# Patient Record
Sex: FEMALE | Race: WHITE | NOT HISPANIC OR LATINO | Employment: OTHER | URBAN - NONMETROPOLITAN AREA
[De-identification: names, ages, dates, MRNs, and addresses within clinical notes are randomized per-mention and may not be internally consistent; named-entity substitution may affect disease eponyms.]

---

## 2017-01-25 ENCOUNTER — TELEPHONE (OUTPATIENT)
Dept: GASTROENTEROLOGY | Facility: CLINIC | Age: 57
End: 2017-01-25

## 2017-01-25 NOTE — TELEPHONE ENCOUNTER
I set patient up with screening colonoscopy. Can you please place the order for the colonoscopy and the [facility]? Thank you.

## 2017-01-26 ENCOUNTER — TELEPHONE (OUTPATIENT)
Dept: GASTROENTEROLOGY | Facility: CLINIC | Age: 57
End: 2017-01-26

## 2017-01-26 DIAGNOSIS — Z12.11 COLON CANCER SCREENING: Primary | ICD-10-CM

## 2017-02-21 ENCOUNTER — ANESTHESIA EVENT (OUTPATIENT)
Dept: PERIOP | Facility: HOSPITAL | Age: 57
End: 2017-02-21

## 2017-02-21 ENCOUNTER — ANESTHESIA (OUTPATIENT)
Dept: PERIOP | Facility: HOSPITAL | Age: 57
End: 2017-02-21

## 2017-02-21 ENCOUNTER — HOSPITAL ENCOUNTER (OUTPATIENT)
Facility: HOSPITAL | Age: 57
Setting detail: HOSPITAL OUTPATIENT SURGERY
Discharge: HOME | End: 2017-02-21
Attending: INTERNAL MEDICINE | Admitting: INTERNAL MEDICINE

## 2017-02-21 VITALS
RESPIRATION RATE: 20 BRPM | BODY MASS INDEX: 47.48 KG/M2 | HEART RATE: 69 BPM | HEIGHT: 62 IN | WEIGHT: 258 LBS | OXYGEN SATURATION: 96 % | SYSTOLIC BLOOD PRESSURE: 100 MMHG | DIASTOLIC BLOOD PRESSURE: 59 MMHG | TEMPERATURE: 97.8 F

## 2017-02-21 LAB — GLUCOSE BLDC GLUCOMTR-MCNC: 195 MG/DL (ref 70–130)

## 2017-02-21 PROCEDURE — 82962 GLUCOSE BLOOD TEST: CPT

## 2017-02-21 PROCEDURE — 25010000002 PROPOFOL 1000 MG/ML EMULSION: Performed by: NURSE ANESTHETIST, CERTIFIED REGISTERED

## 2017-02-21 PROCEDURE — 25010000002 PROPOFOL 10 MG/ML EMULSION: Performed by: NURSE ANESTHETIST, CERTIFIED REGISTERED

## 2017-02-21 PROCEDURE — G0121 COLON CA SCRN NOT HI RSK IND: HCPCS | Performed by: INTERNAL MEDICINE

## 2017-02-21 RX ORDER — IPRATROPIUM BROMIDE AND ALBUTEROL SULFATE 2.5; .5 MG/3ML; MG/3ML
3 SOLUTION RESPIRATORY (INHALATION) ONCE AS NEEDED
Status: DISCONTINUED | OUTPATIENT
Start: 2017-02-21 | End: 2017-02-21 | Stop reason: HOSPADM

## 2017-02-21 RX ORDER — OXYCODONE HYDROCHLORIDE AND ACETAMINOPHEN 5; 325 MG/1; MG/1
1 TABLET ORAL ONCE AS NEEDED
Status: DISCONTINUED | OUTPATIENT
Start: 2017-02-21 | End: 2017-02-21 | Stop reason: HOSPADM

## 2017-02-21 RX ORDER — SODIUM CHLORIDE 0.9 % (FLUSH) 0.9 %
1-10 SYRINGE (ML) INJECTION AS NEEDED
Status: DISCONTINUED | OUTPATIENT
Start: 2017-02-21 | End: 2017-02-21 | Stop reason: HOSPADM

## 2017-02-21 RX ORDER — LIDOCAINE HYDROCHLORIDE 20 MG/ML
INJECTION, SOLUTION INFILTRATION; PERINEURAL AS NEEDED
Status: DISCONTINUED | OUTPATIENT
Start: 2017-02-21 | End: 2017-02-21 | Stop reason: SURG

## 2017-02-21 RX ORDER — MEPERIDINE HYDROCHLORIDE 25 MG/ML
12.5 INJECTION INTRAMUSCULAR; INTRAVENOUS; SUBCUTANEOUS
Status: DISCONTINUED | OUTPATIENT
Start: 2017-02-21 | End: 2017-02-21 | Stop reason: HOSPADM

## 2017-02-21 RX ORDER — FENTANYL CITRATE 50 UG/ML
50 INJECTION, SOLUTION INTRAMUSCULAR; INTRAVENOUS
Status: DISCONTINUED | OUTPATIENT
Start: 2017-02-21 | End: 2017-02-21 | Stop reason: HOSPADM

## 2017-02-21 RX ORDER — SODIUM CHLORIDE, SODIUM LACTATE, POTASSIUM CHLORIDE, CALCIUM CHLORIDE 600; 310; 30; 20 MG/100ML; MG/100ML; MG/100ML; MG/100ML
125 INJECTION, SOLUTION INTRAVENOUS CONTINUOUS
Status: DISCONTINUED | OUTPATIENT
Start: 2017-02-21 | End: 2017-02-21 | Stop reason: HOSPADM

## 2017-02-21 RX ORDER — PROPOFOL 10 MG/ML
VIAL (ML) INTRAVENOUS AS NEEDED
Status: DISCONTINUED | OUTPATIENT
Start: 2017-02-21 | End: 2017-02-21 | Stop reason: SURG

## 2017-02-21 RX ORDER — ONDANSETRON 2 MG/ML
4 INJECTION INTRAMUSCULAR; INTRAVENOUS ONCE AS NEEDED
Status: DISCONTINUED | OUTPATIENT
Start: 2017-02-21 | End: 2017-02-21 | Stop reason: HOSPADM

## 2017-02-21 RX ADMIN — PROPOFOL 175 MCG/KG/MIN: 10 INJECTION, EMULSION INTRAVENOUS at 10:10

## 2017-02-21 RX ADMIN — PROPOFOL 100 MG: 10 INJECTION, EMULSION INTRAVENOUS at 10:10

## 2017-02-21 RX ADMIN — SODIUM CHLORIDE, POTASSIUM CHLORIDE, SODIUM LACTATE AND CALCIUM CHLORIDE 125 ML/HR: 600; 310; 30; 20 INJECTION, SOLUTION INTRAVENOUS at 09:34

## 2017-02-21 RX ADMIN — SODIUM CHLORIDE, POTASSIUM CHLORIDE, SODIUM LACTATE AND CALCIUM CHLORIDE: 600; 310; 30; 20 INJECTION, SOLUTION INTRAVENOUS at 10:06

## 2017-02-21 RX ADMIN — LIDOCAINE HYDROCHLORIDE 60 MG: 20 INJECTION, SOLUTION INFILTRATION; PERINEURAL at 10:10

## 2017-02-21 NOTE — H&P
"History and physical examination  February 21, 2017    HPI  56-year-old white female presents for screening colonoscopy.  Her last colonoscopy was performed greater than 10 years ago.  Family history is positive for colon cancer (sister).      Review of Systems   Negative and noncontributory.    ACTIVE PROBLEMS:   Specialty Problems     None          PAST MEDICAL HISTORY:  Past Medical History   Diagnosis Date   • Arthritis    • Diabetes mellitus    • Dyslipidemia    • History of transfusion    • Hyperlipidemia    • Hypertension    • Palpitations    • PONV (postoperative nausea and vomiting)        SURGICAL HISTORY:  Past Surgical History   Procedure Laterality Date   • Cholecystectomy     • Hysterectomy     • Skin graft     • Tubal abdominal ligation         FAMILY HISTORY:  Family History   Problem Relation Age of Onset   • Cancer Mother    • Heart attack Father    • Cancer Father        SOCIAL HISTORY:  Social History   Substance Use Topics   • Smoking status: Never Smoker   • Smokeless tobacco: Not on file   • Alcohol use No       CURRENT MEDICATION:    Current Facility-Administered Medications:   •  lactated ringers infusion, 125 mL/hr, Intravenous, Continuous, Malachi Wen MD, Last Rate: 125 mL/hr at 02/21/17 0934, 125 mL/hr at 02/21/17 0934  •  sodium chloride 0.9 % flush 1-10 mL, 1-10 mL, Intravenous, PRN, Malachi Wen MD     I have reviewed her list of current medications.    ALLERGIES:  Augmentin [amoxicillin-pot clavulanate] and Metformin and related    VISIT VITALS:  Visit Vitals   • /76 (BP Location: Right arm, Patient Position: Lying)   • Pulse 72   • Temp 97.8 °F (36.6 °C) (Oral)   • Resp 20   • Ht 62\" (157.5 cm)   • Wt 258 lb (117 kg)   • SpO2 97%   • BMI 47.19 kg/m2       PHYSICAL EXAMINATION:  Physical Exam  Alert, oriented ×3  HEENT: Normal  Neck: No mass  Chest: Clear  Heart: Regular rhythm  Abdomen: Soft, nontender, active BS  Extremities: No edema  Neuro: No focal " deficit    Assessment/Plan   Colon cancer screening    REC  Screening colonoscopy is planned.  The procedure, benefits, risks and alternatives were explained to the patient.         Socrates Price III, MD

## 2017-02-21 NOTE — ANESTHESIA POSTPROCEDURE EVALUATION
Patient: Zeina Reed    Procedure Summary     Date Anesthesia Start Anesthesia Stop Room / Location    02/21/17 1006 1026 [facility] 10 / [facility]       Procedure Diagnosis Surgeon Provider    COLONOSCOPY FOR SCREENING  CPTCODE:32593 (N/A ) Colon cancer screening  (Colon cancer screening [Z12.11]) MD Malachi Garvey III, MD          Anesthesia Type: general  Last vitals  /46 (02/21/17 1040)    Temp 97.8 °F (36.6 °C) (02/21/17 1030)    Pulse 77 (02/21/17 1040)   Resp 20 (02/21/17 1040)    SpO2 97 % (02/21/17 1040)      Post Anesthesia Care and Evaluation    Patient location during evaluation: PHASE II  Patient participation: complete - patient participated  Level of consciousness: awake and alert  Pain score: 1  Pain management: adequate  Airway patency: patent  Anesthetic complications: No anesthetic complications  PONV Status: controlled  Cardiovascular status: acceptable  Respiratory status: acceptable  Hydration status: acceptable

## 2017-02-21 NOTE — PLAN OF CARE
Problem: Patient Care Overview (Adult)  Goal: Plan of Care Review  Outcome: Ongoing (interventions implemented as appropriate)  Goal: Adult Individualization and Mutuality  Outcome: Ongoing (interventions implemented as appropriate)  Goal: Discharge Needs Assessment  Outcome: Ongoing (interventions implemented as appropriate)    02/21/17 0842   Discharge Needs Assessment   Concerns To Be Addressed denies needs/concerns at this time   Readmission Within The Last 30 Days no previous admission in last 30 days   Equipment Needed After Discharge none   Discharge Disposition home or self-care   Current Health   Anticipated Changes Related to Illness none   Self-Care   Equipment Currently Used at Home none   Living Environment   Transportation Available car;family or friend will provide         Problem: GI Endoscopy (Adult)  Goal: Signs and Symptoms of Listed Potential Problems Will be Absent or Manageable (GI Endoscopy)  Outcome: Ongoing (interventions implemented as appropriate)    02/21/17 0842   GI Endoscopy   Problems Assessed (GI Endoscopy) all   Problems Present (GI Endoscopy) none

## 2017-02-21 NOTE — ANESTHESIA PREPROCEDURE EVALUATION
Anesthesia Evaluation     Patient summary reviewed and Nursing notes reviewed   no history of anesthetic complications:  NPO Status: > 8 hours   Airway   Mallampati: II  TM distance: <3 FB  Neck ROM: full  no difficulty expected  Dental    (+) upper dentures and poor dentation    Pulmonary - negative pulmonary ROS and normal exam   (-) asthma, not a smoker  Cardiovascular - normal exam  Exercise tolerance: good (4-7 METS)    NYHA Classification: II  Patient on routine beta blocker and Beta blocker given within 24 hours of surgery    (+) hypertension, CAD, dysrhythmias,   (-) past MI, angina, CHF      Neuro/Psych  (+) dizziness/light headedness,    (-) seizures, CVA  GI/Hepatic/Renal/Endo    (+) morbid obesity, GERD, diabetes mellitus,   (-) hypothyroidism    Musculoskeletal     (-) back pain, neck pain, radiculopathy  Abdominal  - normal exam    Bowel sounds: normal.   Substance History - negative use     OB/GYN negative ob/gyn ROS         Other   (+) arthritis                                 Anesthesia Plan    ASA 3     general     intravenous induction   Anesthetic plan and risks discussed with patient and spouse/significant other.  Use of blood products discussed with patient and spouse/significant other  Consented to blood products.

## 2017-02-21 NOTE — OP NOTE
02/21/17    COLONOSCOPY FOR SCREENING  Procedure Note    Zeina Reed  2/21/2017    Pre-op Diagnosis: Family history of colon cancer, colon cancer screening, last colonoscopy was performed greater than 10 years ago      Post-op Diagnosis: Normal colonoscopy        Anesthesia: Per Anesthesia service, general anesthesia      Estimated Blood Loss: None      Findings: Normal rectal examination.  Colonoscopy was performed to the cecum, confirmed by identification of typical cecal anatomy.  Bowel preparation was fair but generally adequate .  The scope was retroflexed within the rectum.  All areas were examined carefully and no abnormality was seen.  She tolerated the examination very well and no complications were encountered.  She left the OR in stable and satisfactory condition.      Complications: None      Recommendations:   Findings discussed with the patient  Repeat screening/surveillance colonoscopy in about 5 years      Socrates Price III, MD     Date: 2/21/2017  Time: 10:27 AM

## 2017-04-18 ENCOUNTER — TRANSCRIBE ORDERS (OUTPATIENT)
Dept: ADMINISTRATIVE | Facility: HOSPITAL | Age: 57
End: 2017-04-18

## 2017-04-18 ENCOUNTER — LAB (OUTPATIENT)
Dept: LAB | Facility: HOSPITAL | Age: 57
End: 2017-04-18

## 2017-04-18 DIAGNOSIS — E11.9 DIABETES MELLITUS WITHOUT COMPLICATION (HCC): ICD-10-CM

## 2017-04-18 DIAGNOSIS — E78.5 HYPERLIPIDEMIA, UNSPECIFIED HYPERLIPIDEMIA TYPE: ICD-10-CM

## 2017-04-18 DIAGNOSIS — E11.9 DIABETES MELLITUS WITHOUT COMPLICATION (HCC): Primary | ICD-10-CM

## 2017-04-18 LAB
ALBUMIN SERPL-MCNC: 3.6 G/DL (ref 3.5–5)
ALBUMIN/GLOB SERPL: 1.4 G/DL (ref 1.5–2.5)
ALP SERPL-CCNC: 85 U/L (ref 35–104)
ALT SERPL W P-5'-P-CCNC: 35 U/L (ref 10–36)
ANION GAP SERPL CALCULATED.3IONS-SCNC: 4.6 MMOL/L (ref 3.6–11.2)
AST SERPL-CCNC: 20 U/L (ref 10–30)
BILIRUB SERPL-MCNC: 1.2 MG/DL (ref 0.2–1.8)
BUN BLD-MCNC: 9 MG/DL (ref 7–21)
BUN/CREAT SERPL: 18.8 (ref 7–25)
CALCIUM SPEC-SCNC: 9 MG/DL (ref 7.7–10)
CHLORIDE SERPL-SCNC: 106 MMOL/L (ref 99–112)
CHOLEST SERPL-MCNC: 147 MG/DL (ref 0–200)
CO2 SERPL-SCNC: 32.4 MMOL/L (ref 24.3–31.9)
CREAT BLD-MCNC: 0.48 MG/DL (ref 0.43–1.29)
GFR SERPL CREATININE-BSD FRML MDRD: 134 ML/MIN/1.73
GLOBULIN UR ELPH-MCNC: 2.6 GM/DL
GLUCOSE BLD-MCNC: 211 MG/DL (ref 70–110)
HBA1C MFR BLD: 8.9 % (ref 4.5–5.7)
HDLC SERPL-MCNC: 41 MG/DL (ref 60–100)
LDLC SERPL CALC-MCNC: 53 MG/DL (ref 0–100)
LDLC/HDLC SERPL: 1.29 {RATIO}
OSMOLALITY SERPL CALC.SUM OF ELEC: 289.9 MOSM/KG (ref 273–305)
POTASSIUM BLD-SCNC: 3.8 MMOL/L (ref 3.5–5.3)
PROT SERPL-MCNC: 6.2 G/DL (ref 6–8)
SODIUM BLD-SCNC: 143 MMOL/L (ref 135–153)
T3RU NFR SERPL: 35.2 % (ref 22.5–37)
T4 SERPL-MCNC: 9.4 MCG/DL (ref 4.5–10.9)
TRIGL SERPL-MCNC: 266 MG/DL (ref 0–150)
TSH SERPL DL<=0.05 MIU/L-ACNC: 2 MIU/ML (ref 0.55–4.78)
VLDLC SERPL-MCNC: 53.2 MG/DL

## 2017-04-18 PROCEDURE — 80053 COMPREHEN METABOLIC PANEL: CPT

## 2017-04-18 PROCEDURE — 84479 ASSAY OF THYROID (T3 OR T4): CPT

## 2017-04-18 PROCEDURE — 84443 ASSAY THYROID STIM HORMONE: CPT

## 2017-04-18 PROCEDURE — 80061 LIPID PANEL: CPT

## 2017-04-18 PROCEDURE — 84436 ASSAY OF TOTAL THYROXINE: CPT

## 2017-04-18 PROCEDURE — 83036 HEMOGLOBIN GLYCOSYLATED A1C: CPT

## 2017-04-18 PROCEDURE — 36415 COLL VENOUS BLD VENIPUNCTURE: CPT

## 2017-04-18 RX ORDER — PRAVASTATIN SODIUM 20 MG
20 TABLET ORAL DAILY
Qty: 30 TABLET | Refills: 2 | Status: CANCELLED | OUTPATIENT
Start: 2017-04-18

## 2017-09-06 ENCOUNTER — OFFICE VISIT (OUTPATIENT)
Dept: CARDIOLOGY | Facility: CLINIC | Age: 57
End: 2017-09-06

## 2017-09-06 VITALS
DIASTOLIC BLOOD PRESSURE: 81 MMHG | BODY MASS INDEX: 45.27 KG/M2 | OXYGEN SATURATION: 95 % | RESPIRATION RATE: 16 BRPM | WEIGHT: 246 LBS | HEART RATE: 75 BPM | SYSTOLIC BLOOD PRESSURE: 133 MMHG | HEIGHT: 62 IN

## 2017-09-06 DIAGNOSIS — E78.5 DYSLIPIDEMIA: ICD-10-CM

## 2017-09-06 DIAGNOSIS — R00.2 PALPITATIONS: Primary | ICD-10-CM

## 2017-09-06 DIAGNOSIS — I10 ESSENTIAL HYPERTENSION: ICD-10-CM

## 2017-09-06 PROCEDURE — 99213 OFFICE O/P EST LOW 20 MIN: CPT | Performed by: INTERNAL MEDICINE

## 2017-09-06 PROCEDURE — 93000 ELECTROCARDIOGRAM COMPLETE: CPT | Performed by: INTERNAL MEDICINE

## 2017-09-06 RX ORDER — OMEGA-3-ACID ETHYL ESTERS 1 G/1
2 CAPSULE, LIQUID FILLED ORAL 2 TIMES DAILY
Qty: 120 CAPSULE | Refills: 5 | Status: ON HOLD | OUTPATIENT
Start: 2017-09-06 | End: 2021-07-10

## 2017-09-06 NOTE — PROGRESS NOTES
MAURO Pulliam  Zeina Reed  1960      Patient Active Problem List   Diagnosis   • Palpitations   • Essential hypertension   • Dyslipidemia   • Dizziness       Dear MAURO Pulliam:    Subjective     Zeina Reed is a 56 y.o. female with the above medical problems who is here today for follow-up. According to the patient he she has been having episodes of palpitations since her last visit.  She states his palpitations occur frequently and she has been checking her heart rate with a faint bid which showed her pulse rises to the 120s occasionally at rest.  She says this is associated with shortness of breath.  She states the episodes occur approximately once or twice per week and can last for 10-15 minutes.  She also continues to complain of occasional dizziness.      Current Outpatient Prescriptions:   •  aspirin 81 MG EC tablet, Take 81 mg by mouth daily., Disp: , Rfl:   •  Calcium Carb-Cholecalciferol (CALCIUM PLUS VITAMIN D3 PO), Take 600 mg by mouth daily., Disp: , Rfl:   •  Cholecalciferol (VITAMIN D3) 2000 UNITS tablet, Take  by mouth daily., Disp: , Rfl:   •  Exenatide ER (BYDUREON) 2 MG pen-injector, Inject 2 mg (1 pen) under the skin once Every 7 (Seven) Days., Disp: 4 each, Rfl: 6  •  FREESTYLE TEST STRIPS test strip, Use as instructed, Disp: 50 each, Rfl: 9  •  ibuprofen (ADVIL,MOTRIN) 800 MG tablet, Take 800 mg by mouth every 6 (six) hours as needed for mild pain (1-3)., Disp: , Rfl:   •  JANUMET -1000 MG tablet sustained-release 24 hour, Take 1 tablet by mouth Daily., Disp: 30 tablet, Rfl: 12  •  JARDIANCE 25 MG tablet, Take 1 tablet by mouth daily., Disp: 30 tablet, Rfl: 12  •  lisinopril-hydrochlorothiazide (PRINZIDE,ZESTORETIC) 20-25 MG per tablet, Take 1 tablet by mouth Daily., Disp: 90 tablet, Rfl: 3  •  metoprolol tartrate (LOPRESSOR) 50 MG tablet, Take 1 tablet by mouth 2 (Two) Times a Day., Disp: 60 tablet, Rfl: 11  •  pravastatin (PRAVACHOL) 20 MG tablet, Take 1  tablet by mouth Daily., Disp: 90 tablet, Rfl: 2  •  SITagliptin-MetFORMIN HCl ER (JANUMET XR)  MG tablet sustained-release 24 hour, Take 2 tablets by mouth Daily., Disp: 60 tablet, Rfl: 7  •  cephalexin (KEFLEX) 500 MG capsule, Take 1 capsule by mouth 2 (Two) Times a Day., Disp: 20 capsule, Rfl: 0  •  Empagliflozin 25 MG tablet, Take 1 tablet by mouth Daily., Disp: 30 tablet, Rfl: 12  •  JARDIANCE 25 MG tablet, Take 1 tablet by mouth Daily., Disp: 30 tablet, Rfl: 12  •  guaiFENesin (MUCINEX) 600 MG 12 hr tablet, Take 1,200 mg by mouth 2 (two) times a day., Disp: , Rfl:   •  lisinopril-hydrochlorothiazide (ZESTORETIC) 10-12.5 MG per tablet, Take 1 tablet by mouth Daily., Disp: 30 tablet, Rfl: 5  •  loratadine (ALLERGY) 10 MG tablet, Take 10 mg by mouth daily., Disp: , Rfl:   •  Mirabegron ER (MYRBETRIQ) 50 MG tablet sustained-release 24 hour 24 hr tablet, Take 1 Tablet by mouth Daily for hypertonicity of bladder., Disp: 30 tablet, Rfl: 10  •  Multiple Vitamin (MULTI VITAMIN DAILY PO), Take  by mouth daily., Disp: , Rfl:   •  omega-3 acid ethyl esters (LOVAZA) 1 G capsule, Take 2 capsules by mouth 2 (Two) Times a Day., Disp: 120 capsule, Rfl: 5  •  Omega-3 Fatty Acids (FISH OIL) 1000 MG capsule capsule, Take 1 capsule by mouth 2 (two) times a day., Disp: 60 capsule, Rfl: 2  •  pantoprazole (PROTONIX) 40 MG EC tablet, Take 1 tablet by mouth Daily., Disp: 90 tablet, Rfl: 3  •  permethrin (ELIMITE) 5 % cream, Apply as Directed and leave on for 8 to 14 Hours then Wash Off and Repeat in 7 Days., Disp: 60 g, Rfl: 1  •  polyethylene glycol (GoLYTELY) 236 G solution, Starting at 6 pm on Day Prior to Procedure. Drink 8 Ounces Every 15 Minutes until all gone or stools are clear., Disp: 4000 mL, Rfl: 0  •  vitamin C (ASCORBIC ACID) 500 MG tablet, Take 1,000 mg by mouth daily., Disp: , Rfl:     The following portions of the patient's history were reviewed and updated as appropriate: allergies, current medications, past  "family history, past medical history, past social history, past surgical history and problem list.    Review of Systems   Constitution: Negative for chills, diaphoresis and fever.   HENT: Negative for congestion, ear pain, hearing loss and sore throat.    Eyes: Negative for blurred vision, pain and redness.   Cardiovascular: Positive for dyspnea on exertion and palpitations. Negative for chest pain, irregular heartbeat, leg swelling, near-syncope, orthopnea and paroxysmal nocturnal dyspnea.   Respiratory: Negative for cough, hemoptysis and shortness of breath.    Endocrine: Negative for cold intolerance and heat intolerance.   Hematologic/Lymphatic: Does not bruise/bleed easily.   Skin: Negative for rash.   Musculoskeletal: Positive for arthritis, back pain, joint swelling, muscle cramps and stiffness. Negative for myalgias.   Gastrointestinal: Negative for abdominal pain, constipation, diarrhea, hematemesis, hematochezia, melena, nausea and vomiting.   Genitourinary: Negative for dysuria and hematuria.   Neurological: Positive for dizziness and light-headedness. Negative for focal weakness and numbness.   Psychiatric/Behavioral: Negative for depression. The patient is nervous/anxious.        Objective   Blood pressure 133/81, pulse 75, resp. rate 16, height 62\" (157.5 cm), weight 246 lb (112 kg), SpO2 95 %.    Physical Exam   Constitutional: She is oriented to person, place, and time. She appears well-developed and well-nourished.   Morbidly obese WF sitting comfortably on chair.   HENT:   Mouth/Throat: Oropharynx is clear and moist.   Eyes: EOM are normal. Pupils are equal, round, and reactive to light.   Neck: Neck supple. No JVD present. No tracheal deviation present. No thyromegaly present.   Cardiovascular: Normal rate, regular rhythm, S1 normal and S2 normal.  Exam reveals no gallop and no friction rub.    No murmur heard.  Pulmonary/Chest: Effort normal and breath sounds normal. No respiratory distress. She " has no wheezes. She has no rales.   Abdominal: Soft. Bowel sounds are normal. She exhibits no mass. There is no tenderness.   Protuberant.   Musculoskeletal: Normal range of motion. She exhibits no edema.   Lymphadenopathy:     She has no cervical adenopathy.   Neurological: She is alert and oriented to person, place, and time.   Skin: Skin is warm and dry. No rash noted.   Psychiatric: She has a normal mood and affect.         ECG 12 Lead  Date/Time: 9/6/2017 11:35 AM  Performed by: LICHA SHERMAN  Authorized by: LICHA SHERMAN   Comparison: compared with previous ECG from 9/16/2016  Similar to previous ECG  Rhythm: sinus rhythm  Rate: normal  BPM: 68  Conduction: conduction normal  ST Segments: ST segments normal  T Waves: T waves normal  QRS axis: normal  Other: no other findings  Clinical impression: non-specific ECG and low voltage            Assessment/Plan      1.  Palpitations: According to the patient she has continued to present with palpitations and is now complaining of tachycardia noted on her fifth visit.  This point we'll evaluate further with a 24-hour Holter.  We will consider increasing her beta blocker further if an arrhythmia is noted.    2.  Hypertension: Patient with a history of hypertension but appears to be well-controlled at this point.  We will continue current regimen.    3.  Dyslipidemia: Patient with history of dyslipidemia with recent lipid panel showing adequate LDL controlled but elevated triglycerides. She has been intermittently compliant with fish oil. I advised her of the importance of compliance. Will check lipid panel on next visit.         Diagnosis Plan   1. Palpitations  Holter Monitor - 24 Hour   2. Essential hypertension     3. Dyslipidemia              Return in about 3 months (around 12/6/2017).    I appreciate the opportunity to participate in this patient's cardiovascular care.    Best Regards    Licha Dinh

## 2017-09-08 ENCOUNTER — HOSPITAL ENCOUNTER (OUTPATIENT)
Dept: RESPIRATORY THERAPY | Facility: HOSPITAL | Age: 57
Discharge: HOME | End: 2017-09-08
Attending: INTERNAL MEDICINE | Admitting: INTERNAL MEDICINE

## 2017-09-08 DIAGNOSIS — R00.2 PALPITATIONS: ICD-10-CM

## 2017-09-08 PROCEDURE — 93226 XTRNL ECG REC<48 HR SCAN A/R: CPT

## 2017-09-08 PROCEDURE — 93225 XTRNL ECG REC<48 HRS REC: CPT

## 2017-09-08 PROCEDURE — 93227 XTRNL ECG REC<48 HR R&I: CPT | Performed by: INTERNAL MEDICINE

## 2017-09-29 RX ORDER — METOPROLOL TARTRATE 50 MG/1
50 TABLET, FILM COATED ORAL 2 TIMES DAILY
Qty: 60 TABLET | Refills: 3 | Status: SHIPPED | OUTPATIENT
Start: 2017-09-29 | End: 2018-01-08

## 2018-01-08 RX ORDER — METOPROLOL TARTRATE 50 MG/1
50 TABLET, FILM COATED ORAL
Qty: 60 TABLET | Refills: 6 | Status: SHIPPED | OUTPATIENT
Start: 2018-01-08 | End: 2018-10-18 | Stop reason: SDUPTHER

## 2018-03-15 RX ORDER — OMEGA-3-ACID ETHYL ESTERS 1 G/1
2 CAPSULE, LIQUID FILLED ORAL
Qty: 120 CAPSULE | Refills: 5 | Status: CANCELLED | OUTPATIENT
Start: 2018-03-15

## 2018-03-16 RX ORDER — OMEGA-3-ACID ETHYL ESTERS 1 G/1
2 CAPSULE, LIQUID FILLED ORAL
Qty: 120 CAPSULE | Refills: 5 | OUTPATIENT
Start: 2018-03-16

## 2018-03-16 RX ORDER — CHLORAL HYDRATE 500 MG
1000 CAPSULE ORAL 2 TIMES DAILY WITH MEALS
Qty: 60 CAPSULE | Refills: 1 | Status: SHIPPED | OUTPATIENT
Start: 2018-03-16 | End: 2018-08-30 | Stop reason: SDUPTHER

## 2018-03-16 RX ORDER — CHLORAL HYDRATE 500 MG
1000 CAPSULE ORAL 2 TIMES DAILY WITH MEALS
Qty: 60 CAPSULE | Refills: 1 | Status: CANCELLED | OUTPATIENT
Start: 2018-03-16

## 2018-03-27 ENCOUNTER — TRANSCRIBE ORDERS (OUTPATIENT)
Dept: ADMINISTRATIVE | Facility: HOSPITAL | Age: 58
End: 2018-03-27

## 2018-03-27 ENCOUNTER — HOSPITAL ENCOUNTER (OUTPATIENT)
Dept: ULTRASOUND IMAGING | Facility: HOSPITAL | Age: 58
Discharge: HOME | End: 2018-03-27
Admitting: NURSE PRACTITIONER

## 2018-03-27 ENCOUNTER — LAB (OUTPATIENT)
Dept: LAB | Facility: HOSPITAL | Age: 58
End: 2018-03-27

## 2018-03-27 DIAGNOSIS — R10.9 ACUTE ABDOMINAL PAIN: ICD-10-CM

## 2018-03-27 DIAGNOSIS — E11.9 DIABETES MELLITUS, STABLE (HCC): Primary | ICD-10-CM

## 2018-03-27 DIAGNOSIS — I10 ESSENTIAL HYPERTENSION, BENIGN: ICD-10-CM

## 2018-03-27 DIAGNOSIS — E11.9 DIABETES MELLITUS, STABLE (HCC): ICD-10-CM

## 2018-03-27 DIAGNOSIS — R10.9 ACUTE ABDOMINAL PAIN: Primary | ICD-10-CM

## 2018-03-27 LAB
ALBUMIN SERPL-MCNC: 3.9 G/DL (ref 3.5–5)
ALBUMIN/GLOB SERPL: 1.2 G/DL (ref 1.5–2.5)
ALP SERPL-CCNC: 98 U/L (ref 35–104)
ALT SERPL W P-5'-P-CCNC: 29 U/L (ref 10–36)
AMYLASE SERPL-CCNC: 39 U/L (ref 28–100)
ANION GAP SERPL CALCULATED.3IONS-SCNC: 9.9 MMOL/L (ref 3.6–11.2)
AST SERPL-CCNC: 20 U/L (ref 10–30)
BASOPHILS # BLD AUTO: 0.04 10*3/MM3 (ref 0–0.3)
BASOPHILS NFR BLD AUTO: 0.4 % (ref 0–2)
BILIRUB SERPL-MCNC: 1.5 MG/DL (ref 0.2–1.8)
BUN BLD-MCNC: 15 MG/DL (ref 7–21)
BUN/CREAT SERPL: 21.7 (ref 7–25)
CALCIUM SPEC-SCNC: 9.9 MG/DL (ref 7.7–10)
CHLORIDE SERPL-SCNC: 105 MMOL/L (ref 99–112)
CO2 SERPL-SCNC: 25.1 MMOL/L (ref 24.3–31.9)
CREAT BLD-MCNC: 0.69 MG/DL (ref 0.43–1.29)
D-LACTATE SERPL-SCNC: 1.2 MMOL/L (ref 0.5–2)
DEPRECATED RDW RBC AUTO: 44.7 FL (ref 37–54)
EOSINOPHIL # BLD AUTO: 0.2 10*3/MM3 (ref 0–0.7)
EOSINOPHIL NFR BLD AUTO: 1.9 % (ref 0–5)
ERYTHROCYTE [DISTWIDTH] IN BLOOD BY AUTOMATED COUNT: 13.6 % (ref 11.5–14.5)
GFR SERPL CREATININE-BSD FRML MDRD: 88 ML/MIN/1.73
GLOBULIN UR ELPH-MCNC: 3.3 GM/DL
GLUCOSE BLD-MCNC: 187 MG/DL (ref 70–110)
HCT VFR BLD AUTO: 48.7 % (ref 37–47)
HGB BLD-MCNC: 16.3 G/DL (ref 12–16)
IMM GRANULOCYTES # BLD: 0.02 10*3/MM3 (ref 0–0.03)
IMM GRANULOCYTES NFR BLD: 0.2 % (ref 0–0.5)
LIPASE SERPL-CCNC: 40 U/L (ref 13–60)
LYMPHOCYTES # BLD AUTO: 2.1 10*3/MM3 (ref 1–3)
LYMPHOCYTES NFR BLD AUTO: 20.1 % (ref 21–51)
MCH RBC QN AUTO: 30.2 PG (ref 27–33)
MCHC RBC AUTO-ENTMCNC: 33.5 G/DL (ref 33–37)
MCV RBC AUTO: 90.4 FL (ref 80–94)
MONOCYTES # BLD AUTO: 0.97 10*3/MM3 (ref 0.1–0.9)
MONOCYTES NFR BLD AUTO: 9.3 % (ref 0–10)
NEUTROPHILS # BLD AUTO: 7.1 10*3/MM3 (ref 1.4–6.5)
NEUTROPHILS NFR BLD AUTO: 68.1 % (ref 30–70)
OSMOLALITY SERPL CALC.SUM OF ELEC: 285.1 MOSM/KG (ref 273–305)
PLATELET # BLD AUTO: 311 10*3/MM3 (ref 130–400)
PMV BLD AUTO: 10.9 FL (ref 6–10)
POTASSIUM BLD-SCNC: 3.8 MMOL/L (ref 3.5–5.3)
PROT SERPL-MCNC: 7.2 G/DL (ref 6–8)
RBC # BLD AUTO: 5.39 10*6/MM3 (ref 4.2–5.4)
SODIUM BLD-SCNC: 140 MMOL/L (ref 135–153)
WBC NRBC COR # BLD: 10.43 10*3/MM3 (ref 4.5–12.5)

## 2018-03-27 PROCEDURE — 76700 US EXAM ABDOM COMPLETE: CPT

## 2018-03-27 PROCEDURE — 36415 COLL VENOUS BLD VENIPUNCTURE: CPT

## 2018-03-27 PROCEDURE — 85025 COMPLETE CBC W/AUTO DIFF WBC: CPT

## 2018-03-27 PROCEDURE — 76700 US EXAM ABDOM COMPLETE: CPT | Performed by: RADIOLOGY

## 2018-03-27 PROCEDURE — 83690 ASSAY OF LIPASE: CPT

## 2018-03-27 PROCEDURE — 83605 ASSAY OF LACTIC ACID: CPT

## 2018-03-27 PROCEDURE — 80053 COMPREHEN METABOLIC PANEL: CPT

## 2018-03-27 PROCEDURE — 82150 ASSAY OF AMYLASE: CPT

## 2018-04-02 ENCOUNTER — TRANSCRIBE ORDERS (OUTPATIENT)
Dept: ADMINISTRATIVE | Facility: HOSPITAL | Age: 58
End: 2018-04-02

## 2018-04-02 DIAGNOSIS — R10.9 ACUTE ABDOMINAL PAIN: Primary | ICD-10-CM

## 2018-04-04 ENCOUNTER — APPOINTMENT (OUTPATIENT)
Dept: ULTRASOUND IMAGING | Facility: HOSPITAL | Age: 58
End: 2018-04-04

## 2018-08-30 RX ORDER — CHLORAL HYDRATE 500 MG
1000 CAPSULE ORAL 2 TIMES DAILY WITH MEALS
Qty: 60 CAPSULE | Refills: 0 | Status: SHIPPED | OUTPATIENT
Start: 2018-08-30 | End: 2018-11-26 | Stop reason: SDUPTHER

## 2018-10-08 RX ORDER — CHLORAL HYDRATE 500 MG
1000 CAPSULE ORAL 2 TIMES DAILY WITH MEALS
Qty: 60 CAPSULE | Refills: 0 | Status: CANCELLED | OUTPATIENT
Start: 2018-10-08

## 2018-10-08 RX ORDER — METOPROLOL TARTRATE 50 MG/1
50 TABLET, FILM COATED ORAL
Qty: 60 TABLET | Refills: 6 | Status: CANCELLED | OUTPATIENT
Start: 2018-10-08

## 2018-10-18 RX ORDER — METOPROLOL TARTRATE 50 MG/1
50 TABLET, FILM COATED ORAL
Qty: 60 TABLET | Refills: 0 | Status: SHIPPED | OUTPATIENT
Start: 2018-10-18 | End: 2018-11-21 | Stop reason: SDUPTHER

## 2018-10-18 NOTE — TELEPHONE ENCOUNTER
Pt called requesting refill for Metoprolol 25 mg bid.  She has appt to see us 11/26/18, I will send her in a 30 day supply of this med.  I made her aware we cannot fill anything after this due to her not being seen since 9/17. She states she will make sure to keep this appt.

## 2018-11-21 RX ORDER — METOPROLOL TARTRATE 50 MG/1
50 TABLET, FILM COATED ORAL
Qty: 60 TABLET | Refills: 0 | Status: SHIPPED | OUTPATIENT
Start: 2018-11-21 | End: 2018-11-26

## 2018-11-26 ENCOUNTER — OFFICE VISIT (OUTPATIENT)
Dept: CARDIOLOGY | Facility: CLINIC | Age: 58
End: 2018-11-26

## 2018-11-26 VITALS
HEIGHT: 62 IN | WEIGHT: 244 LBS | SYSTOLIC BLOOD PRESSURE: 127 MMHG | HEART RATE: 69 BPM | DIASTOLIC BLOOD PRESSURE: 74 MMHG | OXYGEN SATURATION: 98 % | BODY MASS INDEX: 44.9 KG/M2

## 2018-11-26 DIAGNOSIS — I10 ESSENTIAL HYPERTENSION: ICD-10-CM

## 2018-11-26 DIAGNOSIS — R00.2 PALPITATIONS: Primary | ICD-10-CM

## 2018-11-26 DIAGNOSIS — E78.5 DYSLIPIDEMIA: ICD-10-CM

## 2018-11-26 PROCEDURE — 93000 ELECTROCARDIOGRAM COMPLETE: CPT | Performed by: PHYSICIAN ASSISTANT

## 2018-11-26 PROCEDURE — 99213 OFFICE O/P EST LOW 20 MIN: CPT | Performed by: PHYSICIAN ASSISTANT

## 2018-11-26 RX ORDER — CHLORAL HYDRATE 500 MG
1000 CAPSULE ORAL 2 TIMES DAILY WITH MEALS
Qty: 60 CAPSULE | Refills: 6 | Status: ON HOLD | OUTPATIENT
Start: 2018-11-26 | End: 2021-07-10

## 2018-11-26 RX ORDER — METOPROLOL TARTRATE 50 MG/1
50 TABLET, FILM COATED ORAL
Qty: 120 TABLET | Refills: 6 | Status: SHIPPED | OUTPATIENT
Start: 2018-11-26 | End: 2020-01-31 | Stop reason: SDUPTHER

## 2018-11-26 NOTE — PROGRESS NOTES
Rachel Savage APRN  Zeina Reed  1960  11/26/2018    Patient Active Problem List   Diagnosis   • Palpitations   • Essential hypertension   • Dyslipidemia   • Dizziness       Dear Rachel Savage APRN:    Subjective       History of Present Illness:    Chief Complaint   Patient presents with   • Palpitations       Zeina Reed is a pleasant 58 y.o. female with a past medical history significant for essential hypertension, dyslipidemia, and history of palpitations.  Patient comes in for routine cardiology follow-up.    Patient states that she's been doing well.  She denies any chest pain, shortness of breath, weakness, fatigue, dizziness, or syncope.  Patient states her blood pressure is been running well at home and states she is simply here for medication refills.      Allergies   Allergen Reactions   • Augmentin [Amoxicillin-Pot Clavulanate] Diarrhea   • Metformin And Related Diarrhea   :      Current Outpatient Medications:   •  aspirin 81 MG EC tablet, Take 81 mg by mouth daily., Disp: , Rfl:   •  Cholecalciferol (VITAMIN D3) 2000 UNITS tablet, Take  by mouth daily., Disp: , Rfl:   •  FREESTYLE TEST STRIPS test strip, Use as instructed, Disp: 50 each, Rfl: 9  •  ibuprofen (ADVIL,MOTRIN) 800 MG tablet, Take 800 mg by mouth every 6 (six) hours as needed for mild pain (1-3)., Disp: , Rfl:   •  JANUMET -1000 MG tablet sustained-release 24 hour, Take 1 tablet by mouth Daily., Disp: 30 tablet, Rfl: 12  •  lisinopril-hydrochlorothiazide (PRINZIDE,ZESTORETIC) 20-25 MG per tablet, Take 1 tablet by mouth Daily., Disp: 90 tablet, Rfl: 3  •  metoprolol tartrate (LOPRESSOR) 50 MG tablet, Take 1 tablet by mouth 2 (Two) Times a Day., Disp: 120 tablet, Rfl: 6  •  omega-3 acid ethyl esters (LOVAZA) 1 g capsule, Take 2 capsules by mouth 2 (Two) Times a Day., Disp: 120 capsule, Rfl: 5  •  Omega-3 Fatty Acids (FISH OIL) 1000 MG capsule capsule, Take 1 capsule by mouth 2 (Two) Times a Day With Meals., Disp:  60 capsule, Rfl: 6  •  pravastatin (PRAVACHOL) 20 MG tablet, Take 1 tablet by mouth Daily., Disp: 90 tablet, Rfl: 2  •  SITagliptin-MetFORMIN HCl ER (JANUMET XR)  MG tablet, Take 2 tablets by mouth Daily., Disp: 60 tablet, Rfl: 7  •  vitamin C (ASCORBIC ACID) 500 MG tablet, Take 1,000 mg by mouth daily., Disp: , Rfl:   •  Calcium Carb-Cholecalciferol (CALCIUM PLUS VITAMIN D3 PO), Take 600 mg by mouth daily., Disp: , Rfl:   •  cephalexin (KEFLEX) 500 MG capsule, Take 1 capsule by mouth 2 (Two) Times a Day., Disp: 20 capsule, Rfl: 0  •  Exenatide ER (BYDUREON) 2 MG pen-injector, Inject 2 mg (1 pen) under the skin once Every 7 (Seven) Days., Disp: 4 each, Rfl: 6  •  guaiFENesin (MUCINEX) 600 MG 12 hr tablet, Take 1,200 mg by mouth 2 (two) times a day., Disp: , Rfl:   •  lisinopril-hydrochlorothiazide (ZESTORETIC) 10-12.5 MG per tablet, Take 1 tablet by mouth Daily., Disp: 30 tablet, Rfl: 5  •  loratadine (ALLERGY) 10 MG tablet, Take 10 mg by mouth daily., Disp: , Rfl:   •  Mirabegron ER (MYRBETRIQ) 50 MG tablet sustained-release 24 hour 24 hr tablet, Take 1 Tablet by mouth Daily for hypertonicity of bladder., Disp: 30 tablet, Rfl: 10  •  Multiple Vitamin (MULTI VITAMIN DAILY PO), Take  by mouth daily., Disp: , Rfl:   •  pantoprazole (PROTONIX) 40 MG EC tablet, Take 1 tablet by mouth Daily., Disp: 90 tablet, Rfl: 3  •  permethrin (ELIMITE) 5 % cream, Apply as Directed and leave on for 8 to 14 Hours then Wash Off and Repeat in 7 Days., Disp: 60 g, Rfl: 1  •  polyethylene glycol (GoLYTELY) 236 G solution, Starting at 6 pm on Day Prior to Procedure. Drink 8 Ounces Every 15 Minutes until all gone or stools are clear., Disp: 4000 mL, Rfl: 0      The following portions of the patient's history were reviewed and updated as appropriate: allergies, current medications, past family history, past medical history, past social history, past surgical history and problem list.    Social History     Tobacco Use   • Smoking  "status: Never Smoker   Substance Use Topics   • Alcohol use: No   • Drug use: No       Review of Systems   Constitution: Negative for weakness and malaise/fatigue.   Cardiovascular: Positive for dyspnea on exertion, leg swelling and palpitations. Negative for chest pain and irregular heartbeat.   Respiratory: Negative for cough and shortness of breath.    Hematologic/Lymphatic: Negative for bleeding problem. Does not bruise/bleed easily.   Gastrointestinal: Negative for nausea and vomiting.   Neurological: Negative for dizziness.       Objective   Vitals:    11/26/18 1421   BP: 127/74   BP Location: Right arm   Patient Position: Sitting   Cuff Size: Adult   Pulse: 69   SpO2: 98%   Weight: 111 kg (244 lb)   Height: 157.5 cm (62\")     Body mass index is 44.63 kg/m².        Physical Exam   Constitutional: She is oriented to person, place, and time. She appears well-developed and well-nourished. No distress.   HENT:   Head: Normocephalic and atraumatic.   Cardiovascular: Normal rate, regular rhythm, normal heart sounds and intact distal pulses.   Pulmonary/Chest: Effort normal and breath sounds normal. No respiratory distress.   Musculoskeletal: She exhibits no edema.   Neurological: She is alert and oriented to person, place, and time.   Skin: She is not diaphoretic.       Lab Results   Component Value Date     03/27/2018    K 3.8 03/27/2018     03/27/2018    CO2 25.1 03/27/2018    BUN 15 03/27/2018    CREATININE 0.69 03/27/2018    GLUCOSE 187 (H) 03/27/2018    CALCIUM 9.9 03/27/2018    AST 20 03/27/2018    ALT 29 03/27/2018    ALKPHOS 98 03/27/2018    LABIL2 1.4 (L) 02/12/2015     No results found for: CKTOTAL  Lab Results   Component Value Date    WBC 10.43 03/27/2018    HGB 16.3 (H) 03/27/2018    HCT 48.7 (H) 03/27/2018     03/27/2018     No results found for: INR  Lab Results   Component Value Date    MG 2.1 03/17/2015     Lab Results   Component Value Date    TSH 2.005 04/18/2017    CHLPL 205 " (H) 02/17/2016    TRIG 266 (H) 04/18/2017    HDL 41 (L) 04/18/2017    LDL 53 04/18/2017      No results found for: BNP    During this visit the following were done:  Labs Reviewed [x]    Labs Ordered []    Radiology Reports Reviewed [x]    Radiology Ordered []    PCP Records Reviewed []    Referring Provider Records Reviewed []    ER Records Reviewed []    Hospital Records Reviewed []    History Obtained From Family []    Radiology Images Reviewed []    Other Reviewed []    Records Requested []         ECG 12 Lead  Date/Time: 11/26/2018 2:14 PM  Performed by: Chandler Langford PA-C  Authorized by: Chandler Langford PA-C   Rhythm: sinus rhythm  Conduction: conduction normal  ST Segments: ST segments normal  T depression: V2 and III  T flattening: V3  Clinical impression: normal ECG and non-specific ECG              Assessment/Plan    Diagnosis Plan   1. Palpitations     2. Essential hypertension     3. Dyslipidemia                  Recommendations:  1. Continue current regimen follow-up in 6 months.           Return in about 6 months (around 5/26/2019).    As always, I appreciate very much the opportunity to participate in the cardiovascular care of your patients.      With Best Regards,    ANNE Crum disclaimer:  Much of this encounter note is an electronic transcription/translation of spoken language to printed text. The electronic translation of spoken language may permit erroneous, or at times, nonsensical words or phrases to be inadvertently transcribed; Although I have reviewed the note for such errors, some may still exist.

## 2019-06-19 ENCOUNTER — OFFICE VISIT (OUTPATIENT)
Dept: ORTHOPEDIC SURGERY | Facility: CLINIC | Age: 59
End: 2019-06-19

## 2019-06-19 VITALS — WEIGHT: 250 LBS | BODY MASS INDEX: 44.3 KG/M2 | HEIGHT: 63 IN

## 2019-06-19 DIAGNOSIS — S83.411A SPRAIN OF MEDIAL COLLATERAL LIGAMENT OF RIGHT KNEE, INITIAL ENCOUNTER: Primary | ICD-10-CM

## 2019-06-19 PROCEDURE — 99203 OFFICE O/P NEW LOW 30 MIN: CPT | Performed by: PHYSICIAN ASSISTANT

## 2019-06-20 NOTE — PROGRESS NOTES
New Patient Visit        Patient: Zeina Reed  YOB: 1960  Date of encounter: 6/20/2019    Chief Complaint   Patient presents with   • Right Knee - Pain, Edema       History of Present Illness:   Zeina Reed is a 58 y.o. female who presents here today with referral from first care for evaluation of right knee pain following an injury on 6/6/2019.  She states she slipped and fell at [employer] and landed directly on both knees.  She states she has some mild pain initially but over the next several days symptoms got progressively worse.  She states the right knee has persisted and she had some mild swelling and bruising along the medial aspect of her knee.  She states pain is worse with walking as well as going up and down stairs or twisting.  She has a  at the hospital and states that with work and has caused increased pain and by the end of the day she has difficulty walking because of the pain.  She was given a brace and has been using a cane with ambulation.    PMH:   Patient Active Problem List   Diagnosis   • Palpitations   • Essential hypertension   • Dyslipidemia   • Dizziness     Past Medical History:   Diagnosis Date   • Arthritis    • Diabetes mellitus (CMS/HCC)    • Dyslipidemia    • History of transfusion    • Hyperlipidemia    • Hypertension    • Palpitations    • PONV (postoperative nausea and vomiting)        PSH:  Past Surgical History:   Procedure Laterality Date   • CHOLECYSTECTOMY     • COLONOSCOPY N/A 2/21/2017    Procedure: COLONOSCOPY FOR SCREENING  CPTCODE:86148;  Surgeon: Socrates Price III, MD;  Location: [facility];  Service:    • HYSTERECTOMY     • SKIN GRAFT     • TUBAL ABDOMINAL LIGATION         Allergies:     Allergies   Allergen Reactions   • Augmentin [Amoxicillin-Pot Clavulanate] Diarrhea   • Metformin And Related Diarrhea       Medications:     Current Outpatient Medications:   •  aspirin 81 MG EC tablet, Take 81 mg by mouth daily., Disp: , Rfl:   •   Calcium Carb-Cholecalciferol (CALCIUM PLUS VITAMIN D3 PO), Take 600 mg by mouth daily., Disp: , Rfl:   •  cephalexin (KEFLEX) 500 MG capsule, Take 1 capsule by mouth 2 (Two) Times a Day., Disp: 20 capsule, Rfl: 0  •  Cholecalciferol (VITAMIN D3) 2000 UNITS tablet, Take  by mouth daily., Disp: , Rfl:   •  Exenatide ER (BYDUREON) 2 MG pen-injector, Inject 2 mg (1 pen) under the skin once Every 7 (Seven) Days., Disp: 4 each, Rfl: 6  •  FREESTYLE TEST STRIPS test strip, Use as instructed, Disp: 50 each, Rfl: 9  •  guaiFENesin (MUCINEX) 600 MG 12 hr tablet, Take 1,200 mg by mouth 2 (two) times a day., Disp: , Rfl:   •  ibuprofen (ADVIL,MOTRIN) 800 MG tablet, Take 800 mg by mouth every 6 (six) hours as needed for mild pain (1-3)., Disp: , Rfl:   •  JANUMET -1000 MG tablet sustained-release 24 hour, Take 1 tablet by mouth Daily., Disp: 30 tablet, Rfl: 12  •  lisinopril-hydrochlorothiazide (PRINZIDE,ZESTORETIC) 20-25 MG per tablet, Take 1 tablet by mouth Daily., Disp: 90 tablet, Rfl: 3  •  lisinopril-hydrochlorothiazide (ZESTORETIC) 10-12.5 MG per tablet, Take 1 tablet by mouth Daily., Disp: 30 tablet, Rfl: 5  •  loratadine (ALLERGY) 10 MG tablet, Take 10 mg by mouth daily., Disp: , Rfl:   •  metoprolol tartrate (LOPRESSOR) 50 MG tablet, Take 1 tablet by mouth 2 (Two) Times a Day., Disp: 120 tablet, Rfl: 6  •  Mirabegron ER (MYRBETRIQ) 50 MG tablet sustained-release 24 hour 24 hr tablet, Take 1 Tablet by mouth Daily for hypertonicity of bladder., Disp: 30 tablet, Rfl: 10  •  Multiple Vitamin (MULTI VITAMIN DAILY PO), Take  by mouth daily., Disp: , Rfl:   •  omega-3 acid ethyl esters (LOVAZA) 1 g capsule, Take 2 capsules by mouth 2 (Two) Times a Day., Disp: 120 capsule, Rfl: 5  •  Omega-3 Fatty Acids (FISH OIL) 1000 MG capsule capsule, Take 1 capsule by mouth 2 (Two) Times a Day With Meals., Disp: 60 capsule, Rfl: 6  •  pantoprazole (PROTONIX) 40 MG EC tablet, Take 1 tablet by mouth Daily., Disp: 90 tablet, Rfl: 3  •   permethrin (ELIMITE) 5 % cream, Apply as Directed and leave on for 8 to 14 Hours then Wash Off and Repeat in 7 Days., Disp: 60 g, Rfl: 1  •  polyethylene glycol (GoLYTELY) 236 G solution, Starting at 6 pm on Day Prior to Procedure. Drink 8 Ounces Every 15 Minutes until all gone or stools are clear., Disp: 4000 mL, Rfl: 0  •  pravastatin (PRAVACHOL) 20 MG tablet, Take 1 tablet by mouth daily, Disp: 90 tablet, Rfl: 2  •  pravastatin (PRAVACHOL) 20 MG tablet, Take 1 tablet by mouth Daily., Disp: 90 tablet, Rfl: 2  •  pravastatin (PRAVACHOL) 20 MG tablet, Take 1 tablet by mouth daily, Disp: 90 tablet, Rfl: 2  •  pravastatin (PRAVACHOL) 20 MG tablet, Take 1 tablet by mouth daily, Disp: 90 tablet, Rfl: 2  •  SITagliptin-metFORMIN HCl ER (JANUMET XR)  MG tablet, Take 2 tablets by mouth Daily., Disp: 60 tablet, Rfl: 7  •  vitamin C (ASCORBIC ACID) 500 MG tablet, Take 1,000 mg by mouth daily., Disp: , Rfl:     Social History:  Social History     Socioeconomic History   • Marital status:      Spouse name: Not on file   • Number of children: Not on file   • Years of education: Not on file   • Highest education level: Not on file   Tobacco Use   • Smoking status: Never Smoker   • Smokeless tobacco: Never Used   Substance and Sexual Activity   • Alcohol use: No   • Drug use: No   • Sexual activity: Defer       Family History:     Family History   Problem Relation Age of Onset   • Cancer Mother    • Heart attack Father    • Cancer Father        Review of Systems:   Review of Systems   Constitutional: Negative.    HENT: Negative.    Eyes: Negative.    Respiratory: Negative.    Cardiovascular: Positive for palpitations and leg swelling.   Gastrointestinal: Positive for diarrhea.   Endocrine: Negative.    Genitourinary: Positive for frequency and urgency.   Musculoskeletal: Positive for arthralgias.   Skin: Negative.    Neurological: Negative.    Hematological: Negative.    Psychiatric/Behavioral: Negative.   "      Physical Exam: 58 y.o. female  General Appearance:    Alert and oriented x 3, cooperative, in no acute distress                   Vitals:    06/19/19 1632   Weight: 113 kg (250 lb)   Height: 160 cm (63\")              Body mass index is 44.29 kg/m².        Musculoskeletal: Examination of the right knee reveals no significant effusion.  She does have tenderness medially just below the joint line along the insertion of the medial collateral ligament.  She has full range of motion with mild crepitus of the patella.  No instability with varus or valgus stressing.  Neurovascular status is intact.    Radiology:     3 views of the right knee from outside facility reveal some mild narrowing of the medial compartment and squaring of the medial femoral condyle.  No acute fractures or dislocations are noted.    Assessment    ICD-10-CM ICD-9-CM   1. Sprain of medial collateral ligament of right knee, initial encounter S83.411A 844.1       Plan:  A 58-year-old female with a injury to the right knee.  Reviewed radiographs and there is some early to mild degenerative changes but no acute fractures or dislocations noted.  She does have clinical evidence for a strain of the medial collateral ligament.  Of advised to continue wearing the brace.  We will also get her started in formal physical therapy.  In addition of this I will keep her off work for 2 weeks.  She will return back in 4 weeks for follow-up.    Roas Bradford PA-C        "

## 2019-06-25 ENCOUNTER — HOSPITAL ENCOUNTER (OUTPATIENT)
Dept: PHYSICAL THERAPY | Facility: HOSPITAL | Age: 59
Setting detail: THERAPIES SERIES
Discharge: HOME | End: 2019-06-25

## 2019-06-25 DIAGNOSIS — S83.411D SPRAIN OF MEDIAL COLLATERAL LIGAMENT OF RIGHT KNEE, SUBSEQUENT ENCOUNTER: Primary | ICD-10-CM

## 2019-06-25 PROCEDURE — 97163 PT EVAL HIGH COMPLEX 45 MIN: CPT

## 2019-06-25 NOTE — THERAPY EVALUATION
Outpatient Physical Therapy Ortho Initial Evaluation   Dilshad     Patient Name: Zeina Reed  : 1960  MRN: [id number]  Today's Date: 2019      Visit Date: 2019    Patient Active Problem List   Diagnosis   • Palpitations   • Essential hypertension   • Dyslipidemia   • Dizziness        Past Medical History:   Diagnosis Date   • Arthritis    • Diabetes mellitus (CMS/HCC)    • Dyslipidemia    • History of transfusion    • Hyperlipidemia    • Hypertension    • Palpitations    • PONV (postoperative nausea and vomiting)         Past Surgical History:   Procedure Laterality Date   • CHOLECYSTECTOMY     • COLONOSCOPY N/A 2017    Procedure: COLONOSCOPY FOR SCREENING  CPTCODE:73973;  Surgeon: Socrates rPice III, MD;  Location: [facility];  Service:    • HYSTERECTOMY     • SKIN GRAFT     • TUBAL ABDOMINAL LIGATION         Visit Dx:     ICD-10-CM ICD-9-CM   1. Sprain of medial collateral ligament of right knee, subsequent encounter S83.411D V58.89     844.1         Patient History     Row Name 19 1500             History    Chief Complaint  Balance Problems;Difficulty Walking;Difficulty with daily activities;Falls/history of falls;Joint stiffness;Joint swelling;Muscle tenderness;Muscle weakness;Pain  -RT      Type of Pain  Knee pain right  -RT      Date Current Problem(s) Began  19  -RT      Brief Description of Current Complaint  Pt reports while walking in a [employer], she fell and strained her right knee on a slippery surface. The patient received an x-ray of the right knee and later was referred to Rosa Bradford.  The patient was diagnosed with a right MCL strain and was advised to receive therapy for treatment of pain.  -RT      Patient/Caregiver Goals  Relieve pain;Return to prior level of function;Improve strength  -RT      Current Tobacco Use  no  -RT      Smoking Status  no  -RT      Patient's Rating of General Health  Fair  -RT      Hand Dominance   right-handed  -RT      How has patient tried to help current problem?  elevate  -RT      What clinical tests have you had for this problem?  X-ray  -RT      Results of Clinical Tests  OA  -RT      Are you or can you be pregnant  No  -RT         Pain     Pain Location  Knee  -RT      Pain at Present  5  -RT      Pain at Best  1  -RT      Pain at Worst  9  -RT      Pain Frequency  Constant/continuous  -RT      Pain Description  Aching;Throbbing  -RT      What Performance Factors Make the Current Problem(s) WORSE?  squat, walking, standing  -RT         Fall Risk Assessment    Any falls in the past year:  Yes  -RT      Number of falls reported in the last 12 months  1  -RT      Factors that contributed to the fall:  Slippery surface  -RT         Daily Activities    Primary Language  English  -RT      Are you able to read  Yes  -RT      Are you able to write  Yes  -RT      How does patient learn best?  Listening;Reading;Demonstration;Pictures/Video  -RT         Safety    Are you being hurt, hit, or frightened by anyone at home or in your life?  No  -RT      Are you being neglected by a caregiver  No  -RT        User Key  (r) = Recorded By, (t) = Taken By, (c) = Cosigned By    Initials Name Provider Type    RT Tigre Fraga, PT Physical Therapist          PT Ortho     Row Name 06/25/19 1500       Posture/Observations    Posture/Observations Comments  right medial knee edema  -RT       Sensory Screen for Light Touch- Lower Quarter Clearing    L1 (inguinal area)  Bilateral:;Intact  -RT    L2 (anterior mid thigh)  Bilateral:;Intact  -RT    L3 (distal anterior thigh)  Bilateral:;Intact  -RT    L4 (medial lower leg/foot)  Bilateral:;Intact  -RT    L5 (lateral lower leg/great toe)  Bilateral:;Intact  -RT    S1 (bottom of foot)  Bilateral:;Intact  -RT       Myotomal Screen- Lower Quarter Clearing    Hip flexion (L2)  Bilateral:;4 (Good)  -RT    Knee extension (L3)  Right:;4- (Good -);Left:;4 (Good)  -RT    Knee flexion (S2)   Right:;4- (Good -);Left:;4 (Good)  -RT       Special Tests/Palpation    Special Tests/Palpation  -- right med jt line pain  -RT       Knee Special Tests    Anterior drawer (ACL lesion)  Right:;Negative  -RT    Posterior drawer (PCL lesion)  Right:;Negative  -RT    Valgus stress (MCL lesion)  Right:;Positive  -RT    Varus stress (LCL lesion)  Right:;Negative  -RT    Thessaly test (meniscal lesion)  Right:;Positive  -RT       General ROM    GENERAL ROM COMMENTS  left knee 0-115; right 5-105  -RT       Gait/Stairs Assessment/Training    Comment (Gait/Stairs)  amb with decreased stance on right LE  -RT      User Key  (r) = Recorded By, (t) = Taken By, (c) = Cosigned By    Initials Name Provider Type    RT Tigre Fraga, PT Physical Therapist                      Therapy Education  Given: HEP  Program: New, Reinforced  How Provided: Verbal, Demonstration, Written  Provided to: Patient  Level of Understanding: Teach back education performed, Verbalized     PT OP Goals     Row Name 06/25/19 1800          PT Short Term Goals    STG Date to Achieve  07/09/19  -RT     STG 1  Pt will be instructed in a hep.  -RT     STG 1 Progress  New  -RT     STG 2  Pt will present with 5/5 bilateral knee strength.  -RT     STG 2 Progress  New  -RT        Long Term Goals    LTG Date to Achieve  07/23/19  -RT     LTG 1  Pt will improve her LEFs by 20%.  -RT     LTG 1 Progress  New  -RT     LTG 2  Pt will report pain no greater than 4/10 with ADLs and gait.  -RT     LTG 2 Progress  New  -RT        Time Calculation    PT Goal Re-Cert Due Date  07/23/19  -RT       User Key  (r) = Recorded By, (t) = Taken By, (c) = Cosigned By    Initials Name Provider Type    RT Tigre Fraga, PT Physical Therapist          PT Assessment/Plan     Row Name 06/25/19 1826          PT Assessment    Functional Limitations  Impaired gait;Performance in self-care ADL;Performance in leisure activities;Performance in work activities;Limitations in functional  capacity and performance  -RT     Impairments  Balance;Gait;Pain;Muscle strength;Motor function;Poor body mechanics;Posture;Range of motion;Joint mobility  -RT     Assessment Comments  Pt is a 57 y/o female referred to therapy for treatment of a right knee MCL strain.  Pt presents with decreased ROM, impaired gait, decreased strength and increased pain.  Pt will benefit from therapy services for improved mobility and function.  -RT     Please refer to paper survey for additional self-reported information  Yes  -RT     Rehab Potential  Good  -RT     Patient/caregiver participated in establishment of treatment plan and goals  Yes  -RT     Patient would benefit from skilled therapy intervention  Yes  -RT        PT Plan    PT Frequency  Other (comment) bi weekly pt request  -RT     Predicted Duration of Therapy Intervention (Therapy Eval)  4 weeks  -RT     Planned CPT's?  PT EVAL HIGH COMPLEXITY: 22427;PT RE-EVAL: 14248;PT THER PROC EA 15 MIN: 74689;PT MANUAL THERAPY EA 15 MIN: 95023;PT NEUROMUSC RE-EDUCATION EA 15 MIN: 18256;PT GAIT TRAINING EA 15 MIN: 69104;PT ELECTRICAL STIM UNATTEND: ;PT ULTRASOUND EA 15 MIN: 21930;PT HOT/COLD PACK WC NONMCARE: 95745  -RT     Physical Therapy Interventions (Optional Details)  balance training;bed mobility training;joint mobilization;home exercise program;gait training;manual therapy techniques;modalities;postural re-education;patient/family education;neuromuscular re-education;ROM (Range of Motion);strengthening;stretching;taping  -RT     PT Plan Comments  Will follow for optimal gains.  -RT       User Key  (r) = Recorded By, (t) = Taken By, (c) = Cosigned By    Initials Name Provider Type    RT Tigre Fraga, PT Physical Therapist          Modalities     Row Name 06/25/19 1700             Ultrasound 99967    Location  right med knee  -RT      Duty Cycle  50  -RT      Frequency  -- 3.3  -RT      Intensity - Wts/cm  1.2  -RT      89968 - PT Ultrasound Minutes  8  -RT         User Key  (r) = Recorded By, (t) = Taken By, (c) = Cosigned By    Initials Name Provider Type    RT Tigre Fraga, PT Physical Therapist                          Outcome Measure Options: Lower Extremity Functional Scale (LEFS)  Lower Extremity Functional Index  Any of your usual work, housework or school activities: Quite a bit of difficulty  Your usual hobbies, recreational or sporting activities: Quite a bit of difficulty  Getting into or out of the bath: Quite a bit of difficulty  Walking between rooms: Quite a bit of difficulty  Putting on your shoes or socks: Quite a bit of difficulty  Squatting: Quite a bit of difficulty  Lifting an object, like a bag of groceries from the floor: Quite a bit of difficulty  Performing light activities around your home: Quite a bit of difficulty  Performing heavy activities around your home: Quite a bit of difficulty  Getting into or out of a car: Moderate difficulty  Walking 2 blocks: Quite a bit of difficulty  Walking a mile: Quite a bit of difficulty  Going up or down 10 stairs (about 1 flight of stairs): Quite a bit of difficulty  Standing for 1 hour: Quite a bit of difficulty  Sitting for 1 hour: No difficulty  Running on even ground: Extreme difficulty or unable to perform activity  Running on uneven ground: Extreme difficulty or unable to perform activity  Making sharp turns while running fast: Extreme difficulty or unable to perform activity  Hopping: Extreme difficulty or unable to perform activity  Rolling over in bed: Quite a bit of difficulty  Total: 20      Time Calculation:     Start Time: 1500  Stop Time: 1600  Time Calculation (min): 60 min     Therapy Charges for Today     Code Description Service Date Service Provider Modifiers Qty    85589351335 HC PT EVAL HIGH COMPLEXITY 4 6/25/2019 Tigre Fraga, PT GP 1          PT G-Codes  Outcome Measure Options: Lower Extremity Functional Scale (LEFS)  Total: 20         Tigre Fraga PT  6/25/2019

## 2019-07-17 ENCOUNTER — OFFICE VISIT (OUTPATIENT)
Dept: ORTHOPEDIC SURGERY | Facility: CLINIC | Age: 59
End: 2019-07-17

## 2019-07-17 DIAGNOSIS — S83.411D SPRAIN OF MEDIAL COLLATERAL LIGAMENT OF RIGHT KNEE, SUBSEQUENT ENCOUNTER: Primary | ICD-10-CM

## 2019-07-17 PROCEDURE — 99212 OFFICE O/P EST SF 10 MIN: CPT | Performed by: PHYSICIAN ASSISTANT

## 2019-07-17 NOTE — PROGRESS NOTES
Zeina Reed   :1960    Date of encounter:2019    Chief Complaint   Patient presents with   • Right Knee - Follow-up       HPI:  Zeina Reed is a 58 y.o. female who returns here today for follow-up of a strain of the MCL of the right knee.  She also has moderate osteoarthritis as well.  She has been going to physical therapy and states she is doing much better.  She states pain is not nearly as severe and she is getting around better.  She states she is no longer having to wear the brace.    PMH:   Patient Active Problem List   Diagnosis   • Palpitations   • Essential hypertension   • Dyslipidemia   • Dizziness       Exam:  General Appearance:    58 y.o. female  cooperative, in no acute distress.  Alert and oriented x 3,                 There were no vitals filed for this visit.       There is no height or weight on file to calculate BMI.    Examination of the right knee reveals no swelling.  She has minimal tenderness along along the insertion of the medial collateral ligament.  She has full range of motion.  No gross instability.  Her neurovascular status is intact.      Assessment    ICD-10-CM ICD-9-CM   1. Sprain of medial collateral ligament of right knee, subsequent encounter S83.411D V58.89     844.1       Plan:  58-year-old female with a MCL strain of the right knee with moderate arthritis.  She has been doing well with recent therapy.  I have advised that she continue the remainder of her sessions and continue working on strengthening.  She will return back here on an as-needed basis with any worsening or recurrence of pain.    Rosa Bradford PA-C

## 2019-07-18 ENCOUNTER — HOSPITAL ENCOUNTER (OUTPATIENT)
Dept: PHYSICAL THERAPY | Facility: HOSPITAL | Age: 59
Setting detail: THERAPIES SERIES
Discharge: HOME | End: 2019-07-18

## 2019-07-18 DIAGNOSIS — S83.411D SPRAIN OF MEDIAL COLLATERAL LIGAMENT OF RIGHT KNEE, SUBSEQUENT ENCOUNTER: Primary | ICD-10-CM

## 2019-07-18 PROCEDURE — G0283 ELEC STIM OTHER THAN WOUND: HCPCS | Performed by: PHYSICAL THERAPIST

## 2019-07-18 PROCEDURE — 97010 HOT OR COLD PACKS THERAPY: CPT | Performed by: PHYSICAL THERAPIST

## 2019-07-18 PROCEDURE — 97110 THERAPEUTIC EXERCISES: CPT | Performed by: PHYSICAL THERAPIST

## 2019-07-18 NOTE — THERAPY TREATMENT NOTE
Outpatient Physical Therapy Ortho Treatment Note   Dilshad     Patient Name: Zeina Reed  : 1960  MRN: [id number]  Today's Date: 2019      Visit Date: 2019    Visit Dx:    ICD-10-CM ICD-9-CM   1. Sprain of medial collateral ligament of right knee, subsequent encounter S83.411D V58.89     844.1       Patient Active Problem List   Diagnosis   • Palpitations   • Essential hypertension   • Dyslipidemia   • Dizziness        Past Medical History:   Diagnosis Date   • Arthritis    • Diabetes mellitus (CMS/HCC)    • Dyslipidemia    • History of transfusion    • Hyperlipidemia    • Hypertension    • Palpitations    • PONV (postoperative nausea and vomiting)         Past Surgical History:   Procedure Laterality Date   • CHOLECYSTECTOMY     • COLONOSCOPY N/A 2017    Procedure: COLONOSCOPY FOR SCREENING  CPTCODE:85634;  Surgeon: Socrates Price III, MD;  Location: [facility];  Service:    • HYSTERECTOMY     • SKIN GRAFT     • TUBAL ABDOMINAL LIGATION         PT Ortho     Row Name 19 1300       Subjective Comments    Subjective Comments  Pt reported 0/10 right knee pain prior to today's treatment session. She states she is scheduled to return to work as a  on 19.  -AD      User Key  (r) = Recorded By, (t) = Taken By, (c) = Cosigned By    Initials Name Provider Type    AD Dalton, Ashley Claudene, PT Physical Therapist                      PT Assessment/Plan     Row Name 19 1521          PT Assessment    Assessment Comments  Pt tolerated today's session well, with rest breaks provided as needed. Therapeutic exercises were performed to address right knee range of motion and strength. Tactile and verbal cues were required for proper form. The session was initiated with moist heat combined with premod to the right knee and concluded with therapeutic ultrasound. No skin irritation was observed following modalities. She will be progressed as tolerated.  -AD        PT Plan     PT Plan Comments  Progress as tolerated per POC.  -AD       User Key  (r) = Recorded By, (t) = Taken By, (c) = Cosigned By    Initials Name Provider Type    AD Dalton, Ashley Claudene, PT Physical Therapist          Modalities     Row Name 07/18/19 1300             Moist Heat    MH Applied  Yes With premod, no skin irritation observed.  -AD      Location  Right knee  -AD      Rx Minutes  10 mins  -AD      MH Prior to Rx  Yes  -AD         Ultrasound 98610    Location  Medial aspect of right knee No skin irritation observed.  -AD      Duty Cycle  50  -AD      Frequency  -- 3.3 MHz  -AD      Intensity - Wts/cm  1.2  -AD      15485 - PT Ultrasound Minutes  8  -AD         ELECTRICAL STIMULATION    Attended/Unattended  Unattended With MH, no skin irritation observed.  -AD      Stimulation Type  Pre-Mod  -AD      Max mAmp  -- Per pt tolerance  -AD      Location/Electrode Placement/Other  Right knee  -AD       PT E-Stim Unattended (Manual) Minutes  10  -AD        User Key  (r) = Recorded By, (t) = Taken By, (c) = Cosigned By    Initials Name Provider Type    AD Dalton, Ashley Claudene, PT Physical Therapist        Exercises     Row Name 07/18/19 1500 07/18/19 1300          Subjective Comments    Subjective Comments  --  Pt reported 0/10 right knee pain prior to today's treatment session. She states she is scheduled to return to work as a  on 7/22/19.  -AD        Total Minutes    92362 - PT Therapeutic Exercise Minutes  25  -AD  --        Exercise 1    Exercise Name 1  LAQ, GTB knee flexion, GTB hip abduction, SLR, QS, SAQ, heel slides.  -AD  --     Cueing 1  Verbal;Tactile;Demo  -AD  --     Sets 1  2  -AD  --     Reps 1  15  -AD  --     Time 1  25 minutes  -AD  --       User Key  (r) = Recorded By, (t) = Taken By, (c) = Cosigned By    Initials Name Provider Type    AD Dalton, Ashley Claudene, PT Physical Therapist                           Therapy Education  Given: HEP  Program: New, Reinforced  How  Provided: Verbal, Demonstration  Provided to: Patient  Level of Understanding: Teach back education performed, Verbalized              Time Calculation:   Start Time: 1310  Stop Time: 1410  Time Calculation (min): 60 min  Therapy Charges for Today     Code Description Service Date Service Provider Modifiers Qty    66625022041 HC PT THER PROC EA 15 MIN 7/18/2019 Dalton, Ashley Claudene, PT GP 2    15076917674 HC PT ELECTRICAL STIM UNATTENDED 7/18/2019 Dalton, Ashley Claudene, PT  1    [id number]  PT HOT/COLD PACK WC NONMCARE 7/18/2019 Dalton, Ashley Claudene, PT GP 1                    Ashley Claudene Dalton, PT  7/18/2019

## 2019-08-27 ENCOUNTER — DOCUMENTATION (OUTPATIENT)
Dept: PHYSICAL THERAPY | Facility: HOSPITAL | Age: 59
End: 2019-08-27

## 2019-08-27 DIAGNOSIS — S83.411D SPRAIN OF MEDIAL COLLATERAL LIGAMENT OF RIGHT KNEE, SUBSEQUENT ENCOUNTER: Primary | ICD-10-CM

## 2019-08-27 NOTE — THERAPY DISCHARGE NOTE
Outpatient Physical Therapy Discharge Summary         Patient Name: Zeina Reed  : 1960  MRN: [id number]    Today's Date: 2019    Visit Dx:    ICD-10-CM ICD-9-CM   1. Sprain of medial collateral ligament of right knee, subsequent encounter S83.411D V58.89     844.1           OP PT Discharge Summary  Date of Discharge: 19  Reason for Discharge: Unable to participate  Outcomes Achieved: Unable to make functional progress toward goals at this time  Discharge Destination: Home with home program  Discharge Instructions/Additional Comments: Pt treated for one visit only and will be discharged for reasons unknown.      Time Calculation:                    Tigre FRY Flakito, PT  2019

## 2019-09-30 ENCOUNTER — TELEPHONE (OUTPATIENT)
Dept: ORTHOPEDIC SURGERY | Facility: CLINIC | Age: 59
End: 2019-09-30

## 2019-09-30 NOTE — TELEPHONE ENCOUNTER
Patient called requesting a work release.     Talked to Dr. Virk and she is good to be released back to work. Called patient to let her know.

## 2020-01-31 ENCOUNTER — OFFICE VISIT (OUTPATIENT)
Dept: CARDIOLOGY | Facility: CLINIC | Age: 60
End: 2020-01-31

## 2020-01-31 VITALS — BODY MASS INDEX: 45.79 KG/M2 | HEIGHT: 63 IN | WEIGHT: 258.4 LBS

## 2020-01-31 DIAGNOSIS — E78.5 HYPERLIPIDEMIA: ICD-10-CM

## 2020-01-31 DIAGNOSIS — E78.5 DYSLIPIDEMIA: ICD-10-CM

## 2020-01-31 DIAGNOSIS — I10 ESSENTIAL HYPERTENSION: Primary | ICD-10-CM

## 2020-01-31 DIAGNOSIS — R00.2 PALPITATIONS: ICD-10-CM

## 2020-01-31 PROCEDURE — 99213 OFFICE O/P EST LOW 20 MIN: CPT | Performed by: PHYSICIAN ASSISTANT

## 2020-01-31 PROCEDURE — 93000 ELECTROCARDIOGRAM COMPLETE: CPT | Performed by: PHYSICIAN ASSISTANT

## 2020-01-31 RX ORDER — PRAVASTATIN SODIUM 20 MG
20 TABLET ORAL DAILY
Qty: 90 TABLET | Refills: 3 | Status: SHIPPED | OUTPATIENT
Start: 2020-01-31 | End: 2021-04-28 | Stop reason: SDUPTHER

## 2020-01-31 RX ORDER — HYOSCYAMINE SULFATE 0.12 MG/1
TABLET SUBLINGUAL EVERY 8 HOURS
COMMUNITY
Start: 2020-01-15 | End: 2021-02-25 | Stop reason: ALTCHOICE

## 2020-01-31 RX ORDER — METOPROLOL TARTRATE 50 MG/1
50 TABLET, FILM COATED ORAL
Qty: 180 TABLET | Refills: 3 | Status: SHIPPED | OUTPATIENT
Start: 2020-01-31 | End: 2021-02-25 | Stop reason: SDUPTHER

## 2020-01-31 NOTE — PROGRESS NOTES
Rachel Savage APRN  Zeina Reed  1960  01/31/2020    Patient Active Problem List   Diagnosis   • Palpitations   • Essential hypertension   • Dyslipidemia   • Dizziness       Dear Rachel Savage APRN:    Subjective     History of Present Illness:    Chief Complaint   Patient presents with   • Palpitations       Zeina Reed is a pleasant 59 y.o. female with a past medical history significant for essential hypertension, dyslipidemia, and history of palpitations.  Patient comes in for routine cardiology follow-up.     Patient reports that she has been doing well and has no complaints to bring forth upon open questions.  Upon further questioning she does deny chest pains, shortness of breath, dizziness, or syncope.  She does report some mild palpitations that occur from time to time but do not affect her actives of daily living and are infrequent and mild.    Allergies   Allergen Reactions   • Augmentin [Amoxicillin-Pot Clavulanate] Diarrhea   • Metformin And Related Diarrhea   :      Current Outpatient Medications:   •  aspirin 81 MG EC tablet, Take 81 mg by mouth daily., Disp: , Rfl:   •  Calcium Carb-Cholecalciferol (CALCIUM PLUS VITAMIN D3 PO), Take 600 mg by mouth daily., Disp: , Rfl:   •  cephalexin (KEFLEX) 500 MG capsule, Take 1 capsule by mouth 2 (Two) Times a Day., Disp: 20 capsule, Rfl: 0  •  Cholecalciferol (VITAMIN D3) 2000 UNITS tablet, Take  by mouth daily., Disp: , Rfl:   •  Exenatide ER (BYDUREON) 2 MG pen-injector, Inject 2 mg (1 pen) under the skin once Every 7 (Seven) Days., Disp: 4 each, Rfl: 6  •  FREESTYLE TEST STRIPS test strip, Use as instructed, Disp: 50 each, Rfl: 9  •  guaiFENesin (MUCINEX) 600 MG 12 hr tablet, Take 1,200 mg by mouth 2 (two) times a day., Disp: , Rfl:   •  Hyoscyamine Sulfate SL (LEVSIN/SL) 0.125 MG sublingual tablet, Place  under the tongue Every 8 (Eight) Hours., Disp: , Rfl:   •  Hyoscyamine Sulfate SL 0.125 MG sublingual tablet, Dissolve 1 tablet under  the tongue  3 times every day before meals and at bedtime, Disp: 90 each, Rfl: 4  •  ibuprofen (ADVIL,MOTRIN) 800 MG tablet, Take 800 mg by mouth every 6 (six) hours as needed for mild pain (1-3)., Disp: , Rfl:   •  JANUMET -1000 MG tablet sustained-release 24 hour, Take 1 tablet by mouth Daily., Disp: 30 tablet, Rfl: 12  •  lansoprazole (PREVACID) 30 MG capsule, Take 1 capsule by mouth every day before a meal, Disp: 90 capsule, Rfl: 2  •  lisinopril-hydrochlorothiazide (PRINZIDE,ZESTORETIC) 20-25 MG per tablet, Take 1 tablet by mouth Daily., Disp: 90 tablet, Rfl: 3  •  loratadine (ALLERGY) 10 MG tablet, Take 10 mg by mouth daily., Disp: , Rfl:   •  metoprolol tartrate (LOPRESSOR) 50 MG tablet, Take 1 tablet by mouth 2 (Two) Times a Day., Disp: 180 tablet, Rfl: 3  •  Mirabegron ER (MYRBETRIQ) 50 MG tablet sustained-release 24 hour 24 hr tablet, Take 1 Tablet by mouth Daily for hypertonicity of bladder., Disp: 30 tablet, Rfl: 10  •  Mirabegron ER (MYRBETRIQ) 50 MG tablet sustained-release 24 hour 24 hr tablet, take 1 tablet by mouth every day swallowing whole with water. Do not crush, chew and/or divide., Disp: 30 tablet, Rfl: 6  •  Multiple Vitamin (MULTI VITAMIN DAILY PO), Take  by mouth daily., Disp: , Rfl:   •  omega-3 acid ethyl esters (LOVAZA) 1 g capsule, Take 2 capsules by mouth 2 (Two) Times a Day., Disp: 120 capsule, Rfl: 5  •  Omega-3 Fatty Acids (FISH OIL) 1000 MG capsule capsule, Take 1 capsule by mouth 2 (Two) Times a Day With Meals., Disp: 60 capsule, Rfl: 6  •  pantoprazole (PROTONIX) 40 MG EC tablet, Take 1 tablet by mouth Daily., Disp: 90 tablet, Rfl: 3  •  permethrin (ELIMITE) 5 % cream, Apply as Directed and leave on for 8 to 14 Hours then Wash Off and Repeat in 7 Days., Disp: 60 g, Rfl: 1  •  polyethylene glycol (GoLYTELY) 236 G solution, Starting at 6 pm on Day Prior to Procedure. Drink 8 Ounces Every 15 Minutes until all gone or stools are clear., Disp: 4000 mL, Rfl: 0  •  pravastatin  "(PRAVACHOL) 20 MG tablet, Take 1 tablet by mouth Daily., Disp: 90 tablet, Rfl: 3  •  SITagliptin-metFORMIN HCl ER (JANUMET XR)  MG tablet, Take 2 tablets by mouth Daily., Disp: 60 tablet, Rfl: 7  •  SITagliptin-metFORMIN HCl ER (JANUMET XR)  MG tablet, Take 2 tablets by mouth Daily., Disp: 180 tablet, Rfl: 2  •  vitamin C (ASCORBIC ACID) 500 MG tablet, Take 1,000 mg by mouth daily., Disp: , Rfl:     The following portions of the patient's history were reviewed and updated as appropriate: allergies, current medications, past family history, past medical history, past social history, past surgical history and problem list.    Social History     Tobacco Use   • Smoking status: Never Smoker   • Smokeless tobacco: Never Used   Substance Use Topics   • Alcohol use: No   • Drug use: No       Review of Systems   Constitution: Negative for malaise/fatigue.   Cardiovascular: Negative for chest pain, dyspnea on exertion and irregular heartbeat.   Respiratory: Negative for cough and shortness of breath.    Hematologic/Lymphatic: Negative for bleeding problem. Does not bruise/bleed easily.   Gastrointestinal: Negative for nausea and vomiting.   Neurological: Negative for weakness.       Objective   Vitals:    01/31/20 0922   Weight: 117 kg (258 lb 6.4 oz)   Height: 160 cm (63\")     Body mass index is 45.77 kg/m².    Physical Exam   Constitutional: She is oriented to person, place, and time. She appears well-developed and well-nourished. No distress.   HENT:   Head: Normocephalic and atraumatic.   Cardiovascular: Normal rate, regular rhythm and normal heart sounds.   Pulmonary/Chest: Effort normal and breath sounds normal. No respiratory distress.   Musculoskeletal: She exhibits no edema.   Neurological: She is alert and oriented to person, place, and time.   Skin: She is not diaphoretic.       Lab Results   Component Value Date     03/27/2018    K 3.8 03/27/2018     03/27/2018    CO2 25.1 03/27/2018    " BUN 15 03/27/2018    CREATININE 0.69 03/27/2018    GLUCOSE 187 (H) 03/27/2018    CALCIUM 9.9 03/27/2018    AST 20 03/27/2018    ALT 29 03/27/2018    ALKPHOS 98 03/27/2018    LABIL2 1.4 (L) 02/12/2015     No results found for: CKTOTAL  Lab Results   Component Value Date    WBC 10.43 03/27/2018    HGB 16.3 (H) 03/27/2018    HCT 48.7 (H) 03/27/2018     03/27/2018     No results found for: INR  Lab Results   Component Value Date    MG 2.1 03/17/2015     Lab Results   Component Value Date    TSH 2.005 04/18/2017    CHLPL 205 (H) 02/17/2016    TRIG 266 (H) 04/18/2017    HDL 41 (L) 04/18/2017    LDL 53 04/18/2017      No results found for: BNP    During this visit the following were done:  Labs Reviewed [x]    Labs Ordered []    Radiology Reports Reviewed [x]    Radiology Ordered []    PCP Records Reviewed []    Referring Provider Records Reviewed []    ER Records Reviewed []    Hospital Records Reviewed []    History Obtained From Family []    Radiology Images Reviewed []    Other Reviewed []    Records Requested []         ECG 12 Lead  Date/Time: 1/31/2020 9:22 AM  Performed by: Chandler Langford PA-C  Authorized by: Chandler Langford PA-C   Comparison: compared with previous ECG   Similar to previous ECG  Rhythm: sinus rhythm  ST Segments: ST segments normal  Other findings: poor R wave progression    Clinical impression: non-specific ECG            Assessment/Plan    Diagnosis Plan   1. Essential hypertension     2. Palpitations     3. Dyslipidemia     4. Hyperlipidemia  pravastatin (PRAVACHOL) 20 MG tablet            Recommendations:  1. Patient does appear stable from cardiac standpoint. She is asymptomatic with good blood pressure control. Will continue current regimen.     Return in about 1 year (around 1/31/2021).    As always, I appreciate very much the opportunity to participate in the cardiovascular care of your patients.      With Best Regards,    Chandler Langford PA-C

## 2020-11-12 ENCOUNTER — HOSPITAL ENCOUNTER (OUTPATIENT)
Dept: GENERAL RADIOLOGY | Facility: HOSPITAL | Age: 60
Discharge: HOME | End: 2020-11-12
Admitting: NURSE PRACTITIONER

## 2020-11-12 ENCOUNTER — TRANSCRIBE ORDERS (OUTPATIENT)
Dept: ADMINISTRATIVE | Facility: HOSPITAL | Age: 60
End: 2020-11-12

## 2020-11-12 DIAGNOSIS — M25.562 LEFT KNEE PAIN, UNSPECIFIED CHRONICITY: Primary | ICD-10-CM

## 2020-11-12 DIAGNOSIS — M25.562 LEFT KNEE PAIN, UNSPECIFIED CHRONICITY: ICD-10-CM

## 2020-11-12 PROCEDURE — 73564 X-RAY EXAM KNEE 4 OR MORE: CPT | Performed by: RADIOLOGY

## 2020-11-12 PROCEDURE — 73564 X-RAY EXAM KNEE 4 OR MORE: CPT

## 2021-02-22 DIAGNOSIS — Z23 IMMUNIZATION DUE: ICD-10-CM

## 2021-02-25 ENCOUNTER — OFFICE VISIT (OUTPATIENT)
Dept: CARDIOLOGY | Facility: CLINIC | Age: 61
End: 2021-02-25

## 2021-02-25 VITALS
DIASTOLIC BLOOD PRESSURE: 86 MMHG | HEIGHT: 63 IN | SYSTOLIC BLOOD PRESSURE: 156 MMHG | BODY MASS INDEX: 46.95 KG/M2 | TEMPERATURE: 96.3 F | HEART RATE: 70 BPM | WEIGHT: 265 LBS

## 2021-02-25 DIAGNOSIS — I10 ESSENTIAL HYPERTENSION: Primary | ICD-10-CM

## 2021-02-25 PROCEDURE — 99214 OFFICE O/P EST MOD 30 MIN: CPT | Performed by: PHYSICIAN ASSISTANT

## 2021-02-25 PROCEDURE — 93000 ELECTROCARDIOGRAM COMPLETE: CPT | Performed by: PHYSICIAN ASSISTANT

## 2021-02-25 RX ORDER — HYDRALAZINE HYDROCHLORIDE 25 MG/1
25 TABLET, FILM COATED ORAL 2 TIMES DAILY
Qty: 180 TABLET | Refills: 3 | Status: SHIPPED | OUTPATIENT
Start: 2021-02-25 | End: 2021-07-15 | Stop reason: HOSPADM

## 2021-02-25 RX ORDER — METOPROLOL TARTRATE 50 MG/1
50 TABLET, FILM COATED ORAL
Qty: 180 TABLET | Refills: 3 | Status: SHIPPED | OUTPATIENT
Start: 2021-02-25 | End: 2021-07-15 | Stop reason: HOSPADM

## 2021-02-25 NOTE — PROGRESS NOTES
Rachel Savage APRN  Zeina Reed  1960  02/25/2021    Patient Active Problem List   Diagnosis   • Palpitations   • Essential hypertension   • Dyslipidemia   • Dizziness       Dear Rachel Savage APRN:    Subjective     History of Present Illness:    Chief Complaint   Patient presents with   • Follow-up   • Med Management   • Shortness of Breath       Zeina Reed is a pleasant 60 y.o. female with a past medical history significant for essential hypertension, dyslipidemia, and history of palpitations.  Patient comes in for routine cardiology follow-up.     Ms. Pastrana reports that she has been doing well.  She denies any cardiac complaints with open questions, upon further questioning she does deny any chest pains, shortness of breath, palpitations, dizziness, or syncope.    Allergies   Allergen Reactions   • Augmentin [Amoxicillin-Pot Clavulanate] Diarrhea   • Metformin And Related Diarrhea   :      Current Outpatient Medications:   •  dicyclomine (BENTYL) 10 MG capsule, Take 1 to 2 capsules by mouth 3 (Three) Times a Day., Disp: 60 capsule, Rfl: 1  •  FREESTYLE TEST STRIPS test strip, Use as instructed, Disp: 50 each, Rfl: 9  •  guaiFENesin (MUCINEX) 600 MG 12 hr tablet, Take 1,200 mg by mouth 2 (two) times a day., Disp: , Rfl:   •  ibuprofen (ADVIL,MOTRIN) 800 MG tablet, Take 1 tablet by mouth 3 (Three) Times a Day with food or milk as needed for pain., Disp: 90 tablet, Rfl: 1  •  insulin degludec (TRESIBA FLEXTOUCH) 100 UNIT/ML solution pen-injector injection, Inject 20 Units under the skin into the appropriate area as directed Daily., Disp: 15 mL, Rfl: 2  •  Insulin Pen Needle 32G X 4 MM misc, Use once daily as directed., Disp: 90 each, Rfl: 2  •  lisinopril-hydrochlorothiazide (PRINZIDE,ZESTORETIC) 20-25 MG per tablet, Take 1 tablet by mouth Daily., Disp: 90 tablet, Rfl: 3  •  metoprolol tartrate (LOPRESSOR) 50 MG tablet, Take 1 tablet by mouth 2 (Two) Times a Day., Disp: 180 tablet, Rfl: 3  •   pravastatin (PRAVACHOL) 20 MG tablet, Take 1 tablet by mouth Daily., Disp: 90 tablet, Rfl: 3  •  SITagliptin-metFORMIN HCl ER (Janumet XR)  MG tablet, Take 2 tablets by mouth Daily., Disp: 180 tablet, Rfl: 2  •  dicyclomine (BENTYL) 10 MG capsule, Take 1-2 capsules by mouth 3 times per day, Disp: 60 capsule, Rfl: 1  •  dicyclomine (BENTYL) 10 MG capsule, Take 1-2 capsules by mouth 3 (Three) Times a Day., Disp: 60 capsule, Rfl: 1  •  hydrALAZINE (APRESOLINE) 25 MG tablet, Take 1 tablet by mouth 2 (two) times a day., Disp: 180 tablet, Rfl: 3  •  ibuprofen (ADVIL,MOTRIN) 800 MG tablet, Take 800 mg by mouth every 6 (six) hours as needed for mild pain (1-3)., Disp: , Rfl:   •  Tresiba FlexTouch 100 UNIT/ML solution pen-injector injection, Inject 20 units under the skin daily., Disp: 45 mL, Rfl: 2  •  insulin degludec (Tresiba FlexTouch) 100 UNIT/ML solution pen-injector injection, Inject 20 Units under the skin into the appropriate area as directed Daily., Disp: 15 mL, Rfl: 2  •  JANUMET -1000 MG tablet sustained-release 24 hour, Take 1 tablet by mouth Daily., Disp: 30 tablet, Rfl: 12  •  omega-3 acid ethyl esters (LOVAZA) 1 g capsule, Take 2 capsules by mouth 2 (Two) Times a Day., Disp: 120 capsule, Rfl: 5  •  Omega-3 Fatty Acids (FISH OIL) 1000 MG capsule capsule, Take 1 capsule by mouth 2 (Two) Times a Day With Meals., Disp: 60 capsule, Rfl: 6  •  SITagliptin-metFORMIN HCl ER (JANUMET XR)  MG tablet, Take 2 tablets by mouth Daily., Disp: 60 tablet, Rfl: 7  •  SITagliptin-metFORMIN HCl ER (JANUMET XR)  MG tablet, Take 2 tablets by mouth Daily., Disp: 180 tablet, Rfl: 2  •  SITagliptin-metFORMIN HCl ER (Janumet XR)  MG tablet, Take 2 tablets by mouth Daily., Disp: 180 tablet, Rfl: 2    The following portions of the patient's history were reviewed and updated as appropriate: allergies, current medications, past family history, past medical history, past social history, past surgical  "history and problem list.    Social History     Tobacco Use   • Smoking status: Never Smoker   • Smokeless tobacco: Never Used   Substance Use Topics   • Alcohol use: No   • Drug use: No       ROS    Objective   Vitals:    02/25/21 1351   BP: 156/86   Pulse: 70   Temp: 96.3 °F (35.7 °C)   Weight: 120 kg (265 lb)   Height: 160 cm (63\")     Body mass index is 46.94 kg/m².    Constitutional:       General: Not in acute distress.     Appearance: Healthy appearance. Well-developed and not in distress. Not diaphoretic.   Eyes:      Conjunctiva/sclera: Conjunctivae normal.      Pupils: Pupils are equal, round, and reactive to light.   HENT:      Head: Normocephalic and atraumatic.   Neck:      Vascular: No carotid bruit or JVD.   Pulmonary:      Effort: Pulmonary effort is normal. No respiratory distress.      Breath sounds: Normal breath sounds.   Cardiovascular:      Normal rate. Regular rhythm.      Murmurs: There is a blowing diastolic murmur.   Skin:     General: Skin is cool.   Neurological:      Mental Status: Alert, oriented to person, place, and time and oriented to person, place and time.         Lab Results   Component Value Date     03/27/2018    K 3.8 03/27/2018     03/27/2018    CO2 25.1 03/27/2018    BUN 15 03/27/2018    CREATININE 0.69 03/27/2018    GLUCOSE 187 (H) 03/27/2018    CALCIUM 9.9 03/27/2018    AST 20 03/27/2018    ALT 29 03/27/2018    ALKPHOS 98 03/27/2018    LABIL2 1.4 (L) 02/12/2015     No results found for: CKTOTAL  Lab Results   Component Value Date    WBC 10.43 03/27/2018    HGB 16.3 (H) 03/27/2018    HCT 48.7 (H) 03/27/2018     03/27/2018     No results found for: INR  Lab Results   Component Value Date    MG 2.1 03/17/2015     Lab Results   Component Value Date    TSH 2.005 04/18/2017    CHLPL 205 (H) 02/17/2016    TRIG 266 (H) 04/18/2017    HDL 41 (L) 04/18/2017    LDL 53 04/18/2017      No results found for: BNP    During this visit the following were done:  Labs " Reviewed [x]    Labs Ordered []    Radiology Reports Reviewed [x]    Radiology Ordered []    PCP Records Reviewed []    Referring Provider Records Reviewed []    ER Records Reviewed []    Hospital Records Reviewed []    History Obtained From Family []    Radiology Images Reviewed []    Other Reviewed []    Records Requested []         ECG 12 Lead    Date/Time: 2/25/2021 1:51 PM  Performed by: Chandler Langford PA-C  Authorized by: Chandler Langford PA-C   Comparison: compared with previous ECG   Similar to previous ECG  Rhythm: sinus rhythm  Ectopy: unifocal PVCs  T inversion: III    Clinical impression: non-specific ECG            Assessment/Plan    Diagnosis Plan   1. Essential hypertension  Comprehensive Metabolic Panel    CBC & Differential    Lipid Panel            Recommendations:  1. Essential hypertension  1. Slightly elevated today she reports she is only taking hydralazine 10 mg once nightly I will increase this to 25 mg twice daily.  2. I will check a CMP, CBC, and lipid panel      Return in about 1 year (around 2/25/2022).    As always, I appreciate very much the opportunity to participate in the cardiovascular care of your patients.      With Best Regards,    Chandler Langford PA-C

## 2021-04-28 DIAGNOSIS — E78.5 HYPERLIPIDEMIA: ICD-10-CM

## 2021-04-30 RX ORDER — PRAVASTATIN SODIUM 20 MG
20 TABLET ORAL DAILY
Qty: 90 TABLET | Refills: 3 | Status: SHIPPED | OUTPATIENT
Start: 2021-04-30 | End: 2021-12-20

## 2021-07-10 ENCOUNTER — APPOINTMENT (OUTPATIENT)
Dept: CT IMAGING | Facility: HOSPITAL | Age: 61
End: 2021-07-10

## 2021-07-10 ENCOUNTER — APPOINTMENT (OUTPATIENT)
Dept: GENERAL RADIOLOGY | Facility: HOSPITAL | Age: 61
End: 2021-07-10

## 2021-07-10 ENCOUNTER — HOSPITAL ENCOUNTER (INPATIENT)
Facility: HOSPITAL | Age: 61
LOS: 5 days | Discharge: HOME | End: 2021-07-15
Attending: FAMILY MEDICINE | Admitting: HOSPITALIST

## 2021-07-10 DIAGNOSIS — J96.01 ACUTE RESPIRATORY FAILURE WITH HYPOXIA (HCC): ICD-10-CM

## 2021-07-10 DIAGNOSIS — I50.9 ACUTE CONGESTIVE HEART FAILURE, UNSPECIFIED HEART FAILURE TYPE (HCC): Primary | ICD-10-CM

## 2021-07-10 DIAGNOSIS — B34.8 RHINOVIRUS: ICD-10-CM

## 2021-07-10 DIAGNOSIS — J44.1 CHRONIC OBSTRUCTIVE PULMONARY DISEASE WITH ACUTE EXACERBATION (HCC): ICD-10-CM

## 2021-07-10 LAB
A-A DO2: 49.4 MMHG (ref 0–300)
ALBUMIN SERPL-MCNC: 3.35 G/DL (ref 3.5–5.2)
ALBUMIN/GLOB SERPL: 1.3 G/DL
ALP SERPL-CCNC: 130 U/L (ref 39–117)
ALT SERPL W P-5'-P-CCNC: 53 U/L (ref 1–33)
ANION GAP SERPL CALCULATED.3IONS-SCNC: 11.3 MMOL/L (ref 5–15)
ARTERIAL PATENCY WRIST A: POSITIVE
AST SERPL-CCNC: 72 U/L (ref 1–32)
ATMOSPHERIC PRESS: 726 MMHG
B PARAPERT DNA SPEC QL NAA+PROBE: NOT DETECTED
B PERT DNA SPEC QL NAA+PROBE: NOT DETECTED
BACTERIA UR QL AUTO: ABNORMAL /HPF
BASE EXCESS BLDA CALC-SCNC: 0.6 MMOL/L (ref 0–2)
BASOPHILS # BLD AUTO: 0.03 10*3/MM3 (ref 0–0.2)
BASOPHILS NFR BLD AUTO: 0.4 % (ref 0–1.5)
BDY SITE: ABNORMAL
BILIRUB SERPL-MCNC: 1.2 MG/DL (ref 0–1.2)
BILIRUB UR QL STRIP: NEGATIVE
BODY TEMPERATURE: 0 C
BUN SERPL-MCNC: 9 MG/DL (ref 8–23)
BUN/CREAT SERPL: 17 (ref 7–25)
C PNEUM DNA NPH QL NAA+NON-PROBE: NOT DETECTED
CALCIUM SPEC-SCNC: 8.8 MG/DL (ref 8.6–10.5)
CHLORIDE SERPL-SCNC: 101 MMOL/L (ref 98–107)
CLARITY UR: CLEAR
CO2 BLDA-SCNC: 26 MMOL/L (ref 22–33)
CO2 SERPL-SCNC: 24.7 MMOL/L (ref 22–29)
COHGB MFR BLD: 1.7 % (ref 0–5)
COLOR UR: ABNORMAL
CREAT SERPL-MCNC: 0.53 MG/DL (ref 0.57–1)
CRP SERPL-MCNC: 6.57 MG/DL (ref 0–0.5)
D-LACTATE SERPL-SCNC: 1.9 MMOL/L (ref 0.5–2)
DEPRECATED RDW RBC AUTO: 45.8 FL (ref 37–54)
EOSINOPHIL # BLD AUTO: 0.17 10*3/MM3 (ref 0–0.4)
EOSINOPHIL NFR BLD AUTO: 2 % (ref 0.3–6.2)
ERYTHROCYTE [DISTWIDTH] IN BLOOD BY AUTOMATED COUNT: 13.7 % (ref 12.3–15.4)
FLUAV RNA RESP QL NAA+PROBE: NOT DETECTED
FLUAV SUBTYP SPEC NAA+PROBE: NOT DETECTED
FLUBV RNA ISLT QL NAA+PROBE: NOT DETECTED
FLUBV RNA RESP QL NAA+PROBE: NOT DETECTED
GFR SERPL CREATININE-BSD FRML MDRD: 118 ML/MIN/1.73
GLOBULIN UR ELPH-MCNC: 2.7 GM/DL
GLUCOSE SERPL-MCNC: 233 MG/DL (ref 65–99)
GLUCOSE UR STRIP-MCNC: ABNORMAL MG/DL
HADV DNA SPEC NAA+PROBE: NOT DETECTED
HAV IGM SERPL QL IA: NORMAL
HBA1C MFR BLD: 8.7 % (ref 4.8–5.6)
HBV CORE IGM SERPL QL IA: NORMAL
HBV SURFACE AG SERPL QL IA: NORMAL
HCO3 BLDA-SCNC: 24.8 MMOL/L (ref 20–26)
HCOV 229E RNA SPEC QL NAA+PROBE: NOT DETECTED
HCOV HKU1 RNA SPEC QL NAA+PROBE: NOT DETECTED
HCOV NL63 RNA SPEC QL NAA+PROBE: NOT DETECTED
HCOV OC43 RNA SPEC QL NAA+PROBE: NOT DETECTED
HCT VFR BLD AUTO: 36.5 % (ref 34–46.6)
HCT VFR BLD CALC: 40.7 % (ref 38–51)
HCV AB SER DONR QL: NORMAL
HGB BLD-MCNC: 12.3 G/DL (ref 12–15.9)
HGB BLDA-MCNC: 13.3 G/DL (ref 13.5–17.5)
HGB UR QL STRIP.AUTO: NEGATIVE
HMPV RNA NPH QL NAA+NON-PROBE: NOT DETECTED
HOLD SPECIMEN: NORMAL
HOLD SPECIMEN: NORMAL
HPIV1 RNA SPEC QL NAA+PROBE: NOT DETECTED
HPIV2 RNA SPEC QL NAA+PROBE: NOT DETECTED
HPIV3 RNA NPH QL NAA+PROBE: NOT DETECTED
HPIV4 P GENE NPH QL NAA+PROBE: NOT DETECTED
HYALINE CASTS UR QL AUTO: ABNORMAL /LPF
IMM GRANULOCYTES # BLD AUTO: 0.06 10*3/MM3 (ref 0–0.05)
IMM GRANULOCYTES NFR BLD AUTO: 0.7 % (ref 0–0.5)
INHALED O2 CONCENTRATION: 21 %
KETONES UR QL STRIP: NEGATIVE
LEUKOCYTE ESTERASE UR QL STRIP.AUTO: ABNORMAL
LYMPHOCYTES # BLD AUTO: 1.03 10*3/MM3 (ref 0.7–3.1)
LYMPHOCYTES NFR BLD AUTO: 12.3 % (ref 19.6–45.3)
Lab: ABNORMAL
Lab: ABNORMAL
M PNEUMO IGG SER IA-ACNC: NOT DETECTED
MCH RBC QN AUTO: 30.8 PG (ref 26.6–33)
MCHC RBC AUTO-ENTMCNC: 33.7 G/DL (ref 31.5–35.7)
MCV RBC AUTO: 91.5 FL (ref 79–97)
METHGB BLD QL: 0.2 % (ref 0–3)
MODALITY: ABNORMAL
MONOCYTES # BLD AUTO: 0.83 10*3/MM3 (ref 0.1–0.9)
MONOCYTES NFR BLD AUTO: 9.9 % (ref 5–12)
NEUTROPHILS NFR BLD AUTO: 6.28 10*3/MM3 (ref 1.7–7)
NEUTROPHILS NFR BLD AUTO: 74.7 % (ref 42.7–76)
NITRITE UR QL STRIP: NEGATIVE
NOTE: ABNORMAL
NOTIFIED BY: ABNORMAL
NOTIFIED WHO: ABNORMAL
NRBC BLD AUTO-RTO: 0 /100 WBC (ref 0–0.2)
NT-PROBNP SERPL-MCNC: 964 PG/ML (ref 0–900)
OXYHGB MFR BLDV: 86.2 % (ref 94–99)
PCO2 BLDA: 37.8 MM HG (ref 35–45)
PCO2 TEMP ADJ BLD: ABNORMAL MM[HG]
PH BLDA: 7.43 PH UNITS (ref 7.35–7.45)
PH UR STRIP.AUTO: 5.5 [PH] (ref 5–8)
PH, TEMP CORRECTED: ABNORMAL
PLATELET # BLD AUTO: 232 10*3/MM3 (ref 140–450)
PMV BLD AUTO: 10.3 FL (ref 6–12)
PO2 BLDA: 50.5 MM HG (ref 83–108)
PO2 TEMP ADJ BLD: ABNORMAL MM[HG]
POTASSIUM SERPL-SCNC: 3.8 MMOL/L (ref 3.5–5.2)
PROT SERPL-MCNC: 6 G/DL (ref 6–8.5)
PROT UR QL STRIP: ABNORMAL
RBC # BLD AUTO: 3.99 10*6/MM3 (ref 3.77–5.28)
RBC # UR: ABNORMAL /HPF
REF LAB TEST METHOD: ABNORMAL
RHINOVIRUS RNA SPEC NAA+PROBE: DETECTED
RSV RNA NPH QL NAA+NON-PROBE: NOT DETECTED
SAO2 % BLDCOA: 87.9 % (ref 94–99)
SARS-COV-2 RNA RESP QL NAA+PROBE: NOT DETECTED
SODIUM SERPL-SCNC: 137 MMOL/L (ref 136–145)
SP GR UR STRIP: 1.02 (ref 1–1.03)
SQUAMOUS #/AREA URNS HPF: ABNORMAL /HPF
TROPONIN T SERPL-MCNC: <0.01 NG/ML (ref 0–0.03)
TROPONIN T SERPL-MCNC: <0.01 NG/ML (ref 0–0.03)
UROBILINOGEN UR QL STRIP: ABNORMAL
VENTILATOR MODE: ABNORMAL
WBC # BLD AUTO: 8.4 10*3/MM3 (ref 3.4–10.8)
WBC UR QL AUTO: ABNORMAL /HPF
WHOLE BLOOD HOLD SPECIMEN: NORMAL

## 2021-07-10 PROCEDURE — 25010000002 CEFTRIAXONE PER 250 MG: Performed by: PHYSICIAN ASSISTANT

## 2021-07-10 PROCEDURE — 86140 C-REACTIVE PROTEIN: CPT | Performed by: PHYSICIAN ASSISTANT

## 2021-07-10 PROCEDURE — 83050 HGB METHEMOGLOBIN QUAN: CPT

## 2021-07-10 PROCEDURE — 87086 URINE CULTURE/COLONY COUNT: CPT | Performed by: HOSPITALIST

## 2021-07-10 PROCEDURE — 83880 ASSAY OF NATRIURETIC PEPTIDE: CPT | Performed by: PHYSICIAN ASSISTANT

## 2021-07-10 PROCEDURE — 85025 COMPLETE CBC W/AUTO DIFF WBC: CPT | Performed by: PHYSICIAN ASSISTANT

## 2021-07-10 PROCEDURE — 80074 ACUTE HEPATITIS PANEL: CPT | Performed by: HOSPITALIST

## 2021-07-10 PROCEDURE — 87077 CULTURE AEROBIC IDENTIFY: CPT | Performed by: HOSPITALIST

## 2021-07-10 PROCEDURE — 87633 RESP VIRUS 12-25 TARGETS: CPT | Performed by: PHYSICIAN ASSISTANT

## 2021-07-10 PROCEDURE — 94640 AIRWAY INHALATION TREATMENT: CPT

## 2021-07-10 PROCEDURE — 94799 UNLISTED PULMONARY SVC/PX: CPT

## 2021-07-10 PROCEDURE — 87040 BLOOD CULTURE FOR BACTERIA: CPT | Performed by: PHYSICIAN ASSISTANT

## 2021-07-10 PROCEDURE — 0 IOPAMIDOL PER 1 ML: Performed by: FAMILY MEDICINE

## 2021-07-10 PROCEDURE — 87636 SARSCOV2 & INF A&B AMP PRB: CPT | Performed by: PHYSICIAN ASSISTANT

## 2021-07-10 PROCEDURE — 71045 X-RAY EXAM CHEST 1 VIEW: CPT

## 2021-07-10 PROCEDURE — 83036 HEMOGLOBIN GLYCOSYLATED A1C: CPT | Performed by: HOSPITALIST

## 2021-07-10 PROCEDURE — 25010000002 FUROSEMIDE PER 20 MG: Performed by: PHYSICIAN ASSISTANT

## 2021-07-10 PROCEDURE — 93010 ELECTROCARDIOGRAM REPORT: CPT | Performed by: INTERNAL MEDICINE

## 2021-07-10 PROCEDURE — 82375 ASSAY CARBOXYHB QUANT: CPT

## 2021-07-10 PROCEDURE — 25010000002 METHYLPREDNISOLONE PER 125 MG: Performed by: PHYSICIAN ASSISTANT

## 2021-07-10 PROCEDURE — 99285 EMERGENCY DEPT VISIT HI MDM: CPT

## 2021-07-10 PROCEDURE — 99223 1ST HOSP IP/OBS HIGH 75: CPT | Performed by: HOSPITALIST

## 2021-07-10 PROCEDURE — 93005 ELECTROCARDIOGRAM TRACING: CPT | Performed by: PHYSICIAN ASSISTANT

## 2021-07-10 PROCEDURE — 25010000002 HEPARIN (PORCINE) PER 1000 UNITS: Performed by: HOSPITALIST

## 2021-07-10 PROCEDURE — 80053 COMPREHEN METABOLIC PANEL: CPT | Performed by: PHYSICIAN ASSISTANT

## 2021-07-10 PROCEDURE — 81001 URINALYSIS AUTO W/SCOPE: CPT | Performed by: PHYSICIAN ASSISTANT

## 2021-07-10 PROCEDURE — 36600 WITHDRAWAL OF ARTERIAL BLOOD: CPT

## 2021-07-10 PROCEDURE — 87186 SC STD MICRODIL/AGAR DIL: CPT | Performed by: HOSPITALIST

## 2021-07-10 PROCEDURE — 82805 BLOOD GASES W/O2 SATURATION: CPT

## 2021-07-10 PROCEDURE — 83605 ASSAY OF LACTIC ACID: CPT | Performed by: PHYSICIAN ASSISTANT

## 2021-07-10 PROCEDURE — 84484 ASSAY OF TROPONIN QUANT: CPT | Performed by: PHYSICIAN ASSISTANT

## 2021-07-10 PROCEDURE — 71275 CT ANGIOGRAPHY CHEST: CPT

## 2021-07-10 RX ORDER — ACETAMINOPHEN 500 MG
1000 TABLET ORAL ONCE
Status: COMPLETED | OUTPATIENT
Start: 2021-07-10 | End: 2021-07-10

## 2021-07-10 RX ORDER — DEXTROSE MONOHYDRATE 25 G/50ML
25 INJECTION, SOLUTION INTRAVENOUS
Status: DISCONTINUED | OUTPATIENT
Start: 2021-07-10 | End: 2021-07-15 | Stop reason: HOSPADM

## 2021-07-10 RX ORDER — NITROGLYCERIN 0.4 MG/1
0.4 TABLET SUBLINGUAL
Status: DISCONTINUED | OUTPATIENT
Start: 2021-07-10 | End: 2021-07-15 | Stop reason: HOSPADM

## 2021-07-10 RX ORDER — IPRATROPIUM BROMIDE AND ALBUTEROL SULFATE 2.5; .5 MG/3ML; MG/3ML
3 SOLUTION RESPIRATORY (INHALATION)
Status: DISCONTINUED | OUTPATIENT
Start: 2021-07-10 | End: 2021-07-15 | Stop reason: HOSPADM

## 2021-07-10 RX ORDER — SODIUM CHLORIDE 0.9 % (FLUSH) 0.9 %
10 SYRINGE (ML) INJECTION EVERY 12 HOURS SCHEDULED
Status: DISCONTINUED | OUTPATIENT
Start: 2021-07-10 | End: 2021-07-15 | Stop reason: HOSPADM

## 2021-07-10 RX ORDER — BENZONATATE 100 MG/1
100 CAPSULE ORAL 3 TIMES DAILY PRN
Status: DISCONTINUED | OUTPATIENT
Start: 2021-07-10 | End: 2021-07-15 | Stop reason: HOSPADM

## 2021-07-10 RX ORDER — SODIUM CHLORIDE 0.9 % (FLUSH) 0.9 %
10 SYRINGE (ML) INJECTION AS NEEDED
Status: DISCONTINUED | OUTPATIENT
Start: 2021-07-10 | End: 2021-07-15 | Stop reason: HOSPADM

## 2021-07-10 RX ORDER — METOPROLOL TARTRATE 50 MG/1
50 TABLET, FILM COATED ORAL EVERY 12 HOURS SCHEDULED
Status: DISCONTINUED | OUTPATIENT
Start: 2021-07-10 | End: 2021-07-12

## 2021-07-10 RX ORDER — HYDRALAZINE HYDROCHLORIDE 25 MG/1
25 TABLET, FILM COATED ORAL EVERY 12 HOURS SCHEDULED
Status: DISCONTINUED | OUTPATIENT
Start: 2021-07-10 | End: 2021-07-12

## 2021-07-10 RX ORDER — NICOTINE POLACRILEX 4 MG
15 LOZENGE BUCCAL
Status: DISCONTINUED | OUTPATIENT
Start: 2021-07-10 | End: 2021-07-15 | Stop reason: HOSPADM

## 2021-07-10 RX ORDER — BENZONATATE 100 MG/1
100 CAPSULE ORAL 3 TIMES DAILY PRN
COMMUNITY
End: 2021-08-16

## 2021-07-10 RX ORDER — HEPARIN SODIUM 5000 [USP'U]/ML
5000 INJECTION, SOLUTION INTRAVENOUS; SUBCUTANEOUS EVERY 12 HOURS SCHEDULED
Status: DISCONTINUED | OUTPATIENT
Start: 2021-07-10 | End: 2021-07-15 | Stop reason: HOSPADM

## 2021-07-10 RX ORDER — HYDRALAZINE HYDROCHLORIDE 25 MG/1
25 TABLET, FILM COATED ORAL 2 TIMES DAILY
Status: CANCELLED | OUTPATIENT
Start: 2021-07-11

## 2021-07-10 RX ORDER — FAMOTIDINE 20 MG/1
20 TABLET, FILM COATED ORAL
Status: DISCONTINUED | OUTPATIENT
Start: 2021-07-11 | End: 2021-07-10

## 2021-07-10 RX ORDER — METOPROLOL TARTRATE 50 MG/1
50 TABLET, FILM COATED ORAL 2 TIMES DAILY
Status: CANCELLED | OUTPATIENT
Start: 2021-07-11

## 2021-07-10 RX ORDER — FUROSEMIDE 10 MG/ML
40 INJECTION INTRAMUSCULAR; INTRAVENOUS ONCE
Status: COMPLETED | OUTPATIENT
Start: 2021-07-10 | End: 2021-07-10

## 2021-07-10 RX ORDER — PRAVASTATIN SODIUM 40 MG
20 TABLET ORAL DAILY
Status: DISCONTINUED | OUTPATIENT
Start: 2021-07-11 | End: 2021-07-15 | Stop reason: HOSPADM

## 2021-07-10 RX ORDER — PANTOPRAZOLE SODIUM 40 MG/1
40 TABLET, DELAYED RELEASE ORAL EVERY MORNING
Status: DISCONTINUED | OUTPATIENT
Start: 2021-07-11 | End: 2021-07-15 | Stop reason: HOSPADM

## 2021-07-10 RX ORDER — DICYCLOMINE HYDROCHLORIDE 10 MG/1
10 CAPSULE ORAL 3 TIMES DAILY
Status: DISCONTINUED | OUTPATIENT
Start: 2021-07-10 | End: 2021-07-15 | Stop reason: HOSPADM

## 2021-07-10 RX ORDER — METHYLPREDNISOLONE SODIUM SUCCINATE 40 MG/ML
40 INJECTION, POWDER, LYOPHILIZED, FOR SOLUTION INTRAMUSCULAR; INTRAVENOUS EVERY 12 HOURS
Status: DISCONTINUED | OUTPATIENT
Start: 2021-07-11 | End: 2021-07-12

## 2021-07-10 RX ORDER — METHYLPREDNISOLONE SODIUM SUCCINATE 125 MG/2ML
125 INJECTION, POWDER, LYOPHILIZED, FOR SOLUTION INTRAMUSCULAR; INTRAVENOUS ONCE
Status: COMPLETED | OUTPATIENT
Start: 2021-07-10 | End: 2021-07-10

## 2021-07-10 RX ORDER — IPRATROPIUM BROMIDE AND ALBUTEROL SULFATE 2.5; .5 MG/3ML; MG/3ML
3 SOLUTION RESPIRATORY (INHALATION) ONCE
Status: COMPLETED | OUTPATIENT
Start: 2021-07-10 | End: 2021-07-10

## 2021-07-10 RX ADMIN — METOPROLOL TARTRATE 50 MG: 50 TABLET, FILM COATED ORAL at 23:22

## 2021-07-10 RX ADMIN — IPRATROPIUM BROMIDE AND ALBUTEROL SULFATE 3 ML: .5; 3 SOLUTION RESPIRATORY (INHALATION) at 20:20

## 2021-07-10 RX ADMIN — CEFTRIAXONE 1 G: 1 INJECTION, POWDER, FOR SOLUTION INTRAMUSCULAR; INTRAVENOUS at 19:56

## 2021-07-10 RX ADMIN — HEPARIN SODIUM 5000 UNITS: 5000 INJECTION INTRAVENOUS; SUBCUTANEOUS at 23:21

## 2021-07-10 RX ADMIN — FUROSEMIDE 40 MG: 10 INJECTION INTRAMUSCULAR; INTRAVENOUS at 21:11

## 2021-07-10 RX ADMIN — DOXYCYCLINE 100 MG: 100 INJECTION, POWDER, LYOPHILIZED, FOR SOLUTION INTRAVENOUS at 21:11

## 2021-07-10 RX ADMIN — HYDRALAZINE HYDROCHLORIDE 25 MG: 25 TABLET ORAL at 23:21

## 2021-07-10 RX ADMIN — IPRATROPIUM BROMIDE AND ALBUTEROL SULFATE 3 ML: .5; 3 SOLUTION RESPIRATORY (INHALATION) at 22:57

## 2021-07-10 RX ADMIN — SODIUM CHLORIDE, PRESERVATIVE FREE 10 ML: 5 INJECTION INTRAVENOUS at 23:23

## 2021-07-10 RX ADMIN — IOPAMIDOL 100 ML: 755 INJECTION, SOLUTION INTRAVENOUS at 18:55

## 2021-07-10 RX ADMIN — ACETAMINOPHEN 1000 MG: 500 TABLET ORAL at 17:45

## 2021-07-10 RX ADMIN — METHYLPREDNISOLONE SODIUM SUCCINATE 125 MG: 125 INJECTION, POWDER, FOR SOLUTION INTRAMUSCULAR; INTRAVENOUS at 19:56

## 2021-07-11 ENCOUNTER — APPOINTMENT (OUTPATIENT)
Dept: CARDIOLOGY | Facility: HOSPITAL | Age: 61
End: 2021-07-11

## 2021-07-11 LAB
ALBUMIN SERPL-MCNC: 3.38 G/DL (ref 3.5–5.2)
ALBUMIN/GLOB SERPL: 1.1 G/DL
ALP SERPL-CCNC: 136 U/L (ref 39–117)
ALT SERPL W P-5'-P-CCNC: 53 U/L (ref 1–33)
ANION GAP SERPL CALCULATED.3IONS-SCNC: 16.1 MMOL/L (ref 5–15)
AST SERPL-CCNC: 42 U/L (ref 1–32)
BASOPHILS # BLD AUTO: 0.02 10*3/MM3 (ref 0–0.2)
BASOPHILS NFR BLD AUTO: 0.2 % (ref 0–1.5)
BH CV ECHO MEAS - ACS: 1.4 CM
BH CV ECHO MEAS - AO MAX PG: 12.8 MMHG
BH CV ECHO MEAS - AO MEAN PG: 7 MMHG
BH CV ECHO MEAS - AO ROOT AREA (BSA CORRECTED): 1.3
BH CV ECHO MEAS - AO ROOT AREA: 6.6 CM^2
BH CV ECHO MEAS - AO ROOT DIAM: 2.9 CM
BH CV ECHO MEAS - AO V2 MAX: 179 CM/SEC
BH CV ECHO MEAS - AO V2 MEAN: 124 CM/SEC
BH CV ECHO MEAS - AO V2 VTI: 41.2 CM
BH CV ECHO MEAS - BSA(HAYCOCK): 2.4 M^2
BH CV ECHO MEAS - BSA: 2.2 M^2
BH CV ECHO MEAS - BZI_BMI: 50.8 KILOGRAMS/M^2
BH CV ECHO MEAS - BZI_METRIC_HEIGHT: 157.5 CM
BH CV ECHO MEAS - BZI_METRIC_WEIGHT: 126.1 KG
BH CV ECHO MEAS - EDV(CUBED): 74.1 ML
BH CV ECHO MEAS - EDV(MOD-SP4): 51.8 ML
BH CV ECHO MEAS - EDV(TEICH): 78.6 ML
BH CV ECHO MEAS - EF(CUBED): 78.9 %
BH CV ECHO MEAS - EF(MOD-SP4): 72.2 %
BH CV ECHO MEAS - EF(TEICH): 71.6 %
BH CV ECHO MEAS - ESV(CUBED): 15.6 ML
BH CV ECHO MEAS - ESV(MOD-SP4): 14.4 ML
BH CV ECHO MEAS - ESV(TEICH): 22.3 ML
BH CV ECHO MEAS - FS: 40.5 %
BH CV ECHO MEAS - IVS/LVPW: 0.71
BH CV ECHO MEAS - IVSD: 1.2 CM
BH CV ECHO MEAS - LA DIMENSION: 2.6 CM
BH CV ECHO MEAS - LA/AO: 0.9
BH CV ECHO MEAS - LV DIASTOLIC VOL/BSA (35-75): 23.6 ML/M^2
BH CV ECHO MEAS - LV MASS(C)D: 236.7 GRAMS
BH CV ECHO MEAS - LV MASS(C)DI: 107.7 GRAMS/M^2
BH CV ECHO MEAS - LV SYSTOLIC VOL/BSA (12-30): 6.5 ML/M^2
BH CV ECHO MEAS - LVIDD: 4.2 CM
BH CV ECHO MEAS - LVIDS: 2.5 CM
BH CV ECHO MEAS - LVLD AP4: 6.9 CM
BH CV ECHO MEAS - LVLS AP4: 5.4 CM
BH CV ECHO MEAS - LVOT AREA (M): 2.5 CM^2
BH CV ECHO MEAS - LVOT AREA: 2.5 CM^2
BH CV ECHO MEAS - LVOT DIAM: 1.8 CM
BH CV ECHO MEAS - LVPWD: 1.7 CM
BH CV ECHO MEAS - MV A MAX VEL: 102 CM/SEC
BH CV ECHO MEAS - MV E MAX VEL: 110 CM/SEC
BH CV ECHO MEAS - MV E/A: 1.1
BH CV ECHO MEAS - PA ACC TIME: 0.08 SEC
BH CV ECHO MEAS - PA PR(ACCEL): 42.6 MMHG
BH CV ECHO MEAS - SI(AO): 123.8 ML/M^2
BH CV ECHO MEAS - SI(CUBED): 26.6 ML/M^2
BH CV ECHO MEAS - SI(MOD-SP4): 17 ML/M^2
BH CV ECHO MEAS - SI(TEICH): 25.6 ML/M^2
BH CV ECHO MEAS - SV(AO): 272.1 ML
BH CV ECHO MEAS - SV(CUBED): 58.5 ML
BH CV ECHO MEAS - SV(MOD-SP4): 37.4 ML
BH CV ECHO MEAS - SV(TEICH): 56.3 ML
BILIRUB SERPL-MCNC: 1.2 MG/DL (ref 0–1.2)
BUN SERPL-MCNC: 10 MG/DL (ref 8–23)
BUN/CREAT SERPL: 17.5 (ref 7–25)
CALCIUM SPEC-SCNC: 8.9 MG/DL (ref 8.6–10.5)
CHLORIDE SERPL-SCNC: 98 MMOL/L (ref 98–107)
CO2 SERPL-SCNC: 21.9 MMOL/L (ref 22–29)
CREAT SERPL-MCNC: 0.57 MG/DL (ref 0.57–1)
CRP SERPL-MCNC: 10.61 MG/DL (ref 0–0.5)
DEPRECATED RDW RBC AUTO: 43.5 FL (ref 37–54)
EOSINOPHIL # BLD AUTO: 0.01 10*3/MM3 (ref 0–0.4)
EOSINOPHIL NFR BLD AUTO: 0.1 % (ref 0.3–6.2)
ERYTHROCYTE [DISTWIDTH] IN BLOOD BY AUTOMATED COUNT: 13.4 % (ref 12.3–15.4)
GFR SERPL CREATININE-BSD FRML MDRD: 108 ML/MIN/1.73
GLOBULIN UR ELPH-MCNC: 3 GM/DL
GLUCOSE BLDC GLUCOMTR-MCNC: 241 MG/DL (ref 70–130)
GLUCOSE BLDC GLUCOMTR-MCNC: 251 MG/DL (ref 70–130)
GLUCOSE BLDC GLUCOMTR-MCNC: 301 MG/DL (ref 70–130)
GLUCOSE BLDC GLUCOMTR-MCNC: 323 MG/DL (ref 70–130)
GLUCOSE BLDC GLUCOMTR-MCNC: 356 MG/DL (ref 70–130)
GLUCOSE SERPL-MCNC: 331 MG/DL (ref 65–99)
HCT VFR BLD AUTO: 38.5 % (ref 34–46.6)
HGB BLD-MCNC: 13 G/DL (ref 12–15.9)
IMM GRANULOCYTES # BLD AUTO: 0.11 10*3/MM3 (ref 0–0.05)
IMM GRANULOCYTES NFR BLD AUTO: 1.2 % (ref 0–0.5)
LV EF 2D ECHO EST: 65 %
LYMPHOCYTES # BLD AUTO: 0.63 10*3/MM3 (ref 0.7–3.1)
LYMPHOCYTES NFR BLD AUTO: 7.1 % (ref 19.6–45.3)
MAXIMAL PREDICTED HEART RATE: 160 BPM
MCH RBC QN AUTO: 30.5 PG (ref 26.6–33)
MCHC RBC AUTO-ENTMCNC: 33.8 G/DL (ref 31.5–35.7)
MCV RBC AUTO: 90.4 FL (ref 79–97)
MONOCYTES # BLD AUTO: 0.31 10*3/MM3 (ref 0.1–0.9)
MONOCYTES NFR BLD AUTO: 3.5 % (ref 5–12)
NEUTROPHILS NFR BLD AUTO: 7.81 10*3/MM3 (ref 1.7–7)
NEUTROPHILS NFR BLD AUTO: 87.9 % (ref 42.7–76)
NRBC BLD AUTO-RTO: 0 /100 WBC (ref 0–0.2)
PLATELET # BLD AUTO: 231 10*3/MM3 (ref 140–450)
PMV BLD AUTO: 11.1 FL (ref 6–12)
POTASSIUM SERPL-SCNC: 3.3 MMOL/L (ref 3.5–5.2)
POTASSIUM SERPL-SCNC: 4.2 MMOL/L (ref 3.5–5.2)
PROCALCITONIN SERPL-MCNC: 0.07 NG/ML (ref 0–0.25)
PROT SERPL-MCNC: 6.4 G/DL (ref 6–8.5)
QT INTERVAL: 354 MS
QTC INTERVAL: 447 MS
RBC # BLD AUTO: 4.26 10*6/MM3 (ref 3.77–5.28)
SODIUM SERPL-SCNC: 136 MMOL/L (ref 136–145)
STRESS TARGET HR: 136 BPM
TROPONIN T SERPL-MCNC: <0.01 NG/ML (ref 0–0.03)
TROPONIN T SERPL-MCNC: <0.01 NG/ML (ref 0–0.03)
WBC # BLD AUTO: 8.89 10*3/MM3 (ref 3.4–10.8)

## 2021-07-11 PROCEDURE — 94799 UNLISTED PULMONARY SVC/PX: CPT

## 2021-07-11 PROCEDURE — 93306 TTE W/DOPPLER COMPLETE: CPT | Performed by: INTERNAL MEDICINE

## 2021-07-11 PROCEDURE — 84132 ASSAY OF SERUM POTASSIUM: CPT | Performed by: INTERNAL MEDICINE

## 2021-07-11 PROCEDURE — 25010000002 CEFTRIAXONE PER 250 MG: Performed by: HOSPITALIST

## 2021-07-11 PROCEDURE — 85025 COMPLETE CBC W/AUTO DIFF WBC: CPT | Performed by: HOSPITALIST

## 2021-07-11 PROCEDURE — 84145 PROCALCITONIN (PCT): CPT | Performed by: INTERNAL MEDICINE

## 2021-07-11 PROCEDURE — 82962 GLUCOSE BLOOD TEST: CPT

## 2021-07-11 PROCEDURE — 63710000001 INSULIN DETEMIR PER 5 UNITS: Performed by: HOSPITALIST

## 2021-07-11 PROCEDURE — 93306 TTE W/DOPPLER COMPLETE: CPT

## 2021-07-11 PROCEDURE — 99223 1ST HOSP IP/OBS HIGH 75: CPT | Performed by: NURSE PRACTITIONER

## 2021-07-11 PROCEDURE — 63710000001 INSULIN ASPART PER 5 UNITS: Performed by: HOSPITALIST

## 2021-07-11 PROCEDURE — 99233 SBSQ HOSP IP/OBS HIGH 50: CPT | Performed by: INTERNAL MEDICINE

## 2021-07-11 PROCEDURE — 80053 COMPREHEN METABOLIC PANEL: CPT | Performed by: HOSPITALIST

## 2021-07-11 PROCEDURE — 25010000002 HEPARIN (PORCINE) PER 1000 UNITS: Performed by: HOSPITALIST

## 2021-07-11 PROCEDURE — 25010000002 METHYLPREDNISOLONE PER 40 MG: Performed by: HOSPITALIST

## 2021-07-11 PROCEDURE — 84484 ASSAY OF TROPONIN QUANT: CPT | Performed by: HOSPITALIST

## 2021-07-11 PROCEDURE — 86140 C-REACTIVE PROTEIN: CPT | Performed by: HOSPITALIST

## 2021-07-11 RX ORDER — POTASSIUM CHLORIDE 20 MEQ/1
40 TABLET, EXTENDED RELEASE ORAL AS NEEDED
Status: DISCONTINUED | OUTPATIENT
Start: 2021-07-11 | End: 2021-07-15 | Stop reason: HOSPADM

## 2021-07-11 RX ORDER — POTASSIUM CHLORIDE 1.5 G/1.77G
40 POWDER, FOR SOLUTION ORAL AS NEEDED
Status: DISCONTINUED | OUTPATIENT
Start: 2021-07-11 | End: 2021-07-15 | Stop reason: HOSPADM

## 2021-07-11 RX ORDER — POTASSIUM CHLORIDE 20 MEQ/1
40 TABLET, EXTENDED RELEASE ORAL EVERY 4 HOURS
Status: COMPLETED | OUTPATIENT
Start: 2021-07-11 | End: 2021-07-11

## 2021-07-11 RX ADMIN — POTASSIUM CHLORIDE 40 MEQ: 20 TABLET, EXTENDED RELEASE ORAL at 12:08

## 2021-07-11 RX ADMIN — INSULIN ASPART 12 UNITS: 100 INJECTION, SOLUTION INTRAVENOUS; SUBCUTANEOUS at 12:09

## 2021-07-11 RX ADMIN — PRAVASTATIN SODIUM 20 MG: 40 TABLET ORAL at 08:58

## 2021-07-11 RX ADMIN — CEFTRIAXONE 1 G: 1 INJECTION, POWDER, FOR SOLUTION INTRAMUSCULAR; INTRAVENOUS at 20:01

## 2021-07-11 RX ADMIN — METOPROLOL TARTRATE 50 MG: 50 TABLET, FILM COATED ORAL at 20:00

## 2021-07-11 RX ADMIN — IPRATROPIUM BROMIDE AND ALBUTEROL SULFATE 3 ML: .5; 3 SOLUTION RESPIRATORY (INHALATION) at 19:04

## 2021-07-11 RX ADMIN — HYDRALAZINE HYDROCHLORIDE 25 MG: 25 TABLET ORAL at 20:00

## 2021-07-11 RX ADMIN — INSULIN ASPART 10 UNITS: 100 INJECTION, SOLUTION INTRAVENOUS; SUBCUTANEOUS at 08:59

## 2021-07-11 RX ADMIN — SODIUM CHLORIDE, PRESERVATIVE FREE 10 ML: 5 INJECTION INTRAVENOUS at 08:57

## 2021-07-11 RX ADMIN — DICYCLOMINE HYDROCHLORIDE 10 MG: 10 CAPSULE ORAL at 17:21

## 2021-07-11 RX ADMIN — HEPARIN SODIUM 5000 UNITS: 5000 INJECTION INTRAVENOUS; SUBCUTANEOUS at 20:01

## 2021-07-11 RX ADMIN — HEPARIN SODIUM 5000 UNITS: 5000 INJECTION INTRAVENOUS; SUBCUTANEOUS at 00:31

## 2021-07-11 RX ADMIN — INSULIN ASPART 5 UNITS: 100 INJECTION, SOLUTION INTRAVENOUS; SUBCUTANEOUS at 17:21

## 2021-07-11 RX ADMIN — SODIUM CHLORIDE, PRESERVATIVE FREE 10 ML: 5 INJECTION INTRAVENOUS at 20:00

## 2021-07-11 RX ADMIN — IPRATROPIUM BROMIDE AND ALBUTEROL SULFATE 3 ML: .5; 3 SOLUTION RESPIRATORY (INHALATION) at 06:55

## 2021-07-11 RX ADMIN — METOPROLOL TARTRATE 50 MG: 50 TABLET, FILM COATED ORAL at 08:58

## 2021-07-11 RX ADMIN — METHYLPREDNISOLONE SODIUM SUCCINATE 40 MG: 40 INJECTION, POWDER, FOR SOLUTION INTRAMUSCULAR; INTRAVENOUS at 08:59

## 2021-07-11 RX ADMIN — POTASSIUM CHLORIDE 40 MEQ: 20 TABLET, EXTENDED RELEASE ORAL at 08:58

## 2021-07-11 RX ADMIN — INSULIN DETEMIR 15 UNITS: 100 INJECTION, SOLUTION SUBCUTANEOUS at 20:01

## 2021-07-11 RX ADMIN — DOXYCYCLINE 100 MG: 100 INJECTION, POWDER, LYOPHILIZED, FOR SOLUTION INTRAVENOUS at 20:00

## 2021-07-11 RX ADMIN — DICYCLOMINE HYDROCHLORIDE 10 MG: 10 CAPSULE ORAL at 20:00

## 2021-07-11 RX ADMIN — DOXYCYCLINE 100 MG: 100 INJECTION, POWDER, LYOPHILIZED, FOR SOLUTION INTRAVENOUS at 08:59

## 2021-07-11 RX ADMIN — IPRATROPIUM BROMIDE AND ALBUTEROL SULFATE 3 ML: .5; 3 SOLUTION RESPIRATORY (INHALATION) at 12:51

## 2021-07-11 RX ADMIN — DICYCLOMINE HYDROCHLORIDE 10 MG: 10 CAPSULE ORAL at 08:58

## 2021-07-11 RX ADMIN — METHYLPREDNISOLONE SODIUM SUCCINATE 40 MG: 40 INJECTION, POWDER, FOR SOLUTION INTRAMUSCULAR; INTRAVENOUS at 20:00

## 2021-07-11 RX ADMIN — PANTOPRAZOLE SODIUM 40 MG: 40 TABLET, DELAYED RELEASE ORAL at 05:31

## 2021-07-11 RX ADMIN — HYDRALAZINE HYDROCHLORIDE 25 MG: 25 TABLET ORAL at 08:59

## 2021-07-11 RX ADMIN — HEPARIN SODIUM 5000 UNITS: 5000 INJECTION INTRAVENOUS; SUBCUTANEOUS at 08:58

## 2021-07-11 RX ADMIN — DICYCLOMINE HYDROCHLORIDE 10 MG: 10 CAPSULE ORAL at 00:00

## 2021-07-11 RX ADMIN — INSULIN DETEMIR 15 UNITS: 100 INJECTION, SOLUTION SUBCUTANEOUS at 01:29

## 2021-07-11 NOTE — ED PROVIDER NOTES
Subjective   59 yo female patient presents to the ED with complaints of SOB, productive cough, fever, and wheezing x 1 month. Patient tells me when the symptoms started about 4 weeks ago she went to urgent care and was dx with viral respiratory infection given tessalon perles. Patient states she did not get any better and worsened. She went to her PCP and was given antibiotics and steroids. She completed those about 1 week ago. Patient states she has continued to decline. Patient reports no improvement. States that exertion seems to make her more SOB. Patient denies any hx of COPD. States she does not ever wear oxygen. Pt states she does live in a home with 3 smokers. Patient states she gets bronchitis several times a year. PMHX - DM, HTN, and hyperlipidemia, and Palpitations.       History provided by:  Patient   used: No        Review of Systems   Constitutional: Positive for fever.   HENT: Negative.    Eyes: Negative.    Respiratory: Positive for cough, shortness of breath and wheezing.    Cardiovascular: Negative.    Gastrointestinal: Negative.    Endocrine: Negative.    Genitourinary: Negative.    Musculoskeletal: Negative.    Skin: Negative.    Allergic/Immunologic: Negative.    Neurological: Negative.    Hematological: Negative.    Psychiatric/Behavioral: Negative.    All other systems reviewed and are negative.      Past Medical History:   Diagnosis Date   • Acute exacerbation of CHF (congestive heart failure) (CMS/[facility]) 7/10/2021   • Arthritis    • Diabetes mellitus (CMS/[facility])    • Dyslipidemia    • History of transfusion    • Hyperlipidemia    • Hypertension    • Palpitations    • PONV (postoperative nausea and vomiting)        Allergies   Allergen Reactions   • Augmentin [Amoxicillin-Pot Clavulanate] Diarrhea   • Metformin And Related Diarrhea       Past Surgical History:   Procedure Laterality Date   • CHOLECYSTECTOMY     • COLONOSCOPY N/A 2/21/2017    Procedure: COLONOSCOPY FOR  SCREENING  CPTCODE:[id number];  Surgeon: Socrates Price III, MD;  Location: [facility];  Service:    • HYSTERECTOMY     • SKIN GRAFT     • TUBAL ABDOMINAL LIGATION         Family History   Problem Relation Age of Onset   • Cancer Mother    • Heart attack Father    • Cancer Father        Social History     Socioeconomic History   • Marital status:      Spouse name: Not on file   • Number of children: Not on file   • Years of education: Not on file   • Highest education level: Not on file   Tobacco Use   • Smoking status: Never Smoker   • Smokeless tobacco: Never Used   Substance and Sexual Activity   • Alcohol use: No   • Drug use: No   • Sexual activity: Defer           Objective   Physical Exam  Vitals and nursing note reviewed.   Constitutional:       Appearance: She is well-developed and normal weight.   HENT:      Head: Normocephalic and atraumatic.      Mouth/Throat:      Mouth: Mucous membranes are moist.      Pharynx: Oropharynx is clear.   Eyes:      Extraocular Movements: Extraocular movements intact.      Pupils: Pupils are equal, round, and reactive to light.   Cardiovascular:      Rate and Rhythm: Normal rate and regular rhythm.      Pulses: Normal pulses.      Heart sounds: Normal heart sounds.   Pulmonary:      Effort: Pulmonary effort is normal.      Breath sounds: Wheezing present.   Abdominal:      General: Bowel sounds are normal.      Palpations: Abdomen is soft.   Musculoskeletal:         General: Normal range of motion.      Cervical back: Normal range of motion and neck supple.   Skin:     General: Skin is warm and dry.      Capillary Refill: Capillary refill takes less than 2 seconds.   Neurological:      General: No focal deficit present.      Mental Status: She is alert and oriented to person, place, and time.   Psychiatric:         Mood and Affect: Mood normal.         Behavior: Behavior normal.         Procedures           ED Course  ED Course as of Jul 11 1121   Sat Jul 10, 2021    1804 PT placed on 2L NC   pO2, Arterial(!!): 50.5 [ML]   1823    IMPRESSION:  There is mild pulmonary vascular congestion with cardiomegaly.     Signer Name: Hilario Acosta MD   Signed: 7/10/2021 6:08 PM   Workstation Name: [email or web address]    [facility]        Specimen Collected: 07/10/21 18:08 Last Resulted: 07/10/21 18:08           XR Chest 1 View [ML]   1938 IMPRESSION:  Interlobular septal thickening consistent with pulmonary vascular congestion and mild heart failure. No suspicious infiltrates. No evidence of pulmonary embolism.     Signer Name: Hilario Acosta MD   Signed: 7/10/2021 7:21 PM   Workstation Name: [email or web address]    [facility]        Specimen Collected: 07/10/21 19:21         CT Chest Pulmonary Embolism [ML]   2009 Discussed with Reagan - will admit     [ML]      ED Course User Index  [ML] Rema Gonzales PA MDM  Number of Diagnoses or Management Options     Amount and/or Complexity of Data Reviewed  Clinical lab tests: ordered and reviewed  Tests in the radiology section of CPT®: ordered and reviewed  Discuss the patient with other providers: yes    Risk of Complications, Morbidity, and/or Mortality  Presenting problems: moderate  Diagnostic procedures: moderate  Management options: moderate    Patient Progress  Patient progress: stable      Final diagnoses:   Acute congestive heart failure, unspecified heart failure type (CMS/HCC)   Chronic obstructive pulmonary disease with acute exacerbation (CMS/HCC)   Acute respiratory failure with hypoxia (CMS/HCC)   Rhinovirus       ED Disposition  ED Disposition     ED Disposition Condition Comment    Decision to Admit  Level of Care: Telemetry [5]   Diagnosis: Acute exacerbation of CHF (congestive heart failure) (CMS/HCC) [064116]   Admitting Physician: YOHANA TORRES [[id number]]   Attending Physician: YOHANA TORRES [[id number]]   Certification: I Certify That  Inpatient Hospital Services Are Medically Necessary For Greater Than 2 Midnights            No follow-up provider specified.       Medication List      ASK your doctor about these medications    dicyclomine 10 MG capsule  Commonly known as: BENTYL  Take 1 to 2 capsules by mouth 3 (Three) Times a Day.  Ask about: Which instructions should I use?     Janumet XR  MG tablet  Generic drug: SITagliptin-metFORMIN HCl ER  Take 2 tablets by mouth Daily.  Ask about: Which instructions should I use?     Tresiba FlexTouch 100 UNIT/ML solution pen-injector injection  Generic drug: insulin degludec  Inject 20 Units under the skin into the appropriate area as directed Daily.  Ask about: Which instructions should I use?             Rema Gonzales PA  07/11/21 1125
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
## 2021-07-11 NOTE — PROGRESS NOTES
"    [facility] HOSPITALIST PROGRESS NOTE     Patient Identification:  Name:  Zeina Reed  Age:  60 y.o.  Sex:  female  :  1960  MRN:  [id number]  Visit Number:  [id number]  ROOM: [address] [place] [address]     Primary Care Provider:  Rachel Savage APRN    Length of stay in inpatient status:  1    Subjective     Chief Compliant:    Chief Complaint   Patient presents with   • Shortness of Breath   • Cough     History of Presenting Illness:    Patient stable today, no acute events overnight, no new complaints, reports her breathing is much improved, still on 2LNC,  at bedside and notes she looks \"much better\", on ABx and steroids, no definitive COPD or asthma but suspected reactive airway disease, VRP + for rhinovirus, echo w/ normal EF and no diastolic dysfunction though awaiting cardiology input, she denies any fevers or chills.   Objective     Current Hospital Meds:cefTRIAXone, 1 g, Intravenous, Q24H  dicyclomine, 10 mg, Oral, TID  doxycycline, 100 mg, Intravenous, Q12H  heparin (porcine), 5,000 Units, Subcutaneous, Q12H  hydrALAZINE, 25 mg, Oral, Q12H  insulin aspart, 0-14 Units, Subcutaneous, TID AC  insulin detemir, 15 Units, Subcutaneous, Nightly  ipratropium-albuterol, 3 mL, Nebulization, 4x Daily - RT  methylPREDNISolone sodium succinate, 40 mg, Intravenous, Q12H  metoprolol tartrate, 50 mg, Oral, Q12H  pantoprazole, 40 mg, Oral, QAM  pravastatin, 20 mg, Oral, Daily  sodium chloride, 10 mL, Intravenous, Q12H         Current Antimicrobial Therapy:  Anti-Infectives (From admission, onward)    Ordered     Dose/Rate Route Frequency Start Stop    07/10/21 2132  cefTRIAXone (ROCEPHIN) 1 g/100 mL 0.9% NS (MBP)     Ordering Provider: Celso Kirk MD    1 g  over 30 Minutes Intravenous Every 24 Hours 07/11/21 2000 07/16/21 1959    07/10/21 2132  doxycycline (VIBRAMYCIN) 100 mg/100 mL 0.9% NS MBP     Ordering Provider: Celso Kirk MD    100 mg Intravenous Every 12 Hours 21 " 0900 07/18/21 0859    07/10/21 2010  doxycycline (VIBRAMYCIN) 100 mg/100 mL 0.9% NS MBP     Ordering Provider: Rema Gonzales PA    100 mg  over 60 Minutes Intravenous Once 07/10/21 2012 07/10/21 2211    07/10/21 1918  cefTRIAXone (ROCEPHIN) 1 g/100 mL 0.9% NS (MBP)     Ordering Provider: Rema Gonzales PA    1 g  over 30 Minutes Intravenous Once 07/10/21 1920 07/10/21 2026        Current Diuretic Therapy:  Diuretics (From admission, onward)    Ordered     Dose/Rate Route Frequency Start Stop    07/10/21 2009  furosemide (LASIX) injection 40 mg     Ordering Provider: Rema Gonzales PA    40 mg Intravenous Once 07/10/21 2011 07/10/21 2111        ----------------------------------------------------------------------------------------------------------------------  Vital Signs:  Temp:  [98.2 °F (36.8 °C)-101 °F (38.3 °C)] 98.2 °F (36.8 °C)  Heart Rate:  [75-96] 87  Resp:  [16-20] 18  BP: (101-194)/() 178/87  SpO2:  [91 %-98 %] 95 %  on  Flow (L/min):  [2] 2;   Device (Oxygen Therapy): nasal cannula  Body mass index is 50.96 kg/m².    Wt Readings from Last 3 Encounters:   07/11/21 126 kg (278 lb 9.6 oz)   02/25/21 120 kg (265 lb)   01/31/20 117 kg (258 lb 6.4 oz)     Intake & Output (last 3 days)       07/08 0701 - 07/09 0700 07/09 0701 - 07/10 0700 07/10 0701 - 07/11 0700 07/11 0701 - 07/12 0700    P.O.   360 480    IV Piggyback   100     Total Intake(mL/kg)   460 (3.7) 480 (3.8)    Urine (mL/kg/hr)    300 (0.3)    Total Output    300    Net   +460 +180            Urine Unmeasured Occurrence   3 x 1 x        Diet Regular; Consistent Carbohydrate, Cardiac  ----------------------------------------------------------------------------------------------------------------------  Physical exam:  Constitutional:  Well-developed and well-nourished.  No acute distress.      HENT:  Head:  Normocephalic and atraumatic.  Mouth:  Moist mucous membranes.    Eyes:  Conjunctivae and EOM are normal. No  scleral icterus.    Neck:  Neck supple.  No JVD present.    Cardiovascular:  Normal rate, regular rhythm and normal heart sounds with no murmur.  Pulmonary/Chest:  minimal respiratory distress, trace wheezes, no crackles, on 2NC  Abdominal:  Soft. No distension and no tenderness.   Musculoskeletal:  No tenderness, and no deformity.   Neurological:  Alert and oriented to person, place, and time.  No cranial nerve deficit.    Skin:  Skin is warm and dry. No rash noted. No pallor.   Peripheral vascular:  No clubbing, no cyanosis, no edema.  ----------------------------------------------------------------------------------------------------------------------  Results from last 7 days   Lab Units 07/11/21  0033 07/10/21  1729   CRP mg/dL 10.61* 6.57*   LACTATE mmol/L  --  1.9   WBC 10*3/mm3 8.89 8.40   HEMOGLOBIN g/dL 13.0 12.3   HEMATOCRIT % 38.5 36.5   MCV fL 90.4 91.5   MCHC g/dL 33.8 33.7   PLATELETS 10*3/mm3 231 232     Results from last 7 days   Lab Units 07/10/21  1748   PH, ARTERIAL pH units 7.426   PO2 ART mm Hg 50.5*   PCO2, ARTERIAL mm Hg 37.8   HCO3 ART mmol/L 24.8     Results from last 7 days   Lab Units 07/11/21  0033 07/10/21  1729   SODIUM mmol/L 136 137   POTASSIUM mmol/L 3.3* 3.8   CHLORIDE mmol/L 98 101   CO2 mmol/L 21.9* 24.7   BUN mg/dL 10 9   CREATININE mg/dL 0.57 0.53*   EGFR IF NONAFRICN AM mL/min/1.73 108 118   CALCIUM mg/dL 8.9 8.8   GLUCOSE mg/dL 331* 233*   ALBUMIN g/dL 3.38* 3.35*   BILIRUBIN mg/dL 1.2 1.2   ALK PHOS U/L 136* 130*   AST (SGOT) U/L 42* 72*   ALT (SGPT) U/L 53* 53*   Estimated Creatinine Clearance: 133.4 mL/min (by C-G formula based on SCr of 0.57 mg/dL).  No results found for: AMMONIA  Results from last 7 days   Lab Units 07/11/21  0537 07/11/21  0033 07/10/21  1935   TROPONIN T ng/mL <0.010 <0.010 <0.010     Results from last 7 days   Lab Units 07/10/21  1729   PROBNP pg/mL 964.0*         Hemoglobin A1C   Date/Time Value Ref Range Status   07/10/2021 1729 8.70 (H) 4.80 - 5.60  % Final     Glucose   Date/Time Value Ref Range Status   07/11/2021 1046 356 (H) 70 - 130 mg/dL Final     Comment:     Meter: [id number] : [id number] leigh gutierrez   07/11/2021 0744 301 (H) 70 - 130 mg/dL Final     Comment:     Meter: [id number] : [id number] leigh gutierrez   07/11/2021 0027 323 (H) 70 - 130 mg/dL Final     Comment:     RN Notified Meter: [id number] : [id number] MC MILLARD     Lab Results   Component Value Date    TSH 2.005 04/18/2017    FREET4 1.30 02/12/2015     No results found for: PREGTESTUR, PREGSERUM, HCG, HCGQUANT  Pain Management Panel    There is no flowsheet data to display.       Brief Urine Lab Results  (Last result in the past 365 days)      Color   Clarity   Blood   Leuk Est   Nitrite   Protein   CREAT   Urine HCG        07/10/21 1810 Dark Yellow Clear Negative Small (1+) Negative 30 mg/dL (1+)             No results found for: BLOODCX  No results found for: URINECX  No results found for: WOUNDCX  No results found for: STOOLCX  No results found for: RESPCX  No results found for: AFBCX  Results from last 7 days   Lab Units 07/11/21  0033 07/10/21  1729   LACTATE mmol/L  --  1.9   CRP mg/dL 10.61* 6.57*     I have personally looked at the labs and they are summarized above.  ----------------------------------------------------------------------------------------------------------------------  Detailed radiology reports for the last 24 hours:  Imaging Results (Last 24 Hours)     Procedure Component Value Units Date/Time    CT Chest Pulmonary Embolism [[id number]] Collected: 07/10/21 1921     Updated: 07/10/21 1924    Narrative:      CT CHEST PULMONARY EMBOLISM W CONTRAST    INDICATION:   Shortness of breath prior to arrival   TECHNIQUE:   CT angiogram of the chest with 100 cc of Isovue 300 IV contrast. 3-D reconstructions were obtained and reviewed.   Radiation dose reduction techniques included automated exposure control or exposure modulation based on body size. Count of  known CT and  cardiac nuc med studies performed in previous 12 months: 0.     COMPARISON:   None available.    FINDINGS:   Chest images at mediastinal window show good filling of the pulmonary arteries. There are no pulmonary artery filling defects to suggest emboli. The CT angiographic images also show no evidence of emboli. No evidence of adenopathy or effusion. No acute  changes in the aorta.    Chest images at lung window show mild prominence of the interlobular septae bilaterally. This can be seen with fluid overload or mild congestive heart failure.      Impression:      Interlobular septal thickening consistent with pulmonary vascular congestion and mild heart failure. No suspicious infiltrates. No evidence of pulmonary embolism.    Signer Name: Hilario Acosta MD   Signed: 7/10/2021 7:21 PM   Workstation Name: [employer]    [facility]    XR Chest 1 View [[id number]] Collected: 07/10/21 1808     Updated: 07/10/21 1810    Narrative:      CR Chest 1 Vw    INDICATION:   Cough with shortness of breath for the past month     COMPARISON:    None available.    FINDINGS:  Single portable AP view(s) of the chest.  Cardiomegaly is noted. In the lungs pulmonary vascular markings are prominent with redistribution toward the upper lobes consistent with congestive heart failure or fluid overload. No effusions are seen and no  focal infiltrates are noted.      Impression:      There is mild pulmonary vascular congestion with cardiomegaly.    Signer Name: Hilario Acosta MD   Signed: 7/10/2021 6:08 PM   Workstation [facility]: [facility]    [facility]        Assessment & Plan    60F Morbidly Obese by BMI PMH insulin dependent type II DM, HTN, HLD, irritable bowel syndrome and palpitations, who presents with complaints of ongoing cough with worsening shortness of breath and wheezing.    #Sepsis & Acute Hypoxic Respiratory Failure 2/2 Acute Bronchitis w/ resultant reactive airway  disease, c/f underlying COPD vs Asthma  #Possible Acute Uncomplicated UTI  - Patient reportedly not on 02 at home, presents w/ increased SOA, new oxygen requirement, HR > 90, diffuse wheezing, fever.    - Admission labs showed WBC count 8K, CRP 6.57, lactate 1.9, Covid/Flu negative, VRP w/ + Rhinovirus, ABG 7.4/37/50, UA w/ small Le's, negative nitrites, 2+ bacteria, 13-20 WBC's, Urine culture pending, Bcx's pending.   - Continue short acting beta agonist and short acting muscarinic antagonist; Continue scheduled Duonebs and Albuterol PRN  - Continue IV glucocorticoids w/ methylprednisolone 40-60mg IV BID  - Continue IV Ceftriaxone & Doxy x 7 days  - Will treat w/ supplemental oxygen to keep 02 sats 88-92%, caution w/ over oxygenation as not to suppress respiratory drive  - If requires NIPPV will initiate Bipap and titrate settings as indicated  - Monitor on telemetry, continuous pulse ox, continue 02 and wean as able  - Would recommend outpatient PFTs following acute illness resolution to be ordered per PCP    #HTN  #Concern for CHF  - Labs showed trops negative, proBNP 964  - EKG showed NSR, low voltage QRS, nonspecific ST/Twave abnormality  - CXR showed mild pulm vascular congestion and cardiomegaly  - CTPE showed no PE, interlobular septal thickening  - Echo showed LVEF 65%, normal diastolic function, trace MR, otherwise unremarkable  - Cards consulted; Recs pending  - Continue home statin  - Continue home BB, home Hydralazine  - S/p 40mg IV lasix x 1, trend Cr and UOP, further diuresis PRN and per cards recs  - Continue to monitor on tele, strict I/O's, daily weights, trend HR and BP    #NIDDM Type II, uncontrolled/unknown control, unknown complications  - Hgb A1c = 8.7%  - Holding home oral agents, continue FSBG and SSI, continue Levemir 15U qhs, titrate as indicated    #Mild Transaminitis  - AST 53, ALT 72, Acute Hep panel negative, likely fatty liver/AYALA but also could be related to sepsis,  trending.    #GERD  - Continue home PPI    #Morbid Obesity by BMI  - BMI 50, complicates all aspects of care    F: Oral  E: Monitor & Replace PRN  N: DM, Cardiac diet  PPx: SQH  Code: Full Code    Dispo: Pending workup and clinical improvement    *This patient is considered high risk secondary to acute bronchitis, acute respiratory failure, possible UTI.    VTE Prophylaxis:   Mechanical Order History:     None      Pharmalogical Order History:      Ordered     Dose Route Frequency Stop    07/10/21 2132  heparin (porcine) 5000 UNIT/ML injection 5,000 Units      5,000 Units SC Every 12 Hours Scheduled --              Luke Venegas MD  [facility] Hospitalist  07/11/21  13:54 EDT

## 2021-07-11 NOTE — PLAN OF CARE
Goal Outcome Evaluation:              Outcome Summary: Patient is resting in bed. Patient states her SOA is improving with the 2L NC. Denies any chest pain. Patient voices no concerns at this time.Call light is in reach. Will continue to monitor and follow the plan of care.

## 2021-07-11 NOTE — CONSULTS
Inpatient Cardiology Consult  Consult performed by: Jia Stacy APRN  Consult ordered by: Celso Kirk MD        Date of Admit: 7/10/2021  Date of Consult: 07/11/21  No ref. provider found  Zeina Reed  1960    Consulting Physician: Villa Chavarria MD    Cardiology consultation    Reason for consultation: New onset CHF plus viral URI symptoms, question of viral cardiomyopathy    Assessment/recommendations:  1. It appears that she has primarily lung issues which are making her short of breath.  Transthoracic echocardiogram shows normal LV systolic and diastolic function.  proBNP is only very mildly elevated at 964.  2. Continue gentle diuresis.  Continue Lopressor and pravastatin.      History of Present Illness    Subjective     Chief Complaint   Patient presents with   • Shortness of Breath   • Cough       Zeina Reed is a 60 y.o. female with past medical history significant for hypertension, dyslipidemia, insulin-dependent type 2 diabetes, arthritis, and obesity.  She presented to the ED on 7/10/2021 with complaint of worsening shortness of breath and wheezing.  She stated that she had initially developed symptoms 1 month prior and was treated at urgent care with a probable viral respiratory illness and given cough medication.  Her PCP gave her antibiotics approximately 2 weeks later.  She also reported swelling to her lower extremities and stated that this was chronic and intermittent.  She had noticed that the swelling had increased over the last week and felt that it was likely secondary to having been taken off of hydrochlorothiazide.  The patient was admitted with sepsis with acute hypoxic respiratory failure.  Cardiology was consulted for concern of new onset of heart failure.    The patient was seen and examined.  She reports events as stated above.    The patient denies past medical history of coronary artery disease.  She states she sees [place] Crum in Dr. Zapata's office due to  palpitations.    Last Echo: 7/11/2021    Interpretation Summary    · Estimated left ventricular EF = 65% Left ventricular systolic function is normal.  · Left ventricular diastolic function was normal.  · Trace Mitral regurgitation  · Aortic and tricuspid valves are unremarkable  · No prev echo       Past Medical History:   Diagnosis Date   • Acute exacerbation of CHF (congestive heart failure) (CMS/HCC) 7/10/2021   • Arthritis    • Diabetes mellitus (CMS/HCC)    • Dyslipidemia    • History of transfusion    • Hyperlipidemia    • Hypertension    • Palpitations    • PONV (postoperative nausea and vomiting)      Past Surgical History:   Procedure Laterality Date   • CHOLECYSTECTOMY     • COLONOSCOPY N/A 2/21/2017    Procedure: COLONOSCOPY FOR SCREENING  CPTCODE:97974;  Surgeon: Socrates Price III, MD;  Location: [facility];  Service:    • HYSTERECTOMY     • SKIN GRAFT     • TUBAL ABDOMINAL LIGATION       Family History   Problem Relation Age of Onset   • Cancer Mother    • Heart attack Father    • Cancer Father      Social History     Tobacco Use   • Smoking status: Never Smoker   • Smokeless tobacco: Never Used   Substance Use Topics   • Alcohol use: No   • Drug use: No     Medications Prior to Admission   Medication Sig Dispense Refill Last Dose   • dicyclomine (BENTYL) 10 MG capsule Take 1 to 2 capsules by mouth 3 (Three) Times a Day. 60 capsule 1 7/10/2021 at Unknown time   • hydrALAZINE (APRESOLINE) 25 MG tablet Take 1 tablet by mouth 2 (two) times a day. 180 tablet 3 7/10/2021 at pm   • insulin degludec (Tresiba FlexTouch) 100 UNIT/ML solution pen-injector injection Inject 20 Units under the skin into the appropriate area as directed Daily. (Patient taking differently: Inject 20 Units under the skin into the appropriate area as directed Every Night.) 15 mL 2 7/9/2021 at Unknown time   • lansoprazole (PREVACID) 30 MG capsule Take 1 capsule by mouth Daily before a meal 90 capsule 0 7/10/2021 at Unknown time    • metoprolol tartrate (LOPRESSOR) 50 MG tablet Take 1 tablet by mouth 2 (Two) Times a Day. 180 tablet 3 7/10/2021 at PM   • pravastatin (PRAVACHOL) 20 MG tablet Take 1 tablet by mouth Daily. 90 tablet 3 7/10/2021 at Unknown time   • SITagliptin-metFORMIN HCl ER (Janumet XR)  MG tablet Take 2 tablets by mouth Daily. 180 tablet 2 7/10/2021 at Unknown time   • benzonatate (TESSALON) 100 MG capsule Take 100 mg by mouth 3 (Three) Times a Day As Needed for Cough.   Unknown at Unknown time     Allergies:  Augmentin [amoxicillin-pot clavulanate] and Metformin and related    Review of Systems   Constitutional: Negative for fatigue.   Respiratory: Positive for cough and shortness of breath.    Cardiovascular: Negative for chest pain, palpitations and leg swelling.   Gastrointestinal: Negative for blood in stool.   Neurological: Negative for dizziness, syncope, weakness and light-headedness.   Hematological: Does not bruise/bleed easily.   All other systems reviewed and are negative.        Objective      Vital Signs  Temp:  [98.2 °F (36.8 °C)-101 °F (38.3 °C)] 98.4 °F (36.9 °C)  Heart Rate:  [75-96] 84  Resp:  [16-20] 18  BP: (101-194)/() 168/95  Body mass index is 50.96 kg/m².    Intake/Output Summary (Last 24 hours) at 7/11/2021 0856  Last data filed at 7/10/2021 2306  Gross per 24 hour   Intake 460 ml   Output --   Net 460 ml       Physical Exam  Constitutional:       Appearance: Normal appearance. She is well-developed.   Cardiovascular:      Rate and Rhythm: Normal rate and regular rhythm.      Heart sounds: No murmur heard.   No friction rub. No gallop.       Comments: Lateral lower extremity edema  Pulmonary:      Effort: Pulmonary effort is normal. No respiratory distress.      Breath sounds: Normal breath sounds. No wheezing or rales.   Skin:     General: Skin is warm and dry.   Neurological:      Mental Status: She is alert and oriented to person, place, and time.   Psychiatric:         Mood and  Affect: Mood normal.         Behavior: Behavior normal.         Telemetry: Sinus 80s    Results Review:   I reviewed the patient's new clinical results.  Results from last 7 days   Lab Units 07/11/21  0537 07/11/21  0033 07/10/21  1935 07/10/21  1729   TROPONIN T ng/mL <0.010 <0.010 <0.010 <0.010     Results from last 7 days   Lab Units 07/11/21  0033 07/10/21  1729   WBC 10*3/mm3 8.89 8.40   HEMOGLOBIN g/dL 13.0 12.3   PLATELETS 10*3/mm3 231 232     Results from last 7 days   Lab Units 07/11/21  0033 07/10/21  1729   SODIUM mmol/L 136 137   POTASSIUM mmol/L 3.3* 3.8   CHLORIDE mmol/L 98 101   CO2 mmol/L 21.9* 24.7   BUN mg/dL 10 9   CREATININE mg/dL 0.57 0.53*   CALCIUM mg/dL 8.9 8.8   GLUCOSE mg/dL 331* 233*   ALT (SGPT) U/L 53* 53*   AST (SGOT) U/L 42* 72*     No results found for: INR  Lab Results   Component Value Date    MG 2.1 03/17/2015     Lab Results   Component Value Date    TSH 2.005 04/18/2017    CHLPL 205 (H) 02/17/2016    TRIG 266 (H) 04/18/2017    HDL 41 (L) 04/18/2017    LDL 53 04/18/2017      No results found for: BNP     EKG:         Imaging Results (Last 72 Hours)     Procedure Component Value Units Date/Time    CT Chest Pulmonary Embolism [[id number]] Collected: 07/10/21 1921     Updated: 07/10/21 1924    Narrative:      CT CHEST PULMONARY EMBOLISM W CONTRAST    INDICATION:   Shortness of breath prior to arrival   TECHNIQUE:   CT angiogram of the chest with 100 cc of Isovue 300 IV contrast. 3-D reconstructions were obtained and reviewed.   Radiation dose reduction techniques included automated exposure control or exposure modulation based on body size. Count of known CT and  cardiac nuc med studies performed in previous 12 months: 0.     COMPARISON:   None available.    FINDINGS:   Chest images at mediastinal window show good filling of the pulmonary arteries. There are no pulmonary artery filling defects to suggest emboli. The CT angiographic images also show no evidence of emboli. No evidence  of adenopathy or effusion. No acute  changes in the aorta.    Chest images at lung window show mild prominence of the interlobular septae bilaterally. This can be seen with fluid overload or mild congestive heart failure.      Impression:      Interlobular septal thickening consistent with pulmonary vascular congestion and mild heart failure. No suspicious infiltrates. No evidence of pulmonary embolism.    Signer Name: Hilario Acosta MD   Signed: 7/10/2021 7:21 PM   Workstation Name: [facility]    [facility]    XR Chest 1 View [[id number]] Collected: 07/10/21 1808     Updated: 07/10/21 1810    Narrative:      CR Chest 1 Vw    INDICATION:   Cough with shortness of breath for the past month     COMPARISON:    None available.    FINDINGS:  Single portable AP view(s) of the chest.  Cardiomegaly is noted. In the lungs pulmonary vascular markings are prominent with redistribution toward the upper lobes consistent with congestive heart failure or fluid overload. No effusions are seen and no  focal infiltrates are noted.      Impression:      There is mild pulmonary vascular congestion with cardiomegaly.    Signer Name: Hilario Acosta MD   Signed: 7/10/2021 6:08 PM   Workstation Name: [facility]    [facility]             Thank you very much for asking us to be involved in this patient's care.  We will follow along with you.    MAURO Leal   07/11/21  08:56 EDT

## 2021-07-11 NOTE — PLAN OF CARE
Goal Outcome Evaluation:  Plan of Care Reviewed With: patient        Progress: improving   Pt sitting up in bed on 2L nc. Pt ambulates to bathroom. Measuring urine output.  at bedside. Pt reports breathing is improved today. Will attempt to titrate O2

## 2021-07-12 LAB
BACTERIA SPEC AEROBE CULT: ABNORMAL
GLUCOSE BLDC GLUCOMTR-MCNC: 356 MG/DL (ref 70–130)
GLUCOSE BLDC GLUCOMTR-MCNC: 379 MG/DL (ref 70–130)
GLUCOSE BLDC GLUCOMTR-MCNC: 396 MG/DL (ref 70–130)
GLUCOSE BLDC GLUCOMTR-MCNC: 401 MG/DL (ref 70–130)

## 2021-07-12 PROCEDURE — 25010000002 FUROSEMIDE PER 20 MG: Performed by: INTERNAL MEDICINE

## 2021-07-12 PROCEDURE — 63710000001 INSULIN DETEMIR PER 5 UNITS: Performed by: INTERNAL MEDICINE

## 2021-07-12 PROCEDURE — 25010000002 HEPARIN (PORCINE) PER 1000 UNITS: Performed by: HOSPITALIST

## 2021-07-12 PROCEDURE — 94799 UNLISTED PULMONARY SVC/PX: CPT

## 2021-07-12 PROCEDURE — 99232 SBSQ HOSP IP/OBS MODERATE 35: CPT | Performed by: SPECIALIST

## 2021-07-12 PROCEDURE — 82962 GLUCOSE BLOOD TEST: CPT

## 2021-07-12 PROCEDURE — 63710000001 INSULIN ASPART PER 5 UNITS: Performed by: HOSPITALIST

## 2021-07-12 PROCEDURE — 99233 SBSQ HOSP IP/OBS HIGH 50: CPT | Performed by: INTERNAL MEDICINE

## 2021-07-12 PROCEDURE — 25010000002 METHYLPREDNISOLONE PER 40 MG: Performed by: HOSPITALIST

## 2021-07-12 RX ORDER — FUROSEMIDE 10 MG/ML
60 INJECTION INTRAMUSCULAR; INTRAVENOUS EVERY 12 HOURS
Status: DISCONTINUED | OUTPATIENT
Start: 2021-07-12 | End: 2021-07-13

## 2021-07-12 RX ORDER — METOPROLOL TARTRATE 100 MG/1
100 TABLET ORAL EVERY 12 HOURS SCHEDULED
Status: DISCONTINUED | OUTPATIENT
Start: 2021-07-12 | End: 2021-07-13

## 2021-07-12 RX ORDER — LOSARTAN POTASSIUM 50 MG/1
50 TABLET ORAL
Status: DISCONTINUED | OUTPATIENT
Start: 2021-07-12 | End: 2021-07-15 | Stop reason: HOSPADM

## 2021-07-12 RX ORDER — METOPROLOL TARTRATE 50 MG/1
50 TABLET, FILM COATED ORAL ONCE
Status: COMPLETED | OUTPATIENT
Start: 2021-07-12 | End: 2021-07-12

## 2021-07-12 RX ADMIN — IPRATROPIUM BROMIDE AND ALBUTEROL SULFATE 3 ML: .5; 3 SOLUTION RESPIRATORY (INHALATION) at 12:37

## 2021-07-12 RX ADMIN — DOXYCYCLINE 100 MG: 100 INJECTION, POWDER, LYOPHILIZED, FOR SOLUTION INTRAVENOUS at 08:32

## 2021-07-12 RX ADMIN — HEPARIN SODIUM 5000 UNITS: 5000 INJECTION INTRAVENOUS; SUBCUTANEOUS at 20:55

## 2021-07-12 RX ADMIN — SODIUM CHLORIDE, PRESERVATIVE FREE 10 ML: 5 INJECTION INTRAVENOUS at 20:55

## 2021-07-12 RX ADMIN — DICYCLOMINE HYDROCHLORIDE 10 MG: 10 CAPSULE ORAL at 17:35

## 2021-07-12 RX ADMIN — IPRATROPIUM BROMIDE AND ALBUTEROL SULFATE 3 ML: .5; 3 SOLUTION RESPIRATORY (INHALATION) at 00:44

## 2021-07-12 RX ADMIN — INSULIN ASPART 12 UNITS: 100 INJECTION, SOLUTION INTRAVENOUS; SUBCUTANEOUS at 11:58

## 2021-07-12 RX ADMIN — INSULIN ASPART 12 UNITS: 100 INJECTION, SOLUTION INTRAVENOUS; SUBCUTANEOUS at 08:31

## 2021-07-12 RX ADMIN — PRAVASTATIN SODIUM 20 MG: 40 TABLET ORAL at 08:31

## 2021-07-12 RX ADMIN — SODIUM CHLORIDE, PRESERVATIVE FREE 10 ML: 5 INJECTION INTRAVENOUS at 08:32

## 2021-07-12 RX ADMIN — PANTOPRAZOLE SODIUM 40 MG: 40 TABLET, DELAYED RELEASE ORAL at 03:53

## 2021-07-12 RX ADMIN — HYDRALAZINE HYDROCHLORIDE 25 MG: 25 TABLET ORAL at 08:31

## 2021-07-12 RX ADMIN — METOPROLOL TARTRATE 50 MG: 50 TABLET, FILM COATED ORAL at 08:31

## 2021-07-12 RX ADMIN — INSULIN DETEMIR 15 UNITS: 100 INJECTION, SOLUTION SUBCUTANEOUS at 11:30

## 2021-07-12 RX ADMIN — HEPARIN SODIUM 5000 UNITS: 5000 INJECTION INTRAVENOUS; SUBCUTANEOUS at 08:32

## 2021-07-12 RX ADMIN — DICYCLOMINE HYDROCHLORIDE 10 MG: 10 CAPSULE ORAL at 08:31

## 2021-07-12 RX ADMIN — INSULIN DETEMIR 20 UNITS: 100 INJECTION, SOLUTION SUBCUTANEOUS at 21:01

## 2021-07-12 RX ADMIN — METHYLPREDNISOLONE SODIUM SUCCINATE 40 MG: 40 INJECTION, POWDER, FOR SOLUTION INTRAMUSCULAR; INTRAVENOUS at 08:31

## 2021-07-12 RX ADMIN — IPRATROPIUM BROMIDE AND ALBUTEROL SULFATE 3 ML: .5; 3 SOLUTION RESPIRATORY (INHALATION) at 19:04

## 2021-07-12 RX ADMIN — METOPROLOL TARTRATE 100 MG: 100 TABLET, FILM COATED ORAL at 20:54

## 2021-07-12 RX ADMIN — INSULIN ASPART 12 UNITS: 100 INJECTION, SOLUTION INTRAVENOUS; SUBCUTANEOUS at 17:33

## 2021-07-12 RX ADMIN — DICYCLOMINE HYDROCHLORIDE 10 MG: 10 CAPSULE ORAL at 20:54

## 2021-07-12 RX ADMIN — LOSARTAN POTASSIUM 50 MG: 50 TABLET, FILM COATED ORAL at 17:35

## 2021-07-12 RX ADMIN — METOPROLOL TARTRATE 50 MG: 50 TABLET, FILM COATED ORAL at 11:58

## 2021-07-12 RX ADMIN — IPRATROPIUM BROMIDE AND ALBUTEROL SULFATE 3 ML: .5; 3 SOLUTION RESPIRATORY (INHALATION) at 07:04

## 2021-07-12 RX ADMIN — FUROSEMIDE 60 MG: 10 INJECTION, SOLUTION INTRAMUSCULAR; INTRAVENOUS at 17:35

## 2021-07-12 NOTE — PLAN OF CARE
Problem: Adult Inpatient Plan of Care  Goal: Plan of Care Review  Outcome: Ongoing, Progressing  Goal: Patient-Specific Goal (Individualized)  Outcome: Ongoing, Progressing  Goal: Absence of Hospital-Acquired Illness or Injury  Outcome: Ongoing, Progressing  Intervention: Identify and Manage Fall Risk  Recent Flowsheet Documentation  Taken 7/11/2021 2001 by Karla Murguia, RN  Safety Promotion/Fall Prevention:   room organization consistent   safety round/check completed   fall prevention program maintained  Intervention: Prevent Skin Injury  Recent Flowsheet Documentation  Taken 7/11/2021 2001 by Karla Murguia, RN  Skin Protection: adhesive use limited  Intervention: Prevent Infection  Recent Flowsheet Documentation  Taken 7/11/2021 2001 by Karla Murguia, RN  Infection Prevention:   rest/sleep promoted   single patient room provided  Goal: Optimal Comfort and Wellbeing  Outcome: Ongoing, Progressing  Intervention: Provide Person-Centered Care  Recent Flowsheet Documentation  Taken 7/11/2021 2001 by Karla Murguia, RN  Trust Relationship/Rapport:   care explained   choices provided   emotional support provided   empathic listening provided   questions answered   questions encouraged   reassurance provided   thoughts/feelings acknowledged  Goal: Readiness for Transition of Care  Outcome: Ongoing, Progressing

## 2021-07-12 NOTE — PLAN OF CARE
Goal Outcome Evaluation:  Plan of Care Reviewed With: patient  Progress: improving   Pt sitting in bed with  at bedside. Pt on 2L nc. SOA on exertion. Denies any needs

## 2021-07-12 NOTE — PROGRESS NOTES
[facility] HOSPITALIST PROGRESS NOTE     Patient Identification:  Name:  Zeina Reed  Age:  60 y.o.  Sex:  female  :  1960  MRN:  [id number]  Visit Number:  [id number]  ROOM: [address] [place] [address]     Primary Care Provider:  Rachel Savage APRN    Length of stay in inpatient status:  2    Subjective     Chief Compliant:    Chief Complaint   Patient presents with   • Shortness of Breath   • Cough     History of Presenting Illness:    Patient seen and examined reviewed discussed the case with the bedside.  She is complaining of some dyspnea.  Her  was at the bedside.      Objective     Current Hospital Meds:dicyclomine, 10 mg, Oral, TID  furosemide, 60 mg, Intravenous, Q12H  heparin (porcine), 5,000 Units, Subcutaneous, Q12H  insulin aspart, 0-14 Units, Subcutaneous, TID AC  insulin detemir, 20 Units, Subcutaneous, Q12H  ipratropium-albuterol, 3 mL, Nebulization, 4x Daily - RT  losartan, 50 mg, Oral, Q24H  methylPREDNISolone sodium succinate, 40 mg, Intravenous, Q12H  metoprolol tartrate, 100 mg, Oral, Q12H  pantoprazole, 40 mg, Oral, QAM  pravastatin, 20 mg, Oral, Daily  sodium chloride, 10 mL, Intravenous, Q12H         Current Antimicrobial Therapy:  Anti-Infectives (From admission, onward)    Ordered     Dose/Rate Route Frequency Start Stop    07/10/21 2010  doxycycline (VIBRAMYCIN) 100 mg/100 mL 0.9% NS MBP     Ordering Provider: Rema Gonzales PA    100 mg  over 60 Minutes Intravenous Once 07/10/21 2012 07/10/21 2211    07/10/21 191  cefTRIAXone (ROCEPHIN) 1 g/100 mL 0.9% NS (MBP)     Ordering Provider: Rema Gonzales PA    1 g  over 30 Minutes Intravenous Once 07/10/21 1920 07/10/21 2026        Current Diuretic Therapy:  Diuretics (From admission, onward)    Ordered     Dose/Rate Route Frequency Start Stop    21 162  furosemide (LASIX) injection 60 mg     Ordering Provider: Tonya Padilla MD    60 mg Intravenous Every 12 Hours 21 1700      07/10/21 2009   furosemide (LASIX) injection 40 mg     Ordering Provider: Rema Gonzales PA    40 mg Intravenous Once 07/10/21 2011 07/10/21 2111        ----------------------------------------------------------------------------------------------------------------------  Vital Signs:  Temp:  [97.6 °F (36.4 °C)-98.6 °F (37 °C)] 97.9 °F (36.6 °C)  Heart Rate:  [] 77  Resp:  [18-20] 20  BP: (144-188)/(74-98) 188/93  SpO2:  [94 %-98 %] 96 %  on  Flow (L/min):  [2] 2;   Device (Oxygen Therapy): nasal cannula  Body mass index is 50.48 kg/m².    Wt Readings from Last 3 Encounters:   07/12/21 125 kg (276 lb)   02/25/21 120 kg (265 lb)   01/31/20 117 kg (258 lb 6.4 oz)     Intake & Output (last 3 days)       07/09 0701 - 07/10 0700 07/10 0701 - 07/11 0700 07/11 0701 - 07/12 0700 07/12 0701 - 07/13 0700    P.O.  360 2280 840    I.V. (mL/kg)   190.8 (1.5)     IV Piggyback  100      Total Intake(mL/kg)  460 (3.7) 2470.8 (19.8) 840 (6.7)    Urine (mL/kg/hr)   1700 (0.6) 600 (0.5)    Total Output   1700 600    Net  +460 +770.8 +240            Urine Unmeasured Occurrence  3 x 5 x         Diet Regular; Consistent Carbohydrate, Cardiac  ----------------------------------------------------------------------------------------------------------------------  Physical exam:  Constitutional:  Well-developed and well-nourished.  Mild resp distress.      HENT:  Head:  Normocephalic and atraumatic.  Mouth:  Moist mucous membranes.    Eyes:  Conjunctivae and EOM are normal. No scleral icterus.    Neck:  Neck supple.  No JVD present.    Cardiovascular:  Normal rate, regular rhythm and normal heart sounds with no murmur.  Pulmonary/Chest:  minimal respiratory distress, trace wheezes, no crackles, on 2NC  Abdominal:  Soft. No distension and no tenderness.   Musculoskeletal:  No tenderness, and no deformity.   Neurological:  Alert and oriented to person, place, and time.  No cranial nerve deficit.    Skin:  Skin is warm and dry. No rash noted. No  pallor.   Peripheral vascular:  No clubbing, no cyanosis, no edema.  ----------------------------------------------------------------------------------------------------------------------  Results from last 7 days   Lab Units 07/11/21  0033 07/10/21  1729   CRP mg/dL 10.61* 6.57*   LACTATE mmol/L  --  1.9   WBC 10*3/mm3 8.89 8.40   HEMOGLOBIN g/dL 13.0 12.3   HEMATOCRIT % 38.5 36.5   MCV fL 90.4 91.5   MCHC g/dL 33.8 33.7   PLATELETS 10*3/mm3 231 232     Results from last 7 days   Lab Units 07/10/21  1748   PH, ARTERIAL pH units 7.426   PO2 ART mm Hg 50.5*   PCO2, ARTERIAL mm Hg 37.8   HCO3 ART mmol/L 24.8     Results from last 7 days   Lab Units 07/11/21  1723 07/11/21 0033 07/10/21  1729   SODIUM mmol/L  --  136 137   POTASSIUM mmol/L 4.2 3.3* 3.8   CHLORIDE mmol/L  --  98 101   CO2 mmol/L  --  21.9* 24.7   BUN mg/dL  --  10 9   CREATININE mg/dL  --  0.57 0.53*   EGFR IF NONAFRICN AM mL/min/1.73  --  108 118   CALCIUM mg/dL  --  8.9 8.8   GLUCOSE mg/dL  --  331* 233*   ALBUMIN g/dL  --  3.38* 3.35*   BILIRUBIN mg/dL  --  1.2 1.2   ALK PHOS U/L  --  136* 130*   AST (SGOT) U/L  --  42* 72*   ALT (SGPT) U/L  --  53* 53*   Estimated Creatinine Clearance: 132.7 mL/min (by C-G formula based on SCr of 0.57 mg/dL).  No results found for: AMMONIA  Results from last 7 days   Lab Units 07/11/21  0537 07/11/21  0033 07/10/21  1935   TROPONIN T ng/mL <0.010 <0.010 <0.010     Results from last 7 days   Lab Units 07/10/21  1729   PROBNP pg/mL 964.0*         Hemoglobin A1C   Date/Time Value Ref Range Status   07/10/2021 1729 8.70 (H) 4.80 - 5.60 % Final     Glucose   Date/Time Value Ref Range Status   07/12/2021 1614 379 (H) 70 - 130 mg/dL Final     Comment:     Meter: [id number] : [id number] FABIÁN JULES   07/12/2021 1045 356 (H) 70 - 130 mg/dL Final     Comment:     Meter: [id number] : [id number] tejal jackson   07/12/2021 0635 396 (H) 70 - 130 mg/dL Final     Comment:     Meter: [id number] : [id number] toure  manuel   07/11/2021 1851 251 (H) 70 - 130 mg/dL Final     Comment:     RN Notified Meter: [id number] : [id number] MC MILLARD   07/11/2021 1644 241 (H) 70 - 130 mg/dL Final     Comment:     Meter: [id number] : [id number] FABIÁN JULES   07/11/2021 1046 356 (H) 70 - 130 mg/dL Final     Comment:     Meter: [id number] : [id number] leigh gutierrez   07/11/2021 0744 301 (H) 70 - 130 mg/dL Final     Comment:     Meter: [id number] : [id number] leigh gutierrez   07/11/2021 0027 323 (H) 70 - 130 mg/dL Final     Comment:     RN Notified Meter: [id number] : [id number] MC MILLARD     Lab Results   Component Value Date    TSH 2.005 04/18/2017    FREET4 1.30 02/12/2015     No results found for: PREGTESTUR, PREGSERUM, HCG, HCGQUANT  Pain Management Panel    There is no flowsheet data to display.       Brief Urine Lab Results  (Last result in the past 365 days)      Color   Clarity   Blood   Leuk Est   Nitrite   Protein   CREAT   Urine HCG        07/10/21 1810 Dark Yellow Clear Negative Small (1+) Negative 30 mg/dL (1+)             No results found for: BLOODCX  No results found for: URINECX  No results found for: WOUNDCX  No results found for: STOOLCX  No results found for: RESPCX  No results found for: AFBCX  Results from last 7 days   Lab Units 07/11/21  0716 07/11/21  0033 07/10/21  1729   PROCALCITONIN ng/mL 0.07  --   --    LACTATE mmol/L  --   --  1.9   CRP mg/dL  --  10.61* 6.57*     I have personally looked at the labs and they are summarized above.  ----------------------------------------------------------------------------------------------------------------------  Detailed radiology reports for the last 24 hours:  Imaging Results (Last 24 Hours)     ** No results found for the last 24 hours. **            Assessment & Plan      60F Morbidly Obese by BMI PMH insulin dependent type II DM, HTN, HLD, irritable bowel syndrome and palpitations, who presents with complaints of ongoing cough with worsening  shortness of breath and wheezing.    *Sepsis secondary to E.coli UTI, POA  *Acute on chronic decompensated diastolic heart failure  *FIDEL  *Essential hypertension  *Highly suspected FIDEL  *Uncontrolled DM type II  *Morbid obesity  *Fatty liver disease with mild transaminitis  *GERD    --DC antibiotics, no evidence of pneumonia, CT reviewed personally  --Continue beta-blocker, add losartan 50 mg daily  --Change Levemir to 20 units daily, DC IV Solu-Medrol  --60 mg IV Lasix twice daily  --Continue pravastatin  --Further management of        Tonya Padilla MD  [facility] Hospitalist  07/12/21  16:29 EDT

## 2021-07-12 NOTE — PROGRESS NOTES
LOS: 2 days     Name: Zeina Reed  Age/Sex: 60 y.o. female  :  1960        PCP: Rachel Savage APRN  REF: No ref. provider found    Active Problems:    Acute exacerbation of CHF (congestive heart failure) (CMS/[facility])      Reason for follow-up: Questionable HFpEF    Subjective       Subjective       Interval History: Ms. Reed reports that her breathing has improved since yesterday denies any chest pains today.  Echocardiogram showed normal LVEF with no diastolic dysfunction.    ROS    Vital Signs  Temp:  [97.6 °F (36.4 °C)-98.6 °F (37 °C)] 97.9 °F (36.6 °C)  Heart Rate:  [] 82  Resp:  [18] 18  BP: (142-168)/(74-98) 168/96  Vital Signs (last 72 hrs)       700  -  07/10 0659 07/10 07  -   0659  07  -   0659 700  -   1135   Most Recent    Temp (°F)   98.2 -  101    97.6 -  98.6      97.9     97.9 (36.6)    Heart Rate   75 -  96    74 -  104    82 -  87     82    Resp   16 -  20      18      18     18    BP   101/97 -  194/85    142/83 -  178/87      168/96     168/96    SpO2 (%)   91 -  96    94 -  98    97 -  98     97        Documented weights    07/10/21 1704 07/10/21 2135 21 0500 21 0500   Weight: 115 kg (254 lb) 126 kg (278 lb 9.6 oz) 126 kg (278 lb 9.6 oz) 125 kg (276 lb)      Body mass index is 50.48 kg/m².    Intake/Output Summary (Last 24 hours) at 2021 1135  Last data filed at 2021 0957  Gross per 24 hour   Intake 1990.75 ml   Output 2300 ml   Net -309.25 ml     Objective    Objective       Physical Exam:     General Appearance:    Alert, cooperative, in no acute distress   Head:    Normocephalic, without obvious abnormality, atraumatic   Eyes:            Conjunctivae and sclerae normal, no   icterus, no pallor, corneas clear.   Neck:   No adenopathy, supple, trachea midline, no thyromegaly, no   carotid bruit, no JVD   Lungs:     Clear to auscultation,respirations regular, even and                  unlabored    Heart:    Regular  rhythm and normal rate, normal S1 and S2, no            murmur, no gallop, no rub, no click   Chest Wall:    No abnormalities observed   Abdomen:     Normal bowel sounds, no masses, no organomegaly, soft        non-tender, non-distended, no guarding, no rebound                tenderness   Extremities:   Moves all extremities well, no edema, no cyanosis, no             redness   Pulses:   Pulses palpable and equal bilaterally   Skin:   No bleeding, bruising or rash       Neurologic:             Procedures    Results review       Results Review:   Results from last 7 days   Lab Units 07/11/21  0033 07/10/21  1729   WBC 10*3/mm3 8.89 8.40   HEMOGLOBIN g/dL 13.0 12.3   PLATELETS 10*3/mm3 231 232     Results from last 7 days   Lab Units 07/11/21  1723 07/11/21  0033 07/10/21  1729   SODIUM mmol/L  --  136 137   POTASSIUM mmol/L 4.2 3.3* 3.8   CHLORIDE mmol/L  --  98 101   CO2 mmol/L  --  21.9* 24.7   BUN mg/dL  --  10 9   CREATININE mg/dL  --  0.57 0.53*   CALCIUM mg/dL  --  8.9 8.8   GLUCOSE mg/dL  --  331* 233*   ALT (SGPT) U/L  --  53* 53*   AST (SGOT) U/L  --  42* 72*     Results from last 7 days   Lab Units 07/11/21  0537 07/11/21  0033 07/10/21  1935 07/10/21  1729   TROPONIN T ng/mL <0.010 <0.010 <0.010 <0.010     No results found for: INR  Lab Results   Component Value Date    MG 2.1 03/17/2015     Lab Results   Component Value Date    TSH 2.005 04/18/2017    CHLPL 205 (H) 02/17/2016    TRIG 266 (H) 04/18/2017    HDL 41 (L) 04/18/2017    LDL 53 04/18/2017      Imaging Results (Last 48 Hours)     Procedure Component Value Units Date/Time    CT Chest Pulmonary Embolism [[id number]] Collected: 07/10/21 1921     Updated: 07/10/21 1924    Narrative:      CT CHEST PULMONARY EMBOLISM W CONTRAST    INDICATION:   Shortness of breath prior to arrival   TECHNIQUE:   CT angiogram of the chest with 100 cc of Isovue 300 IV contrast. 3-D reconstructions were obtained and reviewed.   Radiation dose reduction techniques included  automated exposure control or exposure modulation based on body size. Count of known CT and  cardiac nuc med studies performed in previous 12 months: 0.     COMPARISON:   None available.    FINDINGS:   Chest images at mediastinal window show good filling of the pulmonary arteries. There are no pulmonary artery filling defects to suggest emboli. The CT angiographic images also show no evidence of emboli. No evidence of adenopathy or effusion. No acute  changes in the aorta.    Chest images at lung window show mild prominence of the interlobular septae bilaterally. This can be seen with fluid overload or mild congestive heart failure.      Impression:      Interlobular septal thickening consistent with pulmonary vascular congestion and mild heart failure. No suspicious infiltrates. No evidence of pulmonary embolism.    Signer Name: Hilario Acosta MD   Signed: 7/10/2021 7:21 PM   Workstation Name: [facility]    XR Chest 1 View [[id number]] Collected: 07/10/21 1808     Updated: 07/10/21 1810    Narrative:      CR Chest 1 Vw    INDICATION:   Cough with shortness of breath for the past month     COMPARISON:    None available.    FINDINGS:  Single portable AP view(s) of the chest.  Cardiomegaly is noted. In the lungs pulmonary vascular markings are prominent with redistribution toward the upper lobes consistent with congestive heart failure or fluid overload. No effusions are seen and no  focal infiltrates are noted.      Impression:      There is mild pulmonary vascular congestion with cardiomegaly.    Signer Name: Hilario Acosta MD   Signed: 7/10/2021 6:08 PM   Workstation Name: [facility]        No results found for: BNP           Echo   Results for orders placed during the hospital encounter of 07/10/21    Adult Transthoracic Echo Complete w/ Color, Spectral and Contrast if necessary per protocol    Interpretation Summary  · Estimated left  ventricular EF = 65% Left ventricular systolic function is normal.  · Left ventricular diastolic function was normal.  · Trace Mitral regurgitation  · Aortic and tricuspid valves are unremarkable  · No prev echo       I reviewed the patient's new clinical results.    Medication Review:   dicyclomine, 10 mg, Oral, TID  heparin (porcine), 5,000 Units, Subcutaneous, Q12H  hydrALAZINE, 25 mg, Oral, Q12H  insulin aspart, 0-14 Units, Subcutaneous, TID AC  insulin detemir, 15 Units, Subcutaneous, Q12H  ipratropium-albuterol, 3 mL, Nebulization, 4x Daily - RT  methylPREDNISolone sodium succinate, 40 mg, Intravenous, Q12H  metoprolol tartrate, 100 mg, Oral, Q12H  metoprolol tartrate, 50 mg, Oral, Once  pantoprazole, 40 mg, Oral, QAM  pravastatin, 20 mg, Oral, Daily  sodium chloride, 10 mL, Intravenous, Q12H             Assessment      Assessment:  1. Shortness of breath with slight elevated proBNP, normal left ventricular systolic function  2. Upper respiratory tract infection  3. Likely obstructive sleep apnea  4. Hyperlipidemia  5. Morbid obesity  6. Hypertension        Plan     Recommendations:  1. Patient's breathing is a lot better most of her symptoms are pulmonary, she has normal systolic function we will continue current medication  2. I suspect patient has obstructive sleep apnea she will need sleep study as an outpatient  3. Her blood pressure is little bit labile can be monitored for now  4. Continue with the statin therapy  5. We will follow the distance please call if assistance is required    I discussed the patients findings and my recommendations with patient and family      07/12/21  11:35 EDT  Electronically signed by Oral Menjivar MD, 07/12/21, 11:47 AM EDT.  Electronically signed by Chandler Langford PA-C, 07/12/21, 11:35 AM EDT.

## 2021-07-13 ENCOUNTER — APPOINTMENT (OUTPATIENT)
Dept: ULTRASOUND IMAGING | Facility: HOSPITAL | Age: 61
End: 2021-07-13

## 2021-07-13 LAB
ANION GAP SERPL CALCULATED.3IONS-SCNC: 13.8 MMOL/L (ref 5–15)
BUN SERPL-MCNC: 26 MG/DL (ref 8–23)
BUN/CREAT SERPL: 33.8 (ref 7–25)
CALCIUM SPEC-SCNC: 9.4 MG/DL (ref 8.6–10.5)
CHLORIDE SERPL-SCNC: 96 MMOL/L (ref 98–107)
CO2 SERPL-SCNC: 26.2 MMOL/L (ref 22–29)
CREAT SERPL-MCNC: 0.77 MG/DL (ref 0.57–1)
GFR SERPL CREATININE-BSD FRML MDRD: 76 ML/MIN/1.73
GLUCOSE BLDC GLUCOMTR-MCNC: 149 MG/DL (ref 70–130)
GLUCOSE BLDC GLUCOMTR-MCNC: 205 MG/DL (ref 70–130)
GLUCOSE BLDC GLUCOMTR-MCNC: 230 MG/DL (ref 70–130)
GLUCOSE BLDC GLUCOMTR-MCNC: 286 MG/DL (ref 70–130)
GLUCOSE BLDC GLUCOMTR-MCNC: 371 MG/DL (ref 70–130)
GLUCOSE SERPL-MCNC: 384 MG/DL (ref 65–99)
POTASSIUM SERPL-SCNC: 3.6 MMOL/L (ref 3.5–5.2)
POTASSIUM SERPL-SCNC: 3.6 MMOL/L (ref 3.5–5.2)
SODIUM SERPL-SCNC: 136 MMOL/L (ref 136–145)

## 2021-07-13 PROCEDURE — 94799 UNLISTED PULMONARY SVC/PX: CPT

## 2021-07-13 PROCEDURE — 76775 US EXAM ABDO BACK WALL LIM: CPT

## 2021-07-13 PROCEDURE — 63710000001 INSULIN ASPART PER 5 UNITS: Performed by: HOSPITALIST

## 2021-07-13 PROCEDURE — 76775 US EXAM ABDO BACK WALL LIM: CPT | Performed by: RADIOLOGY

## 2021-07-13 PROCEDURE — 25010000002 FUROSEMIDE PER 20 MG: Performed by: INTERNAL MEDICINE

## 2021-07-13 PROCEDURE — 84132 ASSAY OF SERUM POTASSIUM: CPT | Performed by: INTERNAL MEDICINE

## 2021-07-13 PROCEDURE — 82962 GLUCOSE BLOOD TEST: CPT

## 2021-07-13 PROCEDURE — 25010000002 ONDANSETRON PER 1 MG: Performed by: INTERNAL MEDICINE

## 2021-07-13 PROCEDURE — 25010000002 HEPARIN (PORCINE) PER 1000 UNITS: Performed by: HOSPITALIST

## 2021-07-13 PROCEDURE — 25010000002 HYDRALAZINE PER 20 MG: Performed by: PHYSICIAN ASSISTANT

## 2021-07-13 PROCEDURE — 80048 BASIC METABOLIC PNL TOTAL CA: CPT | Performed by: INTERNAL MEDICINE

## 2021-07-13 PROCEDURE — 63710000001 INSULIN DETEMIR PER 5 UNITS: Performed by: INTERNAL MEDICINE

## 2021-07-13 PROCEDURE — 99233 SBSQ HOSP IP/OBS HIGH 50: CPT | Performed by: INTERNAL MEDICINE

## 2021-07-13 PROCEDURE — 25010000003 CEFAZOLIN PER 500 MG: Performed by: INTERNAL MEDICINE

## 2021-07-13 PROCEDURE — 25010000002 MORPHINE PER 10 MG: Performed by: INTERNAL MEDICINE

## 2021-07-13 RX ORDER — SPIRONOLACTONE 100 MG/1
100 TABLET, FILM COATED ORAL DAILY
Status: DISCONTINUED | OUTPATIENT
Start: 2021-07-13 | End: 2021-07-15 | Stop reason: HOSPADM

## 2021-07-13 RX ORDER — POTASSIUM CHLORIDE 20 MEQ/1
40 TABLET, EXTENDED RELEASE ORAL EVERY 4 HOURS
Status: COMPLETED | OUTPATIENT
Start: 2021-07-13 | End: 2021-07-13

## 2021-07-13 RX ORDER — HYDRALAZINE HYDROCHLORIDE 20 MG/ML
10 INJECTION INTRAMUSCULAR; INTRAVENOUS ONCE
Status: COMPLETED | OUTPATIENT
Start: 2021-07-13 | End: 2021-07-13

## 2021-07-13 RX ORDER — AMOXICILLIN 250 MG
2 CAPSULE ORAL 2 TIMES DAILY
Status: DISCONTINUED | OUTPATIENT
Start: 2021-07-13 | End: 2021-07-15 | Stop reason: HOSPADM

## 2021-07-13 RX ORDER — BUDESONIDE AND FORMOTEROL FUMARATE DIHYDRATE 80; 4.5 UG/1; UG/1
2 AEROSOL RESPIRATORY (INHALATION)
Status: DISCONTINUED | OUTPATIENT
Start: 2021-07-13 | End: 2021-07-15 | Stop reason: HOSPADM

## 2021-07-13 RX ORDER — POTASSIUM CHLORIDE 20 MEQ/1
40 TABLET, EXTENDED RELEASE ORAL EVERY 4 HOURS
Status: COMPLETED | OUTPATIENT
Start: 2021-07-13 | End: 2021-07-14

## 2021-07-13 RX ORDER — MORPHINE SULFATE 2 MG/ML
2 INJECTION, SOLUTION INTRAMUSCULAR; INTRAVENOUS ONCE
Status: COMPLETED | OUTPATIENT
Start: 2021-07-13 | End: 2021-07-13

## 2021-07-13 RX ORDER — BUDESONIDE AND FORMOTEROL FUMARATE DIHYDRATE 80; 4.5 UG/1; UG/1
2 AEROSOL RESPIRATORY (INHALATION)
Status: DISCONTINUED | OUTPATIENT
Start: 2021-07-13 | End: 2021-07-13

## 2021-07-13 RX ORDER — ONDANSETRON 2 MG/ML
4 INJECTION INTRAMUSCULAR; INTRAVENOUS EVERY 6 HOURS PRN
Status: DISCONTINUED | OUTPATIENT
Start: 2021-07-13 | End: 2021-07-15 | Stop reason: HOSPADM

## 2021-07-13 RX ORDER — FUROSEMIDE 10 MG/ML
60 INJECTION INTRAMUSCULAR; INTRAVENOUS EVERY 8 HOURS
Status: DISCONTINUED | OUTPATIENT
Start: 2021-07-13 | End: 2021-07-15 | Stop reason: HOSPADM

## 2021-07-13 RX ADMIN — BUDESONIDE AND FORMOTEROL FUMARATE DIHYDRATE 2 PUFF: 80; 4.5 AEROSOL RESPIRATORY (INHALATION) at 18:56

## 2021-07-13 RX ADMIN — INSULIN ASPART 5 UNITS: 100 INJECTION, SOLUTION INTRAVENOUS; SUBCUTANEOUS at 17:24

## 2021-07-13 RX ADMIN — LOSARTAN POTASSIUM 50 MG: 50 TABLET, FILM COATED ORAL at 09:05

## 2021-07-13 RX ADMIN — DICYCLOMINE HYDROCHLORIDE 10 MG: 10 CAPSULE ORAL at 17:20

## 2021-07-13 RX ADMIN — DOCUSATE SODIUM 50 MG AND SENNOSIDES 8.6 MG 2 TABLET: 8.6; 5 TABLET, FILM COATED ORAL at 17:24

## 2021-07-13 RX ADMIN — FUROSEMIDE 60 MG: 10 INJECTION, SOLUTION INTRAMUSCULAR; INTRAVENOUS at 14:30

## 2021-07-13 RX ADMIN — HEPARIN SODIUM 5000 UNITS: 5000 INJECTION INTRAVENOUS; SUBCUTANEOUS at 20:45

## 2021-07-13 RX ADMIN — POTASSIUM CHLORIDE 40 MEQ: 20 TABLET, EXTENDED RELEASE ORAL at 12:21

## 2021-07-13 RX ADMIN — MORPHINE SULFATE 2 MG: 2 INJECTION, SOLUTION INTRAMUSCULAR; INTRAVENOUS at 14:30

## 2021-07-13 RX ADMIN — IPRATROPIUM BROMIDE AND ALBUTEROL SULFATE 3 ML: .5; 3 SOLUTION RESPIRATORY (INHALATION) at 13:21

## 2021-07-13 RX ADMIN — HYDRALAZINE HYDROCHLORIDE 10 MG: 20 INJECTION INTRAMUSCULAR; INTRAVENOUS at 01:02

## 2021-07-13 RX ADMIN — DOCUSATE SODIUM 50 MG AND SENNOSIDES 8.6 MG 2 TABLET: 8.6; 5 TABLET, FILM COATED ORAL at 21:53

## 2021-07-13 RX ADMIN — POTASSIUM CHLORIDE 40 MEQ: 20 TABLET, EXTENDED RELEASE ORAL at 20:44

## 2021-07-13 RX ADMIN — POTASSIUM CHLORIDE 40 MEQ: 20 TABLET, EXTENDED RELEASE ORAL at 09:05

## 2021-07-13 RX ADMIN — INSULIN ASPART 5 UNITS: 100 INJECTION, SOLUTION INTRAVENOUS; SUBCUTANEOUS at 12:21

## 2021-07-13 RX ADMIN — LABETALOL HYDROCHLORIDE 300 MG: 200 TABLET, FILM COATED ORAL at 20:44

## 2021-07-13 RX ADMIN — METOPROLOL TARTRATE 100 MG: 100 TABLET, FILM COATED ORAL at 09:05

## 2021-07-13 RX ADMIN — ONDANSETRON 4 MG: 2 INJECTION INTRAMUSCULAR; INTRAVENOUS at 14:30

## 2021-07-13 RX ADMIN — DICYCLOMINE HYDROCHLORIDE 10 MG: 10 CAPSULE ORAL at 09:05

## 2021-07-13 RX ADMIN — SODIUM CHLORIDE, PRESERVATIVE FREE 10 ML: 5 INJECTION INTRAVENOUS at 09:06

## 2021-07-13 RX ADMIN — FUROSEMIDE 60 MG: 10 INJECTION, SOLUTION INTRAMUSCULAR; INTRAVENOUS at 21:53

## 2021-07-13 RX ADMIN — IPRATROPIUM BROMIDE AND ALBUTEROL SULFATE 3 ML: .5; 3 SOLUTION RESPIRATORY (INHALATION) at 06:49

## 2021-07-13 RX ADMIN — CEFAZOLIN 1 G: 1 INJECTION, POWDER, FOR SOLUTION INTRAMUSCULAR; INTRAVENOUS at 17:20

## 2021-07-13 RX ADMIN — PANTOPRAZOLE SODIUM 40 MG: 40 TABLET, DELAYED RELEASE ORAL at 05:53

## 2021-07-13 RX ADMIN — IPRATROPIUM BROMIDE AND ALBUTEROL SULFATE 3 ML: .5; 3 SOLUTION RESPIRATORY (INHALATION) at 18:56

## 2021-07-13 RX ADMIN — FUROSEMIDE 60 MG: 10 INJECTION, SOLUTION INTRAMUSCULAR; INTRAVENOUS at 05:54

## 2021-07-13 RX ADMIN — CEFAZOLIN 1 G: 1 INJECTION, POWDER, FOR SOLUTION INTRAMUSCULAR; INTRAVENOUS at 22:03

## 2021-07-13 RX ADMIN — DICYCLOMINE HYDROCHLORIDE 10 MG: 10 CAPSULE ORAL at 20:45

## 2021-07-13 RX ADMIN — HEPARIN SODIUM 5000 UNITS: 5000 INJECTION INTRAVENOUS; SUBCUTANEOUS at 09:06

## 2021-07-13 RX ADMIN — SODIUM CHLORIDE, PRESERVATIVE FREE 10 ML: 5 INJECTION INTRAVENOUS at 20:44

## 2021-07-13 RX ADMIN — INSULIN ASPART 8 UNITS: 100 INJECTION, SOLUTION INTRAVENOUS; SUBCUTANEOUS at 09:07

## 2021-07-13 RX ADMIN — SPIRONOLACTONE 100 MG: 100 TABLET ORAL at 17:21

## 2021-07-13 RX ADMIN — IPRATROPIUM BROMIDE AND ALBUTEROL SULFATE 3 ML: .5; 3 SOLUTION RESPIRATORY (INHALATION) at 00:27

## 2021-07-13 RX ADMIN — INSULIN DETEMIR 30 UNITS: 100 INJECTION, SOLUTION SUBCUTANEOUS at 11:02

## 2021-07-13 RX ADMIN — PRAVASTATIN SODIUM 20 MG: 40 TABLET ORAL at 09:06

## 2021-07-13 NOTE — CONSULTS
Heart Failure Education Consult  Patient did not wear a mask. Proper PPE was worn for isolation precautions.   Type of Heart Failure: Diastolic     Length of diagnosis: Previous Diagnosis     Current HF knowledge: fair     Have you had HF education/teaching in the past? No  Do you check your weight daily? Yes    Current weight        07/12/21  0500 07/13/21  0400   Weight: 125 kg (276 lb) 123 kg (271 lb 12.8 oz)          Most recent EF 65% Date 7/11/21       Most recent ProBNP 964pg/ml Date 7/10/21      Edema Improving        Shortness of Breath: Improving     Number of pillows used to sleep at night: 2  Barriers to learning: Other none noted at this time     Materials Provided:Living with HF Book, HF Action Plan, Daily Weight Monitoring , 2 Gm Na+ diet   and Fluid Restrictions           Referral candidate for HF Clinic:No Patient stated not at this time.      Thank you for this consult. Please let me know if I can be of any assistance with HF education for this patient.  Natacha Alejandre RN   07/13/21 14:20 EDT

## 2021-07-13 NOTE — CONSULTS
"Diabetes Education  Assessment/Teaching    Patient Name:  Zeina Reed  YOB: 1960  MRN: [id number]  Admit Date:  7/10/2021      Assessment Date:  7/13/2021    Most Recent Value   General Information    Height  157.5 cm (62\")   Height Method  Stated   Weight  123 kg (271 lb 12.8 oz)   Weight Method  Standing scale   Pregnancy Assessment   Diabetes History   Education Preferences   Nutrition Information   Assessment Topics   DM Goals            Most Recent Value   DM Education Needs   Meter  Has own   Frequency of Testing  2 times a week   Medication  Insulin   Problem Solving  Hypoglycemia, Hyperglycemia, Sick days, Signs, Symptoms, Treatment   Reducing Risks  Cardiovascular, Immunizations, Foot care, Dental exam, Eye exam, Blood pressure, Lipids, A1C testing, Neuropathy, Retinopathy   Healthy Eating  Basic meal plan provided   Physical Activity  Walking   Physical Activity Frequency  Occasionally   Healthy Coping  Appropriate   Discharge Plan  Home, Follow-up with PCP   Motivation  Moderate   Teaching Method  Explanation, Discussion, Handouts   Patient Response  Verbalized understanding            Other Comments:  A1C 8.7. Patient did not wear a mask. Patient received education and ADA handouts on diet, activity, blood glucose monitoring, taking medication as prescribed, checking feet daily, and S/S of hypo/hyperglycemia. Patient had no questions at this time. If any concerns or questions please re-consult or call. Thank you.         Electronically signed by:  Natacha Alejandre RN  07/13/21 15:14 EDT  "

## 2021-07-13 NOTE — CONSULTS
Patient did not wear a mask, Paroper PPE was worn for isolation precautions.   Length of diagnosis this hospital stay  What stage have you been told you are in unknown  Shortness of breath? Yes  Do you have sleep apnea? no  Do you use a C-PAP or Bi-PAP? No  Do you have a cough? Yes  Have you had any recent weight loss? no  Do you use inhalers/ emergency inhaler and how often? Yes. How Often: as needed  Do you have a nebulizer machine and how often do you use? Yes. How Often: as needed  Do you smoke? non-smoker  How many pillows do you use? 2  Barriers to learning? No  Materials provided? COPD book  Last PFT/when/where? None not interested right now maybe later  Do you follow a pulmonologist? no  Do you get short of breath on normal activity? yes  Home Oxygen no oxygen room air  Last hospital stay for COPD? none  COPD Zone? Green/yellow/red Yellow      Vitals:    07/13/21 1017   BP: 172/87   Pulse: 68   Resp: 18   Temp: 97.7 °F (36.5 °C)   SpO2: 95%          07/11/21  0500 07/12/21  0500 07/13/21  0400   Weight: 126 kg (278 lb 9.6 oz) (admit weight pt not on floor 24 hrs) 125 kg (276 lb) 123 kg (271 lb 12.8 oz)    Patient was seen and educated from the COPD book, and had no questions. Patient stated not interested in a PFT test at this time but will follow up with her PCP. If any questions or concerns please re-consult or call. Thank you.

## 2021-07-13 NOTE — PROGRESS NOTES
[facility] HOSPITALIST PROGRESS NOTE     Patient Identification:  Name:  Zeina Reed  Age:  60 y.o.  Sex:  female  :  1960  MRN:  [id number]  Visit Number:  [id number]  ROOM: [address] [place] [address]     Primary Care Provider:  Rachel Savage APRN    Length of stay in inpatient status:  3    Subjective     Chief Compliant:    Chief Complaint   Patient presents with   • Shortness of Breath   • Cough     History of Presenting Illness:    Patient seen and examined, c.o rt flank pain with nausea, excellent UO, no chest pain, less BURT.      Objective     Current Hospital Meds:dicyclomine, 10 mg, Oral, TID  furosemide, 60 mg, Intravenous, Q8H  heparin (porcine), 5,000 Units, Subcutaneous, Q12H  insulin aspart, 0-14 Units, Subcutaneous, TID AC  insulin detemir, 30 Units, Subcutaneous, Daily  ipratropium-albuterol, 3 mL, Nebulization, 4x Daily - RT  labetalol, 300 mg, Oral, Q12H  losartan, 50 mg, Oral, Q24H  metFORMIN, 500 mg, Oral, BID With Meals  Morphine, 2 mg, Intravenous, Once  pantoprazole, 40 mg, Oral, QAM  pravastatin, 20 mg, Oral, Daily  sodium chloride, 10 mL, Intravenous, Q12H  spironolactone, 100 mg, Oral, Daily         Current Antimicrobial Therapy:  Anti-Infectives (From admission, onward)    Ordered     Dose/Rate Route Frequency Start Stop    07/10/21 2010  doxycycline (VIBRAMYCIN) 100 mg/100 mL 0.9% NS MBP     Ordering Provider: Rema Gonzales PA    100 mg  over 60 Minutes Intravenous Once 07/10/21 2012 07/10/21 2211    07/10/21 191  cefTRIAXone (ROCEPHIN) 1 g/100 mL 0.9% NS (MBP)     Ordering Provider: Rema Gonzales PA    1 g  over 30 Minutes Intravenous Once 07/10/21 1920 07/10/21 2026        Current Diuretic Therapy:  Diuretics (From admission, onward)    Ordered     Dose/Rate Route Frequency Start Stop    21 1341  spironolactone (ALDACTONE) tablet 100 mg     Ordering Provider: Tonya Padilla MD    100 mg Oral Daily 21 1430      21 1341  furosemide  (LASIX) injection 60 mg     Ordering Provider: Tonya Padilla MD    60 mg Intravenous Every 8 Hours 07/13/21 1354      07/10/21 2009  furosemide (LASIX) injection 40 mg     Ordering Provider: Rema Gonzales PA    40 mg Intravenous Once 07/10/21 2011 07/10/21 2111        ----------------------------------------------------------------------------------------------------------------------  Vital Signs:  Temp:  [97.7 °F (36.5 °C)-98.4 °F (36.9 °C)] 97.7 °F (36.5 °C)  Heart Rate:  [68-98] 75  Resp:  [18-20] 18  BP: (169-193)/(82-97) 172/87  SpO2:  [93 %-98 %] 95 %  on  Flow (L/min):  [2] 2;   Device (Oxygen Therapy): nasal cannula  Body mass index is 49.71 kg/m².    Wt Readings from Last 3 Encounters:   07/13/21 123 kg (271 lb 12.8 oz)   02/25/21 120 kg (265 lb)   01/31/20 117 kg (258 lb 6.4 oz)     Intake & Output (last 3 days)       07/10 0701 - 07/11 0700 07/11 0701 - 07/12 0700 07/12 0701 - 07/13 0700 07/13 0701 - 07/14 0700    P.O. 360 2280 960 600    I.V. (mL/kg)  190.8 (1.5) 100 (0.8)     IV Piggyback 100       Total Intake(mL/kg) 460 (3.7) 2470.8 (19.8) 1060 (8.6) 600 (4.9)    Urine (mL/kg/hr)  1700 (0.6) 3400 (1.2) 1200 (1.5)    Total Output  1700 3400 1200    Net +460 +770.8 -2340 -600            Urine Unmeasured Occurrence 3 x 5 x          Diet Regular; Consistent Carbohydrate, Cardiac  ----------------------------------------------------------------------------------------------------------------------  Physical exam:  Constitutional:  Well-developed and well-nourished.  Mild resp distress.  Improved all over compared to yesterday      HENT:  Head:  Normocephalic and atraumatic.  Mouth:  Moist mucous membranes.    Eyes:  Conjunctivae and EOM are normal. No scleral icterus.    Neck:  Neck supple.  No JVD present.    Cardiovascular:  Normal rate, regular rhythm and normal heart sounds with no murmur.  Pulmonary/Chest:  minimal respiratory distress, trace wheezes, no crackles, on 2NC  Abdominal:  Soft.  No distension and no tenderness.   Musculoskeletal:  No tenderness, and no deformity.   Neurological:  Alert and oriented to person, place, and time.  No cranial nerve deficit.    Skin:  Skin is warm and dry. No rash noted. No pallor.   Peripheral vascular:  No clubbing, no cyanosis, no edema.  ----------------------------------------------------------------------------------------------------------------------  Results from last 7 days   Lab Units 07/11/21  0033 07/10/21  1729   CRP mg/dL 10.61* 6.57*   LACTATE mmol/L  --  1.9   WBC 10*3/mm3 8.89 8.40   HEMOGLOBIN g/dL 13.0 12.3   HEMATOCRIT % 38.5 36.5   MCV fL 90.4 91.5   MCHC g/dL 33.8 33.7   PLATELETS 10*3/mm3 231 232     Results from last 7 days   Lab Units 07/10/21  1748   PH, ARTERIAL pH units 7.426   PO2 ART mm Hg 50.5*   PCO2, ARTERIAL mm Hg 37.8   HCO3 ART mmol/L 24.8     Results from last 7 days   Lab Units 07/13/21  0108 07/11/21  1723 07/11/21  0033 07/10/21  1729   SODIUM mmol/L 136  --  136 137   POTASSIUM mmol/L 3.6 4.2 3.3* 3.8   CHLORIDE mmol/L 96*  --  98 101   CO2 mmol/L 26.2  --  21.9* 24.7   BUN mg/dL 26*  --  10 9   CREATININE mg/dL 0.77  --  0.57 0.53*   EGFR IF NONAFRICN AM mL/min/1.73 76  --  108 118   CALCIUM mg/dL 9.4  --  8.9 8.8   GLUCOSE mg/dL 384*  --  331* 233*   ALBUMIN g/dL  --   --  3.38* 3.35*   BILIRUBIN mg/dL  --   --  1.2 1.2   ALK PHOS U/L  --   --  136* 130*   AST (SGOT) U/L  --   --  42* 72*   ALT (SGPT) U/L  --   --  53* 53*   Estimated Creatinine Clearance: 97.3 mL/min (by C-G formula based on SCr of 0.77 mg/dL).  No results found for: AMMONIA  Results from last 7 days   Lab Units 07/11/21  0537 07/11/21  0033 07/10/21  1935   TROPONIN T ng/mL <0.010 <0.010 <0.010     Results from last 7 days   Lab Units 07/10/21  1729   PROBNP pg/mL 964.0*         Hemoglobin A1C   Date/Time Value Ref Range Status   07/10/2021 1729 8.70 (H) 4.80 - 5.60 % Final     Glucose   Date/Time Value Ref Range Status   07/13/2021 1227 230 (H) 70 -  130 mg/dL Final     Comment:     Meter: [id number] : [id number] Perfecto Posey   07/13/2021 1015 371 (H) 70 - 130 mg/dL Final     Comment:     Meter: [id number] : [id number] FABIÁN JULES   07/13/2021 0648 286 (H) 70 - 130 mg/dL Final     Comment:     Meter: [id number] : [id number] tejal manuel   07/12/2021 1920 401 (C) 70 - 130 mg/dL Final     Comment:     Confirmed by Repeat Meter: [id number] : [id number] Fabián Collett   07/12/2021 1614 379 (H) 70 - 130 mg/dL Final     Comment:     Meter: [id number] : [id number] FABIÁN BARAJASPER   07/12/2021 1045 356 (H) 70 - 130 mg/dL Final     Comment:     Meter: [id number] : [id number] tejal manuel   07/12/2021 0635 396 (H) 70 - 130 mg/dL Final     Comment:     Meter: [id number] : [id number] tejal manuel   07/11/2021 1851 251 (H) 70 - 130 mg/dL Final     Comment:     RN Notified Meter: [id number] : [id number] MCMAGALY CLINES     Lab Results   Component Value Date    TSH 2.005 04/18/2017    FREET4 1.30 02/12/2015     No results found for: PREGTESTUR, PREGSERUM, HCG, HCGQUANT  Pain Management Panel    There is no flowsheet data to display.       Brief Urine Lab Results  (Last result in the past 365 days)      Color   Clarity   Blood   Leuk Est   Nitrite   Protein   CREAT   Urine HCG        07/10/21 1810 Dark Yellow Clear Negative Small (1+) Negative 30 mg/dL (1+)             No results found for: BLOODCX  No results found for: URINECX  No results found for: WOUNDCX  No results found for: STOOLCX  No results found for: RESPCX  No results found for: AFBCX  Results from last 7 days   Lab Units 07/11/21  0716 07/11/21  0033 07/10/21  1729   PROCALCITONIN ng/mL 0.07  --   --    LACTATE mmol/L  --   --  1.9   CRP mg/dL  --  10.61* 6.57*     I have personally looked at the labs and they are summarized above.  ----------------------------------------------------------------------------------------------------------------------  Detailed radiology reports  for the last 24 hours:  Imaging Results (Last 24 Hours)     ** No results found for the last 24 hours. **            Assessment & Plan      60F Morbidly Obese by BMI PMH insulin dependent type II DM, HTN, HLD, irritable bowel syndrome and palpitations, who presents with complaints of ongoing cough with worsening shortness of breath and wheezing.    *Sepsis secondary to E.coli UTI, POA  *Acute on chronic decompensated diastolic heart failure  *FIDEL  *Essential hypertension, stage-II  *Rt flank pain with nausea, ? nephrolithiasis  *Highly suspected FIDEL  *Uncontrolled DM type II  *Morbid obesity  *Fatty liver disease with mild transaminitis  *GERD  *Rhinoviral LRTI, POA  *COPD diagnosed in OPC, no PFT    --Cefazolin 1 gm IV q8h x 3 days  --Switch metoprolol to labetalol 300 BID, cont losartan 50 mg daily, add aldactone 100 mg  --Change Levemir to 20 units daily, DC IV Solu-Medrol  --Increase lasix to 60 mg IV q8h  --Continue pravastatin  --add Symbicort 2 puffs BID  --Renal US  --Zofran / morphine as needed        Tonya Padilla MD  [facility]  07/13/21  13:42 EDT

## 2021-07-13 NOTE — PLAN OF CARE
Goal Outcome Evaluation:  Plan of Care Reviewed With: patient        Progress: improving   Pt with good urine output, anxious to go home, states feeling better

## 2021-07-14 LAB
ANION GAP SERPL CALCULATED.3IONS-SCNC: 13.4 MMOL/L (ref 5–15)
BUN SERPL-MCNC: 23 MG/DL (ref 8–23)
BUN/CREAT SERPL: 31.9 (ref 7–25)
CALCIUM SPEC-SCNC: 8.9 MG/DL (ref 8.6–10.5)
CHLORIDE SERPL-SCNC: 98 MMOL/L (ref 98–107)
CO2 SERPL-SCNC: 28.6 MMOL/L (ref 22–29)
CREAT SERPL-MCNC: 0.72 MG/DL (ref 0.57–1)
GFR SERPL CREATININE-BSD FRML MDRD: 83 ML/MIN/1.73
GLUCOSE BLDC GLUCOMTR-MCNC: 199 MG/DL (ref 70–130)
GLUCOSE BLDC GLUCOMTR-MCNC: 282 MG/DL (ref 70–130)
GLUCOSE BLDC GLUCOMTR-MCNC: 313 MG/DL (ref 70–130)
GLUCOSE BLDC GLUCOMTR-MCNC: 387 MG/DL (ref 70–130)
GLUCOSE SERPL-MCNC: 219 MG/DL (ref 65–99)
POTASSIUM SERPL-SCNC: 4.1 MMOL/L (ref 3.5–5.2)
POTASSIUM SERPL-SCNC: 4.1 MMOL/L (ref 3.5–5.2)
SODIUM SERPL-SCNC: 140 MMOL/L (ref 136–145)

## 2021-07-14 PROCEDURE — 25010000002 HEPARIN (PORCINE) PER 1000 UNITS: Performed by: HOSPITALIST

## 2021-07-14 PROCEDURE — 82962 GLUCOSE BLOOD TEST: CPT

## 2021-07-14 PROCEDURE — 63710000001 INSULIN DETEMIR PER 5 UNITS: Performed by: INTERNAL MEDICINE

## 2021-07-14 PROCEDURE — 63710000001 INSULIN ASPART PER 5 UNITS: Performed by: INTERNAL MEDICINE

## 2021-07-14 PROCEDURE — 25010000002 FUROSEMIDE PER 20 MG: Performed by: INTERNAL MEDICINE

## 2021-07-14 PROCEDURE — 94799 UNLISTED PULMONARY SVC/PX: CPT

## 2021-07-14 PROCEDURE — 94640 AIRWAY INHALATION TREATMENT: CPT

## 2021-07-14 PROCEDURE — 84132 ASSAY OF SERUM POTASSIUM: CPT | Performed by: INTERNAL MEDICINE

## 2021-07-14 PROCEDURE — 80048 BASIC METABOLIC PNL TOTAL CA: CPT | Performed by: INTERNAL MEDICINE

## 2021-07-14 PROCEDURE — 99232 SBSQ HOSP IP/OBS MODERATE 35: CPT | Performed by: INTERNAL MEDICINE

## 2021-07-14 PROCEDURE — 63710000001 INSULIN ASPART PER 5 UNITS: Performed by: HOSPITALIST

## 2021-07-14 PROCEDURE — 25010000003 CEFAZOLIN PER 500 MG: Performed by: INTERNAL MEDICINE

## 2021-07-14 RX ADMIN — DICYCLOMINE HYDROCHLORIDE 10 MG: 10 CAPSULE ORAL at 15:53

## 2021-07-14 RX ADMIN — IPRATROPIUM BROMIDE AND ALBUTEROL SULFATE 3 ML: .5; 3 SOLUTION RESPIRATORY (INHALATION) at 07:35

## 2021-07-14 RX ADMIN — DOCUSATE SODIUM 50 MG AND SENNOSIDES 8.6 MG 2 TABLET: 8.6; 5 TABLET, FILM COATED ORAL at 09:00

## 2021-07-14 RX ADMIN — SODIUM CHLORIDE, PRESERVATIVE FREE 10 ML: 5 INJECTION INTRAVENOUS at 09:01

## 2021-07-14 RX ADMIN — BUDESONIDE AND FORMOTEROL FUMARATE DIHYDRATE 2 PUFF: 80; 4.5 AEROSOL RESPIRATORY (INHALATION) at 07:35

## 2021-07-14 RX ADMIN — CEFAZOLIN 1 G: 1 INJECTION, POWDER, FOR SOLUTION INTRAMUSCULAR; INTRAVENOUS at 14:48

## 2021-07-14 RX ADMIN — IPRATROPIUM BROMIDE AND ALBUTEROL SULFATE 3 ML: .5; 3 SOLUTION RESPIRATORY (INHALATION) at 00:31

## 2021-07-14 RX ADMIN — LABETALOL HYDROCHLORIDE 300 MG: 200 TABLET, FILM COATED ORAL at 08:59

## 2021-07-14 RX ADMIN — LABETALOL HYDROCHLORIDE 300 MG: 200 TABLET, FILM COATED ORAL at 20:56

## 2021-07-14 RX ADMIN — IPRATROPIUM BROMIDE AND ALBUTEROL SULFATE 3 ML: .5; 3 SOLUTION RESPIRATORY (INHALATION) at 13:26

## 2021-07-14 RX ADMIN — PRAVASTATIN SODIUM 20 MG: 40 TABLET ORAL at 11:55

## 2021-07-14 RX ADMIN — HEPARIN SODIUM 5000 UNITS: 5000 INJECTION INTRAVENOUS; SUBCUTANEOUS at 20:56

## 2021-07-14 RX ADMIN — DICYCLOMINE HYDROCHLORIDE 10 MG: 10 CAPSULE ORAL at 08:59

## 2021-07-14 RX ADMIN — LOSARTAN POTASSIUM 50 MG: 50 TABLET, FILM COATED ORAL at 08:57

## 2021-07-14 RX ADMIN — INSULIN ASPART 15 UNITS: 100 INJECTION, SOLUTION INTRAVENOUS; SUBCUTANEOUS at 17:56

## 2021-07-14 RX ADMIN — FUROSEMIDE 60 MG: 10 INJECTION, SOLUTION INTRAMUSCULAR; INTRAVENOUS at 22:03

## 2021-07-14 RX ADMIN — INSULIN ASPART 3 UNITS: 100 INJECTION, SOLUTION INTRAVENOUS; SUBCUTANEOUS at 08:55

## 2021-07-14 RX ADMIN — CEFAZOLIN 1 G: 1 INJECTION, POWDER, FOR SOLUTION INTRAMUSCULAR; INTRAVENOUS at 22:02

## 2021-07-14 RX ADMIN — HEPARIN SODIUM 5000 UNITS: 5000 INJECTION INTRAVENOUS; SUBCUTANEOUS at 08:57

## 2021-07-14 RX ADMIN — FUROSEMIDE 60 MG: 10 INJECTION, SOLUTION INTRAMUSCULAR; INTRAVENOUS at 14:30

## 2021-07-14 RX ADMIN — POTASSIUM CHLORIDE 40 MEQ: 20 TABLET, EXTENDED RELEASE ORAL at 00:51

## 2021-07-14 RX ADMIN — CEFAZOLIN 1 G: 1 INJECTION, POWDER, FOR SOLUTION INTRAMUSCULAR; INTRAVENOUS at 06:06

## 2021-07-14 RX ADMIN — DOCUSATE SODIUM 50 MG AND SENNOSIDES 8.6 MG 2 TABLET: 8.6; 5 TABLET, FILM COATED ORAL at 20:56

## 2021-07-14 RX ADMIN — SPIRONOLACTONE 100 MG: 100 TABLET ORAL at 08:58

## 2021-07-14 RX ADMIN — SODIUM CHLORIDE, PRESERVATIVE FREE 10 ML: 5 INJECTION INTRAVENOUS at 20:57

## 2021-07-14 RX ADMIN — PANTOPRAZOLE SODIUM 40 MG: 40 TABLET, DELAYED RELEASE ORAL at 06:05

## 2021-07-14 RX ADMIN — INSULIN DETEMIR 10 UNITS: 100 INJECTION, SOLUTION SUBCUTANEOUS at 17:45

## 2021-07-14 RX ADMIN — INSULIN ASPART 10 UNITS: 100 INJECTION, SOLUTION INTRAVENOUS; SUBCUTANEOUS at 12:19

## 2021-07-14 RX ADMIN — DICYCLOMINE HYDROCHLORIDE 10 MG: 10 CAPSULE ORAL at 22:01

## 2021-07-14 RX ADMIN — INSULIN DETEMIR 30 UNITS: 100 INJECTION, SOLUTION SUBCUTANEOUS at 09:01

## 2021-07-14 RX ADMIN — FUROSEMIDE 60 MG: 10 INJECTION, SOLUTION INTRAMUSCULAR; INTRAVENOUS at 06:06

## 2021-07-14 RX ADMIN — INSULIN ASPART 15 UNITS: 100 INJECTION, SOLUTION INTRAVENOUS; SUBCUTANEOUS at 18:01

## 2021-07-14 RX ADMIN — BUDESONIDE AND FORMOTEROL FUMARATE DIHYDRATE 2 PUFF: 80; 4.5 AEROSOL RESPIRATORY (INHALATION) at 19:07

## 2021-07-14 NOTE — PLAN OF CARE
Goal Outcome Evaluation:           Progress: no change  Outcome Summary: Patient has been getting diuresed this shift. Patient has had adequate urine output. Patient has not complained of SOA. VS stable. Will continue to monitor and follow plan of care.

## 2021-07-14 NOTE — PROGRESS NOTES
[facility] HOSPITALIST PROGRESS NOTE     Patient Identification:  Name:  Zeina Reed  Age:  60 y.o.  Sex:  female  :  1960  MRN:  [id number]  Visit Number:  [id number]  ROOM: [address] [place] [address]     Primary Care Provider:  Rachel Savage APRN    Length of stay in inpatient status:  4    Subjective     Chief Compliant:    Chief Complaint   Patient presents with   • Shortness of Breath   • Cough     History of Presenting Illness:    Patient seen and examined, is better breathing today compared to yesterday       Objective     Current Hospital Meds:budesonide-formoterol, 2 puff, Inhalation, BID - RT  ceFAZolin, 1 g, Intravenous, Q8H  dicyclomine, 10 mg, Oral, TID  furosemide, 60 mg, Intravenous, Q8H  heparin (porcine), 5,000 Units, Subcutaneous, Q12H  insulin aspart, 0-14 Units, Subcutaneous, TID AC  insulin aspart, 9 Units, Subcutaneous, TID With Meals  insulin detemir, 10 Units, Subcutaneous, Once  [START ON 7/15/2021] insulin detemir, 40 Units, Subcutaneous, Daily  ipratropium-albuterol, 3 mL, Nebulization, 4x Daily - RT  labetalol, 300 mg, Oral, Q12H  losartan, 50 mg, Oral, Q24H  pantoprazole, 40 mg, Oral, QAM  pravastatin, 20 mg, Oral, Daily  senna-docusate sodium, 2 tablet, Oral, BID  sodium chloride, 10 mL, Intravenous, Q12H  spironolactone, 100 mg, Oral, Daily         Current Antimicrobial Therapy:  Anti-Infectives (From admission, onward)    Ordered     Dose/Rate Route Frequency Start Stop    21 1344  ceFAZolin 1 gm IVPB in 100 mL NS (VTB)     Ordering Provider: Tonya Padilla MD    1 g  over 30 Minutes Intravenous Every 8 Hours 21 1500 21 1459    07/10/21 2010  doxycycline (VIBRAMYCIN) 100 mg/100 mL 0.9% NS MBP     Ordering Provider: Rema Gonzales PA    100 mg  over 60 Minutes Intravenous Once 07/10/21 2012 07/10/21 2211    07/10/21 191  cefTRIAXone (ROCEPHIN) 1 g/100 mL 0.9% NS (MBP)     Ordering Provider: Rema Gonzales PA    1 g  over 30 Minutes  Intravenous Once 07/10/21 1920 07/10/21 2026        Current Diuretic Therapy:  Diuretics (From admission, onward)    Ordered     Dose/Rate Route Frequency Start Stop    07/13/21 1341  spironolactone (ALDACTONE) tablet 100 mg     Ordering Provider: Tonya Padilla MD    100 mg Oral Daily 07/13/21 1430      07/13/21 1341  furosemide (LASIX) injection 60 mg     Ordering Provider: Tonya Padilla MD    60 mg Intravenous Every 8 Hours 07/13/21 1354      07/10/21 2009  furosemide (LASIX) injection 40 mg     Ordering Provider: Rema Gonzales PA    40 mg Intravenous Once 07/10/21 2011 07/10/21 2111        ----------------------------------------------------------------------------------------------------------------------  Vital Signs:  Temp:  [98.1 °F (36.7 °C)-98.4 °F (36.9 °C)] 98.4 °F (36.9 °C)  Heart Rate:  [68-94] 94  Resp:  [18-20] 19  BP: (117-176)/(65-90) 138/71  SpO2:  [92 %-98 %] 95 %  on  Flow (L/min):  [2] 2;   Device (Oxygen Therapy): room air  Body mass index is 48.21 kg/m².    Wt Readings from Last 3 Encounters:   07/14/21 120 kg (263 lb 9.6 oz)   02/25/21 120 kg (265 lb)   01/31/20 117 kg (258 lb 6.4 oz)     Intake & Output (last 3 days)       07/11 0701 - 07/12 0700 07/12 0701 - 07/13 0700 07/13 0701 - 07/14 0700 07/14 0701 - 07/15 0700    P.O. 2280 960 1200 360    I.V. (mL/kg) 190.8 (1.5) 100 (0.8)      IV Piggyback        Total Intake(mL/kg) 2470.8 (19.8) 1060 (8.6) 1200 (10) 360 (3)    Urine (mL/kg/hr) 1700 (0.6) 3400 (1.2) 2800 (1) 1300 (1.3)    Total Output 1700 3400 2800 1300    Net +770.8 -2340 -1600 -940            Urine Unmeasured Occurrence 5 x           Diet Regular; Consistent Carbohydrate, Cardiac  ----------------------------------------------------------------------------------------------------------------------  Physical exam:  Constitutional:  Well-developed and well-nourished.  Mild resp distress that is better   Eyes:  Conjunctivae and EOM are normal. No scleral icterus.     Neck:  Neck supple.  No JVD present.    Cardiovascular:  Normal rate, regular rhythm and normal heart sounds with no murmur.  Pulmonary/Chest:  minimal respiratory distress, trace wheezes, no crackles, on 2NC  Abdominal:  Soft. No distension and no tenderness.   Musculoskeletal:  No tenderness, and no deformity.   Neurological:  Alert and oriented to person, place, and time.  No cranial nerve deficit.    Skin:  Skin is warm and dry. No rash noted. No pallor.   Peripheral vascular:  No clubbing, no cyanosis, no edema.  ----------------------------------------------------------------------------------------------------------------------  Results from last 7 days   Lab Units 07/11/21  0033 07/10/21  1729   CRP mg/dL 10.61* 6.57*   LACTATE mmol/L  --  1.9   WBC 10*3/mm3 8.89 8.40   HEMOGLOBIN g/dL 13.0 12.3   HEMATOCRIT % 38.5 36.5   MCV fL 90.4 91.5   MCHC g/dL 33.8 33.7   PLATELETS 10*3/mm3 231 232     Results from last 7 days   Lab Units 07/10/21  1748   PH, ARTERIAL pH units 7.426   PO2 ART mm Hg 50.5*   PCO2, ARTERIAL mm Hg 37.8   HCO3 ART mmol/L 24.8     Results from last 7 days   Lab Units 07/14/21  0430 07/13/21  1727 07/13/21  0108 07/11/21  0033 07/10/21  1729   SODIUM mmol/L 140  --  136 136 137   POTASSIUM mmol/L 4.1  4.1 3.6 3.6 3.3* 3.8   CHLORIDE mmol/L 98  --  96* 98 101   CO2 mmol/L 28.6  --  26.2 21.9* 24.7   BUN mg/dL 23  --  26* 10 9   CREATININE mg/dL 0.72  --  0.77 0.57 0.53*   EGFR IF NONAFRICN AM mL/min/1.73 83  --  76 108 118   CALCIUM mg/dL 8.9  --  9.4 8.9 8.8   GLUCOSE mg/dL 219*  --  384* 331* 233*   ALBUMIN g/dL  --   --   --  3.38* 3.35*   BILIRUBIN mg/dL  --   --   --  1.2 1.2   ALK PHOS U/L  --   --   --  136* 130*   AST (SGOT) U/L  --   --   --  42* 72*   ALT (SGPT) U/L  --   --   --  53* 53*   Estimated Creatinine Clearance: 102.4 mL/min (by C-G formula based on SCr of 0.72 mg/dL).  No results found for: AMMONIA  Results from last 7 days   Lab Units 07/11/21  0537 07/11/21  0033  07/10/21  1935   TROPONIN T ng/mL <0.010 <0.010 <0.010     Results from last 7 days   Lab Units 07/10/21  1729   PROBNP pg/mL 964.0*         Glucose   Date/Time Value Ref Range Status   07/14/2021 1156 313 (H) 70 - 130 mg/dL Final     Comment:     Meter: [id number] : [id number] leigh gutierrez   07/14/2021 0703 199 (H) 70 - 130 mg/dL Final     Comment:     Meter: [id number] : [id number] leigh gutierrez   07/13/2021 2002 149 (H) 70 - 130 mg/dL Final     Comment:     Meter: [id number] : [id number] leana raza   07/13/2021 1612 205 (H) 70 - 130 mg/dL Final     Comment:     Meter: [id number] : [id number] FABIÁN BARAJASJUANITA   07/13/2021 1227 230 (H) 70 - 130 mg/dL Final     Comment:     Meter: [id number] : [id number] Perfecto Posey   07/13/2021 1015 371 (H) 70 - 130 mg/dL Final     Comment:     Meter: [id number] : [id number] FABIÁN HOPPER   07/13/2021 0648 286 (H) 70 - 130 mg/dL Final     Comment:     Meter: [id number] : [id number] tejal jackson   07/12/2021 1920 401 (C) 70 - 130 mg/dL Final     Comment:     Confirmed by Repeat Meter: [id number] : [id number] Morgan Collett     Lab Results   Component Value Date    TSH 2.005 04/18/2017    FREET4 1.30 02/12/2015     No results found for: PREGTESTUR, PREGSERUM, HCG, HCGQUANT  Pain Management Panel    There is no flowsheet data to display.       Brief Urine Lab Results  (Last result in the past 365 days)      Color   Clarity   Blood   Leuk Est   Nitrite   Protein   CREAT   Urine HCG        07/10/21 1810 Dark Yellow Clear Negative Small (1+) Negative 30 mg/dL (1+)             No results found for: BLOODCX  No results found for: URINECX  No results found for: WOUNDCX  No results found for: STOOLCX  No results found for: RESPCX  No results found for: AFBCX  Results from last 7 days   Lab Units 07/11/21  0716 07/11/21  0033 07/10/21  1729   PROCALCITONIN ng/mL 0.07  --   --    LACTATE mmol/L  --   --  1.9   CRP mg/dL  --  10.61* 6.57*     I have  personally looked at the labs and they are summarized above.  ----------------------------------------------------------------------------------------------------------------------  Detailed radiology reports for the last 24 hours:  Imaging Results (Last 24 Hours)     ** No results found for the last 24 hours. **            Assessment & Plan      60F Morbidly Obese by BMI PMH insulin dependent type II DM, HTN, HLD, irritable bowel syndrome and palpitations, who presents with complaints of ongoing cough with worsening shortness of breath and wheezing.    *Sepsis secondary to E.coli UTI, POA  *Acute on chronic decompensated diastolic heart failure  *FIDEL  *Essential hypertension, stage-II  *Rt flank pain with nausea, ? nephrolithiasis  *Highly suspected FIDEL  *Uncontrolled DM type II, pt refused metformin   *Morbid obesity  *Fatty liver disease with mild transaminitis  *GERD  *Rhinoviral LRTI, POA  *COPD diagnosed in OPC, no PFT    --Cefazolin 1 gm IV q8h x 3 days  --cont labetalol 300 BID, cont losartan 50 mg daily / aldactone 100 mg  --Change Levemir to 20 units daily, DC IV Solu-Medrol  --Cont lasix to 60 mg IV q8h  --Continue pravastatin  --Symbicort 2 puffs BID  --increase levemir to 40 units, add aspart 9 units TID   --Zofran / morphine as needed  --hopefully to dc in 24 hours         Tonya Padilla MD  07/14/21  15:13 EDT

## 2021-07-14 NOTE — PLAN OF CARE
Goal Outcome Evaluation:         Pt will have improved lung function through diuresis . Pt will understand food choices to control DM. Pt will increase activity. Pt will cough and deep breath. Pt will understand importance hand hygiene.

## 2021-07-14 NOTE — PLAN OF CARE
Goal Outcome Evaluation:         Pt. Is resting in bed. The Morphine she had earlier today helped with the right sided flank pain. She has had good UOP. Will con't to follow plan of care.

## 2021-07-15 VITALS
HEART RATE: 82 BPM | SYSTOLIC BLOOD PRESSURE: 121 MMHG | DIASTOLIC BLOOD PRESSURE: 66 MMHG | HEIGHT: 62 IN | TEMPERATURE: 98.1 F | OXYGEN SATURATION: 98 % | RESPIRATION RATE: 18 BRPM | BODY MASS INDEX: 48.1 KG/M2 | WEIGHT: 261.4 LBS

## 2021-07-15 LAB
ANION GAP SERPL CALCULATED.3IONS-SCNC: 12.6 MMOL/L (ref 5–15)
BACTERIA SPEC AEROBE CULT: NORMAL
BACTERIA SPEC AEROBE CULT: NORMAL
BUN SERPL-MCNC: 22 MG/DL (ref 8–23)
BUN/CREAT SERPL: 22.2 (ref 7–25)
CALCIUM SPEC-SCNC: 9 MG/DL (ref 8.6–10.5)
CHLORIDE SERPL-SCNC: 94 MMOL/L (ref 98–107)
CO2 SERPL-SCNC: 28.4 MMOL/L (ref 22–29)
CREAT SERPL-MCNC: 0.99 MG/DL (ref 0.57–1)
DEPRECATED RDW RBC AUTO: 46.7 FL (ref 37–54)
ERYTHROCYTE [DISTWIDTH] IN BLOOD BY AUTOMATED COUNT: 13.7 % (ref 12.3–15.4)
GFR SERPL CREATININE-BSD FRML MDRD: 57 ML/MIN/1.73
GLUCOSE BLDC GLUCOMTR-MCNC: 216 MG/DL (ref 70–130)
GLUCOSE BLDC GLUCOMTR-MCNC: 228 MG/DL (ref 70–130)
GLUCOSE SERPL-MCNC: 340 MG/DL (ref 65–99)
HCT VFR BLD AUTO: 39.5 % (ref 34–46.6)
HGB BLD-MCNC: 13 G/DL (ref 12–15.9)
MCH RBC QN AUTO: 30.5 PG (ref 26.6–33)
MCHC RBC AUTO-ENTMCNC: 32.9 G/DL (ref 31.5–35.7)
MCV RBC AUTO: 92.7 FL (ref 79–97)
PLATELET # BLD AUTO: 280 10*3/MM3 (ref 140–450)
PMV BLD AUTO: 10.2 FL (ref 6–12)
POTASSIUM SERPL-SCNC: 3.8 MMOL/L (ref 3.5–5.2)
RBC # BLD AUTO: 4.26 10*6/MM3 (ref 3.77–5.28)
SODIUM SERPL-SCNC: 135 MMOL/L (ref 136–145)
WBC # BLD AUTO: 9 10*3/MM3 (ref 3.4–10.8)

## 2021-07-15 PROCEDURE — 94799 UNLISTED PULMONARY SVC/PX: CPT

## 2021-07-15 PROCEDURE — 63710000001 INSULIN ASPART PER 5 UNITS: Performed by: HOSPITALIST

## 2021-07-15 PROCEDURE — 82962 GLUCOSE BLOOD TEST: CPT

## 2021-07-15 PROCEDURE — 80048 BASIC METABOLIC PNL TOTAL CA: CPT | Performed by: INTERNAL MEDICINE

## 2021-07-15 PROCEDURE — 63710000001 INSULIN ASPART PER 5 UNITS: Performed by: INTERNAL MEDICINE

## 2021-07-15 PROCEDURE — 25010000002 HEPARIN (PORCINE) PER 1000 UNITS: Performed by: HOSPITALIST

## 2021-07-15 PROCEDURE — 25010000002 FUROSEMIDE PER 20 MG: Performed by: INTERNAL MEDICINE

## 2021-07-15 PROCEDURE — 99239 HOSP IP/OBS DSCHRG MGMT >30: CPT | Performed by: INTERNAL MEDICINE

## 2021-07-15 PROCEDURE — 63710000001 INSULIN DETEMIR PER 5 UNITS: Performed by: INTERNAL MEDICINE

## 2021-07-15 PROCEDURE — 25010000003 CEFAZOLIN PER 500 MG: Performed by: INTERNAL MEDICINE

## 2021-07-15 PROCEDURE — 85027 COMPLETE CBC AUTOMATED: CPT | Performed by: INTERNAL MEDICINE

## 2021-07-15 RX ORDER — LABETALOL 200 MG/1
400 TABLET, FILM COATED ORAL EVERY 12 HOURS SCHEDULED
Qty: 120 TABLET | Refills: 0 | Status: SHIPPED | OUTPATIENT
Start: 2021-07-15 | End: 2021-07-21 | Stop reason: SDUPTHER

## 2021-07-15 RX ORDER — BUMETANIDE 2 MG/1
2 TABLET ORAL 2 TIMES DAILY
Qty: 60 TABLET | Refills: 0 | Status: SHIPPED | OUTPATIENT
Start: 2021-07-15 | End: 2021-07-21 | Stop reason: SDUPTHER

## 2021-07-15 RX ORDER — PEN NEEDLE, DIABETIC 29 G X1/2"
NEEDLE, DISPOSABLE MISCELLANEOUS
Qty: 100 EACH | Refills: 0 | Status: SHIPPED | OUTPATIENT
Start: 2021-07-15 | End: 2021-08-20 | Stop reason: SDUPTHER

## 2021-07-15 RX ORDER — POTASSIUM CHLORIDE 20 MEQ/1
40 TABLET, EXTENDED RELEASE ORAL DAILY
Qty: 60 TABLET | Refills: 0 | Status: SHIPPED | OUTPATIENT
Start: 2021-07-15 | End: 2021-08-14

## 2021-07-15 RX ORDER — LOSARTAN POTASSIUM 100 MG/1
100 TABLET ORAL
Qty: 30 TABLET | Refills: 0 | Status: SHIPPED | OUTPATIENT
Start: 2021-07-16 | End: 2021-07-21 | Stop reason: SDUPTHER

## 2021-07-15 RX ADMIN — SODIUM CHLORIDE, PRESERVATIVE FREE 10 ML: 5 INJECTION INTRAVENOUS at 09:36

## 2021-07-15 RX ADMIN — PANTOPRAZOLE SODIUM 40 MG: 40 TABLET, DELAYED RELEASE ORAL at 06:04

## 2021-07-15 RX ADMIN — INSULIN ASPART 5 UNITS: 100 INJECTION, SOLUTION INTRAVENOUS; SUBCUTANEOUS at 12:27

## 2021-07-15 RX ADMIN — SPIRONOLACTONE 100 MG: 100 TABLET ORAL at 09:18

## 2021-07-15 RX ADMIN — LABETALOL HYDROCHLORIDE 300 MG: 200 TABLET, FILM COATED ORAL at 09:19

## 2021-07-15 RX ADMIN — FUROSEMIDE 60 MG: 10 INJECTION, SOLUTION INTRAMUSCULAR; INTRAVENOUS at 06:04

## 2021-07-15 RX ADMIN — DICYCLOMINE HYDROCHLORIDE 10 MG: 10 CAPSULE ORAL at 09:19

## 2021-07-15 RX ADMIN — CEFAZOLIN 1 G: 1 INJECTION, POWDER, FOR SOLUTION INTRAMUSCULAR; INTRAVENOUS at 06:04

## 2021-07-15 RX ADMIN — BUDESONIDE AND FORMOTEROL FUMARATE DIHYDRATE 2 PUFF: 80; 4.5 AEROSOL RESPIRATORY (INHALATION) at 07:54

## 2021-07-15 RX ADMIN — DOCUSATE SODIUM 50 MG AND SENNOSIDES 8.6 MG 2 TABLET: 8.6; 5 TABLET, FILM COATED ORAL at 09:18

## 2021-07-15 RX ADMIN — INSULIN ASPART 3 UNITS: 100 INJECTION, SOLUTION INTRAVENOUS; SUBCUTANEOUS at 09:22

## 2021-07-15 RX ADMIN — INSULIN ASPART 9 UNITS: 100 INJECTION, SOLUTION INTRAVENOUS; SUBCUTANEOUS at 09:30

## 2021-07-15 RX ADMIN — LOSARTAN POTASSIUM 50 MG: 50 TABLET, FILM COATED ORAL at 09:18

## 2021-07-15 RX ADMIN — PRAVASTATIN SODIUM 20 MG: 40 TABLET ORAL at 09:18

## 2021-07-15 RX ADMIN — INSULIN ASPART 9 UNITS: 100 INJECTION, SOLUTION INTRAVENOUS; SUBCUTANEOUS at 12:28

## 2021-07-15 RX ADMIN — INSULIN DETEMIR 40 UNITS: 100 INJECTION, SOLUTION SUBCUTANEOUS at 09:19

## 2021-07-15 RX ADMIN — IPRATROPIUM BROMIDE AND ALBUTEROL SULFATE 3 ML: .5; 3 SOLUTION RESPIRATORY (INHALATION) at 07:54

## 2021-07-15 RX ADMIN — IPRATROPIUM BROMIDE AND ALBUTEROL SULFATE 3 ML: .5; 3 SOLUTION RESPIRATORY (INHALATION) at 00:48

## 2021-07-15 RX ADMIN — HEPARIN SODIUM 5000 UNITS: 5000 INJECTION INTRAVENOUS; SUBCUTANEOUS at 09:33

## 2021-07-15 NOTE — PLAN OF CARE
Goal Outcome Evaluation:        Pt will continue to increase activity by walking every day. Pt will begin better eating habits to control blood sugars.

## 2021-07-15 NOTE — DISCHARGE SUMMARY
[facility]    Patient Identification:  Name:  Zeina Reed  Age:  60 y.o.  Sex:  female  :  1960  MRN:  [id number]  Visit Number:  [id number]    Date of Admission: 7/10/2021  Date of Discharge:  7/15/2021     PCP: Rachel Savage APRN    DISCHARGE DIAGNOSIS  *Sepsis secondary to E.coli UTI, POA, resolved  *Acute on chronic decompensated diastolic heart failure  *FIDEL  *Essential hypertension, stage-II, improved  *Rt flank pain with nausea, ? nephrolithiasis  *Highly suspected FIDEL, needs PSG in OPC  *Uncontrolled DM type II, pt refused metformin   *Morbid obesity  *Fatty liver disease with mild transaminitis  *GERD  *Rhinoviral LRTI, POA  *COPD diagnosed in OPC, needs PFT's in OPC        HOSPITAL COURSE  Patient is a 60 y.o. female presented to [facility] complaining of SOB.  Please see the admitting history and physical for further details.  She is morbidly obese, uncontrolled DM-II on insulin, HTN, HLD, irritable bowel syndrome and palpitations, who presents with complaints of ongoing cough with worsening shortness of breath and wheezing.  Patient was found to have acute on chronic decompensated diastolic heart failure as well as rhino viral lower respiratory tract infection.  She had E. coli UTI the patient is being treated with cefazolin for 5 days based on the sensitivity.  The patient will be discharged today in a stable condition.  Her oxygen requirement has subsided dramatically during her stay.  Her antidiabetic and blood pressure medicines were adjusted as well.  She will be discharged on p.o. Bumex 2 mg twice a day.  Was asked to follow-up with her PCP within the next week           VITAL SIGNS:  Temp:  [98.2 °F (36.8 °C)-98.6 °F (37 °C)] 98.3 °F (36.8 °C)  Heart Rate:  [] 99  Resp:  [18-20] 19  BP: (109-138)/(60-77) 138/77  SpO2:  [90 %-99 %] 96 %  on  Flow (L/min):  [2] 2;   Device (Oxygen Therapy): room air    Body mass index is 47.81  kg/m².  Wt Readings from Last 3 Encounters:   07/15/21 119 kg (261 lb 6.4 oz)   02/25/21 120 kg (265 lb)   01/31/20 117 kg (258 lb 6.4 oz)       PHYSICAL EXAM:  Constitutional:  Well-developed and well-nourished.  Mild resp distress that is better   Eyes:  Conjunctivae and EOM are normal. No scleral icterus.    Neck:  Neck supple.  No JVD present.    Cardiovascular:  Normal rate, regular rhythm and normal heart sounds with no murmur.  Pulmonary/Chest:  minimal respiratory distress, trace wheezes, no crackles, on 2NC  Abdominal:  Soft. No distension and no tenderness.   Musculoskeletal:  No tenderness, and no deformity.   Neurological:  Alert and oriented to person, place, and time.  No cranial nerve deficit.    Skin:  Skin is warm and dry. No rash noted. No pallor.   Peripheral vascular:  No clubbing, no cyanosis, no edema.      DISCHARGE DISPOSITION   Stable    DISCHARGE MEDICATIONS:     Discharge Medications      New Medications      Instructions Start Date   Breo Ellipta 200-25 MCG/INH inhaler  Generic drug: Fluticasone Furoate-Vilanterol   1 puff, Inhalation, Daily - RT      bumetanide 2 MG tablet  Commonly known as: BUMEX   2 mg, Oral, 2 Times Daily      insulin detemir 100 UNIT/ML injection  Commonly known as: LEVEMIR   45 Units, Subcutaneous, Daily   Start Date: July 16, 2021     insulin lispro 100 UNIT/ML injection  Commonly known as: HumaLOG   12 Units, Subcutaneous, 3 Times Daily Before Meals      labetalol 200 MG tablet  Commonly known as: NORMODYNE   400 mg, Oral, Every 12 Hours Scheduled      losartan 100 MG tablet  Commonly known as: COZAAR   100 mg, Oral, Every 24 Hours Scheduled   Start Date: July 16, 2021     potassium chloride 20 MEQ CR tablet  Commonly known as: K-DUR,KLOR-CON   40 mEq, Oral, Daily         Continue These Medications      Instructions Start Date   benzonatate 100 MG capsule  Commonly known as: TESSALON   100 mg, Oral, 3 Times Daily PRN      dicyclomine 10 MG capsule  Commonly known  as: BENTYL   Take 1 to 2 capsules by mouth 3 (Three) Times a Day.      Janumet XR  MG tablet  Generic drug: SITagliptin-metFORMIN HCl ER   2 tablets, Oral, Daily      lansoprazole 30 MG capsule  Commonly known as: PREVACID   Take 1 capsule by mouth Daily before a meal      pravastatin 20 MG tablet  Commonly known as: PRAVACHOL   20 mg, Oral, Daily         Stop These Medications    hydrALAZINE 25 MG tablet  Commonly known as: APRESOLINE     metoprolol tartrate 50 MG tablet  Commonly known as: LOPRESSOR     Tresiba FlexTouch 100 UNIT/ML solution pen-injector injection  Generic drug: insulin degludec            Diet Instructions     Diet: Consistent Carbohydrate, Cardiac      Discharge Diet:  Consistent Carbohydrate  Cardiac           Activity Instructions     Activity as Tolerated          Future Appointments   Date Time Provider Department Center   2/28/2022  2:15 PM Chandler Langford PA-C [facility]     Your Scheduled Appointments    Feb 28, 2022  2:15 PM  Follow Up with Chandler Langford PA-C  [facility] CARDIOLOGY ([place]) [address] [place], [place] [address] [address] [phone]   -Bring photo ID, insurance card, and list of medications to appointment  -If testing was completed outside of [facility] then patient must bring images on a disc  -Copay will be collected at time of appointment  -Established patients should arrive 10 minutes prior to appointment          Additional Instructions for the Follow-ups that You Need to Schedule     Discharge Follow-up with PCP   As directed       Currently Documented PCP:    Rachel Savage APRN    PCP Phone Number:    [phone]     Follow Up Details: PCP in a week           Follow-up Information     Rachel Savage APRN .    Specialty: Nurse Practitioner  Why: PCP in a week  Contact information:  [address] [facility] [place] [address]  [phone]                    TEST  RESULTS PENDING AT DISCHARGE  Pending Labs     Order  Current Status    Blood Culture - Blood, Arm, Left Preliminary result    Blood Culture - Blood, Arm, Right Preliminary result           CODE STATUS  Code Status and Medical Interventions:   Ordered at: 07/10/21 2016     Level Of Support Discussed With:    Patient     Code Status:    CPR     Medical Interventions (Level of Support Prior to Arrest):    Full       Tonya Padilla MD  07/15/21  13:42 EDT    Please note that this discharge summary required more than 30 minutes to complete.    Please send a copy of this dictation to the following providers:  Rachel Savage APRN

## 2021-07-15 NOTE — PLAN OF CARE
Goal Outcome Evaluation:           Progress: no change  Outcome Summary: Patient currently resting in bed. Patient has been getting diuresed this shift and has adequate urine output. Patient has not had any complaints. No distress noted at this time. VS stable. Will continue to monitor and follow plan of care.

## 2021-07-16 ENCOUNTER — READMISSION MANAGEMENT (OUTPATIENT)
Dept: CALL CENTER | Facility: HOSPITAL | Age: 61
End: 2021-07-16

## 2021-07-16 NOTE — PAYOR COMM NOTE
"[facility]  NPI:[id number]    Utilization Review  Contact: Monica Donovan RN  Phone: [phone]  Fax:[phone]    DISCHARGE NOTIFICATION      AUTH STILL PENDING [id number]      Zeina Reed (60 y.o. Female)     Date of Birth Social Security Number Address Home Phone MRN    1960  [address]   [place] [address] [phone] [id number]    Spiritism Marital Status          Congregation        Admission Date Admission Type Admitting Provider Attending Provider Department, Room/Bed    7/10/21 Emergency Celso Kirk MD  [facility] 3 [facility], 3325/1P    Discharge Date Discharge Disposition Discharge Destination        7/15/2021 Home or Self Care              Attending Provider: (none)   Allergies: Augmentin [Amoxicillin-pot Clavulanate], Metformin And Related    Isolation: None   Infection: Rhinovirus  (07/10/21)   Code Status: Prior    Ht: 157.5 cm (62\")   Wt: 119 kg (261 lb 6.4 oz)    Admission Cmt: None   Principal Problem: None                Active Insurance as of 7/10/2021     Primary Coverage     Payor Plan Insurance Group Employer/Plan Group    [employer] [facility] EMPLOYEE [id number]     Payor Plan Address Payor Plan Phone Number Payor Plan Fax Number Effective Dates    [address] [phone]  3/1/2021 - None Entered    [place] [address]       Subscriber Name Subscriber Birth Date Member ID       ZEINA REED 1960 [id number]                 Emergency Contacts      (Rel.) Home Phone Work Phone Mobile Phone    Brianna Roger (Spouse) [phone] -- [phone]               Discharge Summary      Tonya Padilla MD at 07/15/21 1341              [facility] DISCHARGE SUMMARY    Patient Identification:  Name:  Zeina Reed  Age:  60 y.o.  Sex:  female  :  1960  MRN:  [id number]  Visit Number:  [id number]    Date of Admission: 7/10/2021  Date of Discharge:  7/15/2021     PCP: Rachel Savage, " APRN    DISCHARGE DIAGNOSIS  *Sepsis secondary to E.coli UTI, POA, resolved  *Acute on chronic decompensated diastolic heart failure  *FIDEL  *Essential hypertension, stage-II, improved  *Rt flank pain with nausea, ? nephrolithiasis  *Highly suspected FIDEL, needs PSG in OPC  *Uncontrolled DM type II, pt refused metformin   *Morbid obesity  *Fatty liver disease with mild transaminitis  *GERD  *Rhinoviral LRTI, POA  *COPD diagnosed in OPC, needs PFT's in OPC        HOSPITAL COURSE  Patient is a 60 y.o. female presented to [facility] complaining of SOB.  Please see the admitting history and physical for further details.  She is morbidly obese, uncontrolled DM-II on insulin, HTN, HLD, irritable bowel syndrome and palpitations, who presents with complaints of ongoing cough with worsening shortness of breath and wheezing.  Patient was found to have acute on chronic decompensated diastolic heart failure as well as rhino viral lower respiratory tract infection.  She had E. coli UTI the patient is being treated with cefazolin for 5 days based on the sensitivity.  The patient will be discharged today in a stable condition.  Her oxygen requirement has subsided dramatically during her stay.  Her antidiabetic and blood pressure medicines were adjusted as well.  She will be discharged on p.o. Bumex 2 mg twice a day.  Was asked to follow-up with her PCP within the next week           VITAL SIGNS:  Temp:  [98.2 °F (36.8 °C)-98.6 °F (37 °C)] 98.3 °F (36.8 °C)  Heart Rate:  [] 99  Resp:  [18-20] 19  BP: (109-138)/(60-77) 138/77  SpO2:  [90 %-99 %] 96 %  on  Flow (L/min):  [2] 2;   Device (Oxygen Therapy): room air    Body mass index is 47.81 kg/m².  Wt Readings from Last 3 Encounters:   07/15/21 119 kg (261 lb 6.4 oz)   02/25/21 120 kg (265 lb)   01/31/20 117 kg (258 lb 6.4 oz)       PHYSICAL EXAM:  Constitutional:  Well-developed and well-nourished.  Mild resp distress that is better   Eyes:  Conjunctivae and EOM are  normal. No scleral icterus.    Neck:  Neck supple.  No JVD present.    Cardiovascular:  Normal rate, regular rhythm and normal heart sounds with no murmur.  Pulmonary/Chest:  minimal respiratory distress, trace wheezes, no crackles, on 2NC  Abdominal:  Soft. No distension and no tenderness.   Musculoskeletal:  No tenderness, and no deformity.   Neurological:  Alert and oriented to person, place, and time.  No cranial nerve deficit.    Skin:  Skin is warm and dry. No rash noted. No pallor.   Peripheral vascular:  No clubbing, no cyanosis, no edema.      DISCHARGE DISPOSITION   Stable    DISCHARGE MEDICATIONS:     Discharge Medications      New Medications      Instructions Start Date   Breo Ellipta 200-25 MCG/INH inhaler  Generic drug: Fluticasone Furoate-Vilanterol   1 puff, Inhalation, Daily - RT      bumetanide 2 MG tablet  Commonly known as: BUMEX   2 mg, Oral, 2 Times Daily      insulin detemir 100 UNIT/ML injection  Commonly known as: LEVEMIR   45 Units, Subcutaneous, Daily   Start Date: July 16, 2021     insulin lispro 100 UNIT/ML injection  Commonly known as: HumaLOG   12 Units, Subcutaneous, 3 Times Daily Before Meals      labetalol 200 MG tablet  Commonly known as: NORMODYNE   400 mg, Oral, Every 12 Hours Scheduled      losartan 100 MG tablet  Commonly known as: COZAAR   100 mg, Oral, Every 24 Hours Scheduled   Start Date: July 16, 2021     potassium chloride 20 MEQ CR tablet  Commonly known as: K-DUR,KLOR-CON   40 mEq, Oral, Daily         Continue These Medications      Instructions Start Date   benzonatate 100 MG capsule  Commonly known as: TESSALON   100 mg, Oral, 3 Times Daily PRN      dicyclomine 10 MG capsule  Commonly known as: BENTYL   Take 1 to 2 capsules by mouth 3 (Three) Times a Day.      Janumet XR  MG tablet  Generic drug: SITagliptin-metFORMIN HCl ER   2 tablets, Oral, Daily      lansoprazole 30 MG capsule  Commonly known as: PREVACID   Take 1 capsule by mouth Daily before a meal       pravastatin 20 MG tablet  Commonly known as: PRAVACHOL   20 mg, Oral, Daily         Stop These Medications    hydrALAZINE 25 MG tablet  Commonly known as: APRESOLINE     metoprolol tartrate 50 MG tablet  Commonly known as: LOPRESSOR     Tresiba FlexTouch 100 UNIT/ML solution pen-injector injection  Generic drug: insulin degludec            Diet Instructions     Diet: Consistent Carbohydrate, Cardiac      Discharge Diet:  Consistent Carbohydrate  Cardiac           Activity Instructions     Activity as Tolerated          Future Appointments   Date Time Provider Department Center   2/28/2022  2:15 PM Chandler Langford PA-C [facility]     Your Scheduled Appointments    Feb 28, 2022  2:15 PM  Follow Up with Chandler Langford PA-C  [facility] CARDIOLOGY ([place]) [address] [facility] [place] [address] [phone]   -Bring photo ID, insurance card, and list of medications to appointment  -If testing was completed outside of [facility] then patient must bring images on a disc  -Copay will be collected at time of appointment  -Established patients should arrive 10 minutes prior to appointment          Additional Instructions for the Follow-ups that You Need to Schedule     Discharge Follow-up with PCP   As directed       Currently Documented PCP:    Rachel Savaeg APRN    PCP Phone Number:    [phone]     Follow Up Details: PCP in a week           Follow-up Information     Rachel Savage APRN .    Specialty: Nurse Practitioner  Why: PCP in a week  Contact information:  [address] [facility] [address]  [place] [address]  [phone]                    TEST  RESULTS PENDING AT DISCHARGE  Pending Labs     Order Current Status    Blood Culture - Blood, Arm, Left Preliminary result    Blood Culture - Blood, Arm, Right Preliminary result           CODE STATUS  Code Status and Medical Interventions:   Ordered at: 07/10/21 2016     Level Of Support Discussed With:    Patient     Code Status:     CPR     Medical Interventions (Level of Support Prior to Arrest):    Full       Tonya Padilla MD  07/15/21  13:42 EDT    Please note that this discharge summary required more than 30 minutes to complete.    Please send a copy of this dictation to the following providers:  Rachel Savage APRN      Electronically signed by Tonya Padilla MD at 07/15/21 8885

## 2021-07-16 NOTE — OUTREACH NOTE
Prep Survey      Responses   [facility] facility patient discharged from?  [place]   Is LACE score < 7 ?  No   Emergency Room discharge w/ pulse ox?  No   Eligibility  Readm Mgmt   Discharge diagnosis  CHF   Does the patient have one of the following disease processes/diagnoses(primary or secondary)?  CHF   Does the patient have Home health ordered?  No   Is there a DME ordered?  No   Comments regarding appointments  listed   Medication alerts for this patient  breo elipta, bumex, levemir, humalog, labetalol, cozaar, K-dur   Prep survey completed?  Yes          Juana Chahal RN

## 2021-07-19 ENCOUNTER — READMISSION MANAGEMENT (OUTPATIENT)
Dept: CALL CENTER | Facility: HOSPITAL | Age: 61
End: 2021-07-19

## 2021-07-19 NOTE — OUTREACH NOTE
CHF Week 1 Survey      Responses   [facility] patient discharged from?  [place]   Does the patient have one of the following disease processes/diagnoses(primary or secondary)?  CHF   CHF Week 1 attempt successful?  Yes   Call start time  1612   Call end time  1616   Discharge diagnosis  CHF   Is patient permission given to speak with other caregiver?  Yes   List who call center can speak with     Meds reviewed with patient/caregiver?  Yes   Is the patient having any side effects they believe may be caused by any medication additions or changes?  No   Does the patient have all medications ordered at discharge?  Yes   Is the patient taking all medications as directed (includes completed medication regime)?  Yes   Does the patient have a primary care provider?   Yes   Does the patient have an appointment with their PCP within 7 days of discharge?  Yes   Comments regarding PCP  7/20 f/u appt with PCP   Has the patient kept scheduled appointments due by today?  N/A   DME comments  No O2 in place.    Pulse Ox monitoring  Intermittent   Pulse Ox device source  Patient   O2 Sat comments  RA- 90-95%   O2 Sat: education provided  Sat levels   Psychosocial issues?  No   Comments  Pt reports SOA w/exertion. Denies edema. Glucose 200-300's at home since DC.    Did the patient receive a copy of their discharge instructions?  Yes   Nursing interventions  Reviewed instructions with patient   What is the patient's perception of their health status since discharge?  Improving   Nursing interventions  Nurse provided patient education   Is the patient weighing daily?  Yes   Does the patient have scales?  Yes   Daily weight interventions  Education provided on importance of daily weight   Is the patient able to teach back Heart Failure diet management?  Yes   Is the patient able to teach back Heart Failure Zones?  Yes   Is the patient able to teach back signs and symptoms of worsening condition? (i.e. weight gain, shortness  of air, etc.)  Yes   If the patient is a current smoker, are they able to teach back resources for cessation?  Not a smoker   Is the patient/caregiver able to teach back the hierarchy of who to call/visit for symptoms/problems? PCP, Specialist, Home health nurse, Urgent Care, ED, 911  Yes    CHF Week 1 call completed?  Yes   Wrap up additional comments  call was abruptly ended, called back but got generic VM          Jonna Giang, RN

## 2021-07-29 ENCOUNTER — READMISSION MANAGEMENT (OUTPATIENT)
Dept: CALL CENTER | Facility: HOSPITAL | Age: 61
End: 2021-07-29

## 2021-07-29 NOTE — OUTREACH NOTE
CHF Week 2 Survey      Responses   [facility] patient discharged from?  [place]   Does the patient have one of the following disease processes/diagnoses(primary or secondary)?  CHF   Week 2 attempt successful?  Yes   Call start time  1321   Call end time  1325   Discharge diagnosis  CHF   Meds reviewed with patient/caregiver?  Yes   Is the patient having any side effects they believe may be caused by any medication additions or changes?  No   Does the patient have all medications ordered at discharge?  Yes   Is the patient taking all medications as directed (includes completed medication regime)?  Yes   Does the patient have a primary care provider?   Yes   Does the patient have an appointment with their PCP within 7 days of discharge?  Yes   Has the patient kept scheduled appointments due by today?  Yes   Has home health visited the patient within 72 hours of discharge?  N/A   Pulse Ox monitoring  Intermittent   Pulse Ox device source  Patient   O2 Sat comments  90's on RA   O2 Sat: education provided  Sat levels, When to seek care   Psychosocial issues?  No   Did the patient receive a copy of their discharge instructions?  Yes   Nursing interventions  Reviewed instructions with patient   What is the patient's perception of their health status since discharge?  Improving   Nursing interventions  Nurse provided patient education   Is the patient weighing daily?  Yes   Does the patient have scales?  Yes   Is the patient able to teach back Heart Failure diet management?  Yes   Is the patient able to teach back Heart Failure Zones?  Yes   Is the patient able to teach back signs and symptoms of worsening condition? (i.e. weight gain, shortness of air, etc.)  Yes   Is the patient/caregiver able to teach back the hierarchy of who to call/visit for symptoms/problems? PCP, Specialist, Home health nurse, Urgent Care, ED, 911  Yes   Additional teach back comments  states has had wt gain this week of total of 4 lbs, plans  to watch weight closely tomorrow, 7/30, and if gained any more, plans to contact MD   CHF Week 2 call completed?  Yes          Lois Bravo RN

## 2021-08-09 ENCOUNTER — READMISSION MANAGEMENT (OUTPATIENT)
Dept: CALL CENTER | Facility: HOSPITAL | Age: 61
End: 2021-08-09

## 2021-08-09 NOTE — OUTREACH NOTE
CHF Week 3 Survey      Responses   [facility] patient discharged from?  [place]   Does the patient have one of the following disease processes/diagnoses(primary or secondary)?  CHF   Week 3 attempt successful?  Yes   Call start time  1635   Call end time  1638   Discharge diagnosis  CHF   Meds reviewed with patient/caregiver?  Yes   Is the patient having any side effects they believe may be caused by any medication additions or changes?  No   Does the patient have all medications ordered at discharge?  Yes   Is the patient taking all medications as directed (includes completed medication regime)?  Yes   Does the patient have a primary care provider?   Yes   Does the patient have an appointment with their PCP within 7 days of discharge?  Yes   Has the patient kept scheduled appointments due by today?  Yes   Has home health visited the patient within 72 hours of discharge?  N/A   Psychosocial issues?  No   Comments  only reporting weekly weight shifts, running 200's now, DM educators came to see her.   Did the patient receive a copy of their discharge instructions?  Yes   Nursing interventions  Reviewed instructions with patient   What is the patient's perception of their health status since discharge?  Improving   Nursing interventions  Nurse provided patient education   Is the patient weighing daily?  Yes   Does the patient have scales?  Yes   Daily weight interventions  Education provided on importance of daily weight   Is the patient able to teach back Heart Failure diet management?  Yes   Is the patient able to teach back Heart Failure Zones?  Yes   Is the patient able to teach back signs and symptoms of worsening condition? (i.e. weight gain, shortness of air, etc.)  Yes   If the patient is a current smoker, are they able to teach back resources for cessation?  Not a smoker   Is the patient/caregiver able to teach back the hierarchy of who to call/visit for symptoms/problems? PCP, Specialist, Home health  nurse, Urgent Care, ED, 911  Yes   Additional teach back comments  BP has been much better. Watching her diet, she is walking more. She is raising her grandbabies.  Pulm on the 16th.   CHF Week 3 call completed?  Yes   Wrap up additional comments  No issues today.          Mickie Ellis RN

## 2021-08-16 ENCOUNTER — OFFICE VISIT (OUTPATIENT)
Dept: PULMONOLOGY | Facility: CLINIC | Age: 61
End: 2021-08-16

## 2021-08-16 VITALS
TEMPERATURE: 98 F | HEART RATE: 91 BPM | HEIGHT: 62 IN | OXYGEN SATURATION: 94 % | BODY MASS INDEX: 49.5 KG/M2 | DIASTOLIC BLOOD PRESSURE: 68 MMHG | SYSTOLIC BLOOD PRESSURE: 118 MMHG | WEIGHT: 269 LBS

## 2021-08-16 DIAGNOSIS — R06.02 SOB (SHORTNESS OF BREATH): Primary | ICD-10-CM

## 2021-08-16 PROCEDURE — 99203 OFFICE O/P NEW LOW 30 MIN: CPT | Performed by: INTERNAL MEDICINE

## 2021-08-16 NOTE — PROGRESS NOTES
"Chief Complaint  SOB WITH ACTIVITY   Shoulder pain     Subjective    Gets sob with activity  Were you born premature?  no    Any Childhood infections? no      Breathing problems when you were a child? no    Any childhood allergies?    no             At what age did you begin smoking? no    Smoking marijuana? no    Any IV drugs? no    How many packs per day? 00    Lung Function Test? no  Chest X-Ray? no    CT Chest? no Allergy Test? no    Family hx of Lung disease or Lung Cancer?no    If FHx is posivitive for lung cancer, what is the relationship of the family member?     Any hospitalization in the last year? no    How far can you walk without getting short of breath? 200 feet on flat surface     Any coughing? no    Any wheezing? no    Acid Reflux? no    Do you snore? no    Daytime Fatigue? no      Have you been exposed to any chemicals at your job? no    What inhalers are you currently using?  breo      No fever no cough  Uses inhaler and is compliant   Never smoked but has been exposed to second hand smoke from  and now from daughters    Want PFT   Review of system is negative for chest pain, lightheadedness, dizziness, numbness in any area of the body, belly pain, nausea, vomiting, diarrhea,         Zeina Reed presents to [facility] PULMONARY AND CRITICAL CARE MEDICINE  History of Present Illness   Review of system is negative for shortness of breath, chest pain, lightheadedness, dizziness, numbness in any area of the body, belly pain, nausea, vomiting, diarrhea, or cough  All other ROS reviewed and are negative   Family history - mother had emphysema   Objective   Vital Signs:   /68 (BP Location: Right arm, Patient Position: Sitting)   Pulse 91   Temp 98 °F (36.7 °C)   Ht 157.5 cm (62\")   Wt 122 kg (269 lb)   SpO2 94%   BMI 49.20 kg/m²     Physical Exam   General- normal in appearance, not in any acute distress    HEENT- pupils equally reactive to light, normal in size, no " scleral icterus    Neck-supple    Respiratory-respirations normal-on auscultation no wheezing no crackles,     Cardiovascular-  Normal S1 and S2. No S3, S4 or murmurs. No JVD, no carotid bruit and no edema, pulses normal bilaterally     GI-nontender nondistended bowel sounds positive    CNS-nonfocal    Musculoskeletal -no edema    Psychiatric-mood good, good eye contact, alert awake oriented      Result Review :            Appointment Information    PACS Images     Radiology Images   Study Result    Narrative & Impression   CT CHEST PULMONARY EMBOLISM W CONTRAST     INDICATION:   Shortness of breath prior to arrival   TECHNIQUE:   CT angiogram of the chest with 100 cc of Isovue 300 IV contrast. 3-D reconstructions were obtained and reviewed.   Radiation dose reduction techniques included automated exposure control or exposure modulation based on body size. Count of known CT and  cardiac nuc med studies performed in previous 12 months: 0.      COMPARISON:   None available.     FINDINGS:   Chest images at mediastinal window show good filling of the pulmonary arteries. There are no pulmonary artery filling defects to suggest emboli. The CT angiographic images also show no evidence of emboli. No evidence of adenopathy or effusion. No acute  changes in the aorta.     Chest images at lung window show mild prominence of the interlobular septae bilaterally. This can be seen with fluid overload or mild congestive heart failure.     IMPRESSION:  Interlobular septal thickening consistent with pulmonary vascular congestion and mild heart failure. No suspicious infiltrates. No evidence of pulmonary embolism.     Signer Name: Hilario Acosta MD   Signed: 7/10/2021 7:21 PM   Workstation Name: [id number]    [facility] NONINVASIVE LAB  [address] [place] [id number] [phone]             Zeina Reed  Echo Complete w/Doppler and Color Flow  Order# [id number]  Reading  physician: Villa Chavarria MD Ordering physician: Celso Kirk MD Study date: 21   Patient Information    Patient Name   Zeina Reed MRN   [id number] Legal Sex   Female  (Age)   1960 (60 y.o.)   PACS Images     Show images for Adult Transthoracic Echo Complete w/ Color, Spectral and Contrast if necessary per protocol    Sedation Narrator Report    Sedation Narrator Report      Interpretation Summary    · Estimated left ventricular EF = 65% Left ventricular systolic function is normal.  · Left ventricular diastolic function was normal.  · Trace Mitral regurgitation  · Aortic and tricuspid valves are unremarkable  · No prev echo         CT Chest Pulmonary Embolism (Order: [id number]) - 7/10/2021  Result History    CT Chest Pulmonary Embolism (Order #[id number]) on 7/10/2021 - Order Result History Report   Reprint Order Requisition    CT Chest Pulmonary Embolism (Order #[id number]) on 7/10/21   Signed by    Signed Date/Time  Phone Pager   LIYAH WILSON 7/10/2021  7:21 PM [phone]  7:21 [phone]    Exam Information    Status Exam Begun  Exam Ended    Final [99] 7/10/2021 18:38 7/10/2021  6:55 PM [id number]  6:55 PM   Imaging Related Medications    Medication    iopamidol (ISOVUE-370) 76 % injection 100 mL (Completed)   Route:   Intravenous   Admin Amount:   100 mL   Volume:   100 mL   Last Admin Time:   07/10/21 1855   Number of Expected Doses:   1   Most Recent Administration:    User Action Time Recorded Time Dose Route Site Comment Action Reason   Matthieu Clementina 07/10/21 1855 07/10/21 1855 100 mL Intravenous   Given    Full Administration Report                     Assessment and Plan  Shortness of breath-likely multifactorial related to patient's body habitus and exposure to secondhand smoke.  Will get chest x-ray and PFTs to rule out any underlying lung disease.  Continue current inhalers.  Inhalational technique checked and is appropriate.    Physical deconditioning-patient is  likely short of breath due to physical deconditioning.  Exercise counseling done.    Obesity-patient's BMI is close to 50.  Exercise and diet counseling done.  Patient could be short of breath due to obesity as well.  She was made aware of it and encouraged to lose some weight.    Follow-up in 10 to 12 weeks.          There are no diagnoses linked to this encounter.    Follow Up   No follow-ups on file.  Patient was given instructions and counseling regarding her condition or for health maintenance advice. Please see specific information pulled into the AVS if appropriate.

## 2021-08-17 PROBLEM — E66.01 MORBID OBESITY WITH BMI OF 45.0-49.9, ADULT (HCC): Status: ACTIVE | Noted: 2021-08-17

## 2021-08-17 PROBLEM — R06.02 SOB (SHORTNESS OF BREATH): Status: ACTIVE | Noted: 2021-08-17

## 2021-08-17 PROBLEM — R53.81 PHYSICAL DECONDITIONING: Status: ACTIVE | Noted: 2021-08-17

## 2021-08-18 ENCOUNTER — HOSPITAL ENCOUNTER (OUTPATIENT)
Dept: GENERAL RADIOLOGY | Facility: HOSPITAL | Age: 61
Discharge: HOME | End: 2021-08-18
Admitting: NURSE PRACTITIONER

## 2021-08-18 ENCOUNTER — TRANSCRIBE ORDERS (OUTPATIENT)
Dept: ADMINISTRATIVE | Facility: HOSPITAL | Age: 61
End: 2021-08-18

## 2021-08-18 DIAGNOSIS — M54.50 LOW BACK PAIN, UNSPECIFIED BACK PAIN LATERALITY, UNSPECIFIED CHRONICITY, UNSPECIFIED WHETHER SCIATICA PRESENT: ICD-10-CM

## 2021-08-18 DIAGNOSIS — M54.50 LOW BACK PAIN, UNSPECIFIED BACK PAIN LATERALITY, UNSPECIFIED CHRONICITY, UNSPECIFIED WHETHER SCIATICA PRESENT: Primary | ICD-10-CM

## 2021-08-18 PROCEDURE — 72050 X-RAY EXAM NECK SPINE 4/5VWS: CPT | Performed by: RADIOLOGY

## 2021-08-18 PROCEDURE — 72050 X-RAY EXAM NECK SPINE 4/5VWS: CPT

## 2021-08-19 ENCOUNTER — PATIENT ROUNDING (BHMG ONLY) (OUTPATIENT)
Dept: PULMONOLOGY | Facility: CLINIC | Age: 61
End: 2021-08-19

## 2021-08-19 DIAGNOSIS — Z01.818 OTHER SPECIFIED PRE-OPERATIVE EXAMINATION: Primary | ICD-10-CM

## 2021-08-19 NOTE — PROGRESS NOTES
August 19, 2021    Hello, may I speak with Zeina Reed?    My name is Latonya Crain       I am  with [facility] PULMONARY AND CRITICAL CARE MEDICINE  [address] [facility] [address]  [facility] [id number]  [phone].    Before we get started may I verify your date of birth? 1960    I am calling to officially welcome you to our practice and ask about your recent visit. Is this a good time to talk? yes    Tell me about your visit with us. What things went well?  Didn't have to wait long, and enjoyed the staff and provider.       We're always looking for ways to make our patients' experiences even better. Do you have recommendations on ways we may improve?  no    Overall were you satisfied with your first visit to our practice? yes       I appreciate you taking the time to speak with me today. Is there anything else I can do for you? no      Thank you, and have a great day.

## 2021-08-20 RX ORDER — PEN NEEDLE, DIABETIC 29 G X1/2"
NEEDLE, DISPOSABLE MISCELLANEOUS
Qty: 120 EACH | Refills: 8 | OUTPATIENT
Start: 2021-08-20

## 2021-09-03 ENCOUNTER — HOSPITAL ENCOUNTER (OUTPATIENT)
Dept: RESPIRATORY THERAPY | Facility: HOSPITAL | Age: 61
Discharge: HOME | End: 2021-09-03

## 2021-09-03 ENCOUNTER — LAB (OUTPATIENT)
Dept: LAB | Facility: HOSPITAL | Age: 61
End: 2021-09-03

## 2021-09-03 VITALS — OXYGEN SATURATION: 96 % | RESPIRATION RATE: 20 BRPM | HEART RATE: 81 BPM

## 2021-09-03 DIAGNOSIS — R06.02 SOB (SHORTNESS OF BREATH): ICD-10-CM

## 2021-09-03 DIAGNOSIS — Z01.818 OTHER SPECIFIED PRE-OPERATIVE EXAMINATION: ICD-10-CM

## 2021-09-03 LAB
FLUAV SUBTYP SPEC NAA+PROBE: NOT DETECTED
FLUBV RNA ISLT QL NAA+PROBE: NOT DETECTED
SARS-COV-2 RNA PNL SPEC NAA+PROBE: NOT DETECTED

## 2021-09-03 PROCEDURE — 94060 EVALUATION OF WHEEZING: CPT | Performed by: INTERNAL MEDICINE

## 2021-09-03 PROCEDURE — 94729 DIFFUSING CAPACITY: CPT

## 2021-09-03 PROCEDURE — 94060 EVALUATION OF WHEEZING: CPT

## 2021-09-03 PROCEDURE — 94726 PLETHYSMOGRAPHY LUNG VOLUMES: CPT

## 2021-09-03 PROCEDURE — 94729 DIFFUSING CAPACITY: CPT | Performed by: INTERNAL MEDICINE

## 2021-09-03 PROCEDURE — 94760 N-INVAS EAR/PLS OXIMETRY 1: CPT

## 2021-09-03 PROCEDURE — C9803 HOPD COVID-19 SPEC COLLECT: HCPCS

## 2021-09-03 PROCEDURE — 94799 UNLISTED PULMONARY SVC/PX: CPT

## 2021-09-03 PROCEDURE — 94640 AIRWAY INHALATION TREATMENT: CPT

## 2021-09-03 PROCEDURE — 87636 SARSCOV2 & INF A&B AMP PRB: CPT | Performed by: INTERNAL MEDICINE

## 2021-09-03 PROCEDURE — 94726 PLETHYSMOGRAPHY LUNG VOLUMES: CPT | Performed by: INTERNAL MEDICINE

## 2021-09-03 RX ORDER — ALBUTEROL SULFATE 2.5 MG/3ML
2.5 SOLUTION RESPIRATORY (INHALATION) ONCE
Status: COMPLETED | OUTPATIENT
Start: 2021-09-03 | End: 2021-09-03

## 2021-09-03 RX ADMIN — ALBUTEROL SULFATE 2.5 MG: 2.5 SOLUTION RESPIRATORY (INHALATION) at 15:40

## 2021-10-21 ENCOUNTER — OFFICE VISIT (OUTPATIENT)
Dept: CARDIOLOGY | Facility: CLINIC | Age: 61
End: 2021-10-21

## 2021-10-21 ENCOUNTER — TELEPHONE (OUTPATIENT)
Dept: CARDIOLOGY | Facility: CLINIC | Age: 61
End: 2021-10-21

## 2021-10-21 VITALS
SYSTOLIC BLOOD PRESSURE: 142 MMHG | RESPIRATION RATE: 16 BRPM | HEIGHT: 62 IN | HEART RATE: 74 BPM | WEIGHT: 273.8 LBS | TEMPERATURE: 98 F | BODY MASS INDEX: 50.38 KG/M2 | DIASTOLIC BLOOD PRESSURE: 80 MMHG

## 2021-10-21 DIAGNOSIS — I10 ESSENTIAL HYPERTENSION: Primary | ICD-10-CM

## 2021-10-21 PROCEDURE — 99214 OFFICE O/P EST MOD 30 MIN: CPT | Performed by: PHYSICIAN ASSISTANT

## 2021-10-21 RX ORDER — ASPIRIN 81 MG/1
81 TABLET ORAL DAILY
COMMUNITY

## 2021-10-21 RX ORDER — POTASSIUM CHLORIDE 1500 MG/1
TABLET, FILM COATED, EXTENDED RELEASE ORAL
Qty: 90 TABLET | Refills: 3 | Status: SHIPPED | OUTPATIENT
Start: 2021-10-21 | End: 2022-08-19 | Stop reason: SDUPTHER

## 2021-10-21 NOTE — PROGRESS NOTES
Rachel Savage APRN  Zeina Reed  1960  10/21/2021    Patient Active Problem List   Diagnosis   • Palpitations   • Essential hypertension   • Dyslipidemia   • Dizziness   • Acute exacerbation of CHF (congestive heart failure) ([facility])   • SOB (shortness of breath)   • Morbid obesity with BMI of 45.0-49.9, adult ([facility])   • Physical deconditioning       Dear Rachel Savage APRN:    Subjective     History of Present Illness:    Chief Complaint   Patient presents with   • Congestive Heart Failure     8 mos follow   • Chest Pain     pressure   • Med Management     list provided   • Edema     ELSA Reed is a pleasant 61 y.o. female with a past medical history significant for no known history of coronary artery disease or CVA nor history of tobacco abuse.  She does have history of essential hypertension, dyslipidemia, and history of palpitations.  She does have some family history of premature coronary artery disease with her father having a fatal MI in his early 50s.  Patient comes in for routine cardiology follow-up.     Ms. Pastrana reports that she has been having chest pains essentially on a daily basis particularly if she overexerts herself or walks too fast.  She reports that this pain can occur in the center of her chest as well as in her upper back that does get better when she rests after a couple minutes.  She reports she has essentially trained resolved to walk slower or limited amounts to prevent this pain from coming on.  She does report chronic 3 pillow orthopnea that has been present for over a year.  She does report occasionally she does wake up short of breath particular when she is laying on her back.    Allergies   Allergen Reactions   • Augmentin [Amoxicillin-Pot Clavulanate] Diarrhea   • Metformin And Related Diarrhea   :      Current Outpatient Medications:   •  aspirin 81 MG EC tablet, Take 81 mg by mouth Daily., Disp: , Rfl:   •  bumetanide (BUMEX) 2 MG tablet, Take 1 tablet  by mouth 2 (two) times a day., Disp: 60 tablet, Rfl: 3  •  dicyclomine (BENTYL) 10 MG capsule, Take 1 to 2 capsules by mouth 3 (Three) Times a Day., Disp: 60 capsule, Rfl: 1  •  ibuprofen (ADVIL,MOTRIN) 800 MG tablet, Take 1 tablet (800 mg) by mouth 3 times per day with food or milk as needed for pain, Disp: 90 tablet, Rfl: 1  •  insulin detemir (Levemir) 100 UNIT/ML injection, inject 45 units subcutaneously  daily, Disp: 50 mL, Rfl: 3  •  Insulin Lispro, 1 Unit Dial, (HUMALOG) 100 UNIT/ML solution pen-injector, Inject 12 Units under the skin into the appropriate area as directed 3 (Three) Times a Day With Meals., Disp: 15 mL, Rfl: 0  •  labetalol (NORMODYNE) 200 MG tablet, Take 2 tablets (400 mg) by mouth 2 times per day, Disp: 120 tablet, Rfl: 3  •  lansoprazole (PREVACID) 30 MG capsule, Take 1 capsule (30 mg) by mouth daily before a meal, Disp: 90 capsule, Rfl: 2  •  losartan (COZAAR) 100 MG tablet, Take 1 tablet (100 mg) by mouth daily, Disp: 30 tablet, Rfl: 3  •  potassium chloride ER (K-TAB) 20 MEQ tablet controlled-release ER tablet, Take 1 tablet (20 mEq) by mouth daily with food, Disp: 90 tablet, Rfl: 3  •  pravastatin (PRAVACHOL) 20 MG tablet, Take 1 tablet by mouth Daily., Disp: 90 tablet, Rfl: 3  •  SITagliptin-metFORMIN HCl ER (Janumet XR)  MG tablet, Take 2 tablets by mouth Daily., Disp: 180 tablet, Rfl: 2  •  Fluticasone Furoate-Vilanterol (Breo Ellipta) 200-25 MCG/INH inhaler, INHALE 1 PUFF BY MOUTH DAILY, Disp: 60 each, Rfl: 3  •  insulin detemir (LEVEMIR) 100 UNIT/ML injection, Inject 45 Units under the skin into the appropriate area as directed Daily for 30 days., Disp: 20 mL, Rfl: 0  •  insulin lispro (HumaLOG) 100 UNIT/ML injection, Inject 12 Units under the skin into the appropriate area as directed 3 (Three) Times a Day Before Meals for 30 days., Disp: 10 mL, Rfl: 0  •  Insulin Pen Needle (B-D UF III MINI PEN NEEDLES) 31G X 5 MM misc, Use to inject insulin 3 times a day, Disp: 100 each,  "Rfl: 0  •  Insulin Syringe-Needle U-100 (BD Insulin Syringe U/F) 30G X 1/2\" 0.5 ML misc, Use to inject insulin under the skin four times a day as directed, Disp: 120 each, Rfl: 8    The following portions of the patient's history were reviewed and updated as appropriate: allergies, current medications, past family history, past medical history, past social history, past surgical history and problem list.    Social History     Tobacco Use   • Smoking status: Never Smoker   • Smokeless tobacco: Never Used   Vaping Use   • Vaping Use: Never used   Substance Use Topics   • Alcohol use: No   • Drug use: No         Objective   Vitals:    10/21/21 1502   BP: 142/80   Pulse: 74   Resp: 16   Temp: 98 °F (36.7 °C)   Weight: 124 kg (273 lb 12.8 oz)   Height: 157.5 cm (62\")     Body mass index is 50.08 kg/m².    Constitutional:       General: Not in acute distress.     Appearance: Healthy appearance. Well-developed and not in distress. Not diaphoretic.   Eyes:      Conjunctiva/sclera: Conjunctivae normal.      Pupils: Pupils are equal, round, and reactive to light.   HENT:      Head: Normocephalic and atraumatic.   Neck:      Vascular: No carotid bruit or JVD.   Pulmonary:      Effort: Pulmonary effort is normal. No respiratory distress.      Breath sounds: Normal breath sounds.   Cardiovascular:      Normal rate. Regular rhythm.   Skin:     General: Skin is cool.      Comments: Skin graft on left LLE   Neurological:      Mental Status: Alert, oriented to person, place, and time and oriented to person, place and time.         Lab Results   Component Value Date     (L) 07/15/2021    K 3.8 07/15/2021    CL 94 (L) 07/15/2021    CO2 28.4 07/15/2021    BUN 22 07/15/2021    CREATININE 0.99 07/15/2021    GLUCOSE 340 (H) 07/15/2021    CALCIUM 9.0 07/15/2021    AST 42 (H) 07/11/2021    ALT 53 (H) 07/11/2021    ALKPHOS 136 (H) 07/11/2021    LABIL2 1.4 (L) 02/12/2015     No results found for: CKTOTAL  Lab Results   Component Value " Date    WBC 9.00 07/15/2021    HGB 13.0 07/15/2021    HCT 39.5 07/15/2021     07/15/2021     No results found for: INR  Lab Results   Component Value Date    MG 2.1 03/17/2015     Lab Results   Component Value Date    TSH 2.005 04/18/2017    CHLPL 205 (H) 02/17/2016    TRIG 266 (H) 04/18/2017    HDL 41 (L) 04/18/2017    LDL 53 04/18/2017      No results found for: BNP    During this visit the following were done:  Labs Reviewed []    Labs Ordered []    Radiology Reports Reviewed []    Radiology Ordered []    PCP Records Reviewed []    Referring Provider Records Reviewed []    ER Records Reviewed []    Hospital Records Reviewed []    History Obtained From Family []    Radiology Images Reviewed []    Other Reviewed []    Records Requested []       Procedures    Assessment/Plan    Diagnosis Plan   1. Essential hypertension  Comprehensive Metabolic Panel            Recommendations:  1. Precordial pain  1. We will evaluate further with Lexiscan sestamibi study.  2. For now we will continue aspirin, Bumex, losartan, pravastatin, and labetalol      No follow-ups on file.    As always, I appreciate very much the opportunity to participate in the cardiovascular care of your patients.      With Best Regards,    Chandler Langford PA-C

## 2021-10-21 NOTE — TELEPHONE ENCOUNTER
----- Message from Chandler Langford PA-C sent at 10/21/2021  3:16 PM EDT -----  Can we get left heart cath from [facility].      Patient sates this was this well over 10 year ago but could be even longer     Tried to call medical records and could not get a hold of a person.  Will try again tomorrow

## 2021-10-25 ENCOUNTER — LAB (OUTPATIENT)
Dept: LAB | Facility: HOSPITAL | Age: 61
End: 2021-10-25

## 2021-10-25 DIAGNOSIS — I10 ESSENTIAL HYPERTENSION: ICD-10-CM

## 2021-10-25 LAB
ALBUMIN SERPL-MCNC: 4.19 G/DL (ref 3.5–5.2)
ALBUMIN/GLOB SERPL: 1.5 G/DL
ALP SERPL-CCNC: 116 U/L (ref 39–117)
ALT SERPL W P-5'-P-CCNC: 15 U/L (ref 1–33)
ANION GAP SERPL CALCULATED.3IONS-SCNC: 11.4 MMOL/L (ref 5–15)
AST SERPL-CCNC: 17 U/L (ref 1–32)
BILIRUB SERPL-MCNC: 1.6 MG/DL (ref 0–1.2)
BUN SERPL-MCNC: 15 MG/DL (ref 8–23)
BUN/CREAT SERPL: 16.7 (ref 7–25)
CALCIUM SPEC-SCNC: 8.6 MG/DL (ref 8.6–10.5)
CHLORIDE SERPL-SCNC: 98 MMOL/L (ref 98–107)
CO2 SERPL-SCNC: 31.6 MMOL/L (ref 22–29)
CREAT SERPL-MCNC: 0.9 MG/DL (ref 0.57–1)
GFR SERPL CREATININE-BSD FRML MDRD: 64 ML/MIN/1.73
GLOBULIN UR ELPH-MCNC: 2.8 GM/DL
GLUCOSE SERPL-MCNC: 167 MG/DL (ref 65–99)
POTASSIUM SERPL-SCNC: 3 MMOL/L (ref 3.5–5.2)
PROT SERPL-MCNC: 7 G/DL (ref 6–8.5)
SODIUM SERPL-SCNC: 141 MMOL/L (ref 136–145)

## 2021-10-25 PROCEDURE — 80053 COMPREHEN METABOLIC PANEL: CPT

## 2021-10-25 PROCEDURE — 36415 COLL VENOUS BLD VENIPUNCTURE: CPT

## 2021-10-26 DIAGNOSIS — I10 ESSENTIAL HYPERTENSION: Primary | ICD-10-CM

## 2021-11-03 ENCOUNTER — LAB (OUTPATIENT)
Dept: LAB | Facility: HOSPITAL | Age: 61
End: 2021-11-03

## 2021-11-03 DIAGNOSIS — I10 ESSENTIAL HYPERTENSION: ICD-10-CM

## 2021-11-03 LAB
ANION GAP SERPL CALCULATED.3IONS-SCNC: 12.9 MMOL/L (ref 5–15)
BUN SERPL-MCNC: 13 MG/DL (ref 8–23)
BUN/CREAT SERPL: 17.3 (ref 7–25)
CALCIUM SPEC-SCNC: 9.2 MG/DL (ref 8.6–10.5)
CHLORIDE SERPL-SCNC: 99 MMOL/L (ref 98–107)
CO2 SERPL-SCNC: 29.1 MMOL/L (ref 22–29)
CREAT SERPL-MCNC: 0.75 MG/DL (ref 0.57–1)
GFR SERPL CREATININE-BSD FRML MDRD: 79 ML/MIN/1.73
GLUCOSE SERPL-MCNC: 166 MG/DL (ref 65–99)
POTASSIUM SERPL-SCNC: 3.9 MMOL/L (ref 3.5–5.2)
SODIUM SERPL-SCNC: 141 MMOL/L (ref 136–145)

## 2021-11-03 PROCEDURE — 80048 BASIC METABOLIC PNL TOTAL CA: CPT

## 2021-11-03 PROCEDURE — 36415 COLL VENOUS BLD VENIPUNCTURE: CPT

## 2021-11-04 ENCOUNTER — APPOINTMENT (OUTPATIENT)
Dept: NUCLEAR MEDICINE | Facility: HOSPITAL | Age: 61
End: 2021-11-04

## 2021-11-04 ENCOUNTER — APPOINTMENT (OUTPATIENT)
Dept: CARDIOLOGY | Facility: HOSPITAL | Age: 61
End: 2021-11-04

## 2021-11-05 ENCOUNTER — HOSPITAL ENCOUNTER (OUTPATIENT)
Dept: CARDIOLOGY | Facility: HOSPITAL | Age: 61
Discharge: HOME | End: 2021-11-05

## 2021-11-05 ENCOUNTER — HOSPITAL ENCOUNTER (OUTPATIENT)
Dept: NUCLEAR MEDICINE | Facility: HOSPITAL | Age: 61
Discharge: HOME | End: 2021-11-05

## 2021-11-05 DIAGNOSIS — I10 ESSENTIAL HYPERTENSION: ICD-10-CM

## 2021-11-05 LAB
BH CV NUCLEAR PRIOR STUDY: 3
BH CV REST NUCLEAR ISOTOPE DOSE: 9.6 MCI
BH CV STRESS BP STAGE 1: NORMAL
BH CV STRESS BP STAGE 2: NORMAL
BH CV STRESS COMMENTS STAGE 1: NORMAL
BH CV STRESS COMMENTS STAGE 2: NORMAL
BH CV STRESS DOSE REGADENOSON STAGE 1: 0.4
BH CV STRESS DURATION MIN STAGE 1: 0
BH CV STRESS DURATION MIN STAGE 2: 4
BH CV STRESS DURATION SEC STAGE 1: 10
BH CV STRESS DURATION SEC STAGE 2: 0
BH CV STRESS HR STAGE 1: 93
BH CV STRESS HR STAGE 2: 90
BH CV STRESS NUCLEAR ISOTOPE DOSE: 29.2 MCI
BH CV STRESS PROTOCOL 1: NORMAL
BH CV STRESS RECOVERY BP: NORMAL MMHG
BH CV STRESS RECOVERY HR: 82 BPM
BH CV STRESS STAGE 1: 1
BH CV STRESS STAGE 2: 2
MAXIMAL PREDICTED HEART RATE: 159 BPM
PERCENT MAX PREDICTED HR: 58.49 %
STRESS BASELINE BP: NORMAL MMHG
STRESS BASELINE HR: 78 BPM
STRESS PERCENT HR: 69 %
STRESS POST PEAK BP: NORMAL MMHG
STRESS POST PEAK HR: 93 BPM
STRESS TARGET HR: 135 BPM

## 2021-11-05 PROCEDURE — 78452 HT MUSCLE IMAGE SPECT MULT: CPT

## 2021-11-05 PROCEDURE — 78452 HT MUSCLE IMAGE SPECT MULT: CPT | Performed by: INTERNAL MEDICINE

## 2021-11-05 PROCEDURE — 93017 CV STRESS TEST TRACING ONLY: CPT

## 2021-11-05 PROCEDURE — 0 TECHNETIUM SESTAMIBI: Performed by: PHYSICIAN ASSISTANT

## 2021-11-05 PROCEDURE — A9500 TC99M SESTAMIBI: HCPCS | Performed by: PHYSICIAN ASSISTANT

## 2021-11-05 PROCEDURE — 93018 CV STRESS TEST I&R ONLY: CPT | Performed by: INTERNAL MEDICINE

## 2021-11-05 PROCEDURE — 25010000002 REGADENOSON 0.4 MG/5ML SOLUTION: Performed by: PHYSICIAN ASSISTANT

## 2021-11-05 RX ADMIN — REGADENOSON 0.4 MG: 0.08 INJECTION, SOLUTION INTRAVENOUS at 12:55

## 2021-11-05 RX ADMIN — TECHNETIUM TC 99M SESTAMIBI 1 DOSE: 1 INJECTION INTRAVENOUS at 12:55

## 2021-11-05 RX ADMIN — TECHNETIUM TC 99M SESTAMIBI 1 DOSE: 1 INJECTION INTRAVENOUS at 11:26

## 2021-12-01 ENCOUNTER — OFFICE VISIT (OUTPATIENT)
Dept: CARDIOLOGY | Facility: CLINIC | Age: 61
End: 2021-12-01

## 2021-12-01 VITALS
OXYGEN SATURATION: 98 % | WEIGHT: 268 LBS | BODY MASS INDEX: 49.32 KG/M2 | SYSTOLIC BLOOD PRESSURE: 117 MMHG | HEART RATE: 80 BPM | TEMPERATURE: 98 F | HEIGHT: 62 IN | DIASTOLIC BLOOD PRESSURE: 72 MMHG

## 2021-12-01 DIAGNOSIS — I10 ESSENTIAL HYPERTENSION: Primary | ICD-10-CM

## 2021-12-01 DIAGNOSIS — E78.5 DYSLIPIDEMIA: ICD-10-CM

## 2021-12-01 PROCEDURE — 99213 OFFICE O/P EST LOW 20 MIN: CPT | Performed by: PHYSICIAN ASSISTANT

## 2021-12-01 NOTE — PROGRESS NOTES
Rachel Savage APRN  Zeina Reed  1960  12/01/2021    Patient Active Problem List   Diagnosis   • Palpitations   • Essential hypertension   • Dyslipidemia   • Dizziness   • Acute exacerbation of CHF (congestive heart failure) ([facility])   • SOB (shortness of breath)   • Morbid obesity with BMI of 45.0-49.9, adult ([facility])   • Physical deconditioning       Dear Rachel Savage APRN:    Subjective     History of Present Illness:    Chief Complaint   Patient presents with   • Med Management     verbal   • Results     stress and labs.    • Shortness of Breath   • Edema   • Palpitations       Zeina Reed is a pleasant 61 y.o. female with a past medical history significant for no known history of coronary artery disease or CVA nor history of tobacco abuse.  She does have history of essential hypertension, dyslipidemia, and history of palpitations.  She does have some family history of premature coronary artery disease with her father having a fatal MI in his early 50s.  Patient comes in for routine cardiology follow-up.     Zeina did have stress test performed which did show normal color perfusion with no evidence of ischemia.  She does deny any further symptoms since she was last seen she reports her chest pains have resolved and denies any worsening shortness of breath from her baseline.  She also denies any palpitations, dizziness, syncope, or presyncope.  She does report they are trying to get a CPAP they are struggling with the cost due to a high deductible but they do tell me they are going to get 1 likely after the holidays as this is a difficult time financially for them.          Stress test on 11/5/2021  Interpretation Summary  · A pharmacological stress test was performed using regadenoson without low-level exercise.  · Findings consistent with a normal ECG stress test.  · Myocardial perfusion imaging indicates a normal myocardial perfusion study with no evidence of ischemia.  · Normal LV cavity  "size. Normal LV wall motion noted.  · Left ventricular ejection fraction is hyperdynamic (Calculated EF > 70%). .  · Impressions are consistent with a low risk study.            Allergies   Allergen Reactions   • Augmentin [Amoxicillin-Pot Clavulanate] Diarrhea   • Metformin And Related Diarrhea   :      Current Outpatient Medications:   •  aspirin 81 MG EC tablet, Take 81 mg by mouth Daily., Disp: , Rfl:   •  bumetanide (BUMEX) 2 MG tablet, Take 1 tablet by mouth 2 (two) times a day., Disp: 60 tablet, Rfl: 3  •  dicyclomine (BENTYL) 10 MG capsule, Take 1 to 2 capsules by mouth 3 (Three) Times a Day., Disp: 60 capsule, Rfl: 1  •  Fluticasone Furoate-Vilanterol (Breo Ellipta) 200-25 MCG/INH inhaler, INHALE 1 PUFF BY MOUTH DAILY, Disp: 60 each, Rfl: 3  •  ibuprofen (ADVIL,MOTRIN) 800 MG tablet, Take 1 tablet (800 mg) by mouth 3 times per day with food or milk as needed for pain, Disp: 90 tablet, Rfl: 1  •  insulin detemir (Levemir) 100 UNIT/ML injection, inject 45 units subcutaneously  daily, Disp: 50 mL, Rfl: 3  •  Insulin Lispro, 1 Unit Dial, (HUMALOG) 100 UNIT/ML solution pen-injector, Inject 12 units subcutaneously 3 times a day with meals., Disp: 15 mL, Rfl: 0  •  Insulin Pen Needle (B-D UF III MINI PEN NEEDLES) 31G X 5 MM misc, Use to inject insulin 3 times a day, Disp: 100 each, Rfl: 0  •  Insulin Syringe-Needle U-100 (BD Insulin Syringe U/F) 30G X 1/2\" 0.5 ML misc, Use to inject insulin under the skin four times a day as directed, Disp: 120 each, Rfl: 8  •  labetalol (NORMODYNE) 200 MG tablet, Take 2 tablets (400 mg) by mouth 2 times per day, Disp: 120 tablet, Rfl: 3  •  lansoprazole (PREVACID) 30 MG capsule, Take 1 capsule (30 mg) by mouth daily before a meal, Disp: 90 capsule, Rfl: 2  •  lansoprazole (PREVACID) 30 MG capsule, Take 1 capsule (30 mg) by mouth daily before a meal, Disp: 90 capsule, Rfl: 2  •  losartan (COZAAR) 100 MG tablet, Take 1 tablet (100 mg) by mouth daily, Disp: 30 tablet, Rfl: 3  •  " "methocarbamol (ROBAXIN) 500 MG tablet, Take 1 tablet by mouth 2 (Two) Times a Day As Needed., Disp: 60 tablet, Rfl: 3  •  potassium chloride ER (K-TAB) 20 MEQ tablet controlled-release ER tablet, Take 1 tablet (20 mEq) by mouth daily with food, Disp: 90 tablet, Rfl: 3  •  pravastatin (PRAVACHOL) 20 MG tablet, Take 1 tablet by mouth Daily., Disp: 90 tablet, Rfl: 3  •  SITagliptin-metFORMIN HCl ER (Janumet XR)  MG tablet, Take 2 tablets by mouth Daily., Disp: 180 tablet, Rfl: 2  •  insulin detemir (LEVEMIR) 100 UNIT/ML injection, Inject 45 Units under the skin into the appropriate area as directed Daily for 30 days., Disp: 20 mL, Rfl: 0  •  insulin lispro (HumaLOG) 100 UNIT/ML injection, Inject 12 Units under the skin into the appropriate area as directed 3 (Three) Times a Day Before Meals for 30 days., Disp: 10 mL, Rfl: 0    The following portions of the patient's history were reviewed and updated as appropriate: allergies, current medications, past family history, past medical history, past social history, past surgical history and problem list.    Social History     Tobacco Use   • Smoking status: Never Smoker   • Smokeless tobacco: Never Used   Vaping Use   • Vaping Use: Never used   Substance Use Topics   • Alcohol use: No   • Drug use: No         Objective   Vitals:    12/01/21 1432   BP: 117/72   BP Location: Left arm   Patient Position: Sitting   Cuff Size: Adult   Pulse: 80   Temp: 98 °F (36.7 °C)   TempSrc: Infrared   SpO2: 98%   Weight: 122 kg (268 lb)   Height: 157.5 cm (62\")     Body mass index is 49.02 kg/m².    Constitutional:       General: Not in acute distress.     Appearance: Healthy appearance. Well-developed and not in distress. Not diaphoretic.   Eyes:      Conjunctiva/sclera: Conjunctivae normal.      Pupils: Pupils are equal, round, and reactive to light.   HENT:      Head: Normocephalic and atraumatic.   Neck:      Vascular: No carotid bruit or JVD.   Pulmonary:      Effort: Pulmonary " effort is normal. No respiratory distress.      Breath sounds: Normal breath sounds.   Cardiovascular:      Normal rate. Regular rhythm.   Edema:     Pretibial: bilateral 1+ edema of the pretibial area.     Ankle: bilateral 1+ edema of the ankle.     Feet: bilateral 1+ edema of the feet.  Skin:     General: Skin is cool.   Neurological:      Mental Status: Alert, oriented to person, place, and time and oriented to person, place and time.         Lab Results   Component Value Date     11/03/2021    K 3.9 11/03/2021    CL 99 11/03/2021    CO2 29.1 (H) 11/03/2021    BUN 13 11/03/2021    CREATININE 0.75 11/03/2021    GLUCOSE 166 (H) 11/03/2021    CALCIUM 9.2 11/03/2021    AST 17 10/25/2021    ALT 15 10/25/2021    ALKPHOS 116 10/25/2021    LABIL2 1.4 (L) 02/12/2015     No results found for: CKTOTAL  Lab Results   Component Value Date    WBC 9.00 07/15/2021    HGB 13.0 07/15/2021    HCT 39.5 07/15/2021     07/15/2021     No results found for: INR  Lab Results   Component Value Date    MG 2.1 03/17/2015     Lab Results   Component Value Date    TSH 2.005 04/18/2017    CHLPL 205 (H) 02/17/2016    TRIG 266 (H) 04/18/2017    HDL 41 (L) 04/18/2017    LDL 53 04/18/2017      No results found for: BNP    During this visit the following were done:  Labs Reviewed []    Labs Ordered []    Radiology Reports Reviewed []    Radiology Ordered []    PCP Records Reviewed []    Referring Provider Records Reviewed []    ER Records Reviewed []    Hospital Records Reviewed []    History Obtained From Family []    Radiology Images Reviewed []    Other Reviewed []    Records Requested []       Procedures    Assessment/Plan    Diagnosis Plan   1. Essential hypertension  Basic Metabolic Panel   2. Dyslipidemia              Recommendations:  1. Precordial pain  1. Discussed results of stress test which did not reveal any signs of ischemia.  2. Print actually reports that her symptoms have resolved denies any further episodes of chest  pains we will continue with GDMT for now with aspirin, labetalol, losartan, and pravastatin.  2. Essential hypertension  1. Currently well controlled we will continue current regimen I would like to have a BMP in roughly 2 to 3 months.      No follow-ups on file.    As always, I appreciate very much the opportunity to participate in the cardiovascular care of your patients.      With Best Regards,    Chandler Langford PA-C

## 2021-12-17 ENCOUNTER — LAB (OUTPATIENT)
Dept: LAB | Facility: HOSPITAL | Age: 61
End: 2021-12-17

## 2021-12-17 DIAGNOSIS — I10 ESSENTIAL HYPERTENSION: ICD-10-CM

## 2021-12-17 PROCEDURE — 80061 LIPID PANEL: CPT

## 2021-12-17 PROCEDURE — 85025 COMPLETE CBC W/AUTO DIFF WBC: CPT

## 2021-12-17 PROCEDURE — 36415 COLL VENOUS BLD VENIPUNCTURE: CPT

## 2021-12-17 PROCEDURE — 80053 COMPREHEN METABOLIC PANEL: CPT

## 2021-12-18 LAB
ALBUMIN SERPL-MCNC: 4.1 G/DL (ref 3.5–5.2)
ALBUMIN/GLOB SERPL: 1.4 G/DL
ALP SERPL-CCNC: 120 U/L (ref 39–117)
ALT SERPL W P-5'-P-CCNC: 29 U/L (ref 1–33)
ANION GAP SERPL CALCULATED.3IONS-SCNC: 10.8 MMOL/L (ref 5–15)
AST SERPL-CCNC: 16 U/L (ref 1–32)
BASOPHILS # BLD AUTO: 0.06 10*3/MM3 (ref 0–0.2)
BASOPHILS NFR BLD AUTO: 0.6 % (ref 0–1.5)
BILIRUB SERPL-MCNC: 1.1 MG/DL (ref 0–1.2)
BUN SERPL-MCNC: 17 MG/DL (ref 8–23)
BUN/CREAT SERPL: 20.7 (ref 7–25)
CALCIUM SPEC-SCNC: 9.9 MG/DL (ref 8.6–10.5)
CHLORIDE SERPL-SCNC: 102 MMOL/L (ref 98–107)
CHOLEST SERPL-MCNC: 230 MG/DL (ref 0–200)
CO2 SERPL-SCNC: 31.2 MMOL/L (ref 22–29)
CREAT SERPL-MCNC: 0.82 MG/DL (ref 0.57–1)
DEPRECATED RDW RBC AUTO: 43.8 FL (ref 37–54)
EOSINOPHIL # BLD AUTO: 0.22 10*3/MM3 (ref 0–0.4)
EOSINOPHIL NFR BLD AUTO: 2.2 % (ref 0.3–6.2)
ERYTHROCYTE [DISTWIDTH] IN BLOOD BY AUTOMATED COUNT: 13.1 % (ref 12.3–15.4)
GFR SERPL CREATININE-BSD FRML MDRD: 71 ML/MIN/1.73
GLOBULIN UR ELPH-MCNC: 2.9 GM/DL
GLUCOSE SERPL-MCNC: 111 MG/DL (ref 65–99)
HCT VFR BLD AUTO: 36.4 % (ref 34–46.6)
HDLC SERPL-MCNC: 56 MG/DL (ref 40–60)
HGB BLD-MCNC: 12.3 G/DL (ref 12–15.9)
IMM GRANULOCYTES # BLD AUTO: 0.05 10*3/MM3 (ref 0–0.05)
IMM GRANULOCYTES NFR BLD AUTO: 0.5 % (ref 0–0.5)
LDLC SERPL CALC-MCNC: 133 MG/DL (ref 0–100)
LDLC/HDLC SERPL: 2.28 {RATIO}
LYMPHOCYTES # BLD AUTO: 1.95 10*3/MM3 (ref 0.7–3.1)
LYMPHOCYTES NFR BLD AUTO: 19.5 % (ref 19.6–45.3)
MCH RBC QN AUTO: 30.6 PG (ref 26.6–33)
MCHC RBC AUTO-ENTMCNC: 33.8 G/DL (ref 31.5–35.7)
MCV RBC AUTO: 90.5 FL (ref 79–97)
MONOCYTES # BLD AUTO: 0.77 10*3/MM3 (ref 0.1–0.9)
MONOCYTES NFR BLD AUTO: 7.7 % (ref 5–12)
NEUTROPHILS NFR BLD AUTO: 6.95 10*3/MM3 (ref 1.7–7)
NEUTROPHILS NFR BLD AUTO: 69.5 % (ref 42.7–76)
NRBC BLD AUTO-RTO: 0 /100 WBC (ref 0–0.2)
PLATELET # BLD AUTO: 288 10*3/MM3 (ref 140–450)
PMV BLD AUTO: 11 FL (ref 6–12)
POTASSIUM SERPL-SCNC: 3.6 MMOL/L (ref 3.5–5.2)
PROT SERPL-MCNC: 7 G/DL (ref 6–8.5)
RBC # BLD AUTO: 4.02 10*6/MM3 (ref 3.77–5.28)
SODIUM SERPL-SCNC: 144 MMOL/L (ref 136–145)
TRIGL SERPL-MCNC: 231 MG/DL (ref 0–150)
VLDLC SERPL-MCNC: 41 MG/DL (ref 5–40)
WBC NRBC COR # BLD: 10 10*3/MM3 (ref 3.4–10.8)

## 2021-12-20 DIAGNOSIS — E78.5 DYSLIPIDEMIA: Primary | ICD-10-CM

## 2021-12-20 RX ORDER — PRAVASTATIN SODIUM 40 MG
40 TABLET ORAL DAILY
Qty: 30 TABLET | Refills: 5 | Status: SHIPPED | OUTPATIENT
Start: 2021-12-20 | End: 2022-05-24 | Stop reason: SDUPTHER

## 2022-03-22 ENCOUNTER — TRANSCRIBE ORDERS (OUTPATIENT)
Dept: ADMINISTRATIVE | Facility: HOSPITAL | Age: 62
End: 2022-03-22

## 2022-03-22 DIAGNOSIS — R10.12 ABDOMINAL PAIN, LEFT UPPER QUADRANT: Primary | ICD-10-CM

## 2022-04-05 ENCOUNTER — HOSPITAL ENCOUNTER (OUTPATIENT)
Dept: CT IMAGING | Facility: HOSPITAL | Age: 62
Discharge: HOME | End: 2022-04-05
Admitting: NURSE PRACTITIONER

## 2022-04-05 DIAGNOSIS — R10.12 ABDOMINAL PAIN, LEFT UPPER QUADRANT: ICD-10-CM

## 2022-04-05 LAB — CREAT BLDA-MCNC: 0.9 MG/DL (ref 0.6–1.3)

## 2022-04-05 PROCEDURE — 74177 CT ABD & PELVIS W/CONTRAST: CPT | Performed by: RADIOLOGY

## 2022-04-05 PROCEDURE — 82565 ASSAY OF CREATININE: CPT

## 2022-04-05 PROCEDURE — 74177 CT ABD & PELVIS W/CONTRAST: CPT

## 2022-04-05 PROCEDURE — 25010000002 IOPAMIDOL 61 % SOLUTION: Performed by: NURSE PRACTITIONER

## 2022-04-05 RX ADMIN — IOPAMIDOL 100 ML: 612 INJECTION, SOLUTION INTRAVENOUS at 12:38

## 2022-06-01 ENCOUNTER — OFFICE VISIT (OUTPATIENT)
Dept: CARDIOLOGY | Facility: CLINIC | Age: 62
End: 2022-06-01

## 2022-06-01 VITALS
BODY MASS INDEX: 50.79 KG/M2 | DIASTOLIC BLOOD PRESSURE: 63 MMHG | HEIGHT: 62 IN | SYSTOLIC BLOOD PRESSURE: 115 MMHG | HEART RATE: 88 BPM | WEIGHT: 276 LBS | OXYGEN SATURATION: 97 %

## 2022-06-01 DIAGNOSIS — I10 ESSENTIAL HYPERTENSION: Primary | ICD-10-CM

## 2022-06-01 DIAGNOSIS — E78.5 DYSLIPIDEMIA: ICD-10-CM

## 2022-06-01 PROCEDURE — 99213 OFFICE O/P EST LOW 20 MIN: CPT | Performed by: PHYSICIAN ASSISTANT

## 2022-06-01 PROCEDURE — 93000 ELECTROCARDIOGRAM COMPLETE: CPT | Performed by: PHYSICIAN ASSISTANT

## 2022-06-01 NOTE — PROGRESS NOTES
Rachel Savage APRN  Zeina Reed  1960  06/01/2022    Patient Active Problem List   Diagnosis   • Palpitations   • Essential hypertension   • Dyslipidemia   • Dizziness   • Acute exacerbation of CHF (congestive heart failure) (HCC)   • SOB (shortness of breath)   • Morbid obesity with BMI of 45.0-49.9, adult (HCC)   • Physical deconditioning       Dear Rachel Savage APRN:    Subjective     History of Present Illness:    Chief Complaint   Patient presents with   • Med Management     List.    • Chest Pain   • Edema   • Dizziness   • Shortness of Breath   • Palpitations       Zeina Reed is a pleasant 61 y.o. female with a past medical history significant for no known history of coronary artery disease or CVA nor history of tobacco abuse.  She does have history of essential hypertension, dyslipidemia, and history of palpitations.  She does have some family history of premature coronary artery disease with her father having a fatal MI in his early 50s.  Patient comes in for routine cardiology follow-up.     Zeina has no open complaints today.  She does have chronic dyspnea she denies any overt worsening of this she does wear oxygen at night and is planning on getting a CPAP soon this was on backorder due to the recent recall.  She also admits to pedal edema which is also an ongoing chronic issue and denies any overt worsening of this she is on Bumex 2 mg twice daily from PCPs office.  She denies any overt chest pains, syncope, or near syncope.    Allergies   Allergen Reactions   • Augmentin [Amoxicillin-Pot Clavulanate] Diarrhea   • Metformin And Related Diarrhea   :      Current Outpatient Medications:   •  acetaminophen-codeine (TYLENOL #3) 300-30 MG per tablet, Take 1 tablet by mouth Every 12 (Twelve) Hours As Needed., Disp: 60 tablet, Rfl: 0  •  acetaminophen-codeine (TYLENOL #3) 300-30 MG per tablet, Take 1 tablet by mouth Every 12 (Twelve) Hours As Needed., Disp: 60 tablet, Rfl: 0  •  aspirin  81 MG EC tablet, Take 81 mg by mouth Daily., Disp: , Rfl:   •  bumetanide (BUMEX) 2 MG tablet, Take 1 tablet by mouth 2 (Two) Times a Day., Disp: 60 tablet, Rfl: 3  •  ibuprofen (ADVIL,MOTRIN) 800 MG tablet, Take 1 tablet (800 mg) by mouth 3 times per day with food or milk as needed for pain, Disp: 90 tablet, Rfl: 1  •  insulin aspart (NovoLOG FlexPen) 100 UNIT/ML solution pen-injector sc pen, Inject 12 Units under the skin into the appropriate area as directed 3 (Three) Times a Day., Disp: 15 mL, Rfl: 2  •  insulin detemir (Levemir) 100 UNIT/ML injection, inject 45 units subcutaneously  daily, Disp: 50 mL, Rfl: 3  •  insulin detemir (Levemir) 100 UNIT/ML injection, inject 45 units subcutaneously daily, Disp: 50 mL, Rfl: 3  •  insulin detemir (Levemir) 100 UNIT/ML injection, Inject 45 units subcutaneously daily., Disp: 50 mL, Rfl: 3  •  Insulin Lispro, 1 Unit Dial, (HumaLOG KwikPen) 100 UNIT/ML solution pen-injector, Inject 12 units subcutaneously 3 times a day with meals., Disp: 15 mL, Rfl: 4  •  labetalol (NORMODYNE) 200 MG tablet, Take 2 tablets by mouth 2 (Two) Times a Day., Disp: 120 tablet, Rfl: 3  •  lansoprazole (PREVACID) 30 MG capsule, Take 1 capsule (30 mg) by mouth daily before a meal, Disp: 90 capsule, Rfl: 2  •  levothyroxine (Synthroid) 50 MCG tablet, Take 1 tablet by mouth daily in the morning on an empty stomach, Disp: 90 tablet, Rfl: 0  •  loratadine (CLARITIN) 10 MG tablet, Take 1 tablet by mouth Daily., Disp: 30 tablet, Rfl: 3  •  losartan (COZAAR) 100 MG tablet, Take 1 tablet by mouth Daily., Disp: 30 tablet, Rfl: 3  •  potassium chloride ER (K-TAB) 20 MEQ tablet controlled-release ER tablet, Take 4 tablets by mouth daily with food, Disp: 60 tablet, Rfl: 3  •  pravastatin (PRAVACHOL) 40 MG tablet, Take 1 tablet by mouth daily, Disp: 30 tablet, Rfl: 3  •  SITagliptin-metFORMIN HCl ER (Janumet XR)  MG tablet, Take 2 tablets by mouth Daily., Disp: 180 tablet, Rfl: 2  •  azithromycin  (ZITHROMAX) 250 MG tablet, Take 2 tablets by mouth one time today, then 1 tablet daily for 4 days, Disp: 6 tablet, Rfl: 0  •  bumetanide (BUMEX) 2 MG tablet, Take 1 tablet by mouth 2 (Two) Times a Day., Disp: 60 tablet, Rfl: 3  •  bumetanide (BUMEX) 2 MG tablet, Take 1 tablet by mouth 2 (Two) Times a Day., Disp: 60 tablet, Rfl: 3  •  busPIRone (BUSPAR) 5 MG tablet, Take 1 tablet by mouth 3 (Three) Times a Day., Disp: 90 tablet, Rfl: 2  •  dicyclomine (BENTYL) 10 MG capsule, Take 1 to 2 capsules by mouth 3 (Three) Times a Day., Disp: 60 capsule, Rfl: 1  •  dicyclomine (BENTYL) 10 MG capsule, Take 1 or 2 capsules by mouth 3 times per day., Disp: 60 capsule, Rfl: 1  •  fluticasone (FLONASE) 50 MCG/ACT nasal spray, Use 2 sprays in each nostril once daily, Disp: 16 g, Rfl: 0  •  fluticasone (FLONASE) 50 MCG/ACT nasal spray, Use 2 sprays in each nostril daily., Disp: 16 g, Rfl: 0  •  fluticasone (FLONASE) 50 MCG/ACT nasal spray, Instill 2 sprays in each nostril daily., Disp: 16 g, Rfl: 5  •  Fluticasone Furoate-Vilanterol (Breo Ellipta) 200-25 MCG/INH inhaler, INHALE 1 PUFF BY MOUTH DAILY, Disp: 60 each, Rfl: 3  •  Fluticasone Furoate-Vilanterol (Breo Ellipta) 200-25 MCG/INH inhaler, Inhale 1 puff by mouth daily., Disp: 60 each, Rfl: 3  •  gabapentin (NEURONTIN) 300 MG capsule, Take 1 capsule by mouth 3 (Three) Times a Day., Disp: 90 capsule, Rfl: 2  •  ibuprofen (ADVIL,MOTRIN) 800 MG tablet, Take 1 tablet (800 mg) by mouth 3 times per day with food or milk as needed for pain, Disp: 90 tablet, Rfl: 1  •  ibuprofen (ADVIL,MOTRIN) 800 MG tablet, Take 1 tablet (800 mg) by mouth 3 times per day with food or milk as needed for pain, Disp: 90 tablet, Rfl: 1  •  ibuprofen (ADVIL,MOTRIN) 800 MG tablet, Take 1 tablet by mouth 3 times per day with food or milk as needed for pain., Disp: 90 tablet, Rfl: 1  •  insulin detemir (LEVEMIR) 100 UNIT/ML injection, Inject 45 Units under the skin into the appropriate area as directed Daily  "for 30 days., Disp: 20 mL, Rfl: 0  •  insulin lispro (HumaLOG) 100 UNIT/ML injection, Inject 12 Units under the skin into the appropriate area as directed 3 (Three) Times a Day Before Meals for 30 days., Disp: 10 mL, Rfl: 0  •  Insulin Lispro, 1 Unit Dial, (HUMALOG) 100 UNIT/ML solution pen-injector, Inject 12 units subcutaneosly 3 times a day with meals, Disp: 15 mL, Rfl: 4  •  Insulin Lispro, 1 Unit Dial, (HUMALOG) 100 UNIT/ML solution pen-injector, inject 12 units subcutaneously 3 times a day with meals, Disp: 15 mL, Rfl: 4  •  Insulin Pen Needle (B-D UF III MINI PEN NEEDLES) 31G X 5 MM misc, Use to inject insulin 3 times a day, Disp: 100 each, Rfl: 0  •  Insulin Pen Needle (NovoFine Plus Pen Needle) 32G X 4 MM misc, USE 1 TIME DAILY, Disp: 90 each, Rfl: 2  •  Insulin Syringe-Needle U-100 (BD Insulin Syringe U/F) 30G X 1/2\" 0.5 ML misc, Use to inject insulin under the skin four times a day as directed, Disp: 120 each, Rfl: 8  •  labetalol (NORMODYNE) 200 MG tablet, Take 2 tablets by mouth 2 times per day., Disp: 120 tablet, Rfl: 3  •  lansoprazole (PREVACID) 30 MG capsule, Take 1 capsule (30 mg) by mouth daily before a meal, Disp: 90 capsule, Rfl: 2  •  lansoprazole (PREVACID) 30 MG capsule, Take 1 capsule (30 mg) by mouth daily before a meal, Disp: 90 capsule, Rfl: 2  •  levoFLOXacin (LEVAQUIN) 500 MG tablet, Take 1 tablet (500 mg) by mouth daily, Disp: 10 tablet, Rfl: 0  •  loratadine (Claritin) 10 MG tablet, Take 1 tablet (10 mg) by mouth daily, Disp: 90 tablet, Rfl: 3  •  losartan (Cozaar) 100 MG tablet, Take 1 tablet (100 mg) by mouth daily, Disp: 30 tablet, Rfl: 3  •  losartan (Cozaar) 100 MG tablet, Take 1 tablet (100 mg) by mouth daily, Disp: 30 tablet, Rfl: 3  •  methocarbamol (ROBAXIN) 500 MG tablet, Take 1 tablet by mouth 2 (Two) Times a Day As Needed., Disp: 60 tablet, Rfl: 3  •  methocarbamol (ROBAXIN) 500 MG tablet, Take 1 tablet by mouth 2 times daily as needed., Disp: 60 tablet, Rfl: 3  •  " "ondansetron ODT (ZOFRAN-ODT) 8 MG disintegrating tablet, Place 1 tablet (8 mg) on the tongue and allow to dissolve every 6 hours as needed, Disp: 30 tablet, Rfl: 0  •  potassium chloride ER (K-TAB) 20 MEQ tablet controlled-release ER tablet, Take 1 tablet (20 mEq) by mouth daily with food, Disp: 90 tablet, Rfl: 3  •  potassium chloride ER (K-TAB) 20 MEQ tablet controlled-release ER tablet, Take 2 tablets by mouth Daily with food., Disp: 60 tablet, Rfl: 3  •  pravastatin (PRAVACHOL) 40 MG tablet, Take 1 tablet by mouth daily, Disp: 30 tablet, Rfl: 3  •  SITagliptin-metFORMIN HCl ER (Janumet XR)  MG tablet, Take 2 tablets by mouth Daily., Disp: 180 tablet, Rfl: 2  •  SITagliptin-metFORMIN HCl ER (Janumet XR)  MG tablet, Take 2 tablets by mouth Daily., Disp: 180 tablet, Rfl: 2    The following portions of the patient's history were reviewed and updated as appropriate: allergies, current medications, past family history, past medical history, past social history, past surgical history and problem list.    Social History     Tobacco Use   • Smoking status: Never Smoker   • Smokeless tobacco: Never Used   Vaping Use   • Vaping Use: Never used   Substance Use Topics   • Alcohol use: No   • Drug use: No         Objective   Vitals:    06/01/22 1524   BP: 115/63   BP Location: Right arm   Patient Position: Sitting   Cuff Size: Adult   Pulse: 88   SpO2: 97%   Weight: 125 kg (276 lb)   Height: 157.5 cm (62\")     Body mass index is 50.48 kg/m².    Constitutional:       General: Not in acute distress.     Appearance: Healthy appearance. Well-developed and not in distress. Not diaphoretic.   Eyes:      Conjunctiva/sclera: Conjunctivae normal.      Pupils: Pupils are equal, round, and reactive to light.   HENT:      Head: Normocephalic and atraumatic.   Neck:      Vascular: No carotid bruit or JVD.   Pulmonary:      Effort: Pulmonary effort is normal. No respiratory distress.      Breath sounds: Normal breath sounds. "   Cardiovascular:      Normal rate. Regular rhythm.   Edema:     Pretibial: bilateral 2+ edema of the pretibial area.     Ankle: bilateral 2+ edema of the ankle.     Feet: bilateral 2+ edema of the feet.  Skin:     General: Skin is cool.   Neurological:      Mental Status: Alert, oriented to person, place, and time and oriented to person, place and time.         Lab Results   Component Value Date     12/17/2021    K 3.6 12/17/2021     12/17/2021    CO2 31.2 (H) 12/17/2021    BUN 17 12/17/2021    CREATININE 0.90 04/05/2022    GLUCOSE 111 (H) 12/17/2021    CALCIUM 9.9 12/17/2021    AST 16 12/17/2021    ALT 29 12/17/2021    ALKPHOS 120 (H) 12/17/2021    LABIL2 1.4 (L) 02/12/2015     No results found for: CKTOTAL  Lab Results   Component Value Date    WBC 10.00 12/17/2021    HGB 12.3 12/17/2021    HCT 36.4 12/17/2021     12/17/2021     No results found for: INR  Lab Results   Component Value Date    MG 2.1 03/17/2015     Lab Results   Component Value Date    TSH 2.005 04/18/2017    CHLPL 205 (H) 02/17/2016    TRIG 231 (H) 12/17/2021    HDL 56 12/17/2021     (H) 12/17/2021      No results found for: BNP    During this visit the following were done:  Labs Reviewed []    Labs Ordered []    Radiology Reports Reviewed []    Radiology Ordered []    PCP Records Reviewed []    Referring Provider Records Reviewed []    ER Records Reviewed []    Hospital Records Reviewed []    History Obtained From Family []    Radiology Images Reviewed []    Other Reviewed []    Records Requested []         ECG 12 Lead    Date/Time: 6/1/2022 3:09 PM  Performed by: Chandler Langford PA-C  Authorized by: Chandler Langford PA-C   Comparison: compared with previous ECG   Similar to previous ECG  Rhythm: sinus rhythm  Conduction: conduction normal  Other findings: poor R wave progression    Clinical impression: normal ECG            Assessment & Plan    Diagnosis Plan   1. Essential hypertension  Comprehensive Metabolic  Panel    CBC (No Diff)    Lipid Panel   2. Dyslipidemia  Comprehensive Metabolic Panel    CBC (No Diff)    Lipid Panel            Recommendations:  1. Pedal edema  1. Patient is on high-dose diuretics with 2 mg of Bumex twice daily.  I did ask her to reduce this to 1 mg.  I do recommend her reducing this further however to discuss with PCP.  Previous echocardiogram in July 2021 did show normal LVEF with also normal diastolic function as well.  2. Essential hypertension   1. We will order routine labs with CMP, CBC, and lipid panel.       Return in about 6 months (around 12/1/2022).    As always, I appreciate very much the opportunity to participate in the cardiovascular care of your patients.      With Best Regards,    Chandler Langford PA-C

## 2022-08-15 ENCOUNTER — TRANSCRIBE ORDERS (OUTPATIENT)
Dept: ADMINISTRATIVE | Facility: HOSPITAL | Age: 62
End: 2022-08-15

## 2022-08-15 DIAGNOSIS — I50.9 HEART FAILURE, UNSPECIFIED HF CHRONICITY, UNSPECIFIED HEART FAILURE TYPE: ICD-10-CM

## 2022-08-15 DIAGNOSIS — R60.9 EDEMA, UNSPECIFIED TYPE: Primary | ICD-10-CM

## 2022-08-17 ENCOUNTER — HOSPITAL ENCOUNTER (OUTPATIENT)
Dept: CARDIOLOGY | Facility: HOSPITAL | Age: 62
Discharge: HOME | End: 2022-08-17
Admitting: NURSE PRACTITIONER

## 2022-08-17 DIAGNOSIS — I50.9 HEART FAILURE, UNSPECIFIED HF CHRONICITY, UNSPECIFIED HEART FAILURE TYPE: ICD-10-CM

## 2022-08-17 DIAGNOSIS — R60.9 EDEMA, UNSPECIFIED TYPE: ICD-10-CM

## 2022-08-17 PROCEDURE — 93306 TTE W/DOPPLER COMPLETE: CPT | Performed by: INTERNAL MEDICINE

## 2022-08-17 PROCEDURE — 93306 TTE W/DOPPLER COMPLETE: CPT

## 2022-08-19 ENCOUNTER — HOSPITAL ENCOUNTER (OUTPATIENT)
Dept: CARDIOLOGY | Facility: HOSPITAL | Age: 62
Discharge: HOME | End: 2022-08-19
Admitting: PHYSICIAN ASSISTANT

## 2022-08-19 VITALS
BODY MASS INDEX: 51.82 KG/M2 | WEIGHT: 281.6 LBS | HEART RATE: 85 BPM | HEIGHT: 62 IN | DIASTOLIC BLOOD PRESSURE: 79 MMHG | SYSTOLIC BLOOD PRESSURE: 144 MMHG | OXYGEN SATURATION: 96 %

## 2022-08-19 DIAGNOSIS — I10 ESSENTIAL HYPERTENSION: ICD-10-CM

## 2022-08-19 DIAGNOSIS — I50.31 ACUTE HEART FAILURE WITH PRESERVED EJECTION FRACTION (HFPEF): Primary | ICD-10-CM

## 2022-08-19 DIAGNOSIS — E11.42 TYPE 2 DIABETES MELLITUS WITH PERIPHERAL NEUROPATHY: ICD-10-CM

## 2022-08-19 DIAGNOSIS — E66.01 MORBID OBESITY WITH BMI OF 50.0-59.9, ADULT: ICD-10-CM

## 2022-08-19 LAB
ABSOLUTE LUNG FLUID CONTENT: 32 % (ref 20–35)
ANION GAP SERPL CALCULATED.3IONS-SCNC: 11.7 MMOL/L (ref 5–15)
BUN SERPL-MCNC: 17 MG/DL (ref 8–23)
BUN/CREAT SERPL: 16.7 (ref 7–25)
CALCIUM SPEC-SCNC: 9.6 MG/DL (ref 8.6–10.5)
CHLORIDE SERPL-SCNC: 106 MMOL/L (ref 98–107)
CO2 SERPL-SCNC: 24.3 MMOL/L (ref 22–29)
CREAT SERPL-MCNC: 1.02 MG/DL (ref 0.57–1)
EGFRCR SERPLBLD CKD-EPI 2021: 62.7 ML/MIN/1.73
GLUCOSE SERPL-MCNC: 343 MG/DL (ref 65–99)
MAGNESIUM SERPL-MCNC: 1.7 MG/DL (ref 1.6–2.4)
NT-PROBNP SERPL-MCNC: 264 PG/ML (ref 0–900)
POTASSIUM SERPL-SCNC: 4.4 MMOL/L (ref 3.5–5.2)
SODIUM SERPL-SCNC: 142 MMOL/L (ref 136–145)

## 2022-08-19 PROCEDURE — 94726 PLETHYSMOGRAPHY LUNG VOLUMES: CPT | Performed by: PHYSICIAN ASSISTANT

## 2022-08-19 PROCEDURE — 83735 ASSAY OF MAGNESIUM: CPT | Performed by: PHYSICIAN ASSISTANT

## 2022-08-19 PROCEDURE — 99215 OFFICE O/P EST HI 40 MIN: CPT | Performed by: PHYSICIAN ASSISTANT

## 2022-08-19 PROCEDURE — 36415 COLL VENOUS BLD VENIPUNCTURE: CPT | Performed by: PHYSICIAN ASSISTANT

## 2022-08-19 PROCEDURE — 80048 BASIC METABOLIC PNL TOTAL CA: CPT | Performed by: PHYSICIAN ASSISTANT

## 2022-08-19 PROCEDURE — 83880 ASSAY OF NATRIURETIC PEPTIDE: CPT

## 2022-08-19 RX ORDER — LABETALOL 200 MG/1
400 TABLET, FILM COATED ORAL 2 TIMES DAILY
Qty: 120 TABLET | Refills: 3
Start: 2022-08-19 | End: 2022-08-26 | Stop reason: ALTCHOICE

## 2022-08-19 RX ORDER — FUROSEMIDE 40 MG/1
40 TABLET ORAL 2 TIMES DAILY
Qty: 60 TABLET | Refills: 0 | Status: SHIPPED | OUTPATIENT
Start: 2022-08-19 | End: 2022-08-26

## 2022-08-19 RX ORDER — DAPAGLIFLOZIN 10 MG/1
1 TABLET, FILM COATED ORAL DAILY
Qty: 30 TABLET | Refills: 5 | Status: SHIPPED | OUTPATIENT
Start: 2022-08-19 | End: 2022-11-28 | Stop reason: SDUPTHER

## 2022-08-19 RX ORDER — POTASSIUM CHLORIDE 1500 MG/1
20 TABLET, FILM COATED, EXTENDED RELEASE ORAL DAILY
Qty: 60 TABLET | Refills: 3
Start: 2022-08-19 | End: 2022-08-26 | Stop reason: SDUPTHER

## 2022-08-19 RX ORDER — SPIRONOLACTONE 25 MG/1
25 TABLET ORAL DAILY
Qty: 30 TABLET | Refills: 2 | Status: SHIPPED | OUTPATIENT
Start: 2022-08-19 | End: 2022-09-26 | Stop reason: SDUPTHER

## 2022-08-19 NOTE — PROGRESS NOTES
[facility]  Pharmacist Note     Zeina Reed is a 61 y.o. female seen in the Heart Failure Clinic for HFpEF.  Zeina Reed reports a fair understanding of medications.  She reports some SOB when walking and some swelling in her legs.  She uses our pharmacy regularly and admits that she sometimes forgets to take some of her medications.  She reports that her Levemir and Breo are difficult for her to afford.     Medication Use:   Hx of med intolerances: None related to HF  Retail Rx Management: Uses our pharmacy regularly     Past Medical History:   Diagnosis Date   • Acute exacerbation of CHF (congestive heart failure) (HCC) 7/10/2021   • Arthritis    • Diabetes mellitus (HCC)    • Dyslipidemia    • History of transfusion    • Hyperlipidemia    • Hypertension    • Palpitations    • PONV (postoperative nausea and vomiting)      ALLERGIES: Augmentin [amoxicillin-pot clavulanate] and Metformin and related  Current Outpatient Medications   Medication Sig Dispense Refill   • acetaminophen-codeine (TYLENOL #3) 300-30 MG per tablet Take 1 tablet by mouth Every 12 (Twelve) Hours As Needed. 60 tablet 0   • aspirin 81 MG EC tablet Take 81 mg by mouth Daily.     • busPIRone (BUSPAR) 5 MG tablet Take 1 tablet by mouth 3 (Three) Times a Day. (Patient taking differently: Take 5 mg by mouth 3 (Three) Times a Day As Needed.) 90 tablet 2   • dapagliflozin Propanediol (Farxiga) 10 MG tablet Take 1 tablet (10 mg) by mouth daily 30 tablet 5   • dicyclomine (BENTYL) 10 MG capsule Take 1 or 2 capsules by mouth 3 times per day. 60 capsule 1   • fluticasone (FLONASE) 50 MCG/ACT nasal spray Instill 2 sprays in each nostril daily. 16 g 5   • gabapentin (NEURONTIN) 300 MG capsule Take 1 capsule by mouth 3 (Three) Times a Day. 90 capsule 2   • ibuprofen (ADVIL,MOTRIN) 800 MG tablet Take 1 tablet by mouth 3 times per day with food or milk as needed for pain. 90 tablet 1   • insulin aspart (NovoLOG FlexPen) 100 UNIT/ML solution  "pen-injector sc pen Inject 12 Units under the skin into the appropriate area as directed 3 (Three) Times a Day. 15 mL 2   • insulin detemir (Levemir) 100 UNIT/ML injection Inject 25 units under the sking 2 times daily 20 mL 3   • Insulin Pen Needle (NovoFine Plus Pen Needle) 32G X 4 MM misc USE 1 TIME DAILY 90 each 2   • Insulin Syringe-Needle U-100 (BD Insulin Syringe U/F) 30G X 1/2\" 0.5 ML misc Use to inject insulin under the skin four times a day as directed 120 each 8   • labetalol (NORMODYNE) 200 MG tablet Take 2 tablets by mouth 2 (Two) Times a Day. Hold if systolic blood pressure is less than 110. 120 tablet 3   • lansoprazole (PREVACID) 30 MG capsule Take 1 capsule (30 mg) by mouth daily before a meal 90 capsule 2   • levothyroxine (Synthroid) 50 MCG tablet Take 1 tablet by mouth daily in the morning on an empty stomach 90 tablet 0   • loratadine (Claritin) 10 MG tablet Take 1 tablet (10 mg) by mouth daily 90 tablet 3   • losartan (Cozaar) 100 MG tablet Take 1 tablet (100 mg) by mouth daily 30 tablet 3   • methocarbamol (ROBAXIN) 500 MG tablet Take 1 tablet by mouth two times daily as needed 60 tablet 3   • potassium chloride ER (K-TAB) 20 MEQ tablet controlled-release ER tablet Take 1 tablet by mouth Daily. 60 tablet 3   • pravastatin (PRAVACHOL) 40 MG tablet Take 1 tablet by mouth Daily. 30 tablet 3   • SITagliptin-metFORMIN HCl ER (Janumet XR)  MG tablet Take 2 tablets by mouth Daily. 180 tablet 2   • Fluticasone Furoate-Vilanterol (Breo Ellipta) 200-25 MCG/INH inhaler Inhale 1 puff by mouth daily. 60 each 3   • furosemide (Lasix) 40 MG tablet Take 1 tablet by mouth 2 (Two) Times a Day for 30 days. 60 tablet 0   • ondansetron ODT (ZOFRAN-ODT) 8 MG disintegrating tablet Place 1 tablet (8 mg) on the tongue and allow to dissolve every 6 hours as needed 30 tablet 0   • spironolactone (Aldactone) 25 MG tablet Take 1 tablet by mouth Daily for 30 days. 30 tablet 2     No current facility-administered " "medications for this encounter.       Vaccination History:   Pneumonia:   Annual Influenza:   COVID 19:     Objective  Vitals:    08/19/22 1138   BP: 144/79   BP Location: Left arm   Patient Position: Sitting   Cuff Size: Large Adult   Pulse: 85   SpO2: 96%   Weight: 128 kg (281 lb 9.6 oz)   Height: 157.5 cm (62\")     Wt Readings from Last 3 Encounters:   08/19/22 128 kg (281 lb 9.6 oz)   06/01/22 125 kg (276 lb)   12/01/21 122 kg (268 lb)         08/19/22  1138   Weight: 128 kg (281 lb 9.6 oz)     Lab Results   Component Value Date    GLUCOSE 343 (H) 08/19/2022    BUN 17 08/19/2022    CREATININE 1.02 (H) 08/19/2022    EGFRIFNONA 71 12/17/2021    EGFRIFAFRI  09/17/2016      Comment:      <15 Indicative of kidney failure.    BCR 16.7 08/19/2022    K 4.4 08/19/2022    CO2 24.3 08/19/2022    CALCIUM 9.6 08/19/2022    ALBUMIN 4.10 12/17/2021    LABIL2 1.4 (L) 02/12/2015    AST 16 12/17/2021    ALT 29 12/17/2021     Lab Results   Component Value Date    WBC 10.00 12/17/2021    HGB 12.3 12/17/2021    HCT 36.4 12/17/2021    MCV 90.5 12/17/2021     12/17/2021     Lab Results   Component Value Date    TROPONINT <0.010 07/11/2021     Lab Results   Component Value Date    PROBNP 264.0 08/19/2022     Results for orders placed during the hospital encounter of 07/10/21    Adult Transthoracic Echo Complete w/ Color, Spectral and Contrast if necessary per protocol    Interpretation Summary  · Estimated left ventricular EF = 65% Left ventricular systolic function is normal.  · Left ventricular diastolic function was normal.  · Trace Mitral regurgitation  · Aortic and tricuspid valves are unremarkable  · No prev echo         GDMT    Drug Class   Drug   Dose Last Dose Adjustment Additional Titration   Notes   ACEi/ARB/ARNI Losartan 100mg      Beta Blocker Labetalol 400mg BID      MRA Spironolactone 25mg 8/19/22     SGLT2i Farxiga 10mg  N/A        Drug Therapy Problems    1. Drug Interactions Screening: No significant DDI  2. " Drug-Disease Interactions: IBU in HFpEF  3. Needs Immunizations   4. Optimize Drug Therapy for HFpEF  5. Incorrect medication administration: Pravastatin  6. Incorrect medication administration: Levothyroxine   7. Needs Patient Assistance (Breo and Levemir)     Recommendations:     1. N/A - no significant DDI   2. Recommended against IBU use.  Advised her to use topical diclofenac gel OTC or her PRN Tylenol #3. Explained risks associated with NSAID use.   3. Recommended COVID vaccine, pneumonia vaccination and flu vaccination (when available).  Pt declines pneumonia and COVID vaccines today.    4. Consider starting spironolactone.  Would recommend decreasing potassium dose if started.   5. Advised her to start taking pravastatin at bedtime for best effect.    6. Advised her to start taking levothyroxine on an empty stomach first thing in the morning with a glass of water.  Wait 30 min for other meds/breakfast.   7. Have sent information to patient assistance technician to see if any programs or coupons for Breo or Levemir.     Patient was educated on heart failure medications and the importance of medication adherence. All questions were addressed and patient expressed some understanding. She would benefit from additional education at next visit.     Thank you for allowing me to participate in the care of your patient,    Aislinn Ernandez, PharmD  08/19/22  13:26 EDT

## 2022-08-19 NOTE — PROGRESS NOTES
[facility]  ANNE Pinto APRN Taylor, Gloria Jean, APRN  [address]  [address],  [facility]    Thank you for asking me to see Zeina Reed for congestive heart failure.    HPI:     This is a 61 y.o. female with known past medical history of:    · FIDEL (CPAP)  · Diastolic HF  · Essential HTN  · Uncontrolled DM type 2  · Fatty liver disease  · GERD  · COPD     Zeina Reed presents for today for HF referral from PCP.  The patient is typically seen by Nini Marie APRN.  Patient's primary cardiologist is Dr. Zapata & team.     • Last known EF 65% in 2021 with repeat echocardiogram pending at present.      • Last known hospitalization and/or ED visit: July 10, 2021 through July 15, 2021 with acute on chronic decompensated heart failure, obstructive sleep apnea uncontrolled diabetes and sepsis admission.    • Accompanied by: Spouse    • Patient was referred by her PCP's office after seeing Ann Marie and reporting worsening dyspnea on exertion and swelling in spite of Bumex 2mg BID.         Initial visit data/details regarding:   • Dyspnea: Worsening shortness of breath with exertion  • Lower extremity swelling: Improving but still present  • Abdominal swelling: Mild abdominal protuberance.    • Home weight: Not currently monitoring--booklet provided (Wireless scale provided during clinic visit.)   • Home BP: Not currently monitoring--booklet provided.    • Home heart rate: Not currently monitoring--booklet provided  • Daily activities of living:   • Pillows/lying flat: Two pillows.   • Zone: Yellow; HF zones discussed with patient.    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 She was previously taking care of her mother, now .  She reports she later got COVID-19 and has had ongoing decline in health.   • She reports significant worsening of lower extremity edema  over the past few weeks on Bumex 2mg BID, though she reports she does not consistently take her medications.  Prior to starting Lasix, she reports she was feeling as if she did also have some swelling in her abdomen.  She reports worsening dyspnea on exertion and difficulty performing daily activities of living.    • Mrs. Reed has been taking her BID Lasix 40mg since being started by Ann Marie earlier in the week. She reports improvement in swelling.    • She reports her home scale is present but does not always function correctly.  Clinci scale is provided.   • She reports having a fully functioning blood pressure cuff.    • Echocardiogram has been performed but is pending read on today's visit.   • Patient denies chest pain.       Specialists:   • Cardiology: Dr. Zapata    Review of Systems - Review of Systems   Constitutional: Negative for chills, diaphoresis, fever and malaise/fatigue.   HENT: Negative for congestion and ear discharge.    Eyes: Negative for blurred vision and discharge.   Cardiovascular: Positive for dyspnea on exertion and leg swelling. Negative for chest pain, claudication, near-syncope, orthopnea and palpitations.   Respiratory: Negative for cough and hemoptysis.    Endocrine: Negative for cold intolerance and heat intolerance.   Hematologic/Lymphatic: Negative for adenopathy and bleeding problem.   Skin: Negative for color change and nail changes.   Musculoskeletal: Negative for arthritis.   Gastrointestinal: Negative for bloating and abdominal pain.   Genitourinary: Negative for bladder incontinence and decreased libido.   Neurological: Negative for aphonia and brief paralysis.   Psychiatric/Behavioral: Negative for altered mental status and depression.         All other systems were reviewed and were negative.    Patient Active Problem List   Diagnosis   • Palpitations   • Essential hypertension   • Dyslipidemia   • Dizziness   • Acute exacerbation of CHF (congestive heart failure)  (formerly [facility])   • SOB (shortness of breath)   • Morbid obesity with BMI of 45.0-49.9, adult (formerly [facility])   • Physical deconditioning       family history includes Cancer in her father and mother; Heart attack in her father.     reports that she has never smoked. She has never used smokeless tobacco. She reports that she does not drink alcohol and does not use drugs.    Allergies   Allergen Reactions   • Augmentin [Amoxicillin-Pot Clavulanate] Diarrhea   • Metformin And Related Diarrhea         Current Outpatient Medications:   •  acetaminophen-codeine (TYLENOL #3) 300-30 MG per tablet, Take 1 tablet by mouth Every 12 (Twelve) Hours As Needed., Disp: 60 tablet, Rfl: 0  •  aspirin 81 MG EC tablet, Take 81 mg by mouth Daily., Disp: , Rfl:   •  busPIRone (BUSPAR) 5 MG tablet, Take 1 tablet by mouth 3 (Three) Times a Day. (Patient taking differently: Take 5 mg by mouth 3 (Three) Times a Day As Needed.), Disp: 90 tablet, Rfl: 2  •  dapagliflozin Propanediol (Farxiga) 10 MG tablet, Take 1 tablet (10 mg) by mouth daily, Disp: 30 tablet, Rfl: 5  •  dicyclomine (BENTYL) 10 MG capsule, Take 1 or 2 capsules by mouth 3 times per day., Disp: 60 capsule, Rfl: 1  •  fluticasone (FLONASE) 50 MCG/ACT nasal spray, Instill 2 sprays in each nostril daily., Disp: 16 g, Rfl: 5  •  gabapentin (NEURONTIN) 300 MG capsule, Take 1 capsule by mouth 3 (Three) Times a Day., Disp: 90 capsule, Rfl: 2  •  ibuprofen (ADVIL,MOTRIN) 800 MG tablet, Take 1 tablet by mouth 3 times per day with food or milk as needed for pain., Disp: 90 tablet, Rfl: 1  •  insulin aspart (NovoLOG FlexPen) 100 UNIT/ML solution pen-injector sc pen, Inject 12 Units under the skin into the appropriate area as directed 3 (Three) Times a Day., Disp: 15 mL, Rfl: 2  •  insulin detemir (Levemir) 100 UNIT/ML injection, Inject 25 units under the sking 2 times daily, Disp: 20 mL, Rfl: 3  •  Insulin Pen Needle (NovoFine Plus Pen Needle) 32G X 4 MM misc, USE 1 TIME DAILY, Disp: 90 each, Rfl: 2  •   "Insulin Syringe-Needle U-100 (BD Insulin Syringe U/F) 30G X 1/2\" 0.5 ML misc, Use to inject insulin under the skin four times a day as directed, Disp: 120 each, Rfl: 8  •  labetalol (NORMODYNE) 200 MG tablet, Take 2 tablets by mouth 2 (Two) Times a Day. Hold if systolic blood pressure is less than 110., Disp: 120 tablet, Rfl: 3  •  lansoprazole (PREVACID) 30 MG capsule, Take 1 capsule (30 mg) by mouth daily before a meal, Disp: 90 capsule, Rfl: 2  •  levothyroxine (Synthroid) 50 MCG tablet, Take 1 tablet by mouth daily in the morning on an empty stomach, Disp: 90 tablet, Rfl: 0  •  loratadine (Claritin) 10 MG tablet, Take 1 tablet (10 mg) by mouth daily, Disp: 90 tablet, Rfl: 3  •  losartan (Cozaar) 100 MG tablet, Take 1 tablet (100 mg) by mouth daily, Disp: 30 tablet, Rfl: 3  •  methocarbamol (ROBAXIN) 500 MG tablet, Take 1 tablet by mouth two times daily as needed, Disp: 60 tablet, Rfl: 3  •  potassium chloride ER (K-TAB) 20 MEQ tablet controlled-release ER tablet, Take 1 tablet by mouth Daily., Disp: 60 tablet, Rfl: 3  •  pravastatin (PRAVACHOL) 40 MG tablet, Take 1 tablet by mouth Daily., Disp: 30 tablet, Rfl: 3  •  SITagliptin-metFORMIN HCl ER (Janumet XR)  MG tablet, Take 2 tablets by mouth Daily., Disp: 180 tablet, Rfl: 2  •  Fluticasone Furoate-Vilanterol (Breo Ellipta) 200-25 MCG/INH inhaler, Inhale 1 puff by mouth daily., Disp: 60 each, Rfl: 3  •  furosemide (Lasix) 40 MG tablet, Take 1 tablet by mouth 2 (Two) Times a Day for 30 days., Disp: 60 tablet, Rfl: 0  •  ondansetron ODT (ZOFRAN-ODT) 8 MG disintegrating tablet, Place 1 tablet (8 mg) on the tongue and allow to dissolve every 6 hours as needed, Disp: 30 tablet, Rfl: 0  •  spironolactone (Aldactone) 25 MG tablet, Take 1 tablet by mouth Daily for 30 days., Disp: 30 tablet, Rfl: 2      Physical Exam:  I have reviewed the patient's current vital signs as documented in the patient's EMR.   Vitals:    08/19/22 1138   BP: 144/79   Pulse: 85   SpO2: " 96%     Body mass index is 51.51 kg/m².       22  1138   Weight: 128 kg (281 lb 9.6 oz)            Physical Exam  Vitals and nursing note reviewed.   Constitutional:       General: She is awake.   HENT:      Head: Normocephalic and atraumatic.   Eyes:      General: Lids are normal.      Conjunctiva/sclera:      Right eye: Right conjunctiva is not injected.      Left eye: Left conjunctiva is not injected.   Neck:      Thyroid: No thyroid mass.      Vascular: No carotid bruit.   Cardiovascular:      Rate and Rhythm: Normal rate and regular rhythm.      Heart sounds: S1 normal and S2 normal.   Pulmonary:      Effort: No tachypnea or bradypnea.      Breath sounds: Examination of the right-lower field reveals decreased breath sounds. Examination of the left-lower field reveals decreased breath sounds. Decreased breath sounds present. No wheezing, rhonchi or rales.   Abdominal:      General: Bowel sounds are normal.      Palpations: Abdomen is soft.      Tenderness: There is no abdominal tenderness.   Musculoskeletal:      Cervical back: Full passive range of motion without pain.      Right lower le+ Edema present.      Left lower le+ Edema present.      Comments: Non pitting bilateral edema   Skin:     General: Skin is warm and moist.   Neurological:      Mental Status: She is alert and oriented to person, place, and time.   Psychiatric:         Attention and Perception: Attention normal.         Mood and Affect: Mood normal.         Behavior: Behavior is cooperative.          JVP: Volume/Pulsation: Normal.        DATA REVIEWED:     EKG. I personally reviewed and interpreted the EKG.          ---------------------------------------------------  TTE/KELVIN:  Results for orders placed during the hospital encounter of 07/10/21    Adult Transthoracic Echo Complete w/ Color, Spectral and Contrast if necessary per protocol    Interpretation Summary  · Estimated left ventricular EF = 65% Left ventricular systolic  function is normal.  · Left ventricular diastolic function was normal.  · Trace Mitral regurgitation  · Aortic and tricuspid valves are unremarkable  · No prev echo        -----------------------------------------------------  CXR/Imaging:   Imaging Results (Most Recent)     None          I personally reviewed and interpreted the CXR.      -----------------------------------------------------  CT:   No radiology results for the last 30 days.  I personally reviewed the images of the CT scan.  My personal interpretation is below.      ----------------------------------------------------    PFTs:      --------------------------------------------------------------------------------------------------    Laboratory evaluations:    Lab Results   Component Value Date    GLUCOSE 343 (H) 08/19/2022    BUN 17 08/19/2022    CREATININE 1.02 (H) 08/19/2022    EGFRIFNONA 71 12/17/2021    EGFRIFAFRI  09/17/2016      Comment:      <15 Indicative of kidney failure.    BCR 16.7 08/19/2022    K 4.4 08/19/2022    CO2 24.3 08/19/2022    CALCIUM 9.6 08/19/2022    ALBUMIN 4.10 12/17/2021    LABIL2 1.4 (L) 02/12/2015    AST 16 12/17/2021    ALT 29 12/17/2021     Lab Results   Component Value Date    WBC 10.00 12/17/2021    HGB 12.3 12/17/2021    HCT 36.4 12/17/2021    MCV 90.5 12/17/2021     12/17/2021     Lab Results   Component Value Date    CHOL 230 (H) 12/17/2021    CHLPL 205 (H) 02/17/2016    TRIG 231 (H) 12/17/2021    HDL 56 12/17/2021     (H) 12/17/2021     Lab Results   Component Value Date    TSH 2.005 04/18/2017    S2NFHHG 9.40 04/18/2017     Lab Results   Component Value Date    HGBA1C 8.70 (H) 07/10/2021     Lab Results   Component Value Date    ALT 29 12/17/2021     Lab Results   Component Value Date    HGBA1C 8.70 (H) 07/10/2021    HGBA1C 8.90 (H) 04/18/2017    HGBA1C 8.0 (H) 02/12/2015     Lab Results   Component Value Date    CREATININE 1.02 (H) 08/19/2022     No results found for: IRON, TIBC, FERRITIN  No  results found for: INR, PROTIME     Lab Results   Component Value Date    ABSOLUTELUNG 32 08/19/2022       PAH RISK ASSESSMENT:      1. Acute heart failure with preserved ejection fraction (HFpEF) ([facility])    2. Essential hypertension    3. Type 2 diabetes mellitus with peripheral neuropathy ([facility])    4. Morbid obesity with BMI of 50.0-59.9, adult ([facility])          ORDERS PLACED TODAY:  Orders Placed This Encounter   Procedures   • BNP   • Basic Metabolic Panel   • Magnesium   • ReDs Vest        Diagnoses and all orders for this visit:    1. Acute heart failure with preserved ejection fraction (HFpEF) ([facility]) (Primary)  -     BNP  -     Basic Metabolic Panel; Standing  -     Magnesium; Standing  -     ReDs Vest  -     Basic Metabolic Panel  -     Magnesium    2. Essential hypertension    3. Type 2 diabetes mellitus with peripheral neuropathy ([facility])    4. Morbid obesity with BMI of 50.0-59.9, adult ([facility])    Other orders  -     dapagliflozin Propanediol (Farxiga) 10 MG tablet; Take 1 tablet (10 mg) by mouth daily  Dispense: 30 tablet; Refill: 5  -     furosemide (Lasix) 40 MG tablet; Take 1 tablet by mouth 2 (Two) Times a Day for 30 days.  Dispense: 60 tablet; Refill: 0  -     spironolactone (Aldactone) 25 MG tablet; Take 1 tablet by mouth Daily for 30 days.  Dispense: 30 tablet; Refill: 2  -     labetalol (NORMODYNE) 200 MG tablet; Take 2 tablets by mouth 2 (Two) Times a Day. Hold if systolic blood pressure is less than 110.  Dispense: 120 tablet; Refill: 3  -     potassium chloride ER (K-TAB) 20 MEQ tablet controlled-release ER tablet; Take 1 tablet by mouth Daily.  Dispense: 60 tablet; Refill: 3             MEDS ORDERED TODAY:    New Medications Ordered This Visit   Medications   • dapagliflozin Propanediol (Farxiga) 10 MG tablet     Sig: Take 1 tablet (10 mg) by mouth daily     Dispense:  30 tablet     Refill:  5   • furosemide (Lasix) 40 MG tablet     Sig: Take 1 tablet by mouth 2 (Two) Times a Day for 30 days.      Dispense:  60 tablet     Refill:  0   • spironolactone (Aldactone) 25 MG tablet     Sig: Take 1 tablet by mouth Daily for 30 days.     Dispense:  30 tablet     Refill:  2   • labetalol (NORMODYNE) 200 MG tablet     Sig: Take 2 tablets by mouth 2 (Two) Times a Day. Hold if systolic blood pressure is less than 110.     Dispense:  120 tablet     Refill:  3   • potassium chloride ER (K-TAB) 20 MEQ tablet controlled-release ER tablet     Sig: Take 1 tablet by mouth Daily.     Dispense:  60 tablet     Refill:  3        ---------------------------------------------------------------------------------------------------------------------------          ASSESSMENT/PLAN:      Diagnosis Plan   1. Acute heart failure with preserved ejection fraction (HFpEF) ([facility])  BNP    Basic Metabolic Panel    Magnesium    ReDs Vest    Basic Metabolic Panel    Magnesium   2. Essential hypertension     3. Type 2 diabetes mellitus with peripheral neuropathy ([facility])     4. Morbid obesity with BMI of 50.0-59.9, adult ([facility])         acute on chronic diastolic heart failure. CHF.      NYHA stage B;FC-II.    Today, Patient is approaching euvolemiaand with  Moderate perfusion. The patient's hemodynamics are currently acceptable. HR is: normal and is at goal. BP/MAP was reviewed and there isroom for medication up-titration.  Clinical trajectory was assessed and haswaxed and waned.     CHF GOAL DIRECTED MEDICAL THERAPY FOR PATIENT ADDRESSED/ADJUSTED:       GDMT:     Drug Class   Drug   Dose Last Dose Adjustment Additional Titration   Notes   ACEi/ARB/ARNI Losartan  100mg qd      Beta Blocker Labetolol for blood pressures 400mg BID for HTN      MRA START Spironolactone 25mg qd      SGLT2i Farxiga  10mg Sent to pharmacy N/A        -CHF Specific BB:   • Currently on Labetolol 400mg BID for HTN; In future visit, would like to transition this possibly to Toprol XL or other CHF specific beta blocker; however, given addition of MRA today and other recent  medication adjustments within the past week, will hold on this transition.   • Patient reports she often only takes her Labetolol once a day.    • Blood pressure log book provided with education provided.   • Echocardiogram performed and pending read.  RTC in 1 week with echocardiogram review and further medication adjustments.    • Educated regarding slow titration and addition of medications with close monitoring.     -ACE/ARB/ARNi:   • Current dose: Losartan 100mg qd; has been on this dose for some time. Will continue for now. Pending echocardiogram results; will possibly consider ARNi transition at a future date.    • Baseline creatinine previously known to be 0.7-0.9     -MRA:   • The patient is FC-NYHA Class III and MRA is indicated.   • Spironolactone 25 mg initiated with home potassium supplement decreased to 20 mEQ daily.   • The patient will be started on Aldactone.  They have been asked to obtain a BMP within 5 days of starting for monitoring the kidney function and potassium.  • Aldactone was explained to the patient.  However, not all possible side effects and adverse reactions are able to be fully discussed.  Handout was offered to the patient detailing the prescription.  • Patient was advised to not use potassium products.  • Quarterly monitoring of potassium and renal function is currently recommended    -SGLT2 inhibitor therapy:   • A BMP at initiation to verify GFR >30 was recommended, as was interval GFR surveillance.  • The patient was advised to hold SGLT2I when PO intake is restricted due to a planned surgery, or due to an underlying illness.   • Recommend continuing Farxiga (dapagliflozin) 10mg daily with quarterly assessment of GFR.   • RTC one week with BMP after initiation. (Has follow-up appointment on 08/26/22.   • Pt was advised SEs, some severe, including hypersensitivity and Margaret's; coupled with discussion regarding common side effects of UTIs and female genital mycotic infections  were discussed. If you will be NPO, or are sick (poor PO intake, N&V) please hold the medication until you are back to a normal diet.     -Diuretic regimen:   • ReDs Vest reading for 08/19/22 32; ReDs Vest reading reviewed with patient.    • Continue Lasix 40mg BID.  Stop Bumex 2mg BID.   • Add MRA as outlined.  Decrease potassium supplement given addition of potassium sparing diuretic.    • BMP, Mag, & ProBNP reviewed with patient.      -Fluid restriction/Sodium restriction:   • Requested 2000 ml restriction  • Patient has been asked to weigh daily and was provided with a printed diuretic strategy.  • 1,500 mg Na restriction was discussed.  • Dietary information/Education provided.      -Acute vs. Chronic underlying conditions other than HF addressed during visit:   • IDDM:   o Last hemoglobin a1c 8.6 on 03/2022  o Insulin regimen reviewed by pharmacy.    o Started Farxiga for heart failure recently.      CONSIDERATIONS:   -Iron/Anemia in CHF:  • Hgb WNL.               Class 3 Severe Obesity (BMI >=40). Obesity-related health conditions include the following: diabetes mellitus, dyslipidemias, peripheral vascular disease and osteoarthritis. Obesity is newly identified. BMI is is above average; BMI management plan is completed. We discussed portion control and increasing exercise.          45 minutes out of 60 minutes face to face spent counseling patient extensively on dietary Na+ intake, importance of activity, weight monitoring, compliance with medications in addition to importance of titration with goal directed medical therapy and follow up appointments.            This document has been electronically signed by Tawanna Arambula PA-C  August 19, 2022 13:25 EDT      Dictated Utilizing Dragon Dictation: Part of this note may be an electronic transcription/translation of spoken language to printed text using the Dragon Dictation System.    Follow-up appointment and medication changes provided in hand delivered  After Visit Summary as well as reviewed in the room.

## 2022-08-19 NOTE — PROGRESS NOTES
Heart Failure Clinic    Date: 08/19/22     Vitals:    08/19/22 1138   BP: 144/79   Pulse: 85   SpO2: 96%    weight 281.6    Method of arrival: Ambulatory    Weighing self daily: No    Monitoring Heart Failure Zones: No    Today's HF Zone: Reviewed Zones    Taking medications as prescribed: Yes    Edema Yes, improving    Shortness of Air: Yes with activity    Number of pillows used at night:<2    Educational Materials given:  [facility], Living with Heart Failure patient booklet                                                                         ReDS Value: 32  25-35 Optimal Value Status      Bryant Savage RN 08/19/22 11:42 EDT

## 2022-08-21 LAB
BH CV ECHO MEAS - ACS: 2.2 CM
BH CV ECHO MEAS - AO MAX PG: 13.5 MMHG
BH CV ECHO MEAS - AO MEAN PG: 6 MMHG
BH CV ECHO MEAS - AO ROOT DIAM: 3.1 CM
BH CV ECHO MEAS - AO V2 MAX: 184 CM/SEC
BH CV ECHO MEAS - AO V2 VTI: 36 CM
BH CV ECHO MEAS - EDV(CUBED): 113 ML
BH CV ECHO MEAS - EDV(MOD-SP4): 54.8 ML
BH CV ECHO MEAS - EF(MOD-SP4): 56.4 %
BH CV ECHO MEAS - ESV(CUBED): 35 ML
BH CV ECHO MEAS - ESV(MOD-SP4): 23.9 ML
BH CV ECHO MEAS - FS: 32.4 %
BH CV ECHO MEAS - IVS/LVPW: 0.68 CM
BH CV ECHO MEAS - IVSD: 1.19 CM
BH CV ECHO MEAS - LA DIMENSION: 3.4 CM
BH CV ECHO MEAS - LAT PEAK E' VEL: 8.2 CM/SEC
BH CV ECHO MEAS - LV DIASTOLIC VOL/BSA (35-75): 25 CM2
BH CV ECHO MEAS - LV MASS(C)D: 297.3 GRAMS
BH CV ECHO MEAS - LV SYSTOLIC VOL/BSA (12-30): 10.9 CM2
BH CV ECHO MEAS - LVIDD: 4.8 CM
BH CV ECHO MEAS - LVIDS: 3.3 CM
BH CV ECHO MEAS - LVOT AREA: 2.8 CM2
BH CV ECHO MEAS - LVOT DIAM: 1.9 CM
BH CV ECHO MEAS - LVPWD: 1.75 CM
BH CV ECHO MEAS - MED PEAK E' VEL: 9.3 CM/SEC
BH CV ECHO MEAS - MV A MAX VEL: 113 CM/SEC
BH CV ECHO MEAS - MV E MAX VEL: 113 CM/SEC
BH CV ECHO MEAS - MV E/A: 1
BH CV ECHO MEAS - PA ACC TIME: 0.14 SEC
BH CV ECHO MEAS - PA PR(ACCEL): 15.5 MMHG
BH CV ECHO MEAS - RAP SYSTOLE: 10 MMHG
BH CV ECHO MEAS - RVSP: 31.3 MMHG
BH CV ECHO MEAS - SI(MOD-SP4): 14.1 ML/M2
BH CV ECHO MEAS - SV(MOD-SP4): 30.9 ML
BH CV ECHO MEAS - TAPSE (>1.6): 2.6 CM
BH CV ECHO MEAS - TR MAX PG: 21.3 MMHG
BH CV ECHO MEAS - TR MAX VEL: 231 CM/SEC
BH CV ECHO MEASUREMENTS AVERAGE E/E' RATIO: 12.91
LEFT ATRIUM VOLUME INDEX: 15 ML/M2
MAXIMAL PREDICTED HEART RATE: 159 BPM
STRESS TARGET HR: 135 BPM

## 2022-08-26 ENCOUNTER — HOSPITAL ENCOUNTER (OUTPATIENT)
Dept: CARDIOLOGY | Facility: HOSPITAL | Age: 62
Discharge: HOME | End: 2022-08-26
Admitting: PHYSICIAN ASSISTANT

## 2022-08-26 VITALS
SYSTOLIC BLOOD PRESSURE: 139 MMHG | HEART RATE: 80 BPM | TEMPERATURE: 97.7 F | BODY MASS INDEX: 49.65 KG/M2 | HEIGHT: 62 IN | OXYGEN SATURATION: 97 % | DIASTOLIC BLOOD PRESSURE: 74 MMHG | WEIGHT: 269.8 LBS

## 2022-08-26 DIAGNOSIS — I10 ESSENTIAL HYPERTENSION: ICD-10-CM

## 2022-08-26 DIAGNOSIS — E66.01 MORBID OBESITY WITH BMI OF 45.0-49.9, ADULT: ICD-10-CM

## 2022-08-26 DIAGNOSIS — I50.43 ACUTE ON CHRONIC COMBINED SYSTOLIC AND DIASTOLIC CHF (CONGESTIVE HEART FAILURE): Primary | ICD-10-CM

## 2022-08-26 DIAGNOSIS — Z79.4 TYPE 2 DIABETES MELLITUS WITHOUT COMPLICATION, WITH LONG-TERM CURRENT USE OF INSULIN: ICD-10-CM

## 2022-08-26 DIAGNOSIS — E11.9 TYPE 2 DIABETES MELLITUS WITHOUT COMPLICATION, WITH LONG-TERM CURRENT USE OF INSULIN: ICD-10-CM

## 2022-08-26 LAB
ABSOLUTE LUNG FLUID CONTENT: 28 % (ref 20–35)
ANION GAP SERPL CALCULATED.3IONS-SCNC: 14.7 MMOL/L (ref 5–15)
BUN SERPL-MCNC: 16 MG/DL (ref 8–23)
BUN/CREAT SERPL: 18.6 (ref 7–25)
CALCIUM SPEC-SCNC: 9.5 MG/DL (ref 8.6–10.5)
CHLORIDE SERPL-SCNC: 101 MMOL/L (ref 98–107)
CO2 SERPL-SCNC: 23.3 MMOL/L (ref 22–29)
CREAT SERPL-MCNC: 0.86 MG/DL (ref 0.57–1)
EGFRCR SERPLBLD CKD-EPI 2021: 77 ML/MIN/1.73
GLUCOSE SERPL-MCNC: 188 MG/DL (ref 65–99)
MAGNESIUM SERPL-MCNC: 1.8 MG/DL (ref 1.6–2.4)
NT-PROBNP SERPL-MCNC: 65.2 PG/ML (ref 0–900)
POTASSIUM SERPL-SCNC: 3.9 MMOL/L (ref 3.5–5.2)
SODIUM SERPL-SCNC: 139 MMOL/L (ref 136–145)

## 2022-08-26 PROCEDURE — 83735 ASSAY OF MAGNESIUM: CPT | Performed by: PHYSICIAN ASSISTANT

## 2022-08-26 PROCEDURE — 99214 OFFICE O/P EST MOD 30 MIN: CPT | Performed by: PHYSICIAN ASSISTANT

## 2022-08-26 PROCEDURE — 36415 COLL VENOUS BLD VENIPUNCTURE: CPT | Performed by: PHYSICIAN ASSISTANT

## 2022-08-26 PROCEDURE — 94726 PLETHYSMOGRAPHY LUNG VOLUMES: CPT | Performed by: PHYSICIAN ASSISTANT

## 2022-08-26 PROCEDURE — 83880 ASSAY OF NATRIURETIC PEPTIDE: CPT

## 2022-08-26 PROCEDURE — 80048 BASIC METABOLIC PNL TOTAL CA: CPT | Performed by: PHYSICIAN ASSISTANT

## 2022-08-26 RX ORDER — POTASSIUM CHLORIDE 1500 MG/1
20 TABLET, FILM COATED, EXTENDED RELEASE ORAL DAILY
Qty: 60 TABLET | Refills: 3
Start: 2022-08-26 | End: 2023-03-03 | Stop reason: SDUPTHER

## 2022-08-26 RX ORDER — INSULIN DEGLUDEC 200 U/ML
50 INJECTION, SOLUTION SUBCUTANEOUS DAILY
Qty: 9 ML | Refills: 3 | Status: SHIPPED | OUTPATIENT
Start: 2022-08-26 | End: 2023-05-03

## 2022-08-26 RX ORDER — FUROSEMIDE 40 MG/1
40 TABLET ORAL DAILY
Qty: 30 TABLET | Refills: 0 | Status: SHIPPED | OUTPATIENT
Start: 2022-08-26 | End: 2022-09-26 | Stop reason: SDUPTHER

## 2022-08-26 RX ORDER — FLUTICASONE PROPIONATE AND SALMETEROL 250; 50 UG/1; UG/1
1 POWDER RESPIRATORY (INHALATION)
Qty: 60 EACH | Refills: 0 | Status: SHIPPED | OUTPATIENT
Start: 2022-08-26 | End: 2022-10-06 | Stop reason: SDUPTHER

## 2022-08-26 RX ORDER — METOPROLOL SUCCINATE 200 MG/1
200 TABLET, EXTENDED RELEASE ORAL DAILY
Qty: 30 TABLET | Refills: 0 | Status: SHIPPED | OUTPATIENT
Start: 2022-08-26 | End: 2022-09-26 | Stop reason: SDUPTHER

## 2022-08-26 NOTE — PROGRESS NOTES
Heart Failure Clinic    Date: 08/26/22     Vitals:    08/26/22 1151   BP: 139/74   Pulse: 80   Temp: 97.7 °F (36.5 °C)   SpO2: 97%    weight 269.8    Method of arrival: Ambulatory    Weighing self daily: Yes    Monitoring Heart Failure Zones: Yes    Today's HF Zone: Green    Taking medications as prescribed: Yes    Edema Yes improving    Shortness of Air: No    Number of pillows used at night:<2    Educational Materials given:  avs                                                                         ReDS Value: 28  25-35 Optimal Value Status      Bryant Savage RN 08/26/22 11:54 EDT

## 2022-08-26 NOTE — PROGRESS NOTES
[facility]  Pharmacist Note     Zeina Reed is a 61 y.o. female seen in the [facility] for HFpEF.  Zeina Reed reports a fair understanding of medications.  She reports some SOB when walking and some swelling in her legs that is resolving.  She uses our pharmacy regularly and reports adherence at this time. She reports that she has only been taking her lasix 40mg daily instead of bid due to it keeping her up at night when she takes the second dose and it interfering with her ability to get her children ready for school each morning. She also reports that she has only been taking the Labetolol 400 daily instead of bid due to the hypotension is causes. She reports that she has been taking the levothyroxine on an empty stomach first thing in the morning 30 minutes before other medications, as instructed at last visit. She reports that her Levemir and Breo are difficult for her to afford and she actually has not been getting Breo due to the cost. Pt has not been recording her BP or weights, but reports that her BP this morning was 140/71 and has been running in the 130's/70's. She reported that she had not been paying attention to the HR.    Medication Use:   Hx of med intolerances: None related to HF  Retail Rx Management: Uses our pharmacy regularly     Past Medical History:   Diagnosis Date   • Acute exacerbation of CHF (congestive heart failure) (HCC) 7/10/2021   • Arthritis    • Diabetes mellitus (HCC)    • Dyslipidemia    • History of transfusion    • Hyperlipidemia    • Hypertension    • Palpitations    • PONV (postoperative nausea and vomiting)      ALLERGIES: Augmentin [amoxicillin-pot clavulanate] and Metformin and related  Current Outpatient Medications   Medication Sig Dispense Refill   • acetaminophen-codeine (TYLENOL #3) 300-30 MG per tablet Take 1 tablet by mouth Every 12 (Twelve) Hours As Needed. 60 tablet 0   • aspirin 81 MG EC tablet Take 81 mg by mouth Daily.     • busPIRone  "(BUSPAR) 5 MG tablet Take 1 tablet by mouth 3 (Three) Times a Day. (Patient taking differently: Take 5 mg by mouth 3 (Three) Times a Day As Needed.) 90 tablet 2   • dapagliflozin Propanediol (Farxiga) 10 MG tablet Take 1 tablet (10 mg) by mouth daily 30 tablet 5   • dicyclomine (BENTYL) 10 MG capsule Take 1 or 2 capsules by mouth 3 times per day. 60 capsule 1   • fluticasone (FLONASE) 50 MCG/ACT nasal spray Instill 2 sprays in each nostril daily. 16 g 5   • furosemide (Lasix) 40 MG tablet Take 1 tablet by mouth Daily for 30 days. 30 tablet 0   • gabapentin (NEURONTIN) 300 MG capsule Take 1 capsule by mouth 3 (Three) Times a Day. 90 capsule 2   • insulin aspart (NovoLOG FlexPen) 100 UNIT/ML solution pen-injector sc pen Inject 12 Units under the skin into the appropriate area as directed 3 (Three) Times a Day. 15 mL 2   • Insulin Pen Needle (NovoFine Plus Pen Needle) 32G X 4 MM misc USE 1 TIME DAILY 90 each 2   • Insulin Syringe-Needle U-100 (BD Insulin Syringe U/F) 30G X 1/2\" 0.5 ML misc Use to inject insulin under the skin four times a day as directed 120 each 8   • lansoprazole (PREVACID) 30 MG capsule Take 1 capsule (30 mg) by mouth daily before a meal 90 capsule 2   • levothyroxine (Synthroid) 50 MCG tablet Take 1 tablet by mouth daily in the morning on an empty stomach 90 tablet 0   • loratadine (Claritin) 10 MG tablet Take 1 tablet (10 mg) by mouth daily 90 tablet 3   • losartan (Cozaar) 100 MG tablet Take 1 tablet (100 mg) by mouth daily 30 tablet 3   • methocarbamol (ROBAXIN) 500 MG tablet Take 1 tablet by mouth two times daily as needed 60 tablet 3   • ondansetron ODT (ZOFRAN-ODT) 8 MG disintegrating tablet Place 1 tablet (8 mg) on the tongue and allow to dissolve every 6 hours as needed 30 tablet 0   • potassium chloride ER (K-TAB) 20 MEQ tablet controlled-release ER tablet Take 1 tablet by mouth Daily. 60 tablet 3   • pravastatin (PRAVACHOL) 40 MG tablet Take 1 tablet by mouth Daily. 30 tablet 3   • " "SITagliptin-metFORMIN HCl ER (Janumet XR)  MG tablet Take 2 tablets by mouth Daily. 180 tablet 2   • spironolactone (Aldactone) 25 MG tablet Take 1 tablet by mouth Daily for 30 days. 30 tablet 2   • Fluticasone-Salmeterol (Advair Diskus) 250-50 MCG/ACT DISKUS Inhale 1 puff 2 (Two) Times a Day for 30 days. 60 each 0   • Insulin Degludec (Tresiba FlexTouch) 200 UNIT/ML solution pen-injector pen injection Inject 50 Units under the skin into the appropriate area as directed Daily for 30 days. 9 mL 3   • metoprolol succinate XL (Toprol XL) 200 MG 24 hr tablet Take 1 tablet by mouth Daily for 30 days. Hold for systolic blood pressure less than 110 (top number) and/or heart rate under 60. 30 tablet 0     No current facility-administered medications for this encounter.       Vaccination History:   Pneumonia: not UTD  Annual Influenza: not UTD  COVID 19: not UTD    Objective  Vitals:    08/26/22 1151   BP: 139/74   BP Location: Left arm   Patient Position: Sitting   Cuff Size: Large Adult   Pulse: 80   Temp: 97.7 °F (36.5 °C)   TempSrc: Oral   SpO2: 97%   Weight: 122 kg (269 lb 12.8 oz)   Height: 157.5 cm (62\")     Wt Readings from Last 3 Encounters:   08/26/22 122 kg (269 lb 12.8 oz)   08/19/22 128 kg (281 lb 9.6 oz)   06/01/22 125 kg (276 lb)         08/26/22  1151   Weight: 122 kg (269 lb 12.8 oz)     Lab Results   Component Value Date    GLUCOSE 188 (H) 08/26/2022    BUN 16 08/26/2022    CREATININE 0.86 08/26/2022    EGFRIFNONA 71 12/17/2021    EGFRIFAFRI  09/17/2016      Comment:      <15 Indicative of kidney failure.    BCR 18.6 08/26/2022    K 3.9 08/26/2022    CO2 23.3 08/26/2022    CALCIUM 9.5 08/26/2022    ALBUMIN 4.10 12/17/2021    LABIL2 1.4 (L) 02/12/2015    AST 16 12/17/2021    ALT 29 12/17/2021     Lab Results   Component Value Date    WBC 10.00 12/17/2021    HGB 12.3 12/17/2021    HCT 36.4 12/17/2021    MCV 90.5 12/17/2021     12/17/2021     Lab Results   Component Value Date    TROPONINT <0.010 " 07/11/2021     Lab Results   Component Value Date    PROBNP 65.2 08/26/2022     Results for orders placed during the hospital encounter of 08/17/22    Adult Transthoracic Echo Complete W/ Cont if Necessary Per Protocol    Interpretation Summary  · Normal left ventricular cavity size and wall thickness noted. All left ventricular wall segments contract normally.  · Left ventricular ejection fraction appears to be 61 - 65%.  · The aortic valve is structurally normal with no regurgitation or stenosis present.  · The mitral valve is structurally normal with no regurgitation or significant stenosis present.  · There is no evidence of pericardial effusion.         GDMT    Drug Class   Drug   Dose Last Dose Adjustment Additional Titration   Notes   ACEi/ARB/ARNI Losartan 100mg      Beta Blocker Toprol XL 200mg  8/26/22     MRA Spironolactone 25mg 8/19/22     SGLT2i Farxiga 10mg  N/A        Drug Therapy Problems    1. Drug-Disease Interactions: IBU in HFpEF  2. GDMT  3. Incorrect medication administration: Pravastatin  4. Needs Patient Assistance (Breo and Levemir)   5. Patient requested refills on Potassium  6. No daily logs    Recommendations:     1. Ibuprofen was taken off of her med list, as  reports that he threw that medication away and she will not be taking it anymore.  2. Pt tolerating recent addition of Spironolactone. Consider changing Labetolol 400mg daily to Toprol XL 200mg daily with hold parameters for HF benefit. Advised pt to monitor her BP closely. Would keep Lasix 40mg dose at once daily frequency for patient's better quality of life. Would consider changing Losartan to Entresto in the future. Test claim shows copay of $60 per month. Patient reported that they would need assistance in order to afford.   3. Pt had forgotten to make this change after last visit. Advised her to start taking pravastatin at bedtime for best effect.    4. Patient assistance technician reported that there were no programs  or coupons for Breo or Levemir. I ran test claims for some alternatives to these medications and came up with cheaper options for them. Patient to start Tresiba 50 units daily after she finished up her 111 day supply of Levemir that she just got. Advised patient to monitor her blood sugars more closely upon making that change. Breo was changed to Advair Diskus 250/50 bid. I will let her PCP know of these changes as well.    5. Tawanna to send in refills on Potassium  6. Pt was educated on the benefits of keeping a BP/HR/wt log for the management of her medications. Pt and Pt's  seemed to understand and were willing to start keeping a log and will bring to next visit.     Patient was educated on heart failure medications and the importance of medication adherence. All questions were addressed and patient expressed some understanding. She would benefit from additional education at next visit.     Thank you for allowing me to participate in the care of your patient,    Gwen Mark [facility]  08/26/22  13:12 EDT

## 2022-08-26 NOTE — PROGRESS NOTES
[facility]  ANNE Pinto APRN Cloud, Rebekah, APRN  [address] [facility]  [address]  [place] [address]    Thank you for asking me to see Zeina Reed for congestive heart failure.    HPI:     This is a 61 y.o. female with known past medical history of:    · FIDEL (CPAP)  · Diastolic HF  · Essential HTN  · Uncontrolled DM type 2  · Fatty liver disease  · GERD  · COPD     Zeina Reed presents for today for HF referral from PCP.  The patient is typically seen by Nini Marie APRN.  Patient's primary cardiologist is Dr. Zapata & team.     • Last known EF 65% in 07/2021 with repeat echocardiogram pending at present.      • Last known hospitalization and/or ED visit: July 10, 2021 through July 15, 2021 with acute on chronic decompensated heart failure, obstructive sleep apnea uncontrolled diabetes and sepsis admission.    • Accompanied by: Spouse    • Patient was referred by her PCP's office after seeing Ann Marie and reporting worsening dyspnea on exertion and swelling in spite of Bumex 2mg BID.         Initial visit data/details regarding:   • Dyspnea: Significant improvement with dyspnea on exertion  • Lower extremity swelling: Improving but still present  • Abdominal swelling: Improved  • Home weight: Ongoing weight loss.   • Home BP:Staying around 130s systolic  • Home heart rate: Not currently monitoring--Booklet provided  • Daily activities of living: Increased ability to perform daily activities of living.  Increased quality of life.   • Pillows/lying flat: Two pillows.   • Zone:Green    • Weight is down several pounds.    • Mrs. Reed has been taking her lasix once daily due to urination issues but has ReDs and probnp improvement with stable creatinine.  She reports difficulty with running to the bathroom at night.     • She is tolerating Spironolactone without significant difficulty.   • Patient denies chest pain.    • Echocardiogram has been  performed with results reviewed during today's visit.   • Patient denies chest pain.     • She is accompanied by her , and he reports she has been doing well with her compliance minus taking her statin therapy at night, which she has been taking in the day.       Specialists:   • Cardiology: Dr. Zapata    Review of Systems - Review of Systems   Constitutional: Negative for chills, diaphoresis, fever and malaise/fatigue.   HENT: Negative for congestion and ear discharge.    Eyes: Negative for blurred vision and discharge.   Cardiovascular: Positive for leg swelling. Negative for chest pain, claudication, dyspnea on exertion, near-syncope, orthopnea and palpitations.   Respiratory: Negative for cough and hemoptysis.    Endocrine: Negative for cold intolerance and heat intolerance.   Hematologic/Lymphatic: Negative for adenopathy and bleeding problem.   Skin: Negative for color change and nail changes.   Musculoskeletal: Negative for arthritis.   Gastrointestinal: Negative for bloating and abdominal pain.   Genitourinary: Negative for bladder incontinence and decreased libido.   Neurological: Negative for aphonia and brief paralysis.   Psychiatric/Behavioral: Negative for altered mental status and depression.         All other systems were reviewed and were negative.    Patient Active Problem List   Diagnosis   • Palpitations   • Essential hypertension   • Dyslipidemia   • Dizziness   • Acute exacerbation of CHF (congestive heart failure) (HCC)   • SOB (shortness of breath)   • Morbid obesity with BMI of 45.0-49.9, adult (HCC)   • Physical deconditioning       family history includes Cancer in her father and mother; Heart attack in her father.     reports that she has never smoked. She has never used smokeless tobacco. She reports that she does not drink alcohol and does not use drugs.    Allergies   Allergen Reactions   • Augmentin [Amoxicillin-Pot Clavulanate] Diarrhea   • Metformin And Related Diarrhea  "        Current Outpatient Medications:   •  acetaminophen-codeine (TYLENOL #3) 300-30 MG per tablet, Take 1 tablet by mouth Every 12 (Twelve) Hours As Needed., Disp: 60 tablet, Rfl: 0  •  aspirin 81 MG EC tablet, Take 81 mg by mouth Daily., Disp: , Rfl:   •  busPIRone (BUSPAR) 5 MG tablet, Take 1 tablet by mouth 3 (Three) Times a Day. (Patient taking differently: Take 5 mg by mouth 3 (Three) Times a Day As Needed.), Disp: 90 tablet, Rfl: 2  •  dapagliflozin Propanediol (Farxiga) 10 MG tablet, Take 1 tablet (10 mg) by mouth daily, Disp: 30 tablet, Rfl: 5  •  dicyclomine (BENTYL) 10 MG capsule, Take 1 or 2 capsules by mouth 3 times per day., Disp: 60 capsule, Rfl: 1  •  fluticasone (FLONASE) 50 MCG/ACT nasal spray, Instill 2 sprays in each nostril daily., Disp: 16 g, Rfl: 5  •  furosemide (Lasix) 40 MG tablet, Take 1 tablet by mouth Daily for 30 days., Disp: 30 tablet, Rfl: 0  •  gabapentin (NEURONTIN) 300 MG capsule, Take 1 capsule by mouth 3 (Three) Times a Day., Disp: 90 capsule, Rfl: 2  •  insulin aspart (NovoLOG FlexPen) 100 UNIT/ML solution pen-injector sc pen, Inject 12 Units under the skin into the appropriate area as directed 3 (Three) Times a Day., Disp: 15 mL, Rfl: 2  •  Insulin Pen Needle (NovoFine Plus Pen Needle) 32G X 4 MM misc, USE 1 TIME DAILY, Disp: 90 each, Rfl: 2  •  Insulin Syringe-Needle U-100 (BD Insulin Syringe U/F) 30G X 1/2\" 0.5 ML misc, Use to inject insulin under the skin four times a day as directed, Disp: 120 each, Rfl: 8  •  lansoprazole (PREVACID) 30 MG capsule, Take 1 capsule (30 mg) by mouth daily before a meal, Disp: 90 capsule, Rfl: 2  •  levothyroxine (Synthroid) 50 MCG tablet, Take 1 tablet by mouth daily in the morning on an empty stomach, Disp: 90 tablet, Rfl: 0  •  loratadine (Claritin) 10 MG tablet, Take 1 tablet (10 mg) by mouth daily, Disp: 90 tablet, Rfl: 3  •  losartan (Cozaar) 100 MG tablet, Take 1 tablet (100 mg) by mouth daily, Disp: 30 tablet, Rfl: 3  •  methocarbamol " (ROBAXIN) 500 MG tablet, Take 1 tablet by mouth two times daily as needed, Disp: 60 tablet, Rfl: 3  •  ondansetron ODT (ZOFRAN-ODT) 8 MG disintegrating tablet, Place 1 tablet (8 mg) on the tongue and allow to dissolve every 6 hours as needed, Disp: 30 tablet, Rfl: 0  •  potassium chloride ER (K-TAB) 20 MEQ tablet controlled-release ER tablet, Take 1 tablet by mouth Daily., Disp: 60 tablet, Rfl: 3  •  pravastatin (PRAVACHOL) 40 MG tablet, Take 1 tablet by mouth Daily., Disp: 30 tablet, Rfl: 3  •  SITagliptin-metFORMIN HCl ER (Janumet XR)  MG tablet, Take 2 tablets by mouth Daily., Disp: 180 tablet, Rfl: 2  •  spironolactone (Aldactone) 25 MG tablet, Take 1 tablet by mouth Daily for 30 days., Disp: 30 tablet, Rfl: 2  •  Fluticasone-Salmeterol (Advair Diskus) 250-50 MCG/ACT DISKUS, Inhale 1 puff 2 (Two) Times a Day for 30 days., Disp: 60 each, Rfl: 0  •  Insulin Degludec (Tresiba FlexTouch) 200 UNIT/ML solution pen-injector pen injection, Inject 50 Units under the skin into the appropriate area as directed Daily for 30 days., Disp: 9 mL, Rfl: 3  •  metoprolol succinate XL (Toprol XL) 200 MG 24 hr tablet, Take 1 tablet by mouth Daily for 30 days. Hold for systolic blood pressure less than 110 (top number) and/or heart rate under 60., Disp: 30 tablet, Rfl: 0      Physical Exam:  I have reviewed the patient's current vital signs as documented in the patient's EMR.   Vitals:    08/26/22 1151   BP: 139/74   Pulse: 80   Temp: 97.7 °F (36.5 °C)   SpO2: 97%     Body mass index is 49.35 kg/m².       08/26/22  1151   Weight: 122 kg (269 lb 12.8 oz)            Physical Exam  Vitals and nursing note reviewed.   Constitutional:       General: She is awake.   HENT:      Head: Normocephalic and atraumatic.   Eyes:      General: Lids are normal.      Conjunctiva/sclera:      Right eye: Right conjunctiva is not injected.      Left eye: Left conjunctiva is not injected.   Neck:      Thyroid: No thyroid mass.      Vascular: No  carotid bruit.   Cardiovascular:      Rate and Rhythm: Normal rate and regular rhythm.      Heart sounds: S1 normal and S2 normal.   Pulmonary:      Effort: No tachypnea or bradypnea.      Breath sounds: No decreased breath sounds, wheezing, rhonchi or rales.   Abdominal:      General: Bowel sounds are normal.      Palpations: Abdomen is soft.      Tenderness: There is no abdominal tenderness.   Musculoskeletal:      Cervical back: Full passive range of motion without pain.      Right lower le+ Edema present.      Left lower le+ Edema present.      Comments: Non pitting bilateral edema   Skin:     General: Skin is warm and moist.   Neurological:      Mental Status: She is alert and oriented to person, place, and time.   Psychiatric:         Attention and Perception: Attention normal.         Mood and Affect: Mood normal.         Behavior: Behavior is cooperative.            JVP: Volume/Pulsation: Normal.        DATA REVIEWED:     EKG. I personally reviewed and interpreted the EKG.          ---------------------------------------------------  TTE/KELVIN:  Results for orders placed during the hospital encounter of 22    Adult Transthoracic Echo Complete W/ Cont if Necessary Per Protocol    Interpretation Summary  · Normal left ventricular cavity size and wall thickness noted. All left ventricular wall segments contract normally.  · Left ventricular ejection fraction appears to be 61 - 65%.  · The aortic valve is structurally normal with no regurgitation or stenosis present.  · The mitral valve is structurally normal with no regurgitation or significant stenosis present.  · There is no evidence of pericardial effusion.        -----------------------------------------------------  CXR/Imaging:   Imaging Results (Most Recent)     None          I personally reviewed and interpreted the CXR.      -----------------------------------------------------  CT:   No radiology results for the last 30 days.  I personally  reviewed the images of the CT scan.  My personal interpretation is below.      ----------------------------------------------------    PFTs:      --------------------------------------------------------------------------------------------------    Laboratory evaluations:    Lab Results   Component Value Date    GLUCOSE 188 (H) 08/26/2022    BUN 16 08/26/2022    CREATININE 0.86 08/26/2022    EGFRIFNONA 71 12/17/2021    EGFRIFAFRI  09/17/2016      Comment:      <15 Indicative of kidney failure.    BCR 18.6 08/26/2022    K 3.9 08/26/2022    CO2 23.3 08/26/2022    CALCIUM 9.5 08/26/2022    ALBUMIN 4.10 12/17/2021    LABIL2 1.4 (L) 02/12/2015    AST 16 12/17/2021    ALT 29 12/17/2021     Lab Results   Component Value Date    WBC 10.00 12/17/2021    HGB 12.3 12/17/2021    HCT 36.4 12/17/2021    MCV 90.5 12/17/2021     12/17/2021     Lab Results   Component Value Date    CHOL 230 (H) 12/17/2021    CHLPL 205 (H) 02/17/2016    TRIG 231 (H) 12/17/2021    HDL 56 12/17/2021     (H) 12/17/2021     Lab Results   Component Value Date    TSH 2.005 04/18/2017    P0UETCQ 9.40 04/18/2017     Lab Results   Component Value Date    HGBA1C 8.70 (H) 07/10/2021     Lab Results   Component Value Date    ALT 29 12/17/2021     Lab Results   Component Value Date    HGBA1C 8.70 (H) 07/10/2021    HGBA1C 8.90 (H) 04/18/2017    HGBA1C 8.0 (H) 02/12/2015     Lab Results   Component Value Date    CREATININE 0.86 08/26/2022     No results found for: IRON, TIBC, FERRITIN  No results found for: INR, PROTIME     Lab Results   Component Value Date    ABSOLUTELUNG 28 08/26/2022    ABSOLUTELUNG 32 08/19/2022       PAH RISK ASSESSMENT:      1. Acute on chronic combined systolic and diastolic CHF (congestive heart failure) ([facility])    2. Type 2 diabetes mellitus without complication, with long-term current use of insulin ([facility])    3. Essential hypertension    4. Morbid obesity with BMI of 45.0-49.9, adult ([facility])          ORDERS PLACED TODAY:  Orders  Placed This Encounter   Procedures   • BNP   • Basic Metabolic Panel   • Magnesium   • ReDs Vest        Diagnoses and all orders for this visit:    1. Acute on chronic combined systolic and diastolic CHF (congestive heart failure) ([facility]) (Primary)  -     BNP  -     Basic Metabolic Panel; Standing  -     Magnesium; Standing  -     ReDs Vest  -     Basic Metabolic Panel  -     Magnesium    2. Type 2 diabetes mellitus without complication, with long-term current use of insulin ([facility])    3. Essential hypertension    4. Morbid obesity with BMI of 45.0-49.9, adult ([facility])    Other orders  -     Fluticasone-Salmeterol (Advair Diskus) 250-50 MCG/ACT DISKUS; Inhale 1 puff 2 (Two) Times a Day for 30 days.  Dispense: 60 each; Refill: 0  -     furosemide (Lasix) 40 MG tablet; Take 1 tablet by mouth Daily for 30 days.  Dispense: 30 tablet; Refill: 0  -     metoprolol succinate XL (Toprol XL) 200 MG 24 hr tablet; Take 1 tablet by mouth Daily for 30 days. Hold for systolic blood pressure less than 110 (top number) and/or heart rate under 60.  Dispense: 30 tablet; Refill: 0  -     Insulin Degludec (Tresiba FlexTouch) 200 UNIT/ML solution pen-injector pen injection; Inject 50 Units under the skin into the appropriate area as directed Daily for 30 days.  Dispense: 9 mL; Refill: 3  -     potassium chloride ER (K-TAB) 20 MEQ tablet controlled-release ER tablet; Take 1 tablet by mouth Daily.  Dispense: 60 tablet; Refill: 3             MEDS ORDERED TODAY:    New Medications Ordered This Visit   Medications   • Fluticasone-Salmeterol (Advair Diskus) 250-50 MCG/ACT DISKUS     Sig: Inhale 1 puff 2 (Two) Times a Day for 30 days.     Dispense:  60 each     Refill:  0   • furosemide (Lasix) 40 MG tablet     Sig: Take 1 tablet by mouth Daily for 30 days.     Dispense:  30 tablet     Refill:  0   • metoprolol succinate XL (Toprol XL) 200 MG 24 hr tablet     Sig: Take 1 tablet by mouth Daily for 30 days. Hold for systolic blood pressure less than 110  (top number) and/or heart rate under 60.     Dispense:  30 tablet     Refill:  0   • Insulin Degludec (Tresiba FlexTouch) 200 UNIT/ML solution pen-injector pen injection     Sig: Inject 50 Units under the skin into the appropriate area as directed Daily for 30 days.     Dispense:  9 mL     Refill:  3   • potassium chloride ER (K-TAB) 20 MEQ tablet controlled-release ER tablet     Sig: Take 1 tablet by mouth Daily.     Dispense:  60 tablet     Refill:  3        ---------------------------------------------------------------------------------------------------------------------------          ASSESSMENT/PLAN:      Diagnosis Plan   1. Acute on chronic combined systolic and diastolic CHF (congestive heart failure) ([facility])  BNP    Basic Metabolic Panel    Magnesium    ReDs Vest    Basic Metabolic Panel    Magnesium   2. Type 2 diabetes mellitus without complication, with long-term current use of insulin ([facility])     3. Essential hypertension     4. Morbid obesity with BMI of 45.0-49.9, adult ([facility])         acute on chronic diastolic heart failure. CHF.      NYHA stage B;FC-II.    Today, Patient is approaching euvolemiaand with  Moderate perfusion. The patient's hemodynamics are currently acceptable. HR is: normal and is at goal. BP/MAP was reviewed and there isroom for medication up-titration.  Clinical trajectory was assessed and haswaxed and waned.     CHF GOAL DIRECTED MEDICAL THERAPY FOR PATIENT ADDRESSED/ADJUSTED:       GDMT:     Drug Class   Drug   Dose Last Dose Adjustment Additional Titration   Notes   ACEi/ARB/ARNI Losartan  100mg qd      Beta Blocker Labetolol for blood pressures 400mg BID for HTN Change to Toprol XL     MRA  Spironolactone 25mg qd      SGLT2i Farxiga  10mg Sent to pharmacy N/A        -CHF Specific BB:   • Currently on Labetolol 400mg BID for HTN, would like to transition to Metoprolol XL for more CHF specific beta blockade.   • Transitioned Labetolol 400mg to Metoprolol XL 200mg qd following  discussion with pharmacist.   • Patient reports she often only takes her Labetolol once a day.    • Blood pressure log book provided with education provided.   • Echocardiogram read available and reviewed.    • Educated regarding slow titration and addition of medications with close monitoring.   • July 2021 hospitalization discharge summary and record reviewed given switch from Metoprolol tartrate to Labetolol.  Appears this was performed during hospitalization with uncontrolled HTN.     -ACE/ARB/ARNi:   • Current dose: Losartan 100mg qd; has been on this dose for some time. Will continue for now.  • Would like to transition to ARNi if renal function can tolerate in a future visit.    • Baseline creatinine previously known to be 0.7-0.9     -MRA:   • The patient is FC-NYHA Class III and MRA is indicated.   • Continue Spironolactone 25mg qd.   • Continue decreased potassium supplementation for now with K 3.9 today with Lasix & MRA on board daily.    • Quarterly monitoring of potassium and renal function is currently recommended    -SGLT2 inhibitor therapy:   • A BMP at initiation to verify GFR >30 was recommended, as was interval GFR surveillance.  • The patient was advised to hold SGLT2I when PO intake is restricted due to a planned surgery, or due to an underlying illness.   • Recommend continuing Farxiga (dapagliflozin) 10mg daily with quarterly assessment of GFR.   • Denies current  side effects.      -Diuretic regimen:   • ReDs Vest reading for 08/26/22 28; ReDs Vest reading reviewed with patient.    • Lasix decreased to 40mg qd.  Patient has been taking this dose and is doing well currently.      • BMP, Mag, & ProBNP reviewed with patient.      -Fluid restriction/Sodium restriction:   • Requested 2000 ml restriction  • Patient has been asked to weigh daily and was provided with a printed diuretic strategy.  • 1,500 mg Na restriction was discussed.  • Dietary information/Education provided.      -Acute vs.  Chronic underlying conditions other than HF addressed during visit:     • COPD, stable without exacerbation:   o Breo is too expensive for patient and she has not been regularly filling.   o Alternative found with pharmacy and Advair discus prescription sent to [facility].   o Continue outpatient f/u with PCP.     • IDDM:   o Last hemoglobin a1c 8.6 on 03/2022  o Insulin regimen reviewed by pharmacy.  Tresiba is more affordable for patient.    o Transitioned to Tresiba 50u qd per discussion with pharmacy staff; to start after she runs out of Levemir 25u BID.   o Started Farxiga for heart failure recently.    o Continue outpatient PCP follow-up.     · Essential HTN:   · Continue Losartan 100mg with plans for possible future Entresto transition.   · Labetolol transitioned to Toprol XL as outlined above.   · Importance of blood pressure optimization in HFpEF discussed at length with patient and her .      CONSIDERATIONS:   -Iron/Anemia in CHF:  • Hgb WNL.               Class 3 Severe Obesity (BMI >=40). Obesity-related health conditions include the following: diabetes mellitus, dyslipidemias, peripheral vascular disease and osteoarthritis. Obesity is newly identified. BMI is is above average; BMI management plan is completed. We discussed portion control and increasing exercise.          45 minutes out of 60 minutes face to face spent counseling patient extensively on dietary Na+ intake, importance of activity, weight monitoring, compliance with medications in addition to importance of titration with goal directed medical therapy and follow up appointments.            This document has been electronically signed by Tawanna Arambula PA-C  August 26, 2022 13:47 EDT      Dictated Utilizing Dragon Dictation: Part of this note may be an electronic transcription/translation of spoken language to printed text using the Dragon Dictation System.    Follow-up appointment and medication changes provided in hand  delivered After Visit Summary as well as reviewed in the room.

## 2022-09-26 ENCOUNTER — HOSPITAL ENCOUNTER (OUTPATIENT)
Dept: CARDIOLOGY | Facility: HOSPITAL | Age: 62
Discharge: HOME | End: 2022-09-26
Admitting: PHYSICIAN ASSISTANT

## 2022-09-26 VITALS
WEIGHT: 274 LBS | HEART RATE: 74 BPM | OXYGEN SATURATION: 95 % | BODY MASS INDEX: 50.42 KG/M2 | SYSTOLIC BLOOD PRESSURE: 127 MMHG | DIASTOLIC BLOOD PRESSURE: 74 MMHG | HEIGHT: 62 IN

## 2022-09-26 DIAGNOSIS — G47.33 OSA ON CPAP: ICD-10-CM

## 2022-09-26 DIAGNOSIS — Z79.4 TYPE 2 DIABETES MELLITUS WITH OTHER DIABETIC ARTHROPATHY, WITH LONG-TERM CURRENT USE OF INSULIN: ICD-10-CM

## 2022-09-26 DIAGNOSIS — K76.0 NAFLD (NONALCOHOLIC FATTY LIVER DISEASE): ICD-10-CM

## 2022-09-26 DIAGNOSIS — I50.32 CHRONIC HEART FAILURE WITH PRESERVED EJECTION FRACTION: Primary | ICD-10-CM

## 2022-09-26 DIAGNOSIS — E83.42 HYPOMAGNESEMIA: ICD-10-CM

## 2022-09-26 DIAGNOSIS — Z99.89 OSA ON CPAP: ICD-10-CM

## 2022-09-26 DIAGNOSIS — E11.618 TYPE 2 DIABETES MELLITUS WITH OTHER DIABETIC ARTHROPATHY, WITH LONG-TERM CURRENT USE OF INSULIN: ICD-10-CM

## 2022-09-26 LAB
ABSOLUTE LUNG FLUID CONTENT: 28 % (ref 20–35)
ANION GAP SERPL CALCULATED.3IONS-SCNC: 12.1 MMOL/L (ref 5–15)
BUN SERPL-MCNC: 25 MG/DL (ref 8–23)
BUN/CREAT SERPL: 25.8 (ref 7–25)
CALCIUM SPEC-SCNC: 9.4 MG/DL (ref 8.6–10.5)
CHLORIDE SERPL-SCNC: 103 MMOL/L (ref 98–107)
CO2 SERPL-SCNC: 23.9 MMOL/L (ref 22–29)
CREAT SERPL-MCNC: 0.97 MG/DL (ref 0.57–1)
EGFRCR SERPLBLD CKD-EPI 2021: 66.6 ML/MIN/1.73
GLUCOSE SERPL-MCNC: 260 MG/DL (ref 65–99)
MAGNESIUM SERPL-MCNC: 1.4 MG/DL (ref 1.6–2.4)
NT-PROBNP SERPL-MCNC: 201.3 PG/ML (ref 0–900)
POTASSIUM SERPL-SCNC: 4.1 MMOL/L (ref 3.5–5.2)
SODIUM SERPL-SCNC: 139 MMOL/L (ref 136–145)

## 2022-09-26 PROCEDURE — 94726 PLETHYSMOGRAPHY LUNG VOLUMES: CPT | Performed by: PHYSICIAN ASSISTANT

## 2022-09-26 PROCEDURE — 83880 ASSAY OF NATRIURETIC PEPTIDE: CPT

## 2022-09-26 PROCEDURE — 83735 ASSAY OF MAGNESIUM: CPT | Performed by: PHYSICIAN ASSISTANT

## 2022-09-26 PROCEDURE — 80048 BASIC METABOLIC PNL TOTAL CA: CPT | Performed by: PHYSICIAN ASSISTANT

## 2022-09-26 PROCEDURE — 36415 COLL VENOUS BLD VENIPUNCTURE: CPT | Performed by: PHYSICIAN ASSISTANT

## 2022-09-26 PROCEDURE — 99215 OFFICE O/P EST HI 40 MIN: CPT | Performed by: PHYSICIAN ASSISTANT

## 2022-09-26 RX ORDER — FUROSEMIDE 40 MG/1
40 TABLET ORAL DAILY
Qty: 30 TABLET | Refills: 3 | Status: SHIPPED | OUTPATIENT
Start: 2022-09-26 | End: 2022-11-28

## 2022-09-26 RX ORDER — PRAVASTATIN SODIUM 40 MG
40 TABLET ORAL DAILY
Qty: 30 TABLET | Refills: 3 | Status: SHIPPED | OUTPATIENT
Start: 2022-09-26

## 2022-09-26 RX ORDER — METOPROLOL SUCCINATE 200 MG/1
200 TABLET, EXTENDED RELEASE ORAL DAILY
Qty: 90 TABLET | Refills: 1 | Status: SHIPPED | OUTPATIENT
Start: 2022-09-26 | End: 2022-11-28 | Stop reason: SDUPTHER

## 2022-09-26 RX ORDER — SPIRONOLACTONE 25 MG/1
25 TABLET ORAL DAILY
Qty: 90 TABLET | Refills: 1 | Status: SHIPPED | OUTPATIENT
Start: 2022-09-26 | End: 2023-01-26 | Stop reason: SDUPTHER

## 2022-09-26 NOTE — PROGRESS NOTES
[facility] Failure Clinic    Date: 09/26/22     Vitals:    09/26/22 1047   BP: 127/74   Pulse: 74   SpO2: 95%    weight 274    Method of arrival: Ambulatory    Weighing self daily: Yes    Monitoring Heart Failure Zones: Yes    Today's HF Zone: Yellow     Taking medications as prescribed: Yes    Edema Yes    Shortness of Air: Yes    Number of pillows used at night:<2    Educational Materials given:  avs                                                                         ReDS Value: 28  25-35 Optimal Value Status      Bryant Savage RN 09/26/22 10:52 EDT

## 2022-09-26 NOTE — PROGRESS NOTES
[facility]  ANNE Pinto APRN Cloud, Rebekah, APRN  [address]  [address]  [place] [address]    Thank you for asking me to see Zeina Reed for congestive heart failure.    HPI:     This is a 61 y.o. female with known past medical history of:    · FIDEL (CPAP)  · HFpEF  · LVEF 61-65% on 08/17/22.   · Essential HTN  · Uncontrolled DM type 2  · Fatty liver disease  · GERD  · COPD     Zeina Reed presents for today for HF referral from PCP.  The patient is typically seen by Nini Marie APRN.  Patient's primary cardiologist is Dr. Zapata & team.     • Last known EF 65% in 07/2021 with repeat echocardiogram pending at present.      • Last known hospitalization and/or ED visit: July 10, 2021 through July 15, 2021 with acute on chronic decompensated heart failure, obstructive sleep apnea uncontrolled diabetes and sepsis admission.    • Accompanied by: Spouse    • Patient was referred by her PCP's office after seeing Ann Marie and reporting worsening dyspnea on exertion and swelling in spite of Bumex 2mg BID.         Initial visit data/details regarding:   • Dyspnea: Significant improvement with dyspnea on exertion  • Lower extremity swelling: Improving but still present  • Abdominal swelling: Improved  • Home weight: Waxing/waning  • Home BP: Improved to 120s since changing to Toprol XL  • Home heart rate: 70s  • Daily activities of living: Performing independently  • Pillows/lying flat: 3 pillows  • HF zone: green   • Patient is doing well overall.  She has been adjusting her salt intake when her weight fluctuates and in attempts to help with blood pressures.   • She denies chest pain, dyspnea, cough, and wheeze.   • She denies fever/chills.       Specialists:   • Cardiology: Dr. Zapata    Review of Systems - Review of Systems   Constitutional: Negative for chills, diaphoresis, fever and malaise/fatigue.   HENT: Negative for congestion and ear  discharge.    Eyes: Negative for blurred vision and discharge.   Cardiovascular: Positive for leg swelling. Negative for chest pain, claudication, dyspnea on exertion, near-syncope, orthopnea and palpitations.   Respiratory: Negative for cough and hemoptysis.    Endocrine: Negative for cold intolerance and heat intolerance.   Hematologic/Lymphatic: Negative for adenopathy and bleeding problem.   Skin: Negative for color change and nail changes.   Musculoskeletal: Negative for arthritis.   Gastrointestinal: Negative for bloating and abdominal pain.   Genitourinary: Negative for bladder incontinence and decreased libido.   Neurological: Negative for aphonia and brief paralysis.   Psychiatric/Behavioral: Negative for altered mental status and depression.         All other systems were reviewed and were negative.    Patient Active Problem List   Diagnosis   • Palpitations   • Essential hypertension   • Dyslipidemia   • Dizziness   • Acute exacerbation of CHF (congestive heart failure) ([facility])   • SOB (shortness of breath)   • Morbid obesity with BMI of 45.0-49.9, adult ([facility])   • Physical deconditioning       family history includes Cancer in her father and mother; Heart attack in her father.     reports that she has never smoked. She has never used smokeless tobacco. She reports that she does not drink alcohol and does not use drugs.    Allergies   Allergen Reactions   • Augmentin [Amoxicillin-Pot Clavulanate] Diarrhea   • Metformin And Related Diarrhea         Current Outpatient Medications:   •  acetaminophen-codeine (TYLENOL #3) 300-30 MG per tablet, Take 1 tablet by mouth Every 12 (Twelve) Hours As Needed., Disp: 60 tablet, Rfl: 0  •  aspirin 81 MG EC tablet, Take 81 mg by mouth Daily., Disp: , Rfl:   •  busPIRone (BUSPAR) 5 MG tablet, Take 1 tablet by mouth 3 (Three) Times a Day. (Patient taking differently: Take 5 mg by mouth 3 (Three) Times a Day As Needed.), Disp: 90 tablet, Rfl: 2  •  dapagliflozin Propanediol  "(Farxiga) 10 MG tablet, Take 1 tablet (10 mg) by mouth daily, Disp: 30 tablet, Rfl: 5  •  dicyclomine (BENTYL) 10 MG capsule, Take 1 or 2 capsules by mouth 3 times per day., Disp: 60 capsule, Rfl: 1  •  fluticasone (FLONASE) 50 MCG/ACT nasal spray, Instill 2 sprays in each nostril daily., Disp: 16 g, Rfl: 5  •  Fluticasone-Salmeterol (Advair Diskus) 250-50 MCG/ACT DISKUS, Inhale 1 puff 2 (Two) Times a Day for 30 days., Disp: 60 each, Rfl: 0  •  furosemide (Lasix) 40 MG tablet, Take 1 tablet by mouth daily with an extra tablet as needed for swelling/shortness of breath., Disp: 30 tablet, Rfl: 3  •  gabapentin (NEURONTIN) 300 MG capsule, Take 1 capsule by mouth 3 (Three) Times a Day., Disp: 90 capsule, Rfl: 2  •  insulin aspart (NovoLOG FlexPen) 100 UNIT/ML solution pen-injector sc pen, Inject 12 Units under the skin into the appropriate area as directed 3 (Three) Times a Day., Disp: 15 mL, Rfl: 2  •  Insulin Pen Needle (NovoFine Plus Pen Needle) 32G X 4 MM misc, USE 1 TIME DAILY, Disp: 90 each, Rfl: 2  •  Insulin Syringe-Needle U-100 (BD Insulin Syringe U/F) 30G X 1/2\" 0.5 ML misc, Use to inject insulin under the skin four times a day as directed, Disp: 120 each, Rfl: 8  •  lansoprazole (PREVACID) 30 MG capsule, Take 1 capsule (30 mg) by mouth daily before a meal, Disp: 90 capsule, Rfl: 2  •  levothyroxine (Synthroid) 50 MCG tablet, Take 1 tablet by mouth daily in the morning on an empty stomach, Disp: 90 tablet, Rfl: 0  •  loratadine (Claritin) 10 MG tablet, Take 1 tablet (10 mg) by mouth daily, Disp: 90 tablet, Rfl: 3  •  losartan (Cozaar) 100 MG tablet, Take 1 tablet (100 mg) by mouth daily, Disp: 30 tablet, Rfl: 3  •  methocarbamol (ROBAXIN) 500 MG tablet, Take 1 tablet by mouth two times daily as needed, Disp: 60 tablet, Rfl: 3  •  metoprolol succinate XL (Toprol XL) 200 MG 24 hr tablet, Take 1 tablet by mouth Daily for 90 days. Hold for systolic blood pressure less than 110 (top number) and/or heart rate under " 60., Disp: 90 tablet, Rfl: 1  •  ondansetron ODT (ZOFRAN-ODT) 8 MG disintegrating tablet, Place 1 tablet (8 mg) on the tongue and allow to dissolve every 6 hours as needed, Disp: 30 tablet, Rfl: 0  •  potassium chloride ER (K-TAB) 20 MEQ tablet controlled-release ER tablet, Take 1 tablet by mouth Daily., Disp: 60 tablet, Rfl: 3  •  pravastatin (PRAVACHOL) 40 MG tablet, Take 1 tablet by mouth Daily., Disp: 30 tablet, Rfl: 3  •  SITagliptin-metFORMIN HCl ER (Janumet XR)  MG tablet, Take 2 tablets by mouth Daily., Disp: 180 tablet, Rfl: 2  •  spironolactone (Aldactone) 25 MG tablet, Take 1 tablet by mouth Daily for 90 days., Disp: 90 tablet, Rfl: 1  •  Magnesium 400 MG capsule, Take 400 mg by mouth Daily for 30 days., Disp: 30 capsule, Rfl: 0      Physical Exam:  I have reviewed the patient's current vital signs as documented in the patient's EMR.   Vitals:    22 1047   BP: 127/74   Pulse: 74   SpO2: 95%     Body mass index is 50.12 kg/m².       22  104   Weight: 124 kg (274 lb)          Physical Exam  Vitals and nursing note reviewed.   Constitutional:       General: She is awake.   HENT:      Head: Normocephalic and atraumatic.   Eyes:      General: Lids are normal.      Conjunctiva/sclera:      Right eye: Right conjunctiva is not injected.      Left eye: Left conjunctiva is not injected.   Neck:      Thyroid: No thyroid mass.      Vascular: No carotid bruit.   Cardiovascular:      Rate and Rhythm: Normal rate and regular rhythm.      Heart sounds: S1 normal and S2 normal.   Pulmonary:      Effort: No tachypnea or bradypnea.      Breath sounds: No decreased breath sounds, wheezing, rhonchi or rales.   Abdominal:      General: Bowel sounds are normal.      Palpations: Abdomen is soft.      Tenderness: There is no abdominal tenderness.   Musculoskeletal:      Cervical back: Full passive range of motion without pain.      Right lower le+ Edema present.      Left lower le+ Edema present.       Comments: Non pitting bilateral edema   Skin:     General: Skin is warm and moist.   Neurological:      Mental Status: She is alert and oriented to person, place, and time.   Psychiatric:         Attention and Perception: Attention normal.         Mood and Affect: Mood normal.         Behavior: Behavior is cooperative.            JVP: Volume/Pulsation: Normal.        DATA REVIEWED:     EKG. I personally reviewed and interpreted the EKG.          ---------------------------------------------------  TTE/KELVIN:  Results for orders placed during the hospital encounter of 08/17/22    Adult Transthoracic Echo Complete W/ Cont if Necessary Per Protocol    Interpretation Summary  · Normal left ventricular cavity size and wall thickness noted. All left ventricular wall segments contract normally.  · Left ventricular ejection fraction appears to be 61 - 65%.  · The aortic valve is structurally normal with no regurgitation or stenosis present.  · The mitral valve is structurally normal with no regurgitation or significant stenosis present.  · There is no evidence of pericardial effusion.        -----------------------------------------------------  CXR/Imaging:   Imaging Results (Most Recent)     None          I personally reviewed and interpreted the CXR.      -----------------------------------------------------  CT:   No radiology results for the last 30 days.  I personally reviewed the images of the CT scan.  My personal interpretation is below.      ----------------------------------------------------    PFTs:      --------------------------------------------------------------------------------------------------    Laboratory evaluations:    Lab Results   Component Value Date    GLUCOSE 260 (H) 09/26/2022    BUN 25 (H) 09/26/2022    CREATININE 0.97 09/26/2022    EGFRIFNONA 71 12/17/2021    EGFRIFAFRI  09/17/2016      Comment:      <15 Indicative of kidney failure.    BCR 25.8 (H) 09/26/2022    K 4.1 09/26/2022    CO2 23.9  09/26/2022    CALCIUM 9.4 09/26/2022    ALBUMIN 4.10 12/17/2021    LABIL2 1.4 (L) 02/12/2015    AST 16 12/17/2021    ALT 29 12/17/2021     Lab Results   Component Value Date    WBC 10.00 12/17/2021    HGB 12.3 12/17/2021    HCT 36.4 12/17/2021    MCV 90.5 12/17/2021     12/17/2021     Lab Results   Component Value Date    CHOL 230 (H) 12/17/2021    CHLPL 205 (H) 02/17/2016    TRIG 231 (H) 12/17/2021    HDL 56 12/17/2021     (H) 12/17/2021     Lab Results   Component Value Date    TSH 2.005 04/18/2017    L5PQRPV 9.40 04/18/2017     Lab Results   Component Value Date    HGBA1C 8.70 (H) 07/10/2021     Lab Results   Component Value Date    ALT 29 12/17/2021     Lab Results   Component Value Date    HGBA1C 8.70 (H) 07/10/2021    HGBA1C 8.90 (H) 04/18/2017    HGBA1C 8.0 (H) 02/12/2015     Lab Results   Component Value Date    CREATININE 0.97 09/26/2022     No results found for: IRON, TIBC, FERRITIN  No results found for: INR, PROTIME     Lab Results   Component Value Date    ABSOLUTELUNG 28 09/26/2022    ABSOLUTELUNG 28 08/26/2022    ABSOLUTELUNG 32 08/19/2022       PAH RISK ASSESSMENT:      1. Chronic heart failure with preserved ejection fraction (HCC)    2. Type 2 diabetes mellitus with other diabetic arthropathy, with long-term current use of insulin (HCC)    3. FIDEL on CPAP    4. NAFLD (nonalcoholic fatty liver disease)    5. Hypomagnesemia          ORDERS PLACED TODAY:  Orders Placed This Encounter   Procedures   • BNP   • Basic Metabolic Panel   • Magnesium   • ReDs Vest        Diagnoses and all orders for this visit:    1. Chronic heart failure with preserved ejection fraction (HCC) (Primary)  -     BNP  -     Basic Metabolic Panel; Standing  -     Magnesium; Standing  -     ReDs Vest  -     Basic Metabolic Panel  -     Magnesium    2. Type 2 diabetes mellitus with other diabetic arthropathy, with long-term current use of insulin (HCC)    3. FIDEL on CPAP    4. NAFLD (nonalcoholic fatty liver  disease)    5. Hypomagnesemia    Other orders  -     furosemide (Lasix) 40 MG tablet; Take 1 tablet by mouth daily with an extra tablet as needed for swelling/shortness of breath.  Dispense: 30 tablet; Refill: 3  -     metoprolol succinate XL (Toprol XL) 200 MG 24 hr tablet; Take 1 tablet by mouth Daily for 90 days. Hold for systolic blood pressure less than 110 (top number) and/or heart rate under 60.  Dispense: 90 tablet; Refill: 1  -     spironolactone (Aldactone) 25 MG tablet; Take 1 tablet by mouth Daily for 90 days.  Dispense: 90 tablet; Refill: 1  -     pravastatin (PRAVACHOL) 40 MG tablet; Take 1 tablet by mouth Daily.  Dispense: 30 tablet; Refill: 3  -     Magnesium 400 MG capsule; Take 400 mg by mouth Daily for 30 days.  Dispense: 30 capsule; Refill: 0             MEDS ORDERED TODAY:    New Medications Ordered This Visit   Medications   • furosemide (Lasix) 40 MG tablet     Sig: Take 1 tablet by mouth daily with an extra tablet as needed for swelling/shortness of breath.     Dispense:  30 tablet     Refill:  3   • metoprolol succinate XL (Toprol XL) 200 MG 24 hr tablet     Sig: Take 1 tablet by mouth Daily for 90 days. Hold for systolic blood pressure less than 110 (top number) and/or heart rate under 60.     Dispense:  90 tablet     Refill:  1   • spironolactone (Aldactone) 25 MG tablet     Sig: Take 1 tablet by mouth Daily for 90 days.     Dispense:  90 tablet     Refill:  1   • pravastatin (PRAVACHOL) 40 MG tablet     Sig: Take 1 tablet by mouth Daily.     Dispense:  30 tablet     Refill:  3   • Magnesium 400 MG capsule     Sig: Take 400 mg by mouth Daily for 30 days.     Dispense:  30 capsule     Refill:  0        ---------------------------------------------------------------------------------------------------------------------------          ASSESSMENT/PLAN:      Diagnosis Plan   1. Chronic heart failure with preserved ejection fraction (HCC)  BNP    Basic Metabolic Panel    Magnesium    ReDs Vest     Basic Metabolic Panel    Magnesium   2. Type 2 diabetes mellitus with other diabetic arthropathy, with long-term current use of insulin (HCC)     3. FIDEL on CPAP     4. NAFLD (nonalcoholic fatty liver disease)     5. Hypomagnesemia         acute on chronic diastolic heart failure. CHF.      NYHA stage B;FC-II.    Today, Patient is approaching euvolemiaand with  Moderate perfusion. The patient's hemodynamics are currently acceptable. HR is: normal and is at goal. BP/MAP was reviewed and there isroom for medication up-titration.  Clinical trajectory was assessed and haswaxed and waned.     CHF GOAL DIRECTED MEDICAL THERAPY FOR PATIENT ADDRESSED/ADJUSTED:       GDMT:     Drug Class   Drug   Dose Last Dose Adjustment Additional Titration   Notes   ACEi/ARB/ARNI Losartan  100mg qd      Beta Blocker Toprol XL  200mg      MRA  Spironolactone 25mg qd      SGLT2i Farxiga  10mg Sent to pharmacy N/A        -CHF Specific BB:   • Continue Toprol XL 200mg qd.    • Blood pressure log book reviewed with adequate space left until next visit.    • Echocardiogram read available and reviewed.    • Educated regarding slow titration and addition of medications with close monitoring.       -ACE/ARB/ARNi:   • Current dose: Losartan 100mg qd; has been on this dose for some time. Will continue for now.  • Entresto may be more optimal for blood pressure control in HFpEF but is not affordable at present for patient, and her blood pressures have improved since changing to Toprol XL.    • Baseline creatinine previously known to be 0.7-0.9     -MRA:   • The patient is FC-NYHA Class III and MRA is indicated.   • Continue Spironolactone 25mg qd.   • Continue decreased potassium supplementation for now with K 3.9 today with Lasix & MRA on board daily.    • Quarterly monitoring of potassium and renal function is currently recommended    -SGLT2 inhibitor therapy:   • A BMP at initiation to verify GFR >30 was recommended, as was interval GFR  surveillance.  • The patient was advised to hold SGLT2I when PO intake is restricted due to a planned surgery, or due to an underlying illness.   • Recommend continuing Farxiga (dapagliflozin) 10mg daily with quarterly assessment of GFR.   • Denies current  side effects.      -Diuretic regimen:   • ReDs Vest reading for 09/26/22 28; ReDs Vest reading reviewed with patient.    • Continue Lasix 40mg qd and instructed to take extra 40mg PRN for worsening shortness of breath, swelling, weight gain.   • Instructed to call clinic if symptoms worsen.    • BMP, Mag, & ProBNP reviewed with patient.      -Fluid restriction/Sodium restriction:   • Requested 2000 ml restriction  • Patient has been asked to weigh daily and was provided with a printed diuretic strategy.  • 1,500 mg Na restriction was discussed.  • Dietary information/Education provided.      -Acute vs. Chronic underlying conditions other than HF addressed during visit:     • COPD, stable without exacerbation:   o Breo is too expensive for patient and she has not been regularly filling.   o Alternative found with pharmacy and [employer] [facility] to [facility].   o Continue outpatient f/u with PCP.     • Hyperglycemia with IDDM:   o Last hemoglobin a1c 8.6 on 03/2022  o Insulin regimen reviewed by pharmacy.  Tresiba is more affordable for patient.    o Transitioned to Tresiba 50u qd per discussion with pharmacy staff; to start after she runs out of Levemir 25u BID.   o Continue Farxiga 10mg; refills sent.   o Continue outpatient PCP follow-up.     · FIDEL  · Continue home CPAP.     · Hypomagnesemia:   · Start 400mg of Mag daily.   · Sent to pharmacy.     · Essential HTN:   · Continue Losartan 100mg & Toprol XL 200mg.   · Importance of blood pressure optimization in HFpEF discussed at length with patient and her .      CONSIDERATIONS:   -Iron/Anemia in CHF:  • Hgb WNL.               Class 3 Severe Obesity (BMI >=40). Obesity-related health  conditions include the following: diabetes mellitus, dyslipidemias, peripheral vascular disease and osteoarthritis. Obesity is newly identified. BMI is is above average; BMI management plan is completed. We discussed portion control and increasing exercise.          45 minutes out of 60 minutes face to face spent counseling patient extensively on dietary Na+ intake, importance of activity, weight monitoring, compliance with medications in addition to importance of titration with goal directed medical therapy and follow up appointments.            This document has been electronically signed by Tawanna Arambula PA-C  September 26, 2022 11:47 EDT      Dictated Utilizing Dragon Dictation: Part of this note may be an electronic transcription/translation of spoken language to printed text using the Dragon Dictation System.    Follow-up appointment and medication changes provided in hand delivered After Visit Summary as well as reviewed in the room.

## 2022-09-26 NOTE — PROGRESS NOTES
Heart Failure Clinic  Pharmacist Note     Zeina Reed is a 61 y.o. female seen in the Heart Failure Clinic for HFpEF.  Zeina Reed reports a fair understanding of medications.  She denies any SOB, but reports that her weights and swelling have been fluctuating for the past week. Pt brought in her logs and her weights have been going back and forth from 268-272lbs and she report some swelling in her feet. Her logs shows that her BP have been more controlled as of last week with Bps in the 120-130's/high 60-70's with HR in the 70's. I asked if she had made any changes as to why her BP look better last week than the weeks prior and she reported that she had started using a new salt substitute. She reports taking her Pravastatin at bedtime as previously instructed and reports that the Advair Diskus is working well for her. Pt is currently finishing up the Levemir that she has remaining and will start the Tresiba after she runs out of the Levemir. Pt reports stopping Ibuprofen and therefore I took it off of her med list. She reports adherence to all of her medications, besides Spironolactone. She ran out of medication on Friday and has not had since then. She has been taking Lasix 40mg daily along with Potassium 20MEQ daily.       Medication Use:   Hx of med intolerances: None related to HF  Retail Rx Management: Uses our pharmacy regularly     Past Medical History:   Diagnosis Date   • Acute exacerbation of CHF (congestive heart failure) (HCC) 7/10/2021   • Arthritis    • Diabetes mellitus (HCC)    • Dyslipidemia    • History of transfusion    • Hyperlipidemia    • Hypertension    • Palpitations    • PONV (postoperative nausea and vomiting)      ALLERGIES: Augmentin [amoxicillin-pot clavulanate] and Metformin and related  Current Outpatient Medications   Medication Sig Dispense Refill   • acetaminophen-codeine (TYLENOL #3) 300-30 MG per tablet Take 1 tablet by mouth Every 12 (Twelve) Hours As Needed. 60 tablet 0   •  "aspirin 81 MG EC tablet Take 81 mg by mouth Daily.     • busPIRone (BUSPAR) 5 MG tablet Take 1 tablet by mouth 3 (Three) Times a Day. (Patient taking differently: Take 5 mg by mouth 3 (Three) Times a Day As Needed.) 90 tablet 2   • dapagliflozin Propanediol (Farxiga) 10 MG tablet Take 1 tablet (10 mg) by mouth daily 30 tablet 5   • dicyclomine (BENTYL) 10 MG capsule Take 1 or 2 capsules by mouth 3 times per day. 60 capsule 1   • fluticasone (FLONASE) 50 MCG/ACT nasal spray Instill 2 sprays in each nostril daily. 16 g 5   • Fluticasone-Salmeterol (Advair Diskus) 250-50 MCG/ACT DISKUS Inhale 1 puff 2 (Two) Times a Day for 30 days. 60 each 0   • furosemide (Lasix) 40 MG tablet Take 1 tablet by mouth daily with an extra tablet as needed for swelling/shortness of breath. 30 tablet 3   • gabapentin (NEURONTIN) 300 MG capsule Take 1 capsule by mouth 3 (Three) Times a Day. 90 capsule 2   • insulin aspart (NovoLOG FlexPen) 100 UNIT/ML solution pen-injector sc pen Inject 12 Units under the skin into the appropriate area as directed 3 (Three) Times a Day. 15 mL 2   • Insulin Pen Needle (NovoFine Plus Pen Needle) 32G X 4 MM misc USE 1 TIME DAILY 90 each 2   • Insulin Syringe-Needle U-100 (BD Insulin Syringe U/F) 30G X 1/2\" 0.5 ML misc Use to inject insulin under the skin four times a day as directed 120 each 8   • lansoprazole (PREVACID) 30 MG capsule Take 1 capsule (30 mg) by mouth daily before a meal 90 capsule 2   • levothyroxine (Synthroid) 50 MCG tablet Take 1 tablet by mouth daily in the morning on an empty stomach 90 tablet 0   • loratadine (Claritin) 10 MG tablet Take 1 tablet (10 mg) by mouth daily 90 tablet 3   • losartan (Cozaar) 100 MG tablet Take 1 tablet (100 mg) by mouth daily 30 tablet 3   • methocarbamol (ROBAXIN) 500 MG tablet Take 1 tablet by mouth two times daily as needed 60 tablet 3   • metoprolol succinate XL (Toprol XL) 200 MG 24 hr tablet Take 1 tablet by mouth Daily for 90 days. Hold for systolic blood " "pressure less than 110 (top number) and/or heart rate under 60. 90 tablet 1   • ondansetron ODT (ZOFRAN-ODT) 8 MG disintegrating tablet Place 1 tablet (8 mg) on the tongue and allow to dissolve every 6 hours as needed 30 tablet 0   • potassium chloride ER (K-TAB) 20 MEQ tablet controlled-release ER tablet Take 1 tablet by mouth Daily. 60 tablet 3   • pravastatin (PRAVACHOL) 40 MG tablet Take 1 tablet by mouth Daily. 30 tablet 3   • SITagliptin-metFORMIN HCl ER (Janumet XR)  MG tablet Take 2 tablets by mouth Daily. 180 tablet 2   • spironolactone (Aldactone) 25 MG tablet Take 1 tablet by mouth Daily for 90 days. 90 tablet 1     No current facility-administered medications for this encounter.       Vaccination History:   Pneumonia: not UTD  Annual Influenza: not UTD  COVID 19: not UTD    Objective  Vitals:    09/26/22 1047   BP: 127/74   BP Location: Left arm   Patient Position: Sitting   Cuff Size: Adult   Pulse: 74   SpO2: 95%   Weight: 124 kg (274 lb)   Height: 157.5 cm (62\")     Wt Readings from Last 3 Encounters:   09/26/22 124 kg (274 lb)   08/26/22 122 kg (269 lb 12.8 oz)   08/19/22 128 kg (281 lb 9.6 oz)         09/26/22  1047   Weight: 124 kg (274 lb)     Lab Results   Component Value Date    GLUCOSE 260 (H) 09/26/2022    BUN 25 (H) 09/26/2022    CREATININE 0.97 09/26/2022    EGFRIFNONA 71 12/17/2021    EGFRIFAFRI  09/17/2016      Comment:      <15 Indicative of kidney failure.    BCR 25.8 (H) 09/26/2022    K 4.1 09/26/2022    CO2 23.9 09/26/2022    CALCIUM 9.4 09/26/2022    ALBUMIN 4.10 12/17/2021    LABIL2 1.4 (L) 02/12/2015    AST 16 12/17/2021    ALT 29 12/17/2021     Lab Results   Component Value Date    WBC 10.00 12/17/2021    HGB 12.3 12/17/2021    HCT 36.4 12/17/2021    MCV 90.5 12/17/2021     12/17/2021     Lab Results   Component Value Date    TROPONINT <0.010 07/11/2021     Lab Results   Component Value Date    PROBNP 201.3 09/26/2022     Results for orders placed during the hospital " encounter of 08/17/22    Adult Transthoracic Echo Complete W/ Cont if Necessary Per Protocol    Interpretation Summary  · Normal left ventricular cavity size and wall thickness noted. All left ventricular wall segments contract normally.  · Left ventricular ejection fraction appears to be 61 - 65%.  · The aortic valve is structurally normal with no regurgitation or stenosis present.  · The mitral valve is structurally normal with no regurgitation or significant stenosis present.  · There is no evidence of pericardial effusion.         GDMT    Drug Class   Drug   Dose Last Dose Adjustment Additional Titration   Notes   ACEi/ARB/ARNI Losartan 100mg >3m     Beta Blocker Toprol XL 200mg  8/26/22     MRA Spironolactone 25mg 8/19/22     SGLT2i Farxiga 10mg  N/A        Drug Therapy Problems    1. GDMT  2. Continued edema  3. Patient requested refills on Spironolactone and Lasix   4. Hypomagnesium      Recommendations:     1. Pt tolerating recent change to Toprol XL 200mg daily and reports only holding 1 dose due to hold parameters. Would consider changing Losartan to Entresto in the future. Test claim shows copay of $60 per month and pt reports not wanting to change at this time.   2. Continue lasix 40mg daily and recommend adding in an additional dose or doubling of dose for continued swelling and weight gain as needed.   3. Tawanna to send in refills   4. Recommend a magnesium supplement- 400mg daily    Patient was educated on heart failure medications and the importance of medication adherence. All questions were addressed and patient expressed some understanding. She would benefit from additional education at next visit.     Thank you for allowing me to participate in the care of your patient,    Gwen Jas [facility]  09/26/22  11:41 EDT

## 2022-10-06 NOTE — CASE MANAGEMENT/SOCIAL WORK
Discharge Planning Assessment   [place]     Patient Name: Zeina Reed  MRN: [id number]  Today's Date: 7/15/2021    Admit Date: 7/10/2021      Discharge Plan     Row Name 07/15/21 1349       Plan    Final Discharge Disposition Code  01 - home or self-care    Final Note  Pt to be discharged home.              SEVERIANO MatosW    
Discharge Planning Assessment   [place]     Patient Name: Zeina Reed  MRN: [id number]  Today's Date: 7/14/2021    Admit Date: 7/10/2021        Discharge Plan     Row Name 07/14/21 1439       Plan    Plan  Pt admitted on 7/10/21.  Pt lives at home with spouse and plans to return home at discharge.  Pt currently does not utilize home health services or DME.  SS will follow.        MIKE Matos    
Discharge Planning Assessment   [place]     Patient Name: Zeina Reed  MRN: [id number]  Today's Date: 7/12/2021    Admit Date: 7/10/2021    Discharge Needs Assessment     Row Name 07/12/21 1050       Living Environment    Lives With  spouse    Name(s) of Who Lives With Patient  Lives w/ spouse, Kana.    Current Living Arrangements  home/apartment/condo    Primary Care Provided by  self    Provides Primary Care For  no one    Family Caregiver if Needed  spouse    Family Caregiver Sean Roger    Quality of Family Relationships  helpful;involved;supportive    Able to Return to Prior Arrangements  yes       Resource/Environmental Concerns    Resource/Environmental Concerns  none       Transition Planning    Patient/Family Anticipates Transition to  home with family    Patient/Family Anticipated Services at Transition  none    Transportation Anticipated  family or friend will provide       Discharge Needs Assessment    Equipment Currently Used at Home  none    Concerns to be Addressed  no discharge needs identified    Anticipated Changes Related to Illness  none    Equipment Needed After Discharge  none        Discharge Plan     Row Name 07/12/21 1050       Plan    Plan Pt lives w/ spouseKana. Pt does not utilize HH or DME. PCP is Rachel Savage. No POA or living will. Pt's spouse will provide transportation home at d/c. SS to follow and assist.    Patient/Family in Agreement with Plan  yes        Demographic Summary     Row Name 07/12/21 1054       General Information    Referral Source  nursing    Reason for Consult  -- discharge planning        GRETA Aguiar    
patient

## 2022-10-11 RX ORDER — FLUTICASONE PROPIONATE AND SALMETEROL 250; 50 UG/1; UG/1
1 POWDER RESPIRATORY (INHALATION)
Qty: 60 EACH | Refills: 0 | Status: SHIPPED | OUTPATIENT
Start: 2022-10-11 | End: 2022-11-28

## 2022-11-28 ENCOUNTER — HOSPITAL ENCOUNTER (OUTPATIENT)
Dept: CARDIOLOGY | Facility: HOSPITAL | Age: 62
Discharge: HOME | End: 2022-11-28
Admitting: PHYSICIAN ASSISTANT

## 2022-11-28 VITALS
HEART RATE: 70 BPM | BODY MASS INDEX: 50.61 KG/M2 | HEIGHT: 62 IN | SYSTOLIC BLOOD PRESSURE: 125 MMHG | DIASTOLIC BLOOD PRESSURE: 70 MMHG | OXYGEN SATURATION: 92 % | WEIGHT: 275 LBS

## 2022-11-28 DIAGNOSIS — I10 ESSENTIAL HYPERTENSION: ICD-10-CM

## 2022-11-28 DIAGNOSIS — I50.32 CHRONIC HEART FAILURE WITH PRESERVED EJECTION FRACTION (HFPEF): Primary | ICD-10-CM

## 2022-11-28 DIAGNOSIS — E66.01 MORBID OBESITY WITH BMI OF 45.0-49.9, ADULT: ICD-10-CM

## 2022-11-28 LAB
ABSOLUTE LUNG FLUID CONTENT: 28 % (ref 20–35)
ANION GAP SERPL CALCULATED.3IONS-SCNC: 12.1 MMOL/L (ref 5–15)
BUN SERPL-MCNC: 14 MG/DL (ref 8–23)
BUN/CREAT SERPL: 17.3 (ref 7–25)
CALCIUM SPEC-SCNC: 8.8 MG/DL (ref 8.6–10.5)
CHLORIDE SERPL-SCNC: 101 MMOL/L (ref 98–107)
CO2 SERPL-SCNC: 25.9 MMOL/L (ref 22–29)
CREAT SERPL-MCNC: 0.81 MG/DL (ref 0.57–1)
EGFRCR SERPLBLD CKD-EPI 2021: 82.2 ML/MIN/1.73
GLUCOSE SERPL-MCNC: 274 MG/DL (ref 65–99)
MAGNESIUM SERPL-MCNC: 1.6 MG/DL (ref 1.6–2.4)
NT-PROBNP SERPL-MCNC: 274.6 PG/ML (ref 0–900)
POTASSIUM SERPL-SCNC: 3.9 MMOL/L (ref 3.5–5.2)
SODIUM SERPL-SCNC: 139 MMOL/L (ref 136–145)

## 2022-11-28 PROCEDURE — 83735 ASSAY OF MAGNESIUM: CPT | Performed by: PHYSICIAN ASSISTANT

## 2022-11-28 PROCEDURE — 83880 ASSAY OF NATRIURETIC PEPTIDE: CPT | Performed by: PHYSICIAN ASSISTANT

## 2022-11-28 PROCEDURE — 99215 OFFICE O/P EST HI 40 MIN: CPT | Performed by: PHYSICIAN ASSISTANT

## 2022-11-28 PROCEDURE — 94726 PLETHYSMOGRAPHY LUNG VOLUMES: CPT | Performed by: PHYSICIAN ASSISTANT

## 2022-11-28 PROCEDURE — 80048 BASIC METABOLIC PNL TOTAL CA: CPT | Performed by: PHYSICIAN ASSISTANT

## 2022-11-28 RX ORDER — METOPROLOL SUCCINATE 200 MG/1
200 TABLET, EXTENDED RELEASE ORAL DAILY
Qty: 90 TABLET | Refills: 1 | Status: SHIPPED | OUTPATIENT
Start: 2022-11-28 | End: 2023-03-03 | Stop reason: SDUPTHER

## 2022-11-28 RX ORDER — DAPAGLIFLOZIN 10 MG/1
1 TABLET, FILM COATED ORAL DAILY
Qty: 30 TABLET | Refills: 5 | Status: SHIPPED | OUTPATIENT
Start: 2022-11-28 | End: 2023-03-03 | Stop reason: SDUPTHER

## 2022-11-28 RX ORDER — FLUTICASONE PROPIONATE AND SALMETEROL 250; 50 UG/1; UG/1
1 POWDER RESPIRATORY (INHALATION) 2 TIMES DAILY
COMMUNITY

## 2022-11-28 RX ORDER — FUROSEMIDE 40 MG/1
40 TABLET ORAL DAILY
COMMUNITY
End: 2023-03-03 | Stop reason: SDUPTHER

## 2022-11-28 NOTE — ADDENDUM NOTE
Encounter addended by: Genoveva Mcintyre, Hilarioed Rep on: 11/28/2022 2:55 PM   Actions taken: Result filed

## 2022-11-28 NOTE — PROGRESS NOTES
Heart Failure Clinic    Date: 11/28/22     Vitals:    11/28/22 1118   BP: 125/70   Pulse: 70   SpO2: 92%   weight: 275     Method of arrival: Ambulatory    Weighing self daily: Yes    Monitoring Heart Failure Zones: Yes    Today's HF Zone: Green    Taking medications as prescribed: Yes    Edema No    Shortness of Air: No    Number of pillows used at night:>3    Educational Materials given:  avs                                                                         ReDS Value: 28  25-35 Optimal Value Status      Chelita Galdamez Rep 11/28/22 11:23 EST

## 2022-11-28 NOTE — ADDENDUM NOTE
Encounter addended by: Genoveva Mcintyre, Chelita Rep on: 11/28/2022 2:54 PM   Actions taken: Charge Capture section accepted

## 2022-11-28 NOTE — PROGRESS NOTES
Heart Failure Clinic  Pharmacist Note     Zeina Reed is a 62 y.o. female seen in the Heart Failure Clinic for HFpEF.  Zeina Reed reports a fair understanding of medications.  She denies any SOB, but reports that her weights and swelling have been fluctuating for the past week and she report some swelling in her feet. She reports her home BP reading in the 130/60-70's with HR in the 60-70's and in the clinic it was 125/70 with HR 70 bpm. Pt reports to have held her Metoprolol 2-3 times since last visit due to low BP readings (SBP <100). Pt is currently finishing up the Levemir that she has remaining and will start the Tresiba after she runs out of the Levemir. She reports adherence to all of her medications.  She has been taking Lasix 40mg daily along with Potassium 20MEQ daily. She denies having to take any additional doses.      Medication Use:   Hx of med intolerances: None related to HF  Retail Rx Management: Uses our pharmacy regularly     Past Medical History:   Diagnosis Date   • Acute exacerbation of CHF (congestive heart failure) (HCC) 7/10/2021   • Arthritis    • Diabetes mellitus (HCC)    • Dyslipidemia    • History of transfusion    • Hyperlipidemia    • Hypertension    • Palpitations    • PONV (postoperative nausea and vomiting)      ALLERGIES: Augmentin [amoxicillin-pot clavulanate] and Metformin and related  Current Outpatient Medications   Medication Sig Dispense Refill   • acetaminophen-codeine (TYLENOL #3) 300-30 MG per tablet Take 1 tablet by mouth every 12 hours as needed. 60 tablet 0   • aspirin 81 MG EC tablet Take 81 mg by mouth Daily.     • busPIRone (BUSPAR) 5 MG tablet Take 1 tablet by mouth 3 (Three) Times a Day. (Patient taking differently: Take 5 mg by mouth 3 (Three) Times a Day As Needed.) 90 tablet 2   • dapagliflozin Propanediol (Farxiga) 10 MG tablet Take 1 tablet (10 mg) by mouth daily 30 tablet 5   • dicyclomine (BENTYL) 10 MG capsule Take 1 or 2 capsules by mouth 3 times per  "day. 60 capsule 1   • fluticasone (FLONASE) 50 MCG/ACT nasal spray Instill 2 sprays in each nostril daily. 16 g 5   • Fluticasone-Salmeterol (Advair Diskus) 250-50 MCG/ACT DISKUS Inhale 1 puff 2 (Two) Times a Day.     • furosemide (LASIX) 40 MG tablet Take 40 mg by mouth Daily. Take 1 tablet once a day and an additional dose as needed for swelling/shortness of breath     • gabapentin (NEURONTIN) 300 MG capsule Take 1 capsule (300 mg) by mouth 3 times per day 90 capsule 2   • insulin aspart (NovoLOG FlexPen) 100 UNIT/ML solution pen-injector sc pen Inject 12 units subcutaneously 3 times a day. 15 mL 2   • Insulin Pen Needle (NovoFine Plus Pen Needle) 32G X 4 MM misc USE 1 TIME DAILY 90 each 2   • Insulin Syringe 27G X 1/2\" 1 ML misc Use to inject insulin 4 tiimes daily. 500 each 0   • Insulin Syringe-Needle U-100 (BD Insulin Syringe U/F) 30G X 1/2\" 0.5 ML misc Use to inject insulin under the skin four times a day as directed 120 each 8   • lansoprazole (PREVACID) 30 MG capsule Take 1 capsule (30 mg) by mouth daily before a meal 90 capsule 2   • levothyroxine (Synthroid) 50 MCG tablet Take 1 tablet (50 mcg) by mouth daily in the morning on an empty stomach 90 tablet 1   • loratadine (Claritin) 10 MG tablet Take 1 tablet (10 mg) by mouth daily 90 tablet 3   • losartan (Cozaar) 100 MG tablet Take 1 tablet (100 mg) by mouth daily 90 tablet 3   • methocarbamol (ROBAXIN) 500 MG tablet Take 1 tablet by mouth two times daily as needed 60 tablet 3   • metoprolol succinate XL (Toprol XL) 200 MG 24 hr tablet Take 1 tablet by mouth Daily for 90 days. Hold for systolic blood pressure less than 110 (top number) and/or heart rate under 60. 90 tablet 1   • ondansetron ODT (ZOFRAN-ODT) 8 MG disintegrating tablet Place 1 tablet (8 mg) on the tongue and allow to dissolve every 6 hours as needed 30 tablet 0   • potassium chloride ER (K-TAB) 20 MEQ tablet controlled-release ER tablet Take 1 tablet by mouth Daily. 60 tablet 3   • " "pravastatin (PRAVACHOL) 40 MG tablet Take 1 tablet by mouth Daily. 30 tablet 3   • SITagliptin-metFORMIN HCl ER (Janumet XR)  MG tablet Take 2 tablets by mouth Daily. 180 tablet 2   • spironolactone (Aldactone) 25 MG tablet Take 1 tablet by mouth Daily for 90 days. 90 tablet 1   • influenza vac split quad (Flulaval Quadrivalent) 0.5 ML suspension prefilled syringe injection Inject 0.5 mL into the appropriate muscle as directed by prescriber 1 (One) Time for 1 dose. 0.5 mL 0   • insulin aspart (NovoLOG FlexPen) 100 UNIT/ML solution pen-injector sc pen Inject 12 Units under the skin into the appropriate area as directed 3 (Three) Times a Day. 15 mL 2   • levothyroxine (Synthroid) 50 MCG tablet Take 1 tablet by mouth daily in the morning on an empty stomach. 90 tablet 0   • losartan (Cozaar) 100 MG tablet Take 1 tablet (100 mg) by mouth daily 30 tablet 3   • Pneumococcal 20-[place] Conj Vacc (Prevnar 20) 0.5 ML suspension prefilled syringe vaccine Inject 0.5 mL into the appropriate muscle as directed by prescriber 1 (One) Time for 1 dose. 0.5 mL 0     No current facility-administered medications for this encounter.       Vaccination History:   Pneumonia: Prevnar 20 UTD 11/28/22  Annual Influenza: UTD 11/28/22  COVID 19: not UTD    Objective  Vitals:    11/28/22 1118   BP: 125/70   BP Location: Right arm   Patient Position: Sitting   Cuff Size: Adult   Pulse: 70   SpO2: 92%   Weight: 125 kg (275 lb)   Height: 157.5 cm (62\")     Wt Readings from Last 3 Encounters:   11/28/22 125 kg (275 lb)   09/26/22 124 kg (274 lb)   08/26/22 122 kg (269 lb 12.8 oz)         11/28/22  1118   Weight: 125 kg (275 lb)     Lab Results   Component Value Date    GLUCOSE 274 (H) 11/28/2022    BUN 14 11/28/2022    CREATININE 0.81 11/28/2022    EGFRIFNONA 71 12/17/2021    EGFRIFAFRI  09/17/2016      Comment:      <15 Indicative of kidney failure.    BCR 17.3 11/28/2022    K 3.9 11/28/2022    CO2 25.9 11/28/2022    CALCIUM 8.8 11/28/2022    " ALBUMIN 4.10 12/17/2021    LABIL2 1.4 (L) 02/12/2015    AST 16 12/17/2021    ALT 29 12/17/2021     Lab Results   Component Value Date    WBC 10.00 12/17/2021    HGB 12.3 12/17/2021    HCT 36.4 12/17/2021    MCV 90.5 12/17/2021     12/17/2021     Lab Results   Component Value Date    TROPONINT <0.010 07/11/2021     Lab Results   Component Value Date    PROBNP 274.6 11/28/2022     Results for orders placed during the hospital encounter of 08/17/22    Adult Transthoracic Echo Complete W/ Cont if Necessary Per Protocol    Interpretation Summary  · Normal left ventricular cavity size and wall thickness noted. All left ventricular wall segments contract normally.  · Left ventricular ejection fraction appears to be 61 - 65%.  · The aortic valve is structurally normal with no regurgitation or stenosis present.  · The mitral valve is structurally normal with no regurgitation or significant stenosis present.  · There is no evidence of pericardial effusion.         GDMT    Drug Class   Drug   Dose Last Dose Adjustment Additional Titration   Notes   ACEi/ARB/ARNI Losartan 100mg >3m     Beta Blocker Toprol XL 200mg  8/26/22     MRA Spironolactone 25mg 8/19/22     SGLT2i Farxiga 10mg  N/A        Drug Therapy Problems    1. GDMT  2. Vaccines      Recommendations:     1. Pt tolerating recent change to Toprol XL 200mg daily and reports only holding 2-3 doses due to hold parameters. Would consider changing Losartan to Entresto in the future. Test claim shows copay of $60 per month and pt reports not wanting to change at this time. Will offer switching to Entresto at the beginning of next year (01/2023) to see if co-pay would be lower.  2. Patient is eligible for Prevnar 20 and Flu vaccine. Vaccines were processed through the apothecary with copay $0, patient was interested in receiving the vaccines today. Will administer both injections today in-clinic.    Patient was educated on heart failure medications and the importance of  medication adherence. All questions were addressed and patient expressed some understanding. She would benefit from additional education at next visit.     Thank you for allowing me to participate in the care of your patient,    Makayla Chauhan, [facility]  11/28/22  12:56 EST

## 2022-11-28 NOTE — PROGRESS NOTES
[facility]  ANNE Pinto APRN Cloud, Rebekah, APRN  [address] [facility] [address]  [address]  [place] [address]    Thank you for asking me to see Zeina Reed for congestive heart failure.    HPI:     This is a 62 y.o. female with known past medical history of:    · FIDEL (CPAP)  · HFpEF  · LVEF 61-65% on 08/17/22.   · Essential HTN  · Uncontrolled DM type 2  · Fatty liver disease  · GERD  · COPD     Zeina Reed presents for today for HF referral from PCP.  The patient is typically seen by Nini Marie APRN.  Patient's primary cardiologist is Dr. Zapata & team.     • Last known EF 65% in 07/2021 with repeat echocardiogram pending at present.    • Last known hospitalization and/or ED visit: July 10, 2021 through July 15, 2021 with acute on chronic decompensated heart failure, obstructive sleep apnea uncontrolled diabetes and sepsis admission.  • Accompanied by: Spouse  • Patient was reerred by her PCP's office after seeing Ann Marie and reporting worsening dyspnea on exertion and swelling in spite of Bumex 2mg BID.         Initial visit data/details regarding:   • Dyspnea: Significant improvement with dyspnea on exertion  • Lower extremity swelling: Intermittent swelling.   • Abdominal swelling: Improved  • Home weight: Waxing/waning  • Home BP:120s systolic  • Home heart rate: 70s  • Daily activities of living: Performing independently  • Pillows/lying flat: 3 pillows  • HF zone: Green  • Mrs. Reed is chest pain free without shortness of breath.  She denies abdominal pain, nausea, and vomiting.   • She denies headache, fever, and chills.    • She is currently tolerating her medications well.  She reports she is doing well with her current diuretic dosage.         Specialists:   • Cardiology: Dr. Zapata    Review of Systems - Review of Systems   Constitutional: Negative for chills, diaphoresis, fever and malaise/fatigue.   HENT: Negative for congestion and ear  discharge.    Eyes: Negative for blurred vision and discharge.   Cardiovascular: Negative for chest pain, claudication, dyspnea on exertion, leg swelling, near-syncope, orthopnea and palpitations.   Respiratory: Negative for cough and hemoptysis.    Endocrine: Negative for cold intolerance and heat intolerance.   Hematologic/Lymphatic: Negative for adenopathy and bleeding problem.   Skin: Negative for color change and nail changes.   Musculoskeletal: Negative for arthritis.   Gastrointestinal: Negative for bloating and abdominal pain.   Genitourinary: Negative for bladder incontinence and decreased libido.   Neurological: Negative for aphonia and brief paralysis.   Psychiatric/Behavioral: Negative for altered mental status and depression.         All other systems were reviewed and were negative.    Patient Active Problem List   Diagnosis   • Palpitations   • Essential hypertension   • Dyslipidemia   • Dizziness   • Acute exacerbation of CHF (congestive heart failure) ([facility])   • SOB (shortness of breath)   • Morbid obesity with BMI of 45.0-49.9, adult ([facility])   • Physical deconditioning       family history includes Cancer in her father and mother; Heart attack in her father.     reports that she has never smoked. She has never used smokeless tobacco. She reports that she does not drink alcohol and does not use drugs.    Allergies   Allergen Reactions   • Augmentin [Amoxicillin-Pot Clavulanate] Diarrhea   • Metformin And Related Diarrhea         Current Outpatient Medications:   •  acetaminophen-codeine (TYLENOL #3) 300-30 MG per tablet, Take 1 tablet by mouth every 12 hours as needed., Disp: 60 tablet, Rfl: 0  •  aspirin 81 MG EC tablet, Take 81 mg by mouth Daily., Disp: , Rfl:   •  busPIRone (BUSPAR) 5 MG tablet, Take 1 tablet by mouth 3 (Three) Times a Day. (Patient taking differently: Take 5 mg by mouth 3 (Three) Times a Day As Needed.), Disp: 90 tablet, Rfl: 2  •  dapagliflozin Propanediol (Farxiga) 10 MG tablet,  "Take 1 tablet (10 mg) by mouth daily, Disp: 30 tablet, Rfl: 5  •  dicyclomine (BENTYL) 10 MG capsule, Take 1 or 2 capsules by mouth 3 times per day., Disp: 60 capsule, Rfl: 1  •  fluticasone (FLONASE) 50 MCG/ACT nasal spray, Instill 2 sprays in each nostril daily., Disp: 16 g, Rfl: 5  •  Fluticasone-Salmeterol (Advair Diskus) 250-50 MCG/ACT DISKUS, Inhale 1 puff 2 (Two) Times a Day., Disp: , Rfl:   •  furosemide (LASIX) 40 MG tablet, Take 40 mg by mouth Daily. Take 1 tablet once a day and an additional dose as needed for swelling/shortness of breath, Disp: , Rfl:   •  gabapentin (NEURONTIN) 300 MG capsule, Take 1 capsule (300 mg) by mouth 3 times per day, Disp: 90 capsule, Rfl: 2  •  insulin aspart (NovoLOG FlexPen) 100 UNIT/ML solution pen-injector sc pen, Inject 12 units subcutaneously 3 times a day., Disp: 15 mL, Rfl: 2  •  Insulin Pen Needle (NovoFine Plus Pen Needle) 32G X 4 MM misc, USE 1 TIME DAILY, Disp: 90 each, Rfl: 2  •  Insulin Syringe 27G X 1/2\" 1 ML misc, Use to inject insulin 4 tiimes daily., Disp: 500 each, Rfl: 0  •  Insulin Syringe-Needle U-100 (BD Insulin Syringe U/F) 30G X 1/2\" 0.5 ML misc, Use to inject insulin under the skin four times a day as directed, Disp: 120 each, Rfl: 8  •  lansoprazole (PREVACID) 30 MG capsule, Take 1 capsule (30 mg) by mouth daily before a meal, Disp: 90 capsule, Rfl: 2  •  levothyroxine (Synthroid) 50 MCG tablet, Take 1 tablet (50 mcg) by mouth daily in the morning on an empty stomach, Disp: 90 tablet, Rfl: 1  •  loratadine (Claritin) 10 MG tablet, Take 1 tablet (10 mg) by mouth daily, Disp: 90 tablet, Rfl: 3  •  losartan (Cozaar) 100 MG tablet, Take 1 tablet (100 mg) by mouth daily, Disp: 90 tablet, Rfl: 3  •  methocarbamol (ROBAXIN) 500 MG tablet, Take 1 tablet by mouth two times daily as needed, Disp: 60 tablet, Rfl: 3  •  metoprolol succinate XL (Toprol XL) 200 MG 24 hr tablet, Take 1 tablet by mouth Daily for 90 days. Hold for systolic blood pressure less than " 110 (top number) and/or heart rate under 60., Disp: 90 tablet, Rfl: 1  •  ondansetron ODT (ZOFRAN-ODT) 8 MG disintegrating tablet, Place 1 tablet (8 mg) on the tongue and allow to dissolve every 6 hours as needed, Disp: 30 tablet, Rfl: 0  •  potassium chloride ER (K-TAB) 20 MEQ tablet controlled-release ER tablet, Take 1 tablet by mouth Daily., Disp: 60 tablet, Rfl: 3  •  pravastatin (PRAVACHOL) 40 MG tablet, Take 1 tablet by mouth Daily., Disp: 30 tablet, Rfl: 3  •  SITagliptin-metFORMIN HCl ER (Janumet XR)  MG tablet, Take 2 tablets by mouth Daily., Disp: 180 tablet, Rfl: 2  •  spironolactone (Aldactone) 25 MG tablet, Take 1 tablet by mouth Daily for 90 days., Disp: 90 tablet, Rfl: 1  •  influenza vac split quad (Flulaval Quadrivalent) 0.5 ML suspension prefilled syringe injection, Inject 0.5 mL into the appropriate muscle as directed by prescriber 1 (One) Time for 1 dose., Disp: 0.5 mL, Rfl: 0  •  insulin aspart (NovoLOG FlexPen) 100 UNIT/ML solution pen-injector sc pen, Inject 12 Units under the skin into the appropriate area as directed 3 (Three) Times a Day., Disp: 15 mL, Rfl: 2  •  levothyroxine (Synthroid) 50 MCG tablet, Take 1 tablet by mouth daily in the morning on an empty stomach., Disp: 90 tablet, Rfl: 0  •  losartan (Cozaar) 100 MG tablet, Take 1 tablet (100 mg) by mouth daily, Disp: 30 tablet, Rfl: 3  •  Pneumococcal 20-[place] Conj Vacc (Prevnar 20) 0.5 ML suspension prefilled syringe vaccine, Inject 0.5 mL into the appropriate muscle as directed by prescriber 1 (One) Time for 1 dose., Disp: 0.5 mL, Rfl: 0      Physical Exam:  I have reviewed the patient's current vital signs as documented in the patient's EMR.   Vitals:    11/28/22 1118   BP: 125/70   Pulse: 70   SpO2: 92%     Body mass index is 50.3 kg/m².       11/28/22  1118   Weight: 125 kg (275 lb)          Physical Exam  Vitals and nursing note reviewed.   Constitutional:       General: She is awake.   HENT:      Head: Normocephalic and  atraumatic.   Eyes:      General: Lids are normal.      Conjunctiva/sclera:      Right eye: Right conjunctiva is not injected.      Left eye: Left conjunctiva is not injected.   Neck:      Thyroid: No thyroid mass.      Vascular: No carotid bruit.   Cardiovascular:      Rate and Rhythm: Normal rate and regular rhythm.      Heart sounds: S1 normal and S2 normal.   Pulmonary:      Effort: No tachypnea or bradypnea.      Breath sounds: No decreased breath sounds, wheezing, rhonchi or rales.   Abdominal:      General: Bowel sounds are normal.      Palpations: Abdomen is soft.      Tenderness: There is no abdominal tenderness.   Musculoskeletal:      Cervical back: Full passive range of motion without pain.      Right lower leg: No edema.      Left lower leg: No edema.      Comments: Non pitting bilateral edema   Skin:     General: Skin is warm and moist.   Neurological:      Mental Status: She is alert and oriented to person, place, and time.   Psychiatric:         Attention and Perception: Attention normal.         Mood and Affect: Mood normal.         Behavior: Behavior is cooperative.            JVP: Volume/Pulsation: Normal.        DATA REVIEWED:     EKG. I personally reviewed and interpreted the EKG.          ---------------------------------------------------  TTE/KELVIN:  Results for orders placed during the hospital encounter of 08/17/22    Adult Transthoracic Echo Complete W/ Cont if Necessary Per Protocol    Interpretation Summary  · Normal left ventricular cavity size and wall thickness noted. All left ventricular wall segments contract normally.  · Left ventricular ejection fraction appears to be 61 - 65%.  · The aortic valve is structurally normal with no regurgitation or stenosis present.  · The mitral valve is structurally normal with no regurgitation or significant stenosis present.  · There is no evidence of pericardial effusion.        -----------------------------------------------------  CXR/Imaging:    Imaging Results (Most Recent)     None          I personally reviewed and interpreted the CXR.      -----------------------------------------------------  CT:   No radiology results for the last 30 days.  I personally reviewed the images of the CT scan.  My personal interpretation is below.      ----------------------------------------------------    PFTs:      --------------------------------------------------------------------------------------------------    Laboratory evaluations:    Lab Results   Component Value Date    GLUCOSE 274 (H) 11/28/2022    BUN 14 11/28/2022    CREATININE 0.81 11/28/2022    EGFRIFNONA 71 12/17/2021    EGFRIFAFRI  09/17/2016      Comment:      <15 Indicative of kidney failure.    BCR 17.3 11/28/2022    K 3.9 11/28/2022    CO2 25.9 11/28/2022    CALCIUM 8.8 11/28/2022    ALBUMIN 4.10 12/17/2021    LABIL2 1.4 (L) 02/12/2015    AST 16 12/17/2021    ALT 29 12/17/2021     Lab Results   Component Value Date    WBC 10.00 12/17/2021    HGB 12.3 12/17/2021    HCT 36.4 12/17/2021    MCV 90.5 12/17/2021     12/17/2021     Lab Results   Component Value Date    CHOL 230 (H) 12/17/2021    CHLPL 205 (H) 02/17/2016    TRIG 231 (H) 12/17/2021    HDL 56 12/17/2021     (H) 12/17/2021     Lab Results   Component Value Date    TSH 2.005 04/18/2017    U1PHFWF 9.40 04/18/2017     Lab Results   Component Value Date    HGBA1C 8.70 (H) 07/10/2021     Lab Results   Component Value Date    ALT 29 12/17/2021     Lab Results   Component Value Date    HGBA1C 8.70 (H) 07/10/2021    HGBA1C 8.90 (H) 04/18/2017    HGBA1C 8.0 (H) 02/12/2015     Lab Results   Component Value Date    CREATININE 0.81 11/28/2022     No results found for: IRON, TIBC, FERRITIN  No results found for: INR, PROTIME     Lab Results   Component Value Date    ABSOLUTELUNG 28 09/26/2022    ABSOLUTELUNG 28 08/26/2022    ABSOLUTELUNG 32 08/19/2022       PAH RISK ASSESSMENT:      1. Chronic heart failure with preserved ejection fraction  (HFpEF) ([facility])    2. Essential hypertension    3. Morbid obesity with BMI of 45.0-49.9, adult ([facility])          ORDERS PLACED TODAY:  Orders Placed This Encounter   Procedures   • Basic Metabolic Panel   • Magnesium   • proBNP   • Basic Metabolic Panel   • Magnesium   • proBNP   • ReDs Vest        Diagnoses and all orders for this visit:    1. Chronic heart failure with preserved ejection fraction (HFpEF) ([facility]) (Primary)  -     Basic Metabolic Panel; Future  -     Magnesium; Future  -     proBNP; Future  -     Basic Metabolic Panel; Standing  -     Basic Metabolic Panel  -     Magnesium; Standing  -     Magnesium  -     proBNP; Standing  -     proBNP    2. Essential hypertension    3. Morbid obesity with BMI of 45.0-49.9, adult ([facility])    Other orders  -     ReDs Vest; Standing  -     ReDs Vest  -     dapagliflozin Propanediol (Farxiga) 10 MG tablet; Take 1 tablet (10 mg) by mouth daily  Dispense: 30 tablet; Refill: 5  -     metoprolol succinate XL (Toprol XL) 200 MG 24 hr tablet; Take 1 tablet by mouth Daily for 90 days. Hold for systolic blood pressure less than 110 (top number) and/or heart rate under 60.  Dispense: 90 tablet; Refill: 1             MEDS ORDERED TODAY:    New Medications Ordered This Visit   Medications   • dapagliflozin Propanediol (Farxiga) 10 MG tablet     Sig: Take 1 tablet (10 mg) by mouth daily     Dispense:  30 tablet     Refill:  5   • metoprolol succinate XL (Toprol XL) 200 MG 24 hr tablet     Sig: Take 1 tablet by mouth Daily for 90 days. Hold for systolic blood pressure less than 110 (top number) and/or heart rate under 60.     Dispense:  90 tablet     Refill:  1        ---------------------------------------------------------------------------------------------------------------------------          ASSESSMENT/PLAN:      Diagnosis Plan   1. Chronic heart failure with preserved ejection fraction (HFpEF) ([facility])  Basic Metabolic Panel    Magnesium    proBNP    Basic Metabolic Panel    Basic  Metabolic Panel    Magnesium    Magnesium    proBNP    proBNP      2. Essential hypertension        3. Morbid obesity with BMI of 45.0-49.9, adult ([facility])            acute on chronic diastolic heart failure. CHF.      NYHA stage B;FC-II.    Today, Patient is approaching euvolemiaand with  Moderate perfusion. The patient's hemodynamics are currently acceptable. HR is: normal and is at goal. BP/MAP was reviewed and there isroom for medication up-titration.  Clinical trajectory was assessed and haswaxed and waned.     CHF GOAL DIRECTED MEDICAL THERAPY FOR PATIENT ADDRESSED/ADJUSTED:       GDMT:     Drug Class   Drug   Dose Last Dose Adjustment Additional Titration   Notes   ACEi/ARB/ARNI Losartan  100mg qd      Beta Blocker Toprol XL  200mg      MRA  Spironolactone 25mg qd      SGLT2i Farxiga  10mg  N/A        -CHF Specific BB:   • Continue Toprol XL 200mg qd.     • Educated regarding slow titration and addition of medications with close monitoring.       -ACE/ARB/ARNi:   • Current dose: Losartan 100mg qd; has been on this dose for some time. Will continue for now.  • Entresto may be more optimal for blood pressure control in HFpEF but is not affordable at present for patient, and her blood pressures have improved since changing to Toprol XL.   Will consider in future, but for now, she is HFpEF with controlled blood pressures and we will hold off on adjusting.    • Baseline creatinine previously known to be 0.7-0.9     -MRA:   • The patient is FC-NYHA Class III and MRA is indicated.   • Continue Spironolactone 25mg qd.   • BMP reviewed and acceptable.  • Quarterly monitoring of potassium and renal function is currently recommended    -SGLT2 inhibitor therapy:   • A BMP at initiation to verify GFR >30 was recommended, as was interval GFR surveillance.  • The patient was advised to hold SGLT2I when PO intake is restricted due to a planned surgery, or due to an underlying illness.   • Recommend continuing Farxiga  (dapagliflozin) 10mg daily with quarterly assessment of GFR.   • Denies current  side effects.      -Diuretic regimen:   • ReDs Vest reading for 11/28/22 28; ReDs Vest reading reviewed with patient.    • Continue Lasix 40mg qd and instructed to take extra 40mg PRN for worsening shortness of breath, swelling, weight gain.   • Instructed to call clinic if symptoms worsen.    • BMP, Mag, & ProBNP reviewed with patient.      -Fluid restriction/Sodium restriction:   • Requested 2000 ml restriction  • Patient has been asked to weigh daily and was provided with a printed diuretic strategy.  • 1,500 mg Na restriction was discussed.  • Dietary information/Education provided.      -Acute vs. Chronic underlying conditions other than HF addressed during visit:     • COPD, stable without exacerbation:   o Continue outpatient f/u with PCP.     • Hyperglycemia with IDDM:   o Last hemoglobin a1c 8.6 on 03/2022  o Continue Farxiga 10mg; refills sent.   o Continue outpatient PCP follow-up.     · FIDEL  · Continue home CPAP.     · Essential HTN:   · Continue Losartan 100mg & Toprol XL 200mg.   · Importance of blood pressure optimization in HFpEF discussed at length with patient.  She reports she has been abiding by hold parameters and did have to hold medication one time.      CONSIDERATIONS:   -Iron/Anemia in CHF:  • Hgb WNL.               Class 3 Severe Obesity (BMI >=40). Obesity-related health conditions include the following: diabetes mellitus, dyslipidemias, peripheral vascular disease and osteoarthritis. Obesity is newly identified. BMI is is above average; BMI management plan is completed. We discussed portion control and increasing exercise.          >45 minutes out of 60 minutes face to face spent counseling patient extensively on dietary Na+ intake, importance of activity, weight monitoring, compliance with medications in addition to importance of titration with goal directed medical therapy and follow up  appointments.            This document has been electronically signed by Tawanna Arambula PA-C  November 28, 2022 12:42 EST      Dictated Utilizing Dragon Dictation: Part of this note may be an electronic transcription/translation of spoken language to printed text using the Dragon Dictation System.    Follow-up appointment and medication changes provided in hand delivered After Visit Summary as well as reviewed in the room.

## 2022-11-28 NOTE — ADDENDUM NOTE
Encounter addended by: Makayla Chauhan RP on: 11/28/2022 1:03 PM   Actions taken: Clinical Note Signed

## 2022-12-05 ENCOUNTER — OFFICE VISIT (OUTPATIENT)
Dept: CARDIOLOGY | Facility: CLINIC | Age: 62
End: 2022-12-05

## 2022-12-05 VITALS
BODY MASS INDEX: 50.42 KG/M2 | HEIGHT: 62 IN | DIASTOLIC BLOOD PRESSURE: 79 MMHG | SYSTOLIC BLOOD PRESSURE: 134 MMHG | WEIGHT: 274 LBS | RESPIRATION RATE: 16 BRPM | HEART RATE: 78 BPM

## 2022-12-05 DIAGNOSIS — E78.5 DYSLIPIDEMIA: ICD-10-CM

## 2022-12-05 DIAGNOSIS — I10 ESSENTIAL HYPERTENSION: Primary | ICD-10-CM

## 2022-12-05 PROCEDURE — 99213 OFFICE O/P EST LOW 20 MIN: CPT | Performed by: PHYSICIAN ASSISTANT

## 2022-12-05 PROCEDURE — 93000 ELECTROCARDIOGRAM COMPLETE: CPT | Performed by: PHYSICIAN ASSISTANT

## 2022-12-05 NOTE — PROGRESS NOTES
Nini Marie APRN  Zeina Reed  1960  12/05/2022    Patient Active Problem List   Diagnosis   • Palpitations   • Essential hypertension   • Dyslipidemia   • Dizziness   • Acute exacerbation of CHF (congestive heart failure) ([facility])   • SOB (shortness of breath)   • Morbid obesity with BMI of 45.0-49.9, adult ([facility])   • Physical deconditioning       Dear Nini Marie APRN:    Subjective     History of Present Illness:    Chief Complaint   Patient presents with   • Congestive Heart Failure     6 mos follow   • Palpitations     Races,skips   • Shortness of Breath     Routine activity   • Med Management     List provided       Zeina Reed is a pleasant 62 y.o. female with a past medical history significant for no known history of coronary artery disease or CVA nor history of tobacco abuse.  She does have history of essential hypertension, dyslipidemia, and history of palpitations.  She does have some family history of premature coronary artery disease with her father having a fatal MI in his early 50s.  Patient comes in for routine cardiology follow-up.     Zeina denies any chest pains today or worsening dyspnea from her baseline.  She does still have some chronic pedal edema but reports it has been stable recently.  Since she was last seen she has been following with heart failure clinic after she developed acute diastolic heart failure secondary to sepsis from her hospitalization in 2021.  She did have echocardiogram in August which showed normal diastolic function as well as normal systolic function with LVEF of 60 to 65%.  She has been on high-dose diuretics originally on Bumex 2 mg twice daily was recently changed to Lasix 40 mg twice daily she reports improvement in pedal edema.  She was also started on Aldactone however she reports to me that she has not been taking this.  She denies orthopnea or PND.    Allergies   Allergen Reactions   • Augmentin [Amoxicillin-Pot Clavulanate] Diarrhea   • Metformin  And Related Diarrhea   :      Current Outpatient Medications:   •  acetaminophen-codeine (TYLENOL #3) 300-30 MG per tablet, Take 1 tablet by mouth every 12 hours as needed., Disp: 60 tablet, Rfl: 0  •  aspirin 81 MG EC tablet, Take 81 mg by mouth Daily., Disp: , Rfl:   •  busPIRone (BUSPAR) 5 MG tablet, Take 1 tablet by mouth 3 (Three) Times a Day. (Patient taking differently: Take 5 mg by mouth 3 (Three) Times a Day As Needed.), Disp: 90 tablet, Rfl: 2  •  dapagliflozin Propanediol (Farxiga) 10 MG tablet, Take 1 tablet (10 mg) by mouth daily, Disp: 30 tablet, Rfl: 5  •  Fluticasone-Salmeterol (ADVAIR/WIXELA) 250-50 MCG/ACT DISKUS, Inhale 1 puff 2 (Two) Times a Day., Disp: , Rfl:   •  furosemide (LASIX) 40 MG tablet, Take 40 mg by mouth Daily. Take 1 tablet once a day and an additional dose as needed for swelling/shortness of breath, Disp: , Rfl:   •  gabapentin (NEURONTIN) 300 MG capsule, Take 1 capsule (300 mg) by mouth 3 times per day, Disp: 90 capsule, Rfl: 2  •  insulin aspart (NovoLOG FlexPen) 100 UNIT/ML solution pen-injector sc pen, Inject 12 Units under the skin into the appropriate area as directed 3 (Three) Times a Day., Disp: 15 mL, Rfl: 2  •  insulin detemir (LEVEMIR) 100 UNIT/ML injection, Inject 45 Units under the skin into the appropriate area as directed Daily., Disp: , Rfl:   •  lansoprazole (PREVACID) 30 MG capsule, Take 1 capsule (30 mg) by mouth daily before a meal, Disp: 90 capsule, Rfl: 2  •  levothyroxine (Synthroid) 50 MCG tablet, Take 1 tablet by mouth daily in the morning on an empty stomach., Disp: 90 tablet, Rfl: 0  •  loratadine (Claritin) 10 MG tablet, Take 1 tablet (10 mg) by mouth daily, Disp: 90 tablet, Rfl: 3  •  losartan (Cozaar) 100 MG tablet, Take 1 tablet (100 mg) by mouth daily, Disp: 30 tablet, Rfl: 3  •  methocarbamol (ROBAXIN) 500 MG tablet, Take 1 tablet by mouth two times daily as needed, Disp: 60 tablet, Rfl: 3  •  metoprolol succinate XL (Toprol XL) 200 MG 24 hr tablet,  "Take 1 tablet by mouth Daily for 90 days. Hold for systolic blood pressure less than 110 (top number) and/or heart rate under 60., Disp: 90 tablet, Rfl: 1  •  ondansetron ODT (ZOFRAN-ODT) 8 MG disintegrating tablet, Place 1 tablet (8 mg) on the tongue and allow to dissolve every 6 hours as needed, Disp: 30 tablet, Rfl: 0  •  potassium chloride ER (K-TAB) 20 MEQ tablet controlled-release ER tablet, Take 1 tablet by mouth Daily., Disp: 60 tablet, Rfl: 3  •  pravastatin (PRAVACHOL) 40 MG tablet, Take 1 tablet by mouth Daily., Disp: 30 tablet, Rfl: 3  •  SITagliptin-metFORMIN HCl ER (Janumet XR)  MG tablet, Take 2 tablets by mouth Daily., Disp: 180 tablet, Rfl: 2  •  spironolactone (Aldactone) 25 MG tablet, Take 1 tablet by mouth Daily for 90 days., Disp: 90 tablet, Rfl: 1  •  dicyclomine (BENTYL) 10 MG capsule, Take 1 or 2 capsules by mouth 3 times per day., Disp: 60 capsule, Rfl: 1  •  fluticasone (FLONASE) 50 MCG/ACT nasal spray, Instill 2 sprays in each nostril daily., Disp: 16 g, Rfl: 5  •  insulin aspart (NovoLOG FlexPen) 100 UNIT/ML solution pen-injector sc pen, Inject 12 units subcutaneously 3 times a day., Disp: 15 mL, Rfl: 2  •  Insulin Pen Needle (NovoFine Plus Pen Needle) 32G X 4 MM misc, USE 1 TIME DAILY, Disp: 90 each, Rfl: 2  •  Insulin Syringe-Needle U-100 (BD Insulin Syringe U/F) 30G X 1/2\" 0.5 ML misc, Use to inject insulin under the skin four times a day as directed, Disp: 120 each, Rfl: 8  •  Insulin Syringe-Needle U-100 (BD Insulin Syringe U/F) 30G X 1/2\" 0.5 ML misc, Use to inject insulin four times a day., Disp: 500 each, Rfl: 0  •  levothyroxine (Synthroid) 50 MCG tablet, Take 1 tablet (50 mcg) by mouth daily in the morning on an empty stomach, Disp: 90 tablet, Rfl: 1  •  losartan (Cozaar) 100 MG tablet, Take 1 tablet (100 mg) by mouth daily, Disp: 90 tablet, Rfl: 3    The following portions of the patient's history were reviewed and updated as appropriate: allergies, current medications, " "past family history, past medical history, past social history, past surgical history and problem list.    Social History     Tobacco Use   • Smoking status: Never   • Smokeless tobacco: Never   Vaping Use   • Vaping Use: Never used   Substance Use Topics   • Alcohol use: No   • Drug use: No         Objective   Vitals:    12/05/22 1553   BP: 134/79   Pulse: 78   Resp: 16   Weight: 124 kg (274 lb)   Height: 157.5 cm (62\")     Body mass index is 50.12 kg/m².    Constitutional:       General: Not in acute distress.     Appearance: Healthy appearance. Well-developed and not in distress. Not diaphoretic.      Comments: Morbid obesity   Eyes:      Conjunctiva/sclera: Conjunctivae normal.      Pupils: Pupils are equal, round, and reactive to light.   HENT:      Head: Normocephalic and atraumatic.   Neck:      Vascular: No carotid bruit or JVD.   Pulmonary:      Effort: Pulmonary effort is normal. No respiratory distress.      Breath sounds: Normal breath sounds.   Cardiovascular:      Normal rate. Regular rhythm.   Edema:     Pretibial: bilateral 1+ edema of the pretibial area.     Ankle: bilateral 1+ edema of the ankle.     Feet: bilateral 1+ edema of the feet.  Skin:     General: Skin is cool.   Neurological:      Mental Status: Alert, oriented to person, place, and time and oriented to person, place and time.         Lab Results   Component Value Date     11/28/2022    K 3.9 11/28/2022     11/28/2022    CO2 25.9 11/28/2022    BUN 14 11/28/2022    CREATININE 0.81 11/28/2022    GLUCOSE 274 (H) 11/28/2022    CALCIUM 8.8 11/28/2022    AST 16 12/17/2021    ALT 29 12/17/2021    ALKPHOS 120 (H) 12/17/2021    LABIL2 1.4 (L) 02/12/2015     No results found for: CKTOTAL  Lab Results   Component Value Date    WBC 10.00 12/17/2021    HGB 12.3 12/17/2021    HCT 36.4 12/17/2021     12/17/2021     No results found for: INR  Lab Results   Component Value Date    MG 1.6 11/28/2022     Lab Results   Component Value " Date    TSH 2.005 04/18/2017    CHLPL 205 (H) 02/17/2016    TRIG 231 (H) 12/17/2021    HDL 56 12/17/2021     (H) 12/17/2021      No results found for: BNP    During this visit the following were done:  Labs Reviewed []    Labs Ordered []    Radiology Reports Reviewed []    Radiology Ordered []    PCP Records Reviewed []    Referring Provider Records Reviewed []    ER Records Reviewed []    Hospital Records Reviewed []    History Obtained From Family []    Radiology Images Reviewed []    Other Reviewed []    Records Requested []         ECG 12 Lead    Date/Time: 12/5/2022 3:56 PM  Performed by: Chandler Langford PA-C  Authorized by: Chandler Langford PA-C   Comparison: compared with previous ECG   Similar to previous ECG  Rhythm: sinus rhythm  Conduction: conduction normal  ST Segments: ST segments normal    Clinical impression: non-specific ECG            Assessment & Plan    Diagnosis Plan   1. Essential hypertension        2. Dyslipidemia                 Recommendations:  1. Dyspnea  a. Likely noncardiac suspect more from pulmonary etiology from sleep apnea.  2. Pedal edema  a. Low suspicion of this being cardiac in etiology more likely from her obesity and sedentary lifestyle.  I did encourage regular exercise with a goal of trying to walk up to 20 to 30 minutes a day. She did state that she does have a treadmill and does wish to walk more regularly but struggles finding time due to caring for her grandchildren.   3. Hypotension  denia Pastrana reports she has been having episodes of low blood pressure with systolic around 100 to 110 mmHg.  She reports she has stopped Aldactone because of this and occasionally holds her metoprolol due to this.  If recurrent I recommended her cutting losartan to 50 mg daily and to continue current dose of metoprolol as she reports abrupt withdrawal side effect of  palpitations after she holds metoprolol.    No follow-ups on file.    As always, I appreciate very much the  opportunity to participate in the cardiovascular care of your patients.      With Best Regards,    Chandler Langford PA-C

## 2023-01-31 RX ORDER — PEN NEEDLE, DIABETIC 32 GX 1/4"
1 NEEDLE, DISPOSABLE MISCELLANEOUS DAILY
Qty: 100 EACH | Refills: 0 | Status: SHIPPED | OUTPATIENT
Start: 2023-01-31

## 2023-02-24 ENCOUNTER — DOCUMENTATION (OUTPATIENT)
Dept: CARDIOLOGY | Facility: HOSPITAL | Age: 63
End: 2023-02-24
Payer: COMMERCIAL

## 2023-02-24 NOTE — PROGRESS NOTES
pts daughter rescheduled her for march the 3rd at 11, it was full so I put her in a 10:30 spot just wanted to make a note that she isn't coming until 11:00

## 2023-03-03 ENCOUNTER — HOSPITAL ENCOUNTER (OUTPATIENT)
Dept: CARDIOLOGY | Facility: HOSPITAL | Age: 63
Discharge: HOME | End: 2023-03-03
Admitting: PHYSICIAN ASSISTANT
Payer: COMMERCIAL

## 2023-03-03 VITALS
DIASTOLIC BLOOD PRESSURE: 80 MMHG | WEIGHT: 286 LBS | SYSTOLIC BLOOD PRESSURE: 135 MMHG | HEIGHT: 62 IN | HEART RATE: 77 BPM | BODY MASS INDEX: 52.63 KG/M2 | OXYGEN SATURATION: 96 %

## 2023-03-03 DIAGNOSIS — E66.01 MORBID OBESITY WITH BMI OF 45.0-49.9, ADULT: ICD-10-CM

## 2023-03-03 DIAGNOSIS — R53.81 DEBILITY: ICD-10-CM

## 2023-03-03 DIAGNOSIS — I50.32 CHRONIC HEART FAILURE WITH PRESERVED EJECTION FRACTION (HFPEF): Primary | ICD-10-CM

## 2023-03-03 DIAGNOSIS — R53.81 PHYSICAL DECONDITIONING: ICD-10-CM

## 2023-03-03 DIAGNOSIS — I50.43 ACUTE ON CHRONIC COMBINED SYSTOLIC AND DIASTOLIC CONGESTIVE HEART FAILURE: ICD-10-CM

## 2023-03-03 LAB
ABSOLUTE LUNG FLUID CONTENT: 25 % (ref 20–35)
ANION GAP SERPL CALCULATED.3IONS-SCNC: 7.8 MMOL/L (ref 5–15)
BUN SERPL-MCNC: 18 MG/DL (ref 8–23)
BUN/CREAT SERPL: 24.7 (ref 7–25)
CALCIUM SPEC-SCNC: 8.8 MG/DL (ref 8.6–10.5)
CHLORIDE SERPL-SCNC: 102 MMOL/L (ref 98–107)
CO2 SERPL-SCNC: 26.2 MMOL/L (ref 22–29)
CREAT SERPL-MCNC: 0.73 MG/DL (ref 0.57–1)
EGFRCR SERPLBLD CKD-EPI 2021: 93.1 ML/MIN/1.73
GLUCOSE SERPL-MCNC: 241 MG/DL (ref 65–99)
MAGNESIUM SERPL-MCNC: 1.6 MG/DL (ref 1.6–2.4)
NT-PROBNP SERPL-MCNC: 232.5 PG/ML (ref 0–900)
POTASSIUM SERPL-SCNC: 4.1 MMOL/L (ref 3.5–5.2)
SODIUM SERPL-SCNC: 136 MMOL/L (ref 136–145)

## 2023-03-03 PROCEDURE — 99215 OFFICE O/P EST HI 40 MIN: CPT | Performed by: PHYSICIAN ASSISTANT

## 2023-03-03 PROCEDURE — 80048 BASIC METABOLIC PNL TOTAL CA: CPT | Performed by: PHYSICIAN ASSISTANT

## 2023-03-03 PROCEDURE — 83735 ASSAY OF MAGNESIUM: CPT | Performed by: PHYSICIAN ASSISTANT

## 2023-03-03 PROCEDURE — 94726 PLETHYSMOGRAPHY LUNG VOLUMES: CPT | Performed by: PHYSICIAN ASSISTANT

## 2023-03-03 PROCEDURE — 83880 ASSAY OF NATRIURETIC PEPTIDE: CPT | Performed by: PHYSICIAN ASSISTANT

## 2023-03-03 RX ORDER — DAPAGLIFLOZIN 10 MG/1
1 TABLET, FILM COATED ORAL DAILY
Qty: 30 TABLET | Refills: 5 | Status: SHIPPED | OUTPATIENT
Start: 2023-03-03

## 2023-03-03 RX ORDER — METOPROLOL SUCCINATE 200 MG/1
200 TABLET, EXTENDED RELEASE ORAL DAILY
Qty: 90 TABLET | Refills: 0 | Status: SHIPPED | OUTPATIENT
Start: 2023-03-03

## 2023-03-03 RX ORDER — SPIRONOLACTONE 25 MG/1
TABLET ORAL
Qty: 90 TABLET | Refills: 1 | Status: SHIPPED | OUTPATIENT
Start: 2023-03-03

## 2023-03-03 RX ORDER — FUROSEMIDE 40 MG/1
40 TABLET ORAL 2 TIMES DAILY
Qty: 180 TABLET | Refills: 0 | Status: SHIPPED | OUTPATIENT
Start: 2023-03-03

## 2023-03-03 RX ORDER — POTASSIUM CHLORIDE 1500 MG/1
20 TABLET, FILM COATED, EXTENDED RELEASE ORAL DAILY
Qty: 60 TABLET | Refills: 3
Start: 2023-03-03

## 2023-03-03 NOTE — PROGRESS NOTES
iutyygf g [facility]  ANNE Pinto APRN Cloud, Rebekah, APRN  [address] [facility]  [address]  [place] [address]    Thank you for asking me to see Zeina Reed for congestive heart failure.    HPI:     This is a 62 y.o. female with known past medical history of:    · FIDEL (CPAP)  · HFpEF  · LVEF 61-65% on 08/17/22.   · Essential HTN  · Uncontrolled DM type 2  · Fatty liver disease  · GERD  · COPD     Zeina Reed presents for today for HF referral from PCP.  The patient is typically seen by Nini Marie APRN.  Patient's primary cardiologist is Dr. Zapata & team.     • Last known EF 65% in 07/2021 with repeat echocardiogram pending at present.    • Last known hospitalization and/or ED visit: July 10, 2021 through July 15, 2021 with acute on chronic decompensated heart failure, obstructive sleep apnea uncontrolled diabetes and sepsis admission.  • Accompanied by: Spouse  • Patient was reerred by her PCP's office after seeing Ann Marie and reporting worsening dyspnea on exertion and swelling in spite of Bumex 2mg BID.         03/03/23 visit data/details regarding:   • Dyspnea: Dyspnea with prolonged exertion  • Lower extremity swelling: Intermittent swelling   • Abdominal swelling: Improved  • Home weight: Waxing/waning  • Home BP:130s-150s systolic  • Home heart rate: 70s  • Daily activities of living: Performing with assistance of family.   • Pillows/lying flat: 3 pillows  • HF zone: Green  • Patient is doing well overall. She reports it is very difficult to work with her combination of multiple medical co-morbidities. She reports prolonged dyspnea on exertion makes performing daily activities of living difficult.  She reports she has to often take breaks and sit for extended periods of time. She also reports she does have blindness in one of her eyes that is further complicating all of this  • From HF standpoint, she is compensated appearing. She is  using her daily Lasix at least once a day and sometimes the second dose in the evening.       Specialists:   • Cardiology: Dr. Zapata    Review of Systems - Review of Systems   Constitutional: Negative for chills, diaphoresis, fever and malaise/fatigue.   HENT: Negative for congestion and ear discharge.    Eyes: Negative for blurred vision and discharge.   Cardiovascular: Negative for chest pain, claudication, dyspnea on exertion, leg swelling, near-syncope, orthopnea and palpitations.   Respiratory: Negative for cough and hemoptysis.    Endocrine: Negative for cold intolerance and heat intolerance.   Hematologic/Lymphatic: Negative for adenopathy and bleeding problem.   Skin: Negative for color change and nail changes.   Musculoskeletal: Positive for arthritis.   Gastrointestinal: Negative for bloating and abdominal pain.   Genitourinary: Negative for bladder incontinence and decreased libido.   Neurological: Negative for aphonia and brief paralysis.   Psychiatric/Behavioral: Negative for altered mental status and depression.         All other systems were reviewed and were negative.    Patient Active Problem List   Diagnosis   • Palpitations   • Essential hypertension   • Dyslipidemia   • Dizziness   • Acute exacerbation of CHF (congestive heart failure) ([facility])   • SOB (shortness of breath)   • Morbid obesity with BMI of 45.0-49.9, adult ([facility])   • Physical deconditioning       family history includes Cancer in her father and mother; Heart attack in her father.     reports that she has never smoked. She has never used smokeless tobacco. She reports that she does not drink alcohol and does not use drugs.    Allergies   Allergen Reactions   • Augmentin [Amoxicillin-Pot Clavulanate] Diarrhea   • Metformin And Related Diarrhea         Current Outpatient Medications:   •  acetaminophen-codeine (TYLENOL #3) 300-30 MG per tablet, Take 1 tablet by mouth every 12 hours as needed., Disp: 60 tablet, Rfl: 0  •  aspirin 81 MG EC  "tablet, Take 1 tablet by mouth Daily., Disp: , Rfl:   •  busPIRone (BUSPAR) 5 MG tablet, Take 1 tablet by mouth 3 (Three) Times a Day. (Patient taking differently: Take 1 tablet by mouth 3 (Three) Times a Day As Needed.), Disp: 90 tablet, Rfl: 2  •  dapagliflozin Propanediol (Farxiga) 10 MG tablet, Take 1 tablet (10 mg) by mouth daily, Disp: 30 tablet, Rfl: 5  •  dicyclomine (BENTYL) 10 MG capsule, Take 1 or 2 capsules by mouth 3 times per day., Disp: 60 capsule, Rfl: 1  •  fluticasone (FLONASE) 50 MCG/ACT nasal spray, Instill 2 sprays in each nostril daily., Disp: 16 g, Rfl: 5  •  Fluticasone-Salmeterol (ADVAIR/WIXELA) 250-50 MCG/ACT DISKUS, Inhale 1 puff 2 (Two) Times a Day. Taking 1 time daily, Disp: , Rfl:   •  furosemide (LASIX) 40 MG tablet, Take 1 tablet by mouth 2 (Two) Times a Day. (Patient taking differently: Take 1 tablet by mouth 2 (Two) Times a Day. Taking second dose only as needed for swelling/weight gain), Disp: 180 tablet, Rfl: 0  •  gabapentin (NEURONTIN) 300 MG capsule, Take 1 capsule (300 mg) by mouth 3 times per day, Disp: 90 capsule, Rfl: 2  •  insulin aspart (NovoLOG FlexPen) 100 UNIT/ML solution pen-injector sc pen, Inject 12 Units under the skin into the appropriate area as directed 3 (Three) Times a Day., Disp: 15 mL, Rfl: 2  •  insulin aspart (NovoLOG FlexPen) 100 UNIT/ML solution pen-injector sc pen, Inject 12 units subcutaneously 3 times a day., Disp: 15 mL, Rfl: 2  •  Insulin Degludec (Tresiba FlexTouch) 200 UNIT/ML solution pen-injector pen injection, Inject 50 Units under the skin into the appropriate area as directed Daily for 30 days., Disp: 9 mL, Rfl: 3  •  Insulin Pen Needle (Novofine Pen Needle) 32G X 6 MM misc, Use 1 Daily as directed, Disp: 100 each, Rfl: 0  •  Insulin Pen Needle (NovoFine Plus Pen Needle) 32G X 4 MM misc, USE 1 TIME DAILY, Disp: 90 each, Rfl: 2  •  Insulin Syringe-Needle U-100 (BD Insulin Syringe U/F) 30G X 1/2\" 0.5 ML misc, Use to inject insulin under the " "skin four times a day as directed, Disp: 120 each, Rfl: 8  •  Insulin Syringe-Needle U-100 (BD Insulin Syringe U/F) 30G X 1/2\" 0.5 ML misc, Use to inject insulin four times a day., Disp: 500 each, Rfl: 0  •  lansoprazole (PREVACID) 30 MG capsule, Take 1 capsule (30 mg) by mouth daily before a meal, Disp: 90 capsule, Rfl: 2  •  lansoprazole (PREVACID) 30 MG capsule, Take 1 capsule (30 mg) by mouth daily before a meal, Disp: 90 capsule, Rfl: 2  •  levothyroxine (Synthroid) 50 MCG tablet, Take 1 tablet by mouth daily in the morning on an empty stomach., Disp: 90 tablet, Rfl: 0  •  loratadine (Claritin) 10 MG tablet, Take 1 tablet (10 mg) by mouth daily, Disp: 90 tablet, Rfl: 3  •  methocarbamol (ROBAXIN) 500 MG tablet, Take 1 tablet by mouth two times daily as needed, Disp: 60 tablet, Rfl: 3  •  metoprolol succinate XL (TOPROL-XL) 200 MG 24 hr tablet, Take 1 tablet by mouth Daily., Disp: 90 tablet, Rfl: 0  •  ondansetron ODT (ZOFRAN-ODT) 8 MG disintegrating tablet, Place 1 tablet (8 mg) on the tongue and allow to dissolve every 6 hours as needed, Disp: 30 tablet, Rfl: 0  •  ondansetron ODT (ZOFRAN-ODT) 8 MG disintegrating tablet, Place 1 tablet on the tongue and allow to dissolve Every 8 (Eight) Hours As Needed., Disp: 30 tablet, Rfl: 3  •  potassium chloride ER (K-TAB) 20 MEQ tablet controlled-release ER tablet, Take 1 tablet by mouth Daily., Disp: 60 tablet, Rfl: 3  •  pravastatin (PRAVACHOL) 40 MG tablet, Take 1 tablet by mouth Daily., Disp: 30 tablet, Rfl: 3  •  sacubitril-valsartan (ENTRESTO) 49-51 MG tablet, Take 1 tablet by mouth 2 (Two) Times a Day for 30 days., Disp: 60 tablet, Rfl: 6  •  SITagliptin-metFORMIN HCl ER (Janumet XR)  MG tablet, Take 2 tablets by mouth Daily., Disp: 180 tablet, Rfl: 2  •  spironolactone (ALDACTONE) 25 MG tablet, Take 1 tablet (25 mg) by mouth daily, Disp: 90 tablet, Rfl: 1  •  loratadine (Claritin) 10 MG tablet, Take 1 tablet (10 mg) by mouth daily, Disp: 90 tablet, Rfl: " 3      Physical Exam:  I have reviewed the patient's current vital signs as documented in the patient's EMR.   Vitals:    03/03/23 1118   BP: 135/80   Pulse: 77   SpO2: 96%     Body mass index is 52.31 kg/m².       03/03/23  1118   Weight: 130 kg (286 lb)        Physical Exam  Vitals and nursing note reviewed.   Constitutional:       General: She is awake.   HENT:      Head: Normocephalic and atraumatic.   Eyes:      General: Lids are normal.      Conjunctiva/sclera:      Right eye: Right conjunctiva is not injected.      Left eye: Left conjunctiva is not injected.   Neck:      Thyroid: No thyroid mass.      Vascular: No carotid bruit.   Cardiovascular:      Rate and Rhythm: Normal rate and regular rhythm.      Heart sounds: S1 normal and S2 normal.   Pulmonary:      Effort: No tachypnea or bradypnea.      Breath sounds: No decreased breath sounds, wheezing, rhonchi or rales.   Abdominal:      General: Bowel sounds are normal.      Palpations: Abdomen is soft.      Tenderness: There is no abdominal tenderness.   Musculoskeletal:      Cervical back: Full passive range of motion without pain.      Right lower leg: No edema.      Left lower leg: No edema.      Comments: Non pitting bilateral edema   Skin:     General: Skin is warm and moist.   Neurological:      Mental Status: She is alert and oriented to person, place, and time.   Psychiatric:         Attention and Perception: Attention normal.         Mood and Affect: Mood normal.         Behavior: Behavior is cooperative.            JVP: Volume/Pulsation: Normal.        DATA REVIEWED:     EKG. I personally reviewed and interpreted the EKG.          ---------------------------------------------------  TTE/KELVIN:  Results for orders placed during the hospital encounter of 08/17/22    Adult Transthoracic Echo Complete W/ Cont if Necessary Per Protocol    Interpretation Summary  · Normal left ventricular cavity size and wall thickness noted. All left ventricular wall  segments contract normally.  · Left ventricular ejection fraction appears to be 61 - 65%.  · The aortic valve is structurally normal with no regurgitation or stenosis present.  · The mitral valve is structurally normal with no regurgitation or significant stenosis present.  · There is no evidence of pericardial effusion.        -----------------------------------------------------  CXR/Imaging:   Imaging Results (Most Recent)     None          I personally reviewed and interpreted the CXR.      -----------------------------------------------------  CT:   No radiology results for the last 30 days.  I personally reviewed the images of the CT scan.  My personal interpretation is below.      ----------------------------------------------------    PFTs:      --------------------------------------------------------------------------------------------------    Laboratory evaluations:    Lab Results   Component Value Date    GLUCOSE 241 (H) 03/03/2023    BUN 18 03/03/2023    CREATININE 0.73 03/03/2023    EGFRIFNONA 71 12/17/2021    EGFRIFAFRI  09/17/2016      Comment:      <15 Indicative of kidney failure.    BCR 24.7 03/03/2023    K 4.1 03/03/2023    CO2 26.2 03/03/2023    CALCIUM 8.8 03/03/2023    ALBUMIN 4.10 12/17/2021    LABIL2 1.4 (L) 02/12/2015    AST 16 12/17/2021    ALT 29 12/17/2021     Lab Results   Component Value Date    WBC 10.00 12/17/2021    HGB 12.3 12/17/2021    HCT 36.4 12/17/2021    MCV 90.5 12/17/2021     12/17/2021     Lab Results   Component Value Date    CHOL 230 (H) 12/17/2021    CHLPL 205 (H) 02/17/2016    TRIG 231 (H) 12/17/2021    HDL 56 12/17/2021     (H) 12/17/2021     Lab Results   Component Value Date    TSH 2.005 04/18/2017    X3GSTRO 9.40 04/18/2017     Lab Results   Component Value Date    HGBA1C 8.70 (H) 07/10/2021     Lab Results   Component Value Date    ALT 29 12/17/2021     Lab Results   Component Value Date    HGBA1C 8.70 (H) 07/10/2021    HGBA1C 8.90 (H) 04/18/2017     HGBA1C 8.0 (H) 02/12/2015     Lab Results   Component Value Date    CREATININE 0.73 03/03/2023     No results found for: IRON, TIBC, FERRITIN  No results found for: INR, PROTIME     Lab Results   Component Value Date    ABSOLUTELUNG 28 11/28/2022    ABSOLUTELUNG 28 09/26/2022    ABSOLUTELUNG 28 08/26/2022       PAH RISK ASSESSMENT:      1. Chronic heart failure with preserved ejection fraction (HFpEF) ([facility])    2. Debility    3. Morbid obesity with BMI of 45.0-49.9, adult ([facility])    4. Physical deconditioning          ORDERS PLACED TODAY:  Orders Placed This Encounter   Procedures   • Basic Metabolic Panel   • proBNP   • Magnesium   • Basic Metabolic Panel   • proBNP   • Magnesium        Diagnoses and all orders for this visit:    1. Chronic heart failure with preserved ejection fraction (HFpEF) ([facility]) (Primary)  -     Basic Metabolic Panel; Future  -     proBNP; Future  -     Magnesium; Future  -     Basic Metabolic Panel; Standing  -     Basic Metabolic Panel  -     proBNP; Standing  -     proBNP  -     Magnesium; Standing  -     Magnesium    2. Debility    3. Morbid obesity with BMI of 45.0-49.9, adult ([facility])    4. Physical deconditioning    Other orders  -     dapagliflozin Propanediol (Farxiga) 10 MG tablet; Take 1 tablet (10 mg) by mouth daily  Dispense: 30 tablet; Refill: 5  -     sacubitril-valsartan (ENTRESTO) 49-51 MG tablet; Take 1 tablet by mouth 2 (Two) Times a Day for 30 days.  Dispense: 60 tablet; Refill: 6  -     furosemide (LASIX) 40 MG tablet; Take 1 tablet by mouth 2 (Two) Times a Day. (Patient taking differently: Take 1 tablet by mouth 2 (Two) Times a Day. Taking second dose only as needed for swelling/weight gain)  Dispense: 180 tablet; Refill: 0  -     metoprolol succinate XL (TOPROL-XL) 200 MG 24 hr tablet; Take 1 tablet by mouth Daily.  Dispense: 90 tablet; Refill: 0  -     potassium chloride ER (K-TAB) 20 MEQ tablet controlled-release ER tablet; Take 1 tablet by mouth Daily.  Dispense: 60 tablet;  Refill: 3  -     spironolactone (ALDACTONE) 25 MG tablet; Take 1 tablet (25 mg) by mouth daily  Dispense: 90 tablet; Refill: 1             MEDS ORDERED TODAY:    New Medications Ordered This Visit   Medications   • dapagliflozin Propanediol (Farxiga) 10 MG tablet     Sig: Take 1 tablet (10 mg) by mouth daily     Dispense:  30 tablet     Refill:  5   • sacubitril-valsartan (ENTRESTO) 49-51 MG tablet     Sig: Take 1 tablet by mouth 2 (Two) Times a Day for 30 days.     Dispense:  60 tablet     Refill:  6   • furosemide (LASIX) 40 MG tablet     Sig: Take 1 tablet by mouth 2 (Two) Times a Day.     Dispense:  180 tablet     Refill:  0   • metoprolol succinate XL (TOPROL-XL) 200 MG 24 hr tablet     Sig: Take 1 tablet by mouth Daily.     Dispense:  90 tablet     Refill:  0   • potassium chloride ER (K-TAB) 20 MEQ tablet controlled-release ER tablet     Sig: Take 1 tablet by mouth Daily.     Dispense:  60 tablet     Refill:  3   • spironolactone (ALDACTONE) 25 MG tablet     Sig: Take 1 tablet (25 mg) by mouth daily     Dispense:  90 tablet     Refill:  1        ---------------------------------------------------------------------------------------------------------------------------          ASSESSMENT/PLAN:      Diagnosis Plan   1. Chronic heart failure with preserved ejection fraction (HFpEF) ([facility])  Basic Metabolic Panel    proBNP    Magnesium    Basic Metabolic Panel    Basic Metabolic Panel    proBNP    proBNP    Magnesium    Magnesium      2. Debility        3. Morbid obesity with BMI of 45.0-49.9, adult ([facility])        4. Physical deconditioning            not acutely decompensated chronic diastolic heart failure. CHF.      NYHA stage B;FC-II.    Today, Patient is approaching euvolemiaand with  Moderate perfusion. The patient's hemodynamics are currently acceptable. HR is: normal and is at goal. BP/MAP was reviewed and there isroom for medication up-titration.  Clinical trajectory was assessed and haswaxed and waned.      CHF GOAL DIRECTED MEDICAL THERAPY FOR PATIENT ADDRESSED/ADJUSTED:       GDMT:     Drug Class   Drug   Dose Last Dose Adjustment Additional Titration   Notes   ACEi/ARB/ARNI Start Entresto 49-51mg BID     Beta Blocker Toprol XL  200mg      MRA  Spironolactone 25mg qd      SGLT2i Farxiga  10mg  N/A        -CHF Specific BB:   • Continue Toprol XL 200mg qd.     • Educated regarding slow titration and addition of medications with close monitoring.        -ACE/ARB/ARNi:   • Will start Entresto 49-51mg BID rather than Losartan, as the medication is now more affordable with increased insurance coverage.    • Baseline creatinine previously known to be 0.7-0.9     -MRA:   • The patient is FC-NYHA Class III and MRA is indicated.   • Continue Spironolactone 25mg qd.   • BMP reviewed and acceptable.  • Quarterly monitoring of potassium and renal function is currently recommended    -SGLT2 inhibitor therapy:   • A BMP at initiation to verify GFR >30 was recommended, as was interval GFR surveillance.  • The patient was advised to hold SGLT2I when PO intake is restricted due to a planned surgery, or due to an underlying illness.   • Recommend continuing Farxiga (dapagliflozin) 10mg daily with quarterly assessment of GFR.   • Denies current  side effects.      -Diuretic regimen:   • ReDs Vest reading for 03/03/23 25; ReDs Vest reading reviewed with patient.    • Continue Lasix 40mg qd and instructed to take extra 40mg PRN for worsening shortness of breath, swelling, weight gain.   • Instructed to call clinic if symptoms worsen.    • BMP, Mag, & ProBNP reviewed with patient.      -Fluid restriction/Sodium restriction:   • Requested 2000 ml restriction  • Patient has been asked to weigh daily and was provided with a printed diuretic strategy.  • 1,500 mg Na restriction was discussed.  • Dietary information/Education provided.      -Acute vs. Chronic underlying conditions other than HF addressed during visit:     • Debility:    • Deconditioning:   o While patient is compensated in regard to heart failure, she does have ongoing limitations with dyspnea on exertion and prolonged activity as a results of this illness.  She has great difficulties performing daily activities of living.     • COPD, stable without exacerbation:   o Continue outpatient f/u with PCP.     • Hyperglycemia with IDDM:   o Last hemoglobin a1c 8.6 on 03/2022  o Continue Farxiga 10mg; refills sent.   o Continue outpatient PCP follow-up.     · FIDEL  · Continue home CPAP.     · Essential HTN:   · Start Entresto 49-51mg BID & Toprol XL 200mg.   · Importance of blood pressure optimization in HFpEF discussed at length with patient.  She reports she has been abiding by hold parameters and did have to hold medication one time.      CONSIDERATIONS:   -Iron/Anemia in CHF:  • Hgb WNL.               Class 3 Severe Obesity (BMI >=40). Obesity-related health conditions include the following: diabetes mellitus, dyslipidemias, peripheral vascular disease and osteoarthritis. Obesity is newly identified. BMI is is above average; BMI management plan is completed. We discussed portion control and increasing exercise.          >45 minutes out of 60 minutes face to face spent counseling patient extensively on dietary Na+ intake, importance of activity, weight monitoring, compliance with medications in addition to importance of titration with goal directed medical therapy and follow up appointments.            This document has been electronically signed by Tawanna Arambula PA-C  March 3, 2023 13:00 EST      Dictated Utilizing Dragon Dictation: Part of this note may be an electronic transcription/translation of spoken language to printed text using the Dragon Dictation System.    Follow-up appointment and medication changes provided in hand delivered After Visit Summary as well as reviewed in the room.

## 2023-03-03 NOTE — PROGRESS NOTES
Heart Failure Clinic  Pharmacist Note     Zeina Reed is a 62 y.o. female seen in the Heart Failure Clinic for HFpEF.  Zeina Reed reports a fair understanding of medications.  She denies any SOB, but reports that her weights and swelling have been fluctuating for the past week and she report some swelling in her feet. She reports her home BP reading in the 130s-150s/70s. Pt reports not having to hold metoprolol due to BP much since last visit as she had needed to do previously. Pt is on Tresiba which she reports was $60 when she filled it in January which was more than she expected. Found copay card and loaded to patient's profile so that future fills would be $35. She reports adherence to all of her medications.  She has been taking Lasix 40mg daily plus a second dose daily as needed, per patient this is not often, along with Potassium 20MEQ daily. Patient had previously declined changing from losartan to entresto due to high copay ($60), was able to get price to $10 with copay card and a free 30 day supply added to patient profile.    Medication Use:   Hx of med intolerances: None related to HF  Retail Rx Management: Uses our pharmacy regularly     Past Medical History:   Diagnosis Date   • Acute exacerbation of CHF (congestive heart failure) (HCC) 7/10/2021   • Arthritis    • Diabetes mellitus (HCC)    • Dyslipidemia    • History of transfusion    • Hyperlipidemia    • Hypertension    • Palpitations    • PONV (postoperative nausea and vomiting)      ALLERGIES: Augmentin [amoxicillin-pot clavulanate] and Metformin and related  Current Outpatient Medications   Medication Sig Dispense Refill   • acetaminophen-codeine (TYLENOL #3) 300-30 MG per tablet Take 1 tablet by mouth every 12 hours as needed. 60 tablet 0   • aspirin 81 MG EC tablet Take 81 mg by mouth Daily.     • busPIRone (BUSPAR) 5 MG tablet Take 1 tablet by mouth 3 (Three) Times a Day. (Patient taking differently: Take 5 mg by mouth 3 (Three) Times a  "Day As Needed.) 90 tablet 2   • dapagliflozin Propanediol (Farxiga) 10 MG tablet Take 1 tablet (10 mg) by mouth daily 30 tablet 5   • dicyclomine (BENTYL) 10 MG capsule Take 1 or 2 capsules by mouth 3 times per day. 60 capsule 1   • fluconazole (DIFLUCAN) 150 MG tablet Take 1 tablet by mouth Daily for 1 week. 7 tablet 0   • fluticasone (FLONASE) 50 MCG/ACT nasal spray Instill 2 sprays in each nostril daily. 16 g 5   • Fluticasone-Salmeterol (ADVAIR/WIXELA) 250-50 MCG/ACT DISKUS Inhale 1 puff 2 (Two) Times a Day.     • furosemide (LASIX) 40 MG tablet Take 40 mg by mouth Daily. Take 1 tablet once a day and an additional dose as needed for swelling/shortness of breath     • furosemide (LASIX) 40 MG tablet Take 1 tablet by mouth 2 (Two) Times a Day. 180 tablet 0   • gabapentin (NEURONTIN) 300 MG capsule Take 1 capsule (300 mg) by mouth 3 times per day 90 capsule 2   • insulin aspart (NovoLOG FlexPen) 100 UNIT/ML solution pen-injector sc pen Inject 12 Units under the skin into the appropriate area as directed 3 (Three) Times a Day. 15 mL 2   • insulin aspart (NovoLOG FlexPen) 100 UNIT/ML solution pen-injector sc pen Inject 12 units subcutaneously 3 times a day. 15 mL 2   • Insulin Degludec (Tresiba FlexTouch) 200 UNIT/ML solution pen-injector pen injection Inject 50 Units under the skin into the appropriate area as directed Daily for 30 days. 9 mL 3   • insulin detemir (LEVEMIR) 100 UNIT/ML injection Inject 45 Units under the skin into the appropriate area as directed Daily.     • Insulin Pen Needle (Novofine Pen Needle) 32G X 6 MM misc Use 1 Daily as directed 100 each 0   • Insulin Pen Needle (NovoFine Plus Pen Needle) 32G X 4 MM misc USE 1 TIME DAILY 90 each 2   • Insulin Syringe-Needle U-100 (BD Insulin Syringe U/F) 30G X 1/2\" 0.5 ML misc Use to inject insulin under the skin four times a day as directed 120 each 8   • Insulin Syringe-Needle U-100 (BD Insulin Syringe U/F) 30G X 1/2\" 0.5 ML misc Use to inject insulin " four times a day. 500 each 0   • lansoprazole (PREVACID) 30 MG capsule Take 1 capsule (30 mg) by mouth daily before a meal 90 capsule 2   • lansoprazole (PREVACID) 30 MG capsule Take 1 capsule (30 mg) by mouth daily before a meal 90 capsule 2   • levothyroxine (Synthroid) 50 MCG tablet Take 1 tablet by mouth daily in the morning on an empty stomach. 90 tablet 0   • levothyroxine (Synthroid) 50 MCG tablet Take 1 tablet (50 mcg) by mouth daily in the morning on an empty stomach 90 tablet 1   • loratadine (Claritin) 10 MG tablet Take 1 tablet (10 mg) by mouth daily 90 tablet 3   • loratadine (Claritin) 10 MG tablet Take 1 tablet (10 mg) by mouth daily 90 tablet 3   • losartan (Cozaar) 100 MG tablet Take 1 tablet (100 mg) by mouth daily 30 tablet 3   • losartan (Cozaar) 100 MG tablet Take 1 tablet (100 mg) by mouth daily 90 tablet 3   • methocarbamol (ROBAXIN) 500 MG tablet Take 1 tablet by mouth two times daily as needed 60 tablet 3   • metoprolol succinate XL (Toprol XL) 200 MG 24 hr tablet Take 1 tablet by mouth Daily for 90 days. Hold for systolic blood pressure less than 110 (top number) and/or heart rate under 60. 90 tablet 1   • metoprolol succinate XL (TOPROL-XL) 200 MG 24 hr tablet Take 1 tablet by mouth Daily. 90 tablet 0   • ondansetron ODT (ZOFRAN-ODT) 8 MG disintegrating tablet Place 1 tablet (8 mg) on the tongue and allow to dissolve every 6 hours as needed 30 tablet 0   • ondansetron ODT (ZOFRAN-ODT) 8 MG disintegrating tablet Place 1 tablet on the tongue and allow to dissolve Every 8 (Eight) Hours As Needed. 30 tablet 3   • potassium chloride ER (K-TAB) 20 MEQ tablet controlled-release ER tablet Take 2 tablets (40 mEq) by mouth daily with food 60 tablet 3   • potassium chloride ER (K-TAB) 20 MEQ tablet controlled-release ER tablet Take 1 tablet by mouth Daily. 60 tablet 3   • pravastatin (PRAVACHOL) 40 MG tablet Take 1 tablet by mouth Daily. 30 tablet 3   • SITagliptin-metFORMIN HCl ER (Janumet XR)   MG tablet Take 2 tablets by mouth Daily. 180 tablet 2   • spironolactone (ALDACTONE) 25 MG tablet Take 1 tablet (25 mg) by mouth daily 90 tablet 1     No current facility-administered medications for this encounter.       Vaccination History:   Pneumonia: Prevnar 20 UTD 11/28/22  Annual Influenza: UTD 11/28/22  COVID 19: not UTD    Objective  There were no vitals filed for this visit.  Wt Readings from Last 3 Encounters:   12/05/22 124 kg (274 lb)   11/28/22 125 kg (275 lb)   09/26/22 124 kg (274 lb)     There were no vitals filed for this visit.  Lab Results   Component Value Date    GLUCOSE 274 (H) 11/28/2022    BUN 14 11/28/2022    CREATININE 0.81 11/28/2022    EGFRIFNONA 71 12/17/2021    EGFRIFAFRI  09/17/2016      Comment:      <15 Indicative of kidney failure.    BCR 17.3 11/28/2022    K 3.9 11/28/2022    CO2 25.9 11/28/2022    CALCIUM 8.8 11/28/2022    ALBUMIN 4.10 12/17/2021    LABIL2 1.4 (L) 02/12/2015    AST 16 12/17/2021    ALT 29 12/17/2021     Lab Results   Component Value Date    WBC 10.00 12/17/2021    HGB 12.3 12/17/2021    HCT 36.4 12/17/2021    MCV 90.5 12/17/2021     12/17/2021     Lab Results   Component Value Date    TROPONINT <0.010 07/11/2021     Lab Results   Component Value Date    PROBNP 274.6 11/28/2022     Results for orders placed during the hospital encounter of 08/17/22    Adult Transthoracic Echo Complete W/ Cont if Necessary Per Protocol    Interpretation Summary  · Normal left ventricular cavity size and wall thickness noted. All left ventricular wall segments contract normally.  · Left ventricular ejection fraction appears to be 61 - 65%.  · The aortic valve is structurally normal with no regurgitation or stenosis present.  · The mitral valve is structurally normal with no regurgitation or significant stenosis present.  · There is no evidence of pericardial effusion.         GDMT    Drug Class   Drug   Dose Last Dose Adjustment Additional Titration   Notes    ACEi/ARB/ARNI Losartan 100mg >3m     Beta Blocker Toprol XL 200mg  8/26/22     MRA Spironolactone 25mg 8/19/22     SGLT2i Farxiga 10mg  N/A        Drug Therapy Problems    1. GDMT      Recommendations:     1. Patient can be changed from losartan 100 mg daily to entresto 49/51 mg bid for titration of GDMT. Patient agreeable to $10 copay.    Patient was educated on heart failure medications and the importance of medication adherence. All questions were addressed and patient expressed some understanding. She would benefit from additional education at next visit.     Thank you for allowing me to participate in the care of your patient,    Damon De La Fuenteon, [facility]  03/03/23  11:13 EST

## 2023-03-24 RX ORDER — POTASSIUM CHLORIDE 20 MEQ/1
40 TABLET, EXTENDED RELEASE ORAL DAILY
Qty: 60 TABLET | Refills: 0 | Status: CANCELLED | OUTPATIENT
Start: 2023-03-24 | End: 2023-04-23

## 2023-03-27 RX ORDER — FLUTICASONE PROPIONATE AND SALMETEROL 250; 50 UG/1; UG/1
1 POWDER RESPIRATORY (INHALATION)
Qty: 60 EACH | Refills: 0 | Status: SHIPPED | OUTPATIENT
Start: 2023-03-27 | End: 2023-04-27

## 2023-05-19 ENCOUNTER — HOSPITAL ENCOUNTER (OUTPATIENT)
Dept: CARDIOLOGY | Facility: HOSPITAL | Age: 63
Discharge: HOME | End: 2023-05-19
Payer: COMMERCIAL

## 2023-05-19 VITALS
SYSTOLIC BLOOD PRESSURE: 130 MMHG | HEART RATE: 70 BPM | DIASTOLIC BLOOD PRESSURE: 79 MMHG | OXYGEN SATURATION: 96 % | WEIGHT: 282.6 LBS | HEIGHT: 62 IN | BODY MASS INDEX: 52.01 KG/M2

## 2023-05-19 DIAGNOSIS — E66.01 MORBID OBESITY WITH BMI OF 45.0-49.9, ADULT: Primary | ICD-10-CM

## 2023-05-19 DIAGNOSIS — F32.9 MAJOR DEPRESSIVE DISORDER WITH CURRENT ACTIVE EPISODE, UNSPECIFIED DEPRESSION EPISODE SEVERITY, UNSPECIFIED WHETHER RECURRENT: ICD-10-CM

## 2023-05-19 DIAGNOSIS — M19.90 OSTEOARTHRITIS, UNSPECIFIED OSTEOARTHRITIS TYPE, UNSPECIFIED SITE: ICD-10-CM

## 2023-05-19 DIAGNOSIS — I50.32 CHRONIC HEART FAILURE WITH PRESERVED EJECTION FRACTION (HFPEF): ICD-10-CM

## 2023-05-19 LAB — ABSOLUTE LUNG FLUID CONTENT: 25 % (ref 20–35)

## 2023-05-19 PROCEDURE — 94726 PLETHYSMOGRAPHY LUNG VOLUMES: CPT | Performed by: PHYSICIAN ASSISTANT

## 2023-05-19 RX ORDER — DULOXETIN HYDROCHLORIDE 30 MG/1
60 CAPSULE, DELAYED RELEASE ORAL DAILY
Qty: 60 CAPSULE | Refills: 2 | Status: SHIPPED | OUTPATIENT
Start: 2023-05-19 | End: 2023-06-18

## 2023-05-19 RX ORDER — DAPAGLIFLOZIN 10 MG/1
1 TABLET, FILM COATED ORAL DAILY
Qty: 30 TABLET | Refills: 5 | Status: SHIPPED | OUTPATIENT
Start: 2023-05-19

## 2023-05-19 NOTE — PROGRESS NOTES
Heart Failure Clinic    Date: 05/19/23     Vitals:    05/19/23 1300   BP: 130/79   Pulse: 70   SpO2: 96%        Method of arrival: Ambulatory    Weighing self daily: Yes    Monitoring Heart Failure Zones: Yes    Today's HF Zone: Green    Taking medications as prescribed: Yes    Edema Yes    Shortness of Air: Yes    Number of pillows used at night:>3    Educational Materials given:  avs                                                                         ReDS Value: 25  25-35 Optimal Value Status      Chelita Gaston Rep 05/19/23 13:01 EDT                                                                                                                                                                                Heart Failure Clinic    Date: 05/19/23     Vitals:    05/19/23 1300   BP: 130/79   Pulse: 70   SpO2: 96%   weight: 282.6     Method of arrival: Ambulatory    Weighing self daily: Most days    Monitoring Heart Failure Zones: Yes    Today's HF Zone: Green    Taking medications as prescribed: Yes    Edema Yes    Shortness of Air: Yes    Number of pillows used at night:>3    Educational Materials given:  avs                                                                         ReDS Value:   25-35 Optimal Value Status      Chelita Gaston Rep 05/19/23 13:01 EDT

## 2023-05-19 NOTE — PROGRESS NOTES
Heart Failure Clinic  Pharmacist Note     Zeina Reed is a 62 y.o. female seen in the Heart Failure Clinic for HFpEF.  Zeina Reed reports a fair understanding of medications.  She denies any SOB, but reports that her weights and swelling have been fluctuating for the past week and she report some swelling in her feet. She reports her home BP reading in the 130s-140s/70s. Pt reports she has had to hold her Toprol XL once or twice a week for SBP less than 110 and has been following those parameters. She reports that she can tell the day following that she held the dose because her BP and HR are more elevated than normal.      Medication Use:   Hx of med intolerances: None related to HF  Retail Rx Management: Tresiba co-pay card $35; Entresto co-pay card $10    Past Medical History:   Diagnosis Date   • Acute exacerbation of CHF (congestive heart failure) 7/10/2021   • Arthritis    • Diabetes mellitus    • Dyslipidemia    • History of transfusion    • Hyperlipidemia    • Hypertension    • Palpitations    • PONV (postoperative nausea and vomiting)      ALLERGIES: Augmentin [amoxicillin-pot clavulanate] and Metformin and related  Current Outpatient Medications   Medication Sig Dispense Refill   • acetaminophen-codeine (TYLENOL #3) 300-30 MG per tablet Take 1 tablet by mouth Every 12 (Twelve) Hours as needed 60 tablet 0   • aspirin 81 MG EC tablet Take 1 tablet by mouth Daily.     • busPIRone (BUSPAR) 5 MG tablet Take 1 tablet by mouth 3 (Three) Times a Day. (Patient taking differently: Take 1 tablet by mouth 3 (Three) Times a Day As Needed.) 90 tablet 2   • dapagliflozin Propanediol (Farxiga) 10 MG tablet Take 1 tablet (10 mg) by mouth daily 30 tablet 5   • fluticasone (FLONASE) 50 MCG/ACT nasal spray Instill 2 sprays in each nostril daily. 16 g 5   • Fluticasone-Salmeterol (Advair Diskus) 250-50 MCG/ACT DISKUS Inhale 1 puff by mouth 2 (Two) Times a Day. (Patient taking differently: Inhale 1 puff 2 (Two) Times a Day.  "5/19: pt thought she was only supposed to take once daily) 60 each 0   • furosemide (LASIX) 40 MG tablet Take 1 tablet by mouth 2 (Two) Times a Day. (Patient taking differently: Take 1 tablet by mouth 2 (Two) Times a Day. Taking second dose only as needed for swelling/weight gain) 180 tablet 0   • gabapentin (NEURONTIN) 300 MG capsule Take 1 capsule (300 mg) by mouth 3 times per day 90 capsule 2   • insulin aspart (NovoLOG FlexPen) 100 UNIT/ML solution pen-injector sc pen Inject 12 Units under the skin into the appropriate area as directed 3 (Three) Times a Day. 15 mL 2   • Insulin Degludec (Tresiba FlexTouch) 200 UNIT/ML solution pen-injector pen injection Inject 50 Units under the skin into the appropriate area as directed Daily for 30 days. 9 mL 3   • Insulin Pen Needle (Novofine Pen Needle) 32G X 6 MM misc Use 1 Daily as directed 100 each 0   • Insulin Pen Needle (NovoFine Plus Pen Needle) 32G X 4 MM misc USE 1 TIME DAILY 90 each 2   • Insulin Syringe-Needle U-100 (BD Insulin Syringe U/F) 30G X 1/2\" 0.5 ML misc Use to inject insulin under the skin four times a day as directed 120 each 8   • Insulin Syringe-Needle U-100 (BD Insulin Syringe U/F) 30G X 1/2\" 0.5 ML misc Use to inject insulin four times a day. 500 each 0   • lansoprazole (PREVACID) 30 MG capsule Take 1 capsule (30 mg) by mouth daily before a meal 90 capsule 2   • levothyroxine (Synthroid) 50 MCG tablet Take 1 tablet by mouth daily in the morning on an empty stomach. 90 tablet 0   • loratadine (Claritin) 10 MG tablet Take 1 tablet (10 mg) by mouth daily 90 tablet 3   • methocarbamol (ROBAXIN) 500 MG tablet Take 1 tablet by mouth two times daily as needed 60 tablet 3   • metoprolol succinate XL (TOPROL-XL) 200 MG 24 hr tablet Take 1 tablet by mouth Daily. 90 tablet 0   • ondansetron ODT (ZOFRAN-ODT) 8 MG disintegrating tablet Place 1 tablet on the tongue and allow to dissolve Every 8 (Eight) Hours As Needed. 30 tablet 3   • potassium chloride ER " (K-TAB) 20 MEQ tablet controlled-release ER tablet Take 1 tablet by mouth Daily. 60 tablet 3   • pravastatin (PRAVACHOL) 40 MG tablet Take 1 tablet by mouth Daily. 30 tablet 3   • sacubitril-valsartan (ENTRESTO) 49-51 MG tablet Take 1 tablet by mouth 2 (Two) Times a Day for 30 days. 60 tablet 6   • SITagliptin-metFORMIN HCl ER (Janumet XR)  MG tablet Take 2 tablets by mouth Daily. (Patient taking differently: Take 2 tablets by mouth Daily. Prior to [facility] Admission, Patient was on: Pt takes 1 tablet twice daily) 180 tablet 2   • SITagliptin-metFORMIN HCl ER (Janumet XR)  MG tablet Take 2 tablets by mouth Daily. 180 tablet 2   • spironolactone (ALDACTONE) 25 MG tablet Take 1 tablet (25 mg) by mouth daily 90 tablet 1   • DULoxetine (CYMBALTA) 30 MG capsule Take 1 capsule by mouth daily for 7 days, then increase to 2 capsules daily. 60 capsule 2   • Fluticasone-Salmeterol (ADVAIR/WIXELA) 250-50 MCG/ACT DISKUS Inhale 1 puff 2 (Two) Times a Day. Taking 1 time daily (Patient not taking: Reported on 5/19/2023)     • insulin aspart (NovoLOG FlexPen) 100 UNIT/ML solution pen-injector sc pen Inject 12 units subcutaneously 3 times a day. (Patient not taking: Reported on 5/19/2023) 15 mL 2   • lansoprazole (PREVACID) 30 MG capsule Take 1 capsule (30 mg) by mouth daily before a meal (Patient not taking: Reported on 5/19/2023) 90 capsule 2   • loratadine (Claritin) 10 MG tablet Take 1 tablet (10 mg) by mouth daily (Patient not taking: Reported on 5/19/2023) 90 tablet 3   • loratadine (Claritin) 10 MG tablet Take 1 tablet (10 mg) by mouth daily (Patient not taking: Reported on 5/19/2023) 90 tablet 3   • potassium chloride ER (K-TAB) 20 MEQ tablet controlled-release ER tablet Take 2 tablets (40 mEq) by mouth daily with food (Patient not taking: Reported on 5/19/2023) 60 tablet 3   • spironolactone (ALDACTONE) 25 MG tablet Take 1 tablet (25 mg) by mouth daily (Patient not taking: Reported on 5/19/2023) 90 tablet 1  "    No current facility-administered medications for this encounter.       Vaccination History:   Pneumonia: Prevnar 20 UTD 11/28/22  Annual Influenza: UTD 11/28/22  COVID 19: not UTD    Objective  Vitals:    05/19/23 1300   BP: 130/79   BP Location: Right arm   Patient Position: Sitting   Cuff Size: Adult   Pulse: 70   SpO2: 96%   Weight: 128 kg (282 lb 9.6 oz)   Height: 157.5 cm (62\")     Wt Readings from Last 3 Encounters:   05/19/23 128 kg (282 lb 9.6 oz)   03/03/23 130 kg (286 lb)   12/05/22 124 kg (274 lb)         05/19/23  1300   Weight: 128 kg (282 lb 9.6 oz)     Lab Results   Component Value Date    GLUCOSE 241 (H) 03/03/2023    BUN 18 03/03/2023    CREATININE 0.73 03/03/2023    EGFRIFNONA 71 12/17/2021    EGFRIFAFRI  09/17/2016      Comment:      <15 Indicative of kidney failure.    BCR 24.7 03/03/2023    K 4.1 03/03/2023    CO2 26.2 03/03/2023    CALCIUM 8.8 03/03/2023    ALBUMIN 4.10 12/17/2021    LABIL2 1.4 (L) 02/12/2015    AST 16 12/17/2021    ALT 29 12/17/2021     Lab Results   Component Value Date    WBC 10.00 12/17/2021    HGB 12.3 12/17/2021    HCT 36.4 12/17/2021    MCV 90.5 12/17/2021     12/17/2021     Lab Results   Component Value Date    TROPONINT <0.010 07/11/2021     Lab Results   Component Value Date    PROBNP 232.5 03/03/2023     Results for orders placed during the hospital encounter of 08/17/22    Adult Transthoracic Echo Complete W/ Cont if Necessary Per Protocol    Interpretation Summary  · Normal left ventricular cavity size and wall thickness noted. All left ventricular wall segments contract normally.  · Left ventricular ejection fraction appears to be 61 - 65%.  · The aortic valve is structurally normal with no regurgitation or stenosis present.  · The mitral valve is structurally normal with no regurgitation or significant stenosis present.  · There is no evidence of pericardial effusion.         GDMT    Drug Class   Drug   Dose Last Dose Adjustment Additional Titration "   Notes   ACEi/ARB/ARNI Losartan 100mg >3m     Beta Blocker Toprol XL 200mg  8/26/22     MRA Spironolactone 25mg 8/19/22     SGLT2i Farxiga 10mg  N/A        Drug Therapy Problems    1. GDMT  2. Drug therapy confusion      Recommendations:     1. May consider decreasing Toprol XL dose slightly since patient is reporting holding one or two doses weekly to try and target more consistency in doses.   2. Pt Entresto was not showing on med list and does not appear she has been taking since last filled in March. Patient reported to Tawanna that she is taking it. Called the [facility]othe[facility] and confirmed the Rx was still active and went through for the $10 co-pay. Tawanna made aware and she wanted to continue with current dose of Entresto. Would recommend close follow up with labs with the confusion to ensure patient is tolerating Entresto dosing. Monitor K+ as patient is on K+ supplement, Entresto, and Spironolactone.      Patient was educated on heart failure medications and the importance of medication adherence. All questions were addressed and patient expressed some understanding. She would benefit from additional education at next visit.     Thank you for allowing me to participate in the care of your patient,    Makayla Ibeth. Andres, PharmD  05/19/23  16:35 EDT

## 2023-05-19 NOTE — PROGRESS NOTES
[id number] g [facility]  ANNE Pinto APRN Cloud, Rebekah, APRN  [address] [facility]  [address]  [place] [address]    Thank you for asking me to see Zeina Reed for congestive heart failure.    HPI:     This is a 62 y.o. female with known past medical history of:    · FIDEL (CPAP)  · HFpEF  · LVEF 61-65% on 08/17/22.   · Essential HTN  · Uncontrolled DM type 2  · Fatty liver disease  · GERD  · COPD     Zeina Reed presents for today for HF referral from PCP.  The patient is typically seen by Nini Marie APRN.  Patient's primary cardiologist is Dr. Zapata & team.     • Last known EF 65% in 07/2021 with repeat echocardiogram pending at present.    • Last known hospitalization and/or ED visit: July 10, 2021 through July 15, 2021 with acute on chronic decompensated heart failure, obstructive sleep apnea uncontrolled diabetes and sepsis admission.  • Accompanied by: Spouse  • Patient was reerred by her PCP's office after seeing Ann Marie and reporting worsening dyspnea on exertion and swelling in spite of Bumex 2mg BID.         05/19/23 visit data/details regarding:   • Dyspnea: Dyspnea with prolonged exertion  • Lower extremity swelling: Intermittent swelling   • Abdominal swelling: Improved  • Home weight: Waxing/waning  • Home BP:130s systolic  • Home heart rate: 70s  • Daily activities of living: Performing with assistance of family.   • Pillows/lying flat: 3 pillows  • HF zone: Green  • Patient continues to be chest pain-free and without shortness of breath.  We do discussed underlying depression with multiple medical comorbidities and debility secondary to osteoarthritis as well as physical deconditioning.  We discussed moving forward possibly with medication.   • From HF standpoint, she is compensated appearing. She is using her daily Lasix at least once a day and sometimes the second dose in the evening.       Specialists:   • Cardiology:   Benny    Review of Systems - Review of Systems   Constitutional: Negative for chills, diaphoresis, fever and malaise/fatigue.   HENT: Negative for congestion and ear discharge.    Eyes: Negative for blurred vision and discharge.   Cardiovascular: Negative for chest pain, claudication, dyspnea on exertion, leg swelling, near-syncope, orthopnea and palpitations.   Respiratory: Negative for cough and hemoptysis.    Endocrine: Negative for cold intolerance and heat intolerance.   Hematologic/Lymphatic: Negative for adenopathy and bleeding problem.   Skin: Negative for color change and nail changes.   Musculoskeletal: Positive for arthritis.   Gastrointestinal: Negative for bloating and abdominal pain.   Genitourinary: Negative for bladder incontinence and decreased libido.   Neurological: Negative for aphonia and brief paralysis.   Psychiatric/Behavioral: Negative for altered mental status and depression.         All other systems were reviewed and were negative.    Patient Active Problem List   Diagnosis   • Palpitations   • Essential hypertension   • Dyslipidemia   • Dizziness   • Acute exacerbation of CHF (congestive heart failure)   • SOB (shortness of breath)   • Morbid obesity with BMI of 45.0-49.9, adult   • Physical deconditioning   • Chronic heart failure with preserved ejection fraction (HFpEF)   • MDD (major depressive disorder)       family history includes Cancer in her father and mother; Heart attack in her father.     reports that she has never smoked. She has never used smokeless tobacco. She reports that she does not drink alcohol and does not use drugs.    Allergies   Allergen Reactions   • Augmentin [Amoxicillin-Pot Clavulanate] Diarrhea   • Metformin And Related Diarrhea         Current Outpatient Medications:   •  acetaminophen-codeine (TYLENOL #3) 300-30 MG per tablet, Take 1 tablet by mouth Every 12 (Twelve) Hours as needed, Disp: 60 tablet, Rfl: 0  •  aspirin 81 MG EC tablet, Take 1 tablet by  "mouth Daily., Disp: , Rfl:   •  busPIRone (BUSPAR) 5 MG tablet, Take 1 tablet by mouth 3 (Three) Times a Day. (Patient taking differently: Take 1 tablet by mouth 3 (Three) Times a Day As Needed.), Disp: 90 tablet, Rfl: 2  •  dapagliflozin Propanediol (Farxiga) 10 MG tablet, Take 1 tablet (10 mg) by mouth daily, Disp: 30 tablet, Rfl: 5  •  fluticasone (FLONASE) 50 MCG/ACT nasal spray, Instill 2 sprays in each nostril daily., Disp: 16 g, Rfl: 5  •  Fluticasone-Salmeterol (Advair Diskus) 250-50 MCG/ACT DISKUS, Inhale 1 puff by mouth 2 (Two) Times a Day. (Patient taking differently: Inhale 1 puff 2 (Two) Times a Day. 5/19: pt thought she was only supposed to take once daily), Disp: 60 each, Rfl: 0  •  furosemide (LASIX) 40 MG tablet, Take 1 tablet by mouth 2 (Two) Times a Day. (Patient taking differently: Take 1 tablet by mouth 2 (Two) Times a Day. Taking second dose only as needed for swelling/weight gain), Disp: 180 tablet, Rfl: 0  •  gabapentin (NEURONTIN) 300 MG capsule, Take 1 capsule (300 mg) by mouth 3 times per day, Disp: 90 capsule, Rfl: 2  •  insulin aspart (NovoLOG FlexPen) 100 UNIT/ML solution pen-injector sc pen, Inject 12 Units under the skin into the appropriate area as directed 3 (Three) Times a Day., Disp: 15 mL, Rfl: 2  •  Insulin Degludec (Tresiba FlexTouch) 200 UNIT/ML solution pen-injector pen injection, Inject 50 Units under the skin into the appropriate area as directed Daily for 30 days., Disp: 9 mL, Rfl: 3  •  Insulin Pen Needle (Novofine Pen Needle) 32G X 6 MM misc, Use 1 Daily as directed, Disp: 100 each, Rfl: 0  •  Insulin Pen Needle (NovoFine Plus Pen Needle) 32G X 4 MM misc, USE 1 TIME DAILY, Disp: 90 each, Rfl: 2  •  Insulin Syringe-Needle U-100 (BD Insulin Syringe U/F) 30G X 1/2\" 0.5 ML misc, Use to inject insulin under the skin four times a day as directed, Disp: 120 each, Rfl: 8  •  Insulin Syringe-Needle U-100 (BD Insulin Syringe U/F) 30G X 1/2\" 0.5 ML misc, Use to inject insulin four " times a day., Disp: 500 each, Rfl: 0  •  lansoprazole (PREVACID) 30 MG capsule, Take 1 capsule (30 mg) by mouth daily before a meal, Disp: 90 capsule, Rfl: 2  •  levothyroxine (Synthroid) 50 MCG tablet, Take 1 tablet by mouth daily in the morning on an empty stomach., Disp: 90 tablet, Rfl: 0  •  loratadine (Claritin) 10 MG tablet, Take 1 tablet (10 mg) by mouth daily, Disp: 90 tablet, Rfl: 3  •  methocarbamol (ROBAXIN) 500 MG tablet, Take 1 tablet by mouth two times daily as needed, Disp: 60 tablet, Rfl: 3  •  metoprolol succinate XL (TOPROL-XL) 200 MG 24 hr tablet, Take 1 tablet by mouth Daily., Disp: 90 tablet, Rfl: 0  •  ondansetron ODT (ZOFRAN-ODT) 8 MG disintegrating tablet, Place 1 tablet on the tongue and allow to dissolve Every 8 (Eight) Hours As Needed., Disp: 30 tablet, Rfl: 3  •  potassium chloride ER (K-TAB) 20 MEQ tablet controlled-release ER tablet, Take 1 tablet by mouth Daily., Disp: 60 tablet, Rfl: 3  •  pravastatin (PRAVACHOL) 40 MG tablet, Take 1 tablet by mouth Daily., Disp: 30 tablet, Rfl: 3  •  sacubitril-valsartan (ENTRESTO) 49-51 MG tablet, Take 1 tablet by mouth 2 (Two) Times a Day for 30 days., Disp: 60 tablet, Rfl: 6  •  SITagliptin-metFORMIN HCl ER (Janumet XR)  MG tablet, Take 2 tablets by mouth Daily. (Patient taking differently: Take 2 tablets by mouth Daily. [facility], Patient was on: Pt takes 1 tablet twice daily), Disp: 180 tablet, Rfl: 2  •  SITagliptin-metFORMIN HCl ER (Janumet XR)  MG tablet, Take 2 tablets by mouth Daily., Disp: 180 tablet, Rfl: 2  •  spironolactone (ALDACTONE) 25 MG tablet, Take 1 tablet (25 mg) by mouth daily, Disp: 90 tablet, Rfl: 1  •  DULoxetine (CYMBALTA) 30 MG capsule, Take 1 capsule by mouth daily for 7 days, then increase to 2 capsules daily., Disp: 60 capsule, Rfl: 2  •  Fluticasone-Salmeterol (ADVAIR/WIXELA) 250-50 MCG/ACT DISKUS, Inhale 1 puff 2 (Two) Times a Day. Taking 1 time daily (Patient not taking: Reported on  5/19/2023), Disp: , Rfl:   •  insulin aspart (NovoLOG FlexPen) 100 UNIT/ML solution pen-injector sc pen, Inject 12 units subcutaneously 3 times a day. (Patient not taking: Reported on 5/19/2023), Disp: 15 mL, Rfl: 2  •  lansoprazole (PREVACID) 30 MG capsule, Take 1 capsule (30 mg) by mouth daily before a meal (Patient not taking: Reported on 5/19/2023), Disp: 90 capsule, Rfl: 2  •  loratadine (Claritin) 10 MG tablet, Take 1 tablet (10 mg) by mouth daily (Patient not taking: Reported on 5/19/2023), Disp: 90 tablet, Rfl: 3  •  loratadine (Claritin) 10 MG tablet, Take 1 tablet (10 mg) by mouth daily (Patient not taking: Reported on 5/19/2023), Disp: 90 tablet, Rfl: 3  •  potassium chloride ER (K-TAB) 20 MEQ tablet controlled-release ER tablet, Take 2 tablets (40 mEq) by mouth daily with food (Patient not taking: Reported on 5/19/2023), Disp: 60 tablet, Rfl: 3  •  spironolactone (ALDACTONE) 25 MG tablet, Take 1 tablet (25 mg) by mouth daily (Patient not taking: Reported on 5/19/2023), Disp: 90 tablet, Rfl: 1      Physical Exam:  I have reviewed the patient's current vital signs as documented in the patient's EMR.   Vitals:    05/19/23 1300   BP: 130/79   Pulse: 70   SpO2: 96%     Body mass index is 51.69 kg/m².       05/19/23  1300   Weight: 128 kg (282 lb 9.6 oz)        Physical Exam  Vitals and nursing note reviewed.   Constitutional:       General: She is awake.   HENT:      Head: Normocephalic and atraumatic.   Eyes:      General: Lids are normal.      Conjunctiva/sclera:      Right eye: Right conjunctiva is not injected.      Left eye: Left conjunctiva is not injected.   Neck:      Thyroid: No thyroid mass.      Vascular: No carotid bruit.   Cardiovascular:      Rate and Rhythm: Normal rate and regular rhythm.      Heart sounds: S1 normal and S2 normal.   Pulmonary:      Effort: No tachypnea or bradypnea.      Breath sounds: No decreased breath sounds, wheezing, rhonchi or rales.   Abdominal:      General: Bowel  sounds are normal.      Palpations: Abdomen is soft.      Tenderness: There is no abdominal tenderness.   Musculoskeletal:      Cervical back: Full passive range of motion without pain.      Right lower leg: No edema.      Left lower leg: No edema.      Comments: Non pitting bilateral edema   Skin:     General: Skin is warm and moist.   Neurological:      Mental Status: She is alert and oriented to person, place, and time.   Psychiatric:         Attention and Perception: Attention normal.         Mood and Affect: Mood normal.         Behavior: Behavior is cooperative.            JVP: Volume/Pulsation: Normal.        DATA REVIEWED:     EKG. I personally reviewed and interpreted the EKG.          ---------------------------------------------------  TTE/KELVIN:  Results for orders placed during the hospital encounter of 08/17/22    Adult Transthoracic Echo Complete W/ Cont if Necessary Per Protocol    Interpretation Summary  · Normal left ventricular cavity size and wall thickness noted. All left ventricular wall segments contract normally.  · Left ventricular ejection fraction appears to be 61 - 65%.  · The aortic valve is structurally normal with no regurgitation or stenosis present.  · The mitral valve is structurally normal with no regurgitation or significant stenosis present.  · There is no evidence of pericardial effusion.        -----------------------------------------------------  CXR/Imaging:   Imaging Results (Most Recent)     None          I personally reviewed and interpreted the CXR.      -----------------------------------------------------  CT:   No radiology results for the last 30 days.  I personally reviewed the images of the CT scan.  My personal interpretation is below.      ----------------------------------------------------    PFTs:      --------------------------------------------------------------------------------------------------    Laboratory evaluations:    Lab Results   Component Value  Date    GLUCOSE 241 (H) 03/03/2023    BUN 18 03/03/2023    CREATININE 0.73 03/03/2023    EGFRIFNONA 71 12/17/2021    EGFRIFAFRI  09/17/2016      Comment:      <15 Indicative of kidney failure.    BCR 24.7 03/03/2023    K 4.1 03/03/2023    CO2 26.2 03/03/2023    CALCIUM 8.8 03/03/2023    ALBUMIN 4.10 12/17/2021    LABIL2 1.4 (L) 02/12/2015    AST 16 12/17/2021    ALT 29 12/17/2021     Lab Results   Component Value Date    WBC 10.00 12/17/2021    HGB 12.3 12/17/2021    HCT 36.4 12/17/2021    MCV 90.5 12/17/2021     12/17/2021     Lab Results   Component Value Date    CHOL 230 (H) 12/17/2021    CHLPL 205 (H) 02/17/2016    TRIG 231 (H) 12/17/2021    HDL 56 12/17/2021     (H) 12/17/2021     Lab Results   Component Value Date    TSH 2.005 04/18/2017    J1XYDTR 9.40 04/18/2017     Lab Results   Component Value Date    HGBA1C 8.70 (H) 07/10/2021     Lab Results   Component Value Date    ALT 29 12/17/2021     Lab Results   Component Value Date    HGBA1C 8.70 (H) 07/10/2021    HGBA1C 8.90 (H) 04/18/2017    HGBA1C 8.0 (H) 02/12/2015     Lab Results   Component Value Date    CREATININE 0.73 03/03/2023     No results found for: IRON, TIBC, FERRITIN  No results found for: INR, PROTIME     Lab Results   Component Value Date    ABSOLUTELUNG 25 05/19/2023    ABSOLUTELUNG 25 03/03/2023    ABSOLUTELUNG 28 11/28/2022       PHQ-9 Depression Screening  Little interest or pleasure in doing things?  Several days   Feeling down, depressed, or hopeless?  Several days   Trouble falling or staying asleep, or sleeping too much?  Several days   Feeling tired or having little energy?  Every day   Poor appetite or overeating?  Not at all   Feeling bad about yourself - or that you are a failure or have let yourself or your family down?  Nearly every day   Trouble concentrating on things, such as reading the newspaper or watching television?  Not at all   Moving or speaking so slowly that other people could have noticed? Or the  opposite - being so fidgety or restless that you have been moving around a lot more than usual?   Not at all   Thoughts that you would be better off dead, or of hurting yourself in some way?  Not at all   PHQ-9 Total Score  9   If you checked off any problems, how difficult have these problems made it for you to do your work, take care of things at home, or get along with other people?             PAH RISK ASSESSMENT:      1. Morbid obesity with BMI of 45.0-49.9, adult    2. Chronic heart failure with preserved ejection fraction (HFpEF)    3. Major depressive disorder with current active episode, unspecified depression episode severity, unspecified whether recurrent    4. Osteoarthritis, unspecified osteoarthritis type, unspecified site          ORDERS PLACED TODAY:  Orders Placed This Encounter   Procedures   • ReDs Vest        Diagnoses and all orders for this visit:    1. Morbid obesity with BMI of 45.0-49.9, adult (Primary)  -     ReDs Vest    2. Chronic heart failure with preserved ejection fraction (HFpEF)    3. Major depressive disorder with current active episode, unspecified depression episode severity, unspecified whether recurrent    4. Osteoarthritis, unspecified osteoarthritis type, unspecified site    Other orders  -     Discontinue: sacubitril-valsartan (ENTRESTO) 49-51 MG tablet; Take 1 tablet by mouth 2 (Two) Times a Day for 30 days.  Dispense: 60 tablet; Refill: 6  -     DULoxetine (CYMBALTA) 30 MG capsule; Take 1 capsule by mouth daily for 7 days, then increase to 2 capsules daily.  Dispense: 60 capsule; Refill: 2  -     sacubitril-valsartan (ENTRESTO) 49-51 MG tablet; Take 1 tablet by mouth 2 (Two) Times a Day for 30 days.  Dispense: 60 tablet; Refill: 6  -     dapagliflozin Propanediol (Farxiga) 10 MG tablet; Take 1 tablet (10 mg) by mouth daily  Dispense: 30 tablet; Refill: 5             MEDS ORDERED TODAY:    New Medications Ordered This Visit   Medications   • DULoxetine (CYMBALTA) 30 MG  capsule     Sig: Take 1 capsule by mouth daily for 7 days, then increase to 2 capsules daily.     Dispense:  60 capsule     Refill:  2   • sacubitril-valsartan (ENTRESTO) 49-51 MG tablet     Sig: Take 1 tablet by mouth 2 (Two) Times a Day for 30 days.     Dispense:  60 tablet     Refill:  6   • dapagliflozin Propanediol (Farxiga) 10 MG tablet     Sig: Take 1 tablet (10 mg) by mouth daily     Dispense:  30 tablet     Refill:  5        ---------------------------------------------------------------------------------------------------------------------------          ASSESSMENT/PLAN:      Diagnosis Plan   1. Morbid obesity with BMI of 45.0-49.9, adult  ReDs Vest      2. Chronic heart failure with preserved ejection fraction (HFpEF)        3. Major depressive disorder with current active episode, unspecified depression episode severity, unspecified whether recurrent        4. Osteoarthritis, unspecified osteoarthritis type, unspecified site            not acutely decompensated chronic diastolic heart failure. CHF.      NYHA stage B;FC-II.    Today, Patient is approaching euvolemiaand with  Moderate perfusion. The patient's hemodynamics are currently acceptable. HR is: normal and is at goal. BP/MAP was reviewed and there isroom for medication up-titration.  Clinical trajectory was assessed and haswaxed and waned.     CHF GOAL DIRECTED MEDICAL THERAPY FOR PATIENT ADDRESSED/ADJUSTED:       GDMT:     Drug Class   Drug   Dose Last Dose Adjustment Additional Titration   Notes   ACEi/ARB/ARNI [id number] BID     Beta Blocker Toprol XL  200mg      MRA  Spironolactone 25mg qd      SGLT2i Farxiga  10mg  N/A        -CHF Specific BB:   • Continue Toprol XL 200mg qd.     • Educated regarding slow titration and addition of medications with close monitoring.  • She is monitoring closely and is aware of hold parameters.         -ACE/ARB/ARNi:   • Continue Entresto with patient tolerating well; reports this is affordable with $10  copay card.    • Baseline creatinine previously known to be 0.7-0.9     -MRA:   • The patient is FC-NYHA Class III and MRA is indicated.   • Continue Spironolactone 25mg qd.   • BMP reviewed and acceptable.  • Quarterly monitoring of potassium and renal function is currently recommended    -SGLT2 inhibitor therapy:   • A BMP at initiation to verify GFR >30 was recommended, as was interval GFR surveillance.  • The patient was advised to hold SGLT2I when PO intake is restricted due to a planned surgery, or due to an underlying illness.   • Recommend continuing Farxiga (dapagliflozin) 10mg daily with quarterly assessment of GFR.   • Denies current  side effects; She does report one recent yeast infection but plans to continue to monitor for more.     -Diuretic regimen:   • ReDs Vest reading for 05/19/23 is 25; ReDs Vest reading reviewed with patient.    • Continue Lasix 40mg qd and instructed to take extra 40mg PRN for worsening shortness of breath, swelling, weight gain.   • Instructed to call clinic if symptoms worsen.    • BMP, Mag, & ProBNP reviewed with patient.      -Fluid restriction/Sodium restriction:   • Requested 2000 ml restriction  • Patient has been asked to weigh daily and was provided with a printed diuretic strategy.  • 1,500 mg Na restriction was discussed.  • Dietary information/Education provided.      -Acute vs. Chronic underlying conditions other than HF addressed during visit:     · Depression:   · Osteoarthritis:   · PHQ-9 Depression screening score was 9.   · Will start Cymbalta 30mg x 7 days and move up to 60mg daily the following week if she tolerated the 30mg qd.     • Debility:   • Deconditioning:   o While patient is compensated in regard to heart failure, she does have ongoing limitations with dyspnea on exertion and prolonged activity as a results of this illness.  She has great difficulties performing daily activities of living.   o May attempt to go down on Toprol XL.      • COPD,  stable without exacerbation:   o Continue outpatient f/u with PCP.     • Hyperglycemia with IDDM:   o Last hemoglobin a1c 8.6 on 03/2022  o Continue Farxiga 10mg; refills sent.   o Continue outpatient PCP follow-up.     · FIDEL  · Continue home CPAP.     · Essential HTN:   · Continue   · Importance of blood pressure optimization in HFpEF discussed at length with patient.  She reports she has been abiding by hold parameters and did have to hold medication one time.      CONSIDERATIONS:   -Iron/Anemia in CHF:  • Hgb WNL.               Class 3 Severe Obesity (BMI >=40). Obesity-related health conditions include the following: diabetes mellitus, dyslipidemias, peripheral vascular disease and osteoarthritis. Obesity is newly identified. BMI is is above average; BMI management plan is completed. We discussed portion control and increasing exercise.          >45 minutes out of 60 minutes face to face spent counseling patient extensively on dietary Na+ intake, importance of activity, weight monitoring, compliance with medications in addition to importance of titration with goal directed medical therapy and follow up appointments.            This document has been electronically signed by Tawanna Arambula PA-C  May 19, 2023 14:20 EDT      Dictated Utilizing Dragon Dictation: Part of this note may be an electronic transcription/translation of spoken language to printed text using the Dragon Dictation System.    Follow-up appointment and medication changes provided in hand delivered After Visit Summary as well as reviewed in the room.

## 2023-05-19 NOTE — ADDENDUM NOTE
Encounter addended by: Makayla Campos, PharmD on: 5/19/2023 4:43 PM   Actions taken: Order Reconciliation Section accessed, Clinical Note Signed

## 2023-06-05 ENCOUNTER — OFFICE VISIT (OUTPATIENT)
Dept: CARDIOLOGY | Facility: CLINIC | Age: 63
End: 2023-06-05
Payer: COMMERCIAL

## 2023-06-05 VITALS
DIASTOLIC BLOOD PRESSURE: 57 MMHG | BODY MASS INDEX: 52.37 KG/M2 | WEIGHT: 284.6 LBS | OXYGEN SATURATION: 96 % | HEIGHT: 62 IN | HEART RATE: 82 BPM | SYSTOLIC BLOOD PRESSURE: 111 MMHG

## 2023-06-05 DIAGNOSIS — E78.5 DYSLIPIDEMIA: ICD-10-CM

## 2023-06-05 DIAGNOSIS — I50.32 CHRONIC HEART FAILURE WITH PRESERVED EJECTION FRACTION (HFPEF): Primary | ICD-10-CM

## 2023-06-05 DIAGNOSIS — I10 ESSENTIAL HYPERTENSION: ICD-10-CM

## 2023-06-05 NOTE — PROGRESS NOTES
Nini Marie APRN  Zeina Reed  1960  06/05/2023    Patient Active Problem List   Diagnosis    Palpitations    Essential hypertension    Dyslipidemia    Dizziness    Acute exacerbation of CHF (congestive heart failure)    SOB (shortness of breath)    Morbid obesity with BMI of 45.0-49.9, adult    Physical deconditioning    Chronic heart failure with preserved ejection fraction (HFpEF)    MDD (major depressive disorder)       Dear Nini Marie APRN:    Subjective     History of Present Illness:    Chief Complaint   Patient presents with    Follow-up     ROUTINE       Zeina Reed is a pleasant 62 y.o. female with a past medical history significant for chronic HFpEF, essential hypertension, diabetes mellitus, obstructive sleep apnea, COPD, GERD.  She does have family history of premature coronary artery disease with a father having a fatal MI in early 50s.  Zeina comes in for routine cardiology follow-up.    Zeina reports she has been stable since she was last seen denies any further recurrent episodes of chest pains.  She has some chronic dyspnea but denies any worsening of this as well as any worsening orthopnea or PND.  She is following closely with the heart failure clinic who is assisting in GDMT.    Allergies   Allergen Reactions    Augmentin [Amoxicillin-Pot Clavulanate] Diarrhea    Metformin And Related Diarrhea   :      Current Outpatient Medications:     acetaminophen-codeine (TYLENOL #3) 300-30 MG per tablet, Take 1 tablet by mouth Every 12 (Twelve) Hours as needed, Disp: 60 tablet, Rfl: 0    acetaminophen-codeine (TYLENOL with CODEINE #3) 300-30 MG per tablet, Take 1 tablet by mouth every 12 hours as needed, Disp: 60 tablet, Rfl: 0    aspirin 81 MG EC tablet, Take 1 tablet by mouth Daily., Disp: , Rfl:     busPIRone (BUSPAR) 5 MG tablet, Take 1 tablet by mouth 3 (Three) Times a Day. (Patient taking differently: Take 1 tablet by mouth 3 (Three) Times a Day As Needed.), Disp: 90 tablet, Rfl:  "2    dapagliflozin Propanediol (Farxiga) 10 MG tablet, Take 1 tablet (10 mg) by mouth daily, Disp: 30 tablet, Rfl: 5    DULoxetine (CYMBALTA) 30 MG capsule, Take 1 capsule by mouth daily for 7 days, then increase to 2 capsules daily., Disp: 60 capsule, Rfl: 2    fluticasone (FLONASE) 50 MCG/ACT nasal spray, Instill 2 sprays in each nostril daily., Disp: 16 g, Rfl: 5    Fluticasone-Salmeterol (ADVAIR/WIXELA) 250-50 MCG/ACT DISKUS, Inhale 1 puff 2 (Two) Times a Day. Taking 1 time daily, Disp: , Rfl:     furosemide (LASIX) 40 MG tablet, Take 1 tablet by mouth 2 (Two) Times a Day. (Patient taking differently: Take 1 tablet by mouth 2 (Two) Times a Day. Taking second dose only as needed for swelling/weight gain), Disp: 180 tablet, Rfl: 0    gabapentin (NEURONTIN) 300 MG capsule, Take 1 capsule (300 mg) by mouth 3 times per day, Disp: 90 capsule, Rfl: 2    insulin aspart (NovoLOG FlexPen) 100 UNIT/ML solution pen-injector sc pen, Inject 12 Units under the skin into the appropriate area as directed 3 (Three) Times a Day., Disp: 15 mL, Rfl: 2    insulin aspart (NovoLOG FlexPen) 100 UNIT/ML solution pen-injector sc pen, Inject 12 units subcutaneously 3 times a day., Disp: 15 mL, Rfl: 2    Insulin Degludec (Tresiba FlexTouch) 200 UNIT/ML solution pen-injector pen injection, Inject 50 Units under the skin into the appropriate area as directed Daily for 30 days., Disp: 9 mL, Rfl: 3    Insulin Pen Needle (Novofine Pen Needle) 32G X 6 MM misc, Use 1 Daily as directed, Disp: 100 each, Rfl: 0    Insulin Pen Needle (NovoFine Plus Pen Needle) 32G X 4 MM misc, USE 1 TIME DAILY, Disp: 90 each, Rfl: 2    Insulin Syringe-Needle U-100 (BD Insulin Syringe U/F) 30G X 1/2\" 0.5 ML misc, Use to inject insulin under the skin four times a day as directed, Disp: 120 each, Rfl: 8    Insulin Syringe-Needle U-100 (BD Insulin Syringe U/F) 30G X 1/2\" 0.5 ML misc, Use to inject insulin four times a day., Disp: 500 each, Rfl: 0    lansoprazole " (PREVACID) 30 MG capsule, Take 1 capsule (30 mg) by mouth daily before a meal, Disp: 90 capsule, Rfl: 2    lansoprazole (PREVACID) 30 MG capsule, Take 1 capsule (30 mg) by mouth daily before a meal, Disp: 90 capsule, Rfl: 2    levothyroxine (Synthroid) 50 MCG tablet, Take 1 tablet by mouth daily in the morning on an empty stomach., Disp: 90 tablet, Rfl: 0    loratadine (Claritin) 10 MG tablet, Take 1 tablet (10 mg) by mouth daily, Disp: 90 tablet, Rfl: 3    loratadine (Claritin) 10 MG tablet, Take 1 tablet (10 mg) by mouth daily, Disp: 90 tablet, Rfl: 3    loratadine (Claritin) 10 MG tablet, Take 1 tablet (10 mg) by mouth daily, Disp: 90 tablet, Rfl: 3    methocarbamol (ROBAXIN) 500 MG tablet, Take 1 tablet by mouth two times daily as needed, Disp: 60 tablet, Rfl: 3    metoprolol succinate XL (TOPROL-XL) 200 MG 24 hr tablet, Take 1 tablet by mouth Daily., Disp: 90 tablet, Rfl: 0    ondansetron ODT (ZOFRAN-ODT) 8 MG disintegrating tablet, Place 1 tablet on the tongue and allow to dissolve Every 8 (Eight) Hours As Needed., Disp: 30 tablet, Rfl: 3    potassium chloride ER (K-TAB) 20 MEQ tablet controlled-release ER tablet, Take 1 tablet by mouth Daily., Disp: 60 tablet, Rfl: 3    potassium chloride ER (K-TAB) 20 MEQ tablet controlled-release ER tablet, Take 2 tablets (40 mEq) by mouth daily with food, Disp: 60 tablet, Rfl: 3    pravastatin (PRAVACHOL) 40 MG tablet, Take 1 tablet by mouth Daily., Disp: 30 tablet, Rfl: 3    sacubitril-valsartan (ENTRESTO) 49-51 MG tablet, Take 1 tablet by mouth 2 (Two) Times a Day for 30 days., Disp: 60 tablet, Rfl: 6    SITagliptin-metFORMIN HCl ER (Janumet XR)  MG tablet, Take 2 tablets by mouth Daily. (Patient taking differently: Take 2 tablets by mouth Daily. Prior to [facility] Admission, Patient was on: Pt takes 1 tablet twice daily), Disp: 180 tablet, Rfl: 2    SITagliptin-metFORMIN HCl ER (Janumet XR)  MG tablet, Take 2 tablets by mouth Daily., Disp: 180 tablet, Rfl:  "2    spironolactone (ALDACTONE) 25 MG tablet, Take 1 tablet (25 mg) by mouth daily, Disp: 90 tablet, Rfl: 1    spironolactone (ALDACTONE) 25 MG tablet, Take 1 tablet (25 mg) by mouth daily, Disp: 90 tablet, Rfl: 1    Tirzepatide (Mounjaro) 2.5 MG/0.5ML solution pen-injector, Inject 2.5 mg under the skin once weekly, Disp: 2 mL, Rfl: 2    Fluticasone-Salmeterol (Advair Diskus) 250-50 MCG/ACT DISKUS, Inhale 1 puff by mouth 2 (Two) Times a Day. (Patient taking differently: Inhale 1 puff 2 (Two) Times a Day. 5/19: pt thought she was only supposed to take once daily), Disp: 60 each, Rfl: 0    The following portions of the patient's history were reviewed and updated as appropriate: allergies, current medications, past family history, past medical history, past social history, past surgical history and problem list.    Social History     Tobacco Use    Smoking status: Never    Smokeless tobacco: Never   Vaping Use    Vaping Use: Never used   Substance Use Topics    Alcohol use: No    Drug use: No         Objective   Vitals:    06/05/23 1439   BP: 111/57   Pulse: 82   SpO2: 96%   Weight: 129 kg (284 lb 9.6 oz)   Height: 157.5 cm (62\")     Body mass index is 52.05 kg/m².    Constitutional:       General: Not in acute distress.     Appearance: Healthy appearance. Well-developed and not in distress. Not diaphoretic.   Eyes:      Conjunctiva/sclera: Conjunctivae normal.      Pupils: Pupils are equal, round, and reactive to light.   HENT:      Head: Normocephalic and atraumatic.   Neck:      Vascular: No carotid bruit or JVD.   Pulmonary:      Effort: Pulmonary effort is normal. No respiratory distress.      Breath sounds: Normal breath sounds.   Cardiovascular:      Normal rate. Regular rhythm.   Edema:     Peripheral edema absent.   Skin:     General: Skin is cool.   Neurological:      Mental Status: Alert, oriented to person, place, and time and oriented to person, place and time.       Lab Results   Component Value Date    "  03/03/2023    K 4.1 03/03/2023     03/03/2023    CO2 26.2 03/03/2023    BUN 18 03/03/2023    CREATININE 0.73 03/03/2023    GLUCOSE 241 (H) 03/03/2023    CALCIUM 8.8 03/03/2023    AST 16 12/17/2021    ALT 29 12/17/2021    ALKPHOS 120 (H) 12/17/2021    LABIL2 1.4 (L) 02/12/2015     No results found for: CKTOTAL  Lab Results   Component Value Date    WBC 10.00 12/17/2021    HGB 12.3 12/17/2021    HCT 36.4 12/17/2021     12/17/2021     No results found for: INR  Lab Results   Component Value Date    MG 1.6 03/03/2023     Lab Results   Component Value Date    TSH 2.005 04/18/2017    CHLPL 205 (H) 02/17/2016    TRIG 231 (H) 12/17/2021    HDL 56 12/17/2021     (H) 12/17/2021      No results found for: BNP    During this visit the following were done:  Labs Reviewed []    Labs Ordered []    Radiology Reports Reviewed []    Radiology Ordered []    PCP Records Reviewed []    Referring Provider Records Reviewed []    ER Records Reviewed []    Hospital Records Reviewed []    History Obtained From Family []    Radiology Images Reviewed []    Other Reviewed []    Records Requested []         ECG 12 Lead    Date/Time: 6/5/2023 2:37 PM  Performed by: Chandler Langford PA-C  Authorized by: Chandler Langford PA-C   Comparison: compared with previous ECG   Similar to previous ECG  Rhythm: sinus rhythm  Conduction: conduction normal  ST Segments: ST segments normal    Clinical impression: normal ECG      Assessment & Plan    Diagnosis Plan   1. Chronic heart failure with preserved ejection fraction (HFpEF)        2. Essential hypertension        3. Dyslipidemia                 Recommendations:  Chronic HFpEF  Clinically euvolemic we will continue with GDMT.  Patient also following with heart failure clinic appreciate their assistance.  Essential hypertension  Better controlled continue current regimen.    No follow-ups on file.    As always, I appreciate very much the opportunity to participate in the  cardiovascular care of your patients.      With Best Regards,    Chandler Langford PA-C

## 2023-06-26 PROBLEM — Z79.4 TYPE 2 DIABETES MELLITUS WITH HYPOGLYCEMIA WITHOUT COMA, WITH LONG-TERM CURRENT USE OF INSULIN: Status: ACTIVE | Noted: 2023-06-26

## 2023-06-26 PROBLEM — E11.649 TYPE 2 DIABETES MELLITUS WITH HYPOGLYCEMIA WITHOUT COMA, WITH LONG-TERM CURRENT USE OF INSULIN: Status: ACTIVE | Noted: 2023-06-26

## 2023-08-09 ENCOUNTER — OFFICE VISIT (OUTPATIENT)
Dept: GASTROENTEROLOGY | Facility: CLINIC | Age: 63
End: 2023-08-09
Payer: COMMERCIAL

## 2023-08-09 ENCOUNTER — HOSPITAL ENCOUNTER (OUTPATIENT)
Dept: GENERAL RADIOLOGY | Facility: HOSPITAL | Age: 63
Discharge: HOME | End: 2023-08-09
Admitting: NURSE PRACTITIONER
Payer: COMMERCIAL

## 2023-08-09 VITALS
SYSTOLIC BLOOD PRESSURE: 144 MMHG | DIASTOLIC BLOOD PRESSURE: 77 MMHG | HEART RATE: 93 BPM | WEIGHT: 270 LBS | HEIGHT: 63 IN | BODY MASS INDEX: 47.84 KG/M2

## 2023-08-09 DIAGNOSIS — R19.7 DIARRHEA, UNSPECIFIED TYPE: ICD-10-CM

## 2023-08-09 DIAGNOSIS — Z12.11 ENCOUNTER FOR SCREENING FOR MALIGNANT NEOPLASM OF COLON: Primary | ICD-10-CM

## 2023-08-09 DIAGNOSIS — Z90.49 S/P CHOLECYSTECTOMY: ICD-10-CM

## 2023-08-09 DIAGNOSIS — Z80.0 FAMILY HISTORY OF COLON CANCER: ICD-10-CM

## 2023-08-09 DIAGNOSIS — K21.9 GASTROESOPHAGEAL REFLUX DISEASE, UNSPECIFIED WHETHER ESOPHAGITIS PRESENT: ICD-10-CM

## 2023-08-09 DIAGNOSIS — K59.04 CHRONIC IDIOPATHIC CONSTIPATION: ICD-10-CM

## 2023-08-09 PROCEDURE — 74018 RADEX ABDOMEN 1 VIEW: CPT | Performed by: RADIOLOGY

## 2023-08-09 PROCEDURE — 74018 RADEX ABDOMEN 1 VIEW: CPT

## 2023-08-09 RX ORDER — SODIUM, POTASSIUM,MAG SULFATES 17.5-3.13G
1 SOLUTION, RECONSTITUTED, ORAL ORAL TAKE AS DIRECTED
Qty: 354 ML | Refills: 0 | Status: SHIPPED | OUTPATIENT
Start: 2023-08-09

## 2023-08-09 RX ORDER — BISACODYL 5 MG/1
20 TABLET, DELAYED RELEASE ORAL ONCE
Qty: 4 TABLET | Refills: 0 | Status: SHIPPED | OUTPATIENT
Start: 2023-08-09 | End: 2023-08-10

## 2023-08-09 NOTE — PROGRESS NOTES
DATE OF CONSULTATION:  2023    REASON FOR REFERRAL: Diarrhea    REFERRING PHYSICIAN:  MAURO Robertson    CHIEF COMPLAINT:  Chief Complaint   Patient presents with    Diarrhea       HISTORY OF PRESENT ILLNESS:   Zeina Reed is a very pleasant 62 y.o. female who is being seen today at the request of MAURO Robertson for evaluation and treatment of diarrhea. Ms. Reed reports struggling with diarrhea since having her gallbladder removed in . She reports having 1-2 episodes per day with stool type 6-7 on BSS. She has had associated fecal urgency and abdominal cramping. She was previoulsy given Bentyl which wasn't helpful. She denies having issues with constipation. However, she had previous CT A/P on 22 which showed moderate/large stool burden. At present, she denies having bloody stools, melena or BRBPR. She says she was started on Mounjaro ~3 months ago for diabetes which has been helpful. She is now having a bowel movement every 1-2 days with stool type 6-7 on BSS. She had prior colonoscopy ~ which was unremarkable. She reports having a sister that  with colon cancer. Denies unusual weight loss. She reports having mild GERD which is controlled with lansoprazole 30 mg PO daily. She reports losing ~15-20 lbs since starting Mounjaro. She has no other complaints today.     PAST MEDICAL HISTORY:  Past Medical History:   Diagnosis Date    Acute exacerbation of CHF (congestive heart failure) 7/10/2021    Arthritis     Diabetes mellitus     Dyslipidemia     History of transfusion     Hyperlipidemia     Hypertension     Palpitations     PONV (postoperative nausea and vomiting)        PAST SURGICAL HISTORY:  Past Surgical History:   Procedure Laterality Date    CHOLECYSTECTOMY      COLONOSCOPY N/A 2017    Procedure: COLONOSCOPY FOR SCREENING  CPTCODE:48384;  Surgeon: Socrates Price III, MD;  Location: [facility];  Service:     HYSTERECTOMY      SKIN GRAFT      TUBAL ABDOMINAL LIGATION          FAMILY HISTORY:  Family History   Problem Relation Age of Onset    Cancer Mother     Heart attack Father     Cancer Father        SOCIAL HISTORY:  Social History     Socioeconomic History    Marital status:    Tobacco Use    Smoking status: Never    Smokeless tobacco: Never   Vaping Use    Vaping Use: Never used   Substance and Sexual Activity    Alcohol use: No    Drug use: No    Sexual activity: Defer     MEDICATIONS:  The current medication list was reviewed in the EMR    Current Outpatient Medications:     acetaminophen-codeine (TYLENOL #3) 300-30 MG per tablet, Take 1 tablet by mouth Every 12 (Twelve) Hours as needed, Disp: 60 tablet, Rfl: 0    acetaminophen-codeine (TYLENOL with CODEINE #3) 300-30 MG per tablet, Take 1 tablet by mouth Every 12 (Twelve) Hours as needed., Disp: 60 tablet, Rfl: 0    aspirin 81 MG EC tablet, Take 1 tablet by mouth Daily., Disp: , Rfl:     busPIRone (BUSPAR) 5 MG tablet, Take 1 tablet by mouth 3 (Three) Times a Day. (Patient taking differently: Take 1 tablet by mouth 3 (Three) Times a Day As Needed.), Disp: 90 tablet, Rfl: 2    Continuous Blood Gluc Sensor (Dexcom G7 Sensor) misc, Change sensor Every 10 (Ten) Days., Disp: 3 each, Rfl: 5    dapagliflozin Propanediol (Farxiga) 10 MG tablet, Take 1 tablet (10 mg) by mouth daily, Disp: 30 tablet, Rfl: 5    DULoxetine (CYMBALTA) 30 MG capsule, Take 1 capsule by mouth daily for 7 days, then increase to 2 capsules daily., Disp: 60 capsule, Rfl: 2    fluticasone (FLONASE) 50 MCG/ACT nasal spray, Instill 2 sprays in each nostril daily., Disp: 16 g, Rfl: 5    Fluticasone-Salmeterol (ADVAIR/WIXELA) 250-50 MCG/ACT DISKUS, Inhale 1 puff 2 (Two) Times a Day. Taking 1 time daily, Disp: , Rfl:     furosemide (LASIX) 40 MG tablet, Take 1 tablet by mouth 2 (Two) Times a Day., Disp: 180 tablet, Rfl: 0    gabapentin (NEURONTIN) 300 MG capsule, Take 1 capsule (300 mg) by mouth 3 times per day, Disp: 90 capsule, Rfl: 2    gabapentin  "(NEURONTIN) 300 MG capsule, Take 1 capsule (300 mg) by mouth 3 times per day, Disp: 90 capsule, Rfl: 2    insulin aspart (NovoLOG FlexPen) 100 UNIT/ML solution pen-injector sc pen, Inject 12 Units under the skin into the appropriate area as directed 3 (Three) Times a Day., Disp: 15 mL, Rfl: 2    insulin aspart (NovoLOG FlexPen) 100 UNIT/ML solution pen-injector sc pen, Inject 12 units subcutaneously 3 times a day., Disp: 15 mL, Rfl: 2    Insulin Degludec (Tresiba FlexTouch) 200 UNIT/ML solution pen-injector pen injection, Inject 50 Units under the skin into the appropriate area as directed Daily for 30 days., Disp: 9 mL, Rfl: 3    Insulin Pen Needle (Novofine Pen Needle) 32G X 6 MM misc, Use 1 Daily as directed, Disp: 100 each, Rfl: 0    Insulin Pen Needle (NovoFine Plus Pen Needle) 32G X 4 MM misc, USE 1 TIME DAILY, Disp: 90 each, Rfl: 2    Insulin Syringe-Needle U-100 (BD Insulin Syringe U/F) 30G X 1/2\" 0.5 ML misc, Use to inject insulin under the skin four times a day as directed, Disp: 120 each, Rfl: 8    Insulin Syringe-Needle U-100 (BD Insulin Syringe U/F) 30G X 1/2\" 0.5 ML misc, Use to inject insulin four times a day., Disp: 500 each, Rfl: 0    lansoprazole (PREVACID) 30 MG capsule, Take 1 capsule (30 mg) by mouth daily before a meal, Disp: 90 capsule, Rfl: 2    lansoprazole (PREVACID) 30 MG capsule, Take 1 capsule (30 mg) by mouth daily before a meal, Disp: 90 capsule, Rfl: 2    levothyroxine (Synthroid) 50 MCG tablet, Take 1 tablet by mouth daily in the morning on an empty stomach., Disp: 90 tablet, Rfl: 0    loratadine (Claritin) 10 MG tablet, Take 1 tablet (10 mg) by mouth daily, Disp: 90 tablet, Rfl: 3    loratadine (Claritin) 10 MG tablet, Take 1 tablet (10 mg) by mouth daily, Disp: 90 tablet, Rfl: 3    loratadine (Claritin) 10 MG tablet, Take 1 tablet (10 mg) by mouth daily, Disp: 90 tablet, Rfl: 3    metFORMIN ER (GLUCOPHAGE-XR) 500 MG 24 hr tablet, Take 2 tablets by mouth Daily With Breakfast., Disp: " 180 tablet, Rfl: 2    methocarbamol (ROBAXIN) 500 MG tablet, Take 1 tablet by mouth two times daily as needed, Disp: 60 tablet, Rfl: 3    metoprolol succinate XL (TOPROL-XL) 200 MG 24 hr tablet, Take 1 tablet by mouth Daily., Disp: 90 tablet, Rfl: 0    ondansetron ODT (ZOFRAN-ODT) 8 MG disintegrating tablet, Place 1 tablet on the tongue and allow to dissolve Every 8 (Eight) Hours As Needed., Disp: 30 tablet, Rfl: 3    potassium chloride ER (K-TAB) 20 MEQ tablet controlled-release ER tablet, Take 1 tablet by mouth Daily., Disp: 60 tablet, Rfl: 3    potassium chloride ER (K-TAB) 20 MEQ tablet controlled-release ER tablet, Take 2 tablets (40 mEq) by mouth daily with food, Disp: 60 tablet, Rfl: 3    pravastatin (PRAVACHOL) 40 MG tablet, Take 1 tablet by mouth Daily., Disp: 30 tablet, Rfl: 3    sacubitril-valsartan (ENTRESTO)  MG tablet, Take 1 tablet by mouth 2 (Two) Times a Day., Disp: 60 tablet, Rfl: 3    spironolactone (ALDACTONE) 25 MG tablet, Take 1 tablet (25 mg) by mouth daily, Disp: 90 tablet, Rfl: 1    spironolactone (ALDACTONE) 25 MG tablet, Take 1 tablet (25 mg) by mouth daily, Disp: 90 tablet, Rfl: 1    Tirzepatide (Mounjaro) 5 MG/0.5ML solution pen-injector, Inject 0.5 mL under the skin into the appropriate area as directed 1 (One) Time Per Week., Disp: 2 mL, Rfl: 2    Tirzepatide (Mounjaro) 5 MG/0.5ML solution pen-injector, Inject 5 mg under the skin as directed once weekly on the same day of each week, Disp: 2 mL, Rfl: 3    Fluticasone-Salmeterol (Advair Diskus) 250-50 MCG/ACT DISKUS, Inhale 1 puff by mouth 2 (Two) Times a Day. (Patient taking differently: Inhale 1 puff 2 (Two) Times a Day. 5/19: pt thought she was only supposed to take once daily), Disp: 60 each, Rfl: 0    ALLERGIES:    Allergies   Allergen Reactions    Augmentin [Amoxicillin-Pot Clavulanate] Diarrhea    Metformin And Related Diarrhea       REVIEW OF SYSTEMS:    A comprehensive 14 point review of systems was performed.  Significant  "findings as mentioned above.  All other systems reviewed and are negative.      Physical Exam   Vital Signs: /77 (BP Location: Left arm, Patient Position: Sitting, Cuff Size: Large Adult)   Pulse 93   Ht 160 cm (63\")   Wt 122 kg (270 lb)   BMI 47.83 kg/mý     General: Obese, Alert and oriented x 3, in no acute distress.   Head: ATNC   Eyes: PERRL, No evidence of conjunctivitis.   Nose: No nasal discharge.   Mouth: Oral mucosal membranes moist. No oral ulceration or hemorrhages.   Neck: Neck supple. No thyromegaly. No JVD.   Lungs: Clear in all fields to A&P without rales, rhonchi or wheezing.   Heart: Regular rate and rhythm. No murmurs, rubs, or gallops.   Abdomen: Soft. Bowel sounds are normoactive. Nontender with palpation.   Extremities: No cyanosis or edema.   Neurologic: Grossly non-focal exam      IMAGIN/5/22  Narrative & Impression   EXAM: CT ABDOMEN PELVIS W CONTRAST-         TECHNIQUE: Multiple axial CT images were obtained from lung bases  through pubic symphysis WITH administration of IV contrast. Reformatted  images in the coronal and/or sagittal plane(s) were generated from the  axial data set to facilitate diagnostic accuracy and/or surgical  planning.  Oral Contrast:NONE.     Radiation dose reduction techniques were utilized per ALARA protocol.  Automated exposure control was initiated through either or [employer] or  [email or web address] software packages by  protocol.    DOSE:     Clinical information R10.12; R10.12-Left upper quadrant pain      Comparison none immediately available at this time     FINDINGS:     Lower thorax: Clear. No effusions.     Abdomen:     Liver: Homogeneous. No focal hepatic mass or ductal dilatation.     Gallbladder: Surgically absent.     Pancreas: Unremarkable. No mass or ductal dilatation.     Spleen: Homogeneous. No splenomegaly.     Adrenals: Left adrenal nodule measuring 1 cm.     Kidneys/ureters: No mass. No obstructive uropathy.  No evidence " of  urolithiasis.     GI tract: Non-dilated. No definite wall thickening.. There is no  evidence of appendicitis     MESENTERY: No free fluid, walled off fluid collections, mesenteric  stranding, or enlarged lymph nodes         Vasculature: No evidence of aneurysm.     Abdominal wall: No focal hernia or mass.        Bladder: No focal mass or significant wall thickening     Reproductive: Unremarkable as visualized     Bones: Grade 1 anterolisthesis L4 on L5.     IMPRESSION:     1. Small left adrenal nodule.     2.Grade 1 anterolisthesis L4 on L5.     3. Moderate to large volume stool.          RECENT LABS:  Lab Results   Component Value Date    WBC 10.00 12/17/2021    HGB 12.3 12/17/2021    HCT 36.4 12/17/2021    MCV 90.5 12/17/2021    RDW 13.1 12/17/2021     12/17/2021    NEUTRORELPCT 69.5 12/17/2021    LYMPHORELPCT 19.5 (L) 12/17/2021    MONORELPCT 7.7 12/17/2021    EOSRELPCT 2.2 12/17/2021    BASORELPCT 0.6 12/17/2021    NEUTROABS 6.95 12/17/2021    LYMPHSABS 1.95 12/17/2021       Lab Results   Component Value Date     07/21/2023    K 4.0 07/21/2023    CO2 26.7 07/21/2023     07/21/2023    BUN 13 07/21/2023    CREATININE 0.79 07/21/2023    EGFRIFNONA 71 12/17/2021    EGFRIFAFRI  09/17/2016      Comment:      <15 Indicative of kidney failure.    GLUCOSE 140 (H) 07/21/2023    CALCIUM 9.6 07/21/2023    ALKPHOS 120 (H) 12/17/2021    AST 16 12/17/2021    ALT 29 12/17/2021    BILITOT 1.1 12/17/2021    ALBUMIN 4.10 12/17/2021    PROTEINTOT 7.0 12/17/2021    MG 2.0 07/21/2023     ASSESSMENT & PLAN:  Zeina Reed is a very pleasant 62 y.o. female with    1.  Diarrhea:  2. S/P cholecystectomy:  3. Family history of colon cancer (sister):    -Diarrhea has reportedly been a chronic issue since having her gallbladder removed. She denies having issues with constipation. However, she had previous CT A/P on 4/5/22 which showed moderate/large stool burden. Will obtain KUB today to evaluate stool pattern and  possible overflow diarrhea. Based on findings, will discuss further management. As above, she was recently started on Mounjaro which she feels has been helpful.  -Also recommended repeat colonoscopy given family history of colon cancer. Her last colonoscopy was reportedly in ~2018 and unremarkable. Patient is agreeable, will schedule.   -She will RTC following the above procedure.     4. GERD:  -Controlled with lansoprazole 30 mg PO daily.     The patient was in agreement with the plan and all questions were answered to her satisfaction.     Thank you so much for allowing us to participate in the care of Zeina Reed . Please do not hesitate to contact us with any questions or concerns.             Electronically Signed by: MAURO Fernandez , August 9, 2023 15:49 EDT       CC:   MAURO Robertson Rebekah, APRN

## 2023-08-10 ENCOUNTER — TELEPHONE (OUTPATIENT)
Dept: GASTROENTEROLOGY | Facility: CLINIC | Age: 63
End: 2023-08-10
Payer: COMMERCIAL

## 2023-08-10 NOTE — TELEPHONE ENCOUNTER
Please let Ms. Reed know that X ray of her abdomen from yesterday showed moderate stool burden (constipation). Please recommend she take Citrucel or metamucil daily (fiber) along with Miralax 1 capful daily prn for constipation.

## 2023-08-11 NOTE — TELEPHONE ENCOUNTER
Called patient no answer, I left a message on her VM that per Vashti Please let Ms. Reed know that X ray of her abdomen from yesterday showed moderate stool burden (constipation). Please recommend she take Citrucel or metamucil daily (fiber) along with Miralax 1 capful daily prn for constipation.  ed patient no answer, I left a message on her VM that per Vashti

## 2023-08-31 PROBLEM — Z12.11 ENCOUNTER FOR SCREENING FOR MALIGNANT NEOPLASM OF COLON: Status: ACTIVE | Noted: 2023-08-31

## 2023-08-31 PROBLEM — Z80.0 FAMILY HISTORY OF COLON CANCER: Status: ACTIVE | Noted: 2023-08-31

## 2023-08-31 PROBLEM — R19.7 DIARRHEA: Status: ACTIVE | Noted: 2023-08-31

## 2023-09-06 DIAGNOSIS — Z12.11 ENCOUNTER FOR SCREENING FOR MALIGNANT NEOPLASM OF COLON: Primary | ICD-10-CM

## 2023-09-06 RX ORDER — SODIUM, POTASSIUM,MAG SULFATES 17.5-3.13G
1 SOLUTION, RECONSTITUTED, ORAL ORAL EVERY 12 HOURS
Qty: 354 ML | Refills: 0 | Status: SHIPPED | OUTPATIENT
Start: 2023-09-06

## 2023-09-14 ENCOUNTER — ANESTHESIA EVENT (OUTPATIENT)
Dept: PERIOP | Facility: HOSPITAL | Age: 63
End: 2023-09-14
Payer: COMMERCIAL

## 2023-09-14 ENCOUNTER — ANESTHESIA (OUTPATIENT)
Dept: PERIOP | Facility: HOSPITAL | Age: 63
End: 2023-09-14
Payer: COMMERCIAL

## 2023-09-14 ENCOUNTER — HOSPITAL ENCOUNTER (OUTPATIENT)
Facility: HOSPITAL | Age: 63
Setting detail: HOSPITAL OUTPATIENT SURGERY
Discharge: HOME | End: 2023-09-14
Attending: INTERNAL MEDICINE | Admitting: INTERNAL MEDICINE
Payer: COMMERCIAL

## 2023-09-14 VITALS
BODY MASS INDEX: 47.84 KG/M2 | OXYGEN SATURATION: 97 % | HEART RATE: 69 BPM | SYSTOLIC BLOOD PRESSURE: 108 MMHG | RESPIRATION RATE: 18 BRPM | DIASTOLIC BLOOD PRESSURE: 63 MMHG | HEIGHT: 63 IN | TEMPERATURE: 97.7 F | WEIGHT: 270 LBS

## 2023-09-14 DIAGNOSIS — Z12.11 ENCOUNTER FOR SCREENING FOR MALIGNANT NEOPLASM OF COLON: ICD-10-CM

## 2023-09-14 DIAGNOSIS — R19.7 DIARRHEA, UNSPECIFIED TYPE: ICD-10-CM

## 2023-09-14 DIAGNOSIS — Z80.0 FAMILY HISTORY OF COLON CANCER: ICD-10-CM

## 2023-09-14 PROCEDURE — 45385 COLONOSCOPY W/LESION REMOVAL: CPT | Performed by: INTERNAL MEDICINE

## 2023-09-14 PROCEDURE — 25010000002 PROPOFOL 200 MG/20ML EMULSION: Performed by: NURSE ANESTHETIST, CERTIFIED REGISTERED

## 2023-09-14 RX ORDER — SODIUM CHLORIDE, SODIUM LACTATE, POTASSIUM CHLORIDE, CALCIUM CHLORIDE 600; 310; 30; 20 MG/100ML; MG/100ML; MG/100ML; MG/100ML
125 INJECTION, SOLUTION INTRAVENOUS ONCE
Status: DISCONTINUED | OUTPATIENT
Start: 2023-09-14 | End: 2023-09-14 | Stop reason: HOSPADM

## 2023-09-14 RX ORDER — SODIUM CHLORIDE, SODIUM LACTATE, POTASSIUM CHLORIDE, CALCIUM CHLORIDE 600; 310; 30; 20 MG/100ML; MG/100ML; MG/100ML; MG/100ML
100 INJECTION, SOLUTION INTRAVENOUS ONCE AS NEEDED
Status: DISCONTINUED | OUTPATIENT
Start: 2023-09-14 | End: 2023-09-14 | Stop reason: HOSPADM

## 2023-09-14 RX ORDER — SODIUM CHLORIDE 0.9 % (FLUSH) 0.9 %
10 SYRINGE (ML) INJECTION EVERY 12 HOURS SCHEDULED
Status: DISCONTINUED | OUTPATIENT
Start: 2023-09-14 | End: 2023-09-14 | Stop reason: HOSPADM

## 2023-09-14 RX ORDER — SODIUM CHLORIDE, SODIUM LACTATE, POTASSIUM CHLORIDE, CALCIUM CHLORIDE 600; 310; 30; 20 MG/100ML; MG/100ML; MG/100ML; MG/100ML
INJECTION, SOLUTION INTRAVENOUS CONTINUOUS PRN
Status: DISCONTINUED | OUTPATIENT
Start: 2023-09-14 | End: 2023-09-14 | Stop reason: SURG

## 2023-09-14 RX ORDER — ONDANSETRON 2 MG/ML
4 INJECTION INTRAMUSCULAR; INTRAVENOUS AS NEEDED
Status: DISCONTINUED | OUTPATIENT
Start: 2023-09-14 | End: 2023-09-14 | Stop reason: HOSPADM

## 2023-09-14 RX ORDER — PROPOFOL 10 MG/ML
INJECTION, EMULSION INTRAVENOUS AS NEEDED
Status: DISCONTINUED | OUTPATIENT
Start: 2023-09-14 | End: 2023-09-14 | Stop reason: SURG

## 2023-09-14 RX ORDER — OXYCODONE HYDROCHLORIDE AND ACETAMINOPHEN 5; 325 MG/1; MG/1
1 TABLET ORAL ONCE AS NEEDED
Status: DISCONTINUED | OUTPATIENT
Start: 2023-09-14 | End: 2023-09-14 | Stop reason: HOSPADM

## 2023-09-14 RX ORDER — MEPERIDINE HYDROCHLORIDE 25 MG/ML
12.5 INJECTION INTRAMUSCULAR; INTRAVENOUS; SUBCUTANEOUS
Status: DISCONTINUED | OUTPATIENT
Start: 2023-09-14 | End: 2023-09-14 | Stop reason: HOSPADM

## 2023-09-14 RX ORDER — SODIUM CHLORIDE 0.9 % (FLUSH) 0.9 %
10 SYRINGE (ML) INJECTION AS NEEDED
Status: DISCONTINUED | OUTPATIENT
Start: 2023-09-14 | End: 2023-09-14 | Stop reason: HOSPADM

## 2023-09-14 RX ORDER — IPRATROPIUM BROMIDE AND ALBUTEROL SULFATE 2.5; .5 MG/3ML; MG/3ML
3 SOLUTION RESPIRATORY (INHALATION) ONCE AS NEEDED
Status: DISCONTINUED | OUTPATIENT
Start: 2023-09-14 | End: 2023-09-14 | Stop reason: HOSPADM

## 2023-09-14 RX ORDER — SODIUM CHLORIDE 9 MG/ML
40 INJECTION, SOLUTION INTRAVENOUS AS NEEDED
Status: DISCONTINUED | OUTPATIENT
Start: 2023-09-14 | End: 2023-09-14 | Stop reason: HOSPADM

## 2023-09-14 RX ORDER — MIDAZOLAM HYDROCHLORIDE 1 MG/ML
1 INJECTION INTRAMUSCULAR; INTRAVENOUS
Status: DISCONTINUED | OUTPATIENT
Start: 2023-09-14 | End: 2023-09-14 | Stop reason: HOSPADM

## 2023-09-14 RX ORDER — FENTANYL CITRATE 50 UG/ML
50 INJECTION, SOLUTION INTRAMUSCULAR; INTRAVENOUS
Status: DISCONTINUED | OUTPATIENT
Start: 2023-09-14 | End: 2023-09-14 | Stop reason: HOSPADM

## 2023-09-14 RX ADMIN — PROPOFOL 50 MG: 10 INJECTION, EMULSION INTRAVENOUS at 11:59

## 2023-09-14 RX ADMIN — SODIUM CHLORIDE, POTASSIUM CHLORIDE, SODIUM LACTATE AND CALCIUM CHLORIDE: 600; 310; 30; 20 INJECTION, SOLUTION INTRAVENOUS at 11:47

## 2023-09-14 RX ADMIN — PROPOFOL 80 MG: 10 INJECTION, EMULSION INTRAVENOUS at 11:49

## 2023-09-14 RX ADMIN — PROPOFOL 100 MG: 10 INJECTION, EMULSION INTRAVENOUS at 12:04

## 2023-09-14 RX ADMIN — PROPOFOL 50 MG: 10 INJECTION, EMULSION INTRAVENOUS at 11:54

## 2023-09-14 NOTE — ANESTHESIA PREPROCEDURE EVALUATION
Anesthesia Evaluation     Patient summary reviewed and Nursing notes reviewed   no history of anesthetic complications:   NPO Solid Status: > 8 hours  NPO Liquid Status: > 8 hours           Airway   Mallampati: II  TM distance: <3 FB  Neck ROM: full  no difficulty expected  Dental    (+) upper dentures and poor dentation    Pulmonary - negative pulmonary ROS and normal exam   (-) asthma, not a smoker  Cardiovascular - normal exam  Exercise tolerance: good (4-7 METS)    NYHA Classification: II  Patient on routine beta blocker and Beta blocker given within 24 hours of surgery    (+) hypertension, CAD, dysrhythmias  (-) past MI, angina, CHF      Neuro/Psych  (+) dizziness/light headedness  (-) seizures, CVA  GI/Hepatic/Renal/Endo    (+) morbid obesity, GERD, diabetes mellitus    Musculoskeletal     (-) back pain, neck pain, radiculopathy  Abdominal  - normal exam    Bowel sounds: normal.   Substance History - negative use     OB/GYN negative ob/gyn ROS         Other   arthritis,                     Anesthesia Plan    ASA 3     general     intravenous induction     Anesthetic plan, risks, benefits, and alternatives have been provided, discussed and informed consent has been obtained with: patient and spouse/significant other.    Use of blood products discussed with patient and spouse/significant other  Consented to blood products.

## 2023-09-14 NOTE — H&P
[facility]    Patient Identification:  Name:  Zeina Reed  Age:  62 y.o.  Sex:  female  :  1960  MRN:  [id number]   Visit Number:  [id number]  Primary Care Physician:  Nini Marie APRN       Chief complaint: Family history of colon cancer (sister), constipation    History of presenting illness: Ms. Reed is a 62 y.o. female who presents today for colonoscopy. At her recent GI appointment, she was complaining of diarrhea with 1-2 episodes per day with stool type 6-7 on BSS. However, she had previous CT A/P on 22 which showed moderate/large stool burden. Obtained KUB on initial consultation which also showed moderate stool burden. Therefore, recommended she take Citrucel or metamucil daily along with Miralax 1 capful daily prn for constipation which she has been doing. She is now having one soft bowel movement daily. She denies having bloody stools, melena or BRBPR. Of note, she was also started on Mounjaro ~3 months ago for diabetes. She reports losing ~15-20 lbs since starting Mounjaro.She had prior colonoscopy ~2018 which was unremarkable. She reports having a sister that  with colon cancer.  She has no other complaints today.      Review of Systems   Constitutional: Negative.    HENT: Negative.     Eyes: Negative.    Respiratory: Negative.     Cardiovascular: Negative.    Gastrointestinal:  Positive for constipation. Negative for abdominal distention, abdominal pain, anal bleeding, blood in stool, diarrhea, nausea, rectal pain and vomiting.   Endocrine: Negative.    Genitourinary: Negative.    Musculoskeletal: Negative.    Allergic/Immunologic: Negative.    Neurological: Negative.    Hematological: Negative.       Past Medical History:   Diagnosis Date    Acute exacerbation of CHF (congestive heart failure) 07/10/2021    Arthritis     COPD (chronic obstructive pulmonary disease)     Diabetes mellitus     Disease of thyroid gland      Dyslipidemia     Elevated cholesterol     GERD (gastroesophageal reflux disease)     History of transfusion     Hyperlipidemia     Hypertension     Palpitations     Sleep apnea      Past Surgical History:   Procedure Laterality Date    CHOLECYSTECTOMY      COLONOSCOPY N/A 2/21/2017    Procedure: COLONOSCOPY FOR SCREENING  CPTCODE:[id number];  Surgeon: Socrates Price III, MD;  [place] [facility] [place]  Service:     HYSTERECTOMY      SKIN GRAFT      TUBAL ABDOMINAL LIGATION       Family History   Problem Relation Age of Onset    Cancer Mother     Heart attack Father     Cancer Father      Social History     Socioeconomic History    Marital status:    Tobacco Use    Smoking status: Never    Smokeless tobacco: Never   Vaping Use    Vaping Use: Never used   Substance and Sexual Activity    Alcohol use: No    Drug use: No    Sexual activity: Defer       Allergies:  Augmentin [amoxicillin-pot clavulanate] and Metformin and related    Prior to Admission Medications       Prescriptions Last Dose Informant Patient Reported? Taking?    acetaminophen-codeine (TYLENOL #3) 300-30 MG per tablet   No No    Take 1 tablet by mouth Every 12 (Twelve) Hours as needed    acetaminophen-codeine (TYLENOL with CODEINE #3) 300-30 MG per tablet   No No    Take 1 tablet by mouth Every 12 (Twelve) Hours as needed.    acetaminophen-codeine (TYLENOL with CODEINE #3) 300-30 MG per tablet   No No    Take 1 tablet by mouth Every 12 (Twelve) Hours as needed    aspirin 81 MG EC tablet   Yes No    Take 1 tablet by mouth Daily.    busPIRone (BUSPAR) 5 MG tablet   No No    Take 1 tablet by mouth 3 (Three) Times a Day.    Patient taking differently:  Take 1 tablet by mouth 3 (Three) Times a Day As Needed.    Continuous Blood Gluc Sensor (Dexcom G7 Sensor) misc   No No    Change sensor Every 10 (Ten) Days.    dapagliflozin Propanediol (Farxiga) 10 MG tablet   No No    Take 1 tablet (10 mg) by mouth daily    DULoxetine (CYMBALTA) 30 MG capsule   No No     "Take 1 capsule by mouth daily for 7 days, then increase to 2 capsules daily.    fluticasone (FLONASE) 50 MCG/ACT nasal spray   No No    Instill 2 sprays in each nostril daily.    Fluticasone-Salmeterol (Advair Diskus) 250-50 MCG/ACT DISKUS   No No    Inhale 1 puff by mouth 2 (Two) Times a Day.    Patient taking differently:  Inhale 1 puff 2 (Two) Times a Day. 5/19: pt thought she was only supposed to take once daily    Fluticasone-Salmeterol (ADVAIR/WIXELA) 250-50 MCG/ACT DISKUS   Yes No    Inhale 1 puff 2 (Two) Times a Day. Taking 1 time daily    furosemide (LASIX) 40 MG tablet   No No    Take 1 tablet by mouth 2 (Two) Times a Day.    gabapentin (NEURONTIN) 300 MG capsule   No No    Take 1 capsule (300 mg) by mouth 3 times per day    gabapentin (NEURONTIN) 300 MG capsule   No No    Take 1 capsule (300 mg) by mouth 3 times per day    insulin aspart (NovoLOG FlexPen) 100 UNIT/ML solution pen-injector sc pen   No No    Inject 12 Units under the skin into the appropriate area as directed 3 (Three) Times a Day.    insulin aspart (NovoLOG FlexPen) 100 UNIT/ML solution pen-injector sc pen   No No    Inject 12 units subcutaneously 3 times a day.    Insulin Pen Needle (Novofine Pen Needle) 32G X 6 MM misc   No No    Use 1 Daily as directed    Insulin Pen Needle (NovoFine Plus Pen Needle) 32G X 4 MM misc   No No    USE 1 TIME DAILY    Insulin Syringe-Needle U-100 (BD Insulin Syringe U/F) 30G X 1/2\" 0.5 ML misc   No No    Use to inject insulin under the skin four times a day as directed    Insulin Syringe-Needle U-100 (BD Insulin Syringe U/F) 30G X 1/2\" 0.5 ML misc   No No    Use to inject insulin four times a day.    lansoprazole (PREVACID) 30 MG capsule   No No    Take 1 capsule (30 mg) by mouth daily before a meal    lansoprazole (PREVACID) 30 MG capsule   No No    Take 1 capsule (30 mg) by mouth daily before a meal    levothyroxine (Synthroid) 50 MCG tablet   No No    Take 1 tablet by mouth daily in the morning on an " empty stomach.    loratadine (Claritin) 10 MG tablet   No No    Take 1 tablet (10 mg) by mouth daily    loratadine (Claritin) 10 MG tablet   No No    Take 1 tablet (10 mg) by mouth daily    loratadine (Claritin) 10 MG tablet   No No    Take 1 tablet (10 mg) by mouth daily    metFORMIN ER (GLUCOPHAGE-XR) 500 MG 24 hr tablet   No No    Take 2 tablets by mouth Daily With Breakfast.    methocarbamol (ROBAXIN) 500 MG tablet   No No    Take 1 tablet by mouth two times daily as needed    metoprolol succinate XL (TOPROL-XL) 200 MG 24 hr tablet   No No    Take 1 tablet by mouth Daily.    ondansetron ODT (ZOFRAN-ODT) 8 MG disintegrating tablet   No No    Place 1 tablet on the tongue and allow to dissolve Every 8 (Eight) Hours As Needed.    potassium chloride ER (K-TAB) 20 MEQ tablet controlled-release ER tablet   No No    Take 1 tablet by mouth Daily.    potassium chloride ER (K-TAB) 20 MEQ tablet controlled-release ER tablet   No No    Take 2 tablets (40 mEq) by mouth daily with food    pravastatin (PRAVACHOL) 40 MG tablet   No No    Take 1 tablet by mouth Daily.    promethazine-dextromethorphan (PROMETHAZINE-DM) 6.25-15 MG/5ML syrup   No No    Take 5 mL by mouth every 6 hours as needed.    sacubitril-valsartan (ENTRESTO)  MG tablet   No No    Take 1 tablet by mouth 2 (Two) Times a Day.    sodium-potassium-magnesium sulfates (Suprep Bowel Prep Kit) 17.5-3.13-1.6 GM/177ML solution oral solution   No No    Take 1 bottle by mouth Every 12 (Twelve) Hours.    sodium-potassium-magnesium sulfates (SUPREP) 17.5-3.13-1.6 GM/177ML solution oral solution   No No    Take As Directed for 2 doses. Take one bottle with 16oz of water. Then within the hour drink an additional 32oz of water.    spironolactone (ALDACTONE) 25 MG tablet   No No    Take 1 tablet (25 mg) by mouth daily    spironolactone (ALDACTONE) 25 MG tablet   No No    Take 1 tablet (25 mg) by mouth daily    Tirzepatide (Mounjaro) 5 MG/0.5ML solution pen-injector   No No  "   Inject 0.5 mL under the skin into the appropriate area as directed 1 (One) Time Per Week.    Tirzepatide (Mounjaro) 5 MG/0.5ML solution pen-injector   No No    Inject 5 mg under the skin as directed once weekly on the same day of each week    Tirzepatide (Mounjaro) 7.5 MG/0.5ML solution pen-injector   No No    Inject 7.5 mg subcutaneously weekly          Vital Signs:     Vitals:    09/14/23 1028   BP: 141/50   BP Location: Left arm   Patient Position: Sitting   Pulse: 69   Resp: 18   Temp: 97.7 °F (36.5 °C)   TempSrc: Oral   SpO2: 95%   Weight: 122 kg (270 lb)   Height: 160 cm (63\")        Body mass index is 47.83 kg/m².    Physical Exam:  Constitutional:  Alert and oriented. Obese, in no acute distress.  HENT:  Head: Normocephalic and atraumatic.  Mouth:  Moist mucous membranes.  OP clear, mmm  Eyes:  Conjunctivae and EOM are normal.  Pupils are equal, round, and reactive to light.  No scleral icterus.  Neck:  Neck supple.  No JVD present.    Cardiovascular:  RRR, no MRG.  Pulmonary/Chest:  CTAB, unlabored.   Abdominal:  Soft.  Bowel sounds are normal.  No distension and no tenderness.   Musculoskeletal:  No edema, no tenderness, and no deformity.   Neurological:  MS as above, grossly nonfocal exam     Assessment and Plan:  Proceed with colonoscopy given family history of colon cancer (sister) and for evaluation of constipation.     Vashti Schrader, MAURO  09/14/23  10:01 EDT  "

## 2023-09-14 NOTE — ANESTHESIA POSTPROCEDURE EVALUATION
Patient: Zeina Reed    Procedure Summary       Date: 09/14/23 Room / Location:  [facility]  /  [facility]    Anesthesia Start: 1147 Anesthesia Stop: 1210    Procedure: COLONOSCOPY Diagnosis:       Encounter for screening for malignant neoplasm of colon      Diarrhea, unspecified type      Family history of colon cancer      (Encounter for screening for malignant neoplasm of colon [Z12.11])      (Diarrhea, unspecified type [R19.7])      (Family history of colon cancer [Z80.0])    Surgeons: Natacha Jo MD Provider: Power Gordon MD    Anesthesia Type: general ASA Status: 3            Anesthesia Type: general    Vitals  Vitals Value Taken Time   /63 09/14/23 1239   Temp 97.7 °F (36.5 °C) 09/14/23 1239   Pulse 69 09/14/23 1239   Resp 18 09/14/23 1239   SpO2 97 % 09/14/23 1239           Post Anesthesia Care and Evaluation    Patient location during evaluation: bedside  Patient participation: complete - patient participated  Level of consciousness: awake and alert  Pain score: 1  Pain management: adequate    Airway patency: patent  Anesthetic complications: No anesthetic complications  PONV Status: none  Cardiovascular status: acceptable  Respiratory status: acceptable  Hydration status: acceptable

## 2023-09-15 LAB — REF LAB TEST METHOD: NORMAL

## 2023-09-20 NOTE — PROGRESS NOTES
At the time of your recent colonoscopy, 1 polyp was removed from the colon.  The polyp was a sessile serrated lesion.  You will need repeat colonoscopy in 5 years.  Thank you for allow me to participate in your care.

## 2023-09-22 ENCOUNTER — HOSPITAL ENCOUNTER (OUTPATIENT)
Dept: CARDIOLOGY | Facility: HOSPITAL | Age: 63
Discharge: HOME | End: 2023-09-22
Payer: COMMERCIAL

## 2023-09-22 VITALS
SYSTOLIC BLOOD PRESSURE: 132 MMHG | HEART RATE: 90 BPM | OXYGEN SATURATION: 96 % | HEIGHT: 63 IN | WEIGHT: 271 LBS | DIASTOLIC BLOOD PRESSURE: 76 MMHG | BODY MASS INDEX: 48.02 KG/M2

## 2023-09-22 DIAGNOSIS — F32.9 MAJOR DEPRESSIVE DISORDER WITH CURRENT ACTIVE EPISODE, UNSPECIFIED DEPRESSION EPISODE SEVERITY, UNSPECIFIED WHETHER RECURRENT: ICD-10-CM

## 2023-09-22 DIAGNOSIS — M19.90 OSTEOARTHRITIS, UNSPECIFIED OSTEOARTHRITIS TYPE, UNSPECIFIED SITE: ICD-10-CM

## 2023-09-22 DIAGNOSIS — I50.32 CHRONIC HEART FAILURE WITH PRESERVED EJECTION FRACTION (HFPEF): Primary | ICD-10-CM

## 2023-09-22 LAB
ANION GAP SERPL CALCULATED.3IONS-SCNC: 10.6 MMOL/L (ref 5–15)
BUN SERPL-MCNC: 16 MG/DL (ref 8–23)
BUN/CREAT SERPL: 23.5 (ref 7–25)
CALCIUM SPEC-SCNC: 9.6 MG/DL (ref 8.6–10.5)
CHLORIDE SERPL-SCNC: 105 MMOL/L (ref 98–107)
CO2 SERPL-SCNC: 27.4 MMOL/L (ref 22–29)
CREAT SERPL-MCNC: 0.68 MG/DL (ref 0.57–1)
EGFRCR SERPLBLD CKD-EPI 2021: 98.6 ML/MIN/1.73
GLUCOSE SERPL-MCNC: 164 MG/DL (ref 65–99)
MAGNESIUM SERPL-MCNC: 1.6 MG/DL (ref 1.6–2.4)
NT-PROBNP SERPL-MCNC: 121.8 PG/ML (ref 0–900)
POTASSIUM SERPL-SCNC: 4.3 MMOL/L (ref 3.5–5.2)
SODIUM SERPL-SCNC: 143 MMOL/L (ref 136–145)

## 2023-09-22 PROCEDURE — 83880 ASSAY OF NATRIURETIC PEPTIDE: CPT | Performed by: PHYSICIAN ASSISTANT

## 2023-09-22 PROCEDURE — 83735 ASSAY OF MAGNESIUM: CPT | Performed by: PHYSICIAN ASSISTANT

## 2023-09-22 PROCEDURE — 80048 BASIC METABOLIC PNL TOTAL CA: CPT | Performed by: PHYSICIAN ASSISTANT

## 2023-09-22 RX ORDER — DULOXETIN HYDROCHLORIDE 30 MG/1
30 CAPSULE, DELAYED RELEASE ORAL DAILY
Qty: 30 CAPSULE | Refills: 6 | Status: SHIPPED | OUTPATIENT
Start: 2023-09-22

## 2023-09-22 RX ORDER — DAPAGLIFLOZIN 10 MG/1
1 TABLET, FILM COATED ORAL DAILY
Qty: 30 TABLET | Refills: 5 | Status: SHIPPED | OUTPATIENT
Start: 2023-09-22

## 2023-09-22 NOTE — PROGRESS NOTES
Heart Failure Clinic    Date: 09/22/23     Vitals:    09/22/23 1351   BP: 132/76   Pulse: 90   SpO2: 96%    Weight 271    Method of arrival: Ambulatory    Weighing self daily: Most days    Monitoring Heart Failure Zones: Yes    Today's HF Zone: Green    Taking medications as prescribed: Yes    Edema No    Shortness of Air: No    Number of pillows used at night:>3    Educational Materials given:  avs                                                                         ReDS Value: dexcom on right chest, instructed patient to place in another location when changing it.         Bryant Savage RN 09/22/23 13:52 EDT

## 2023-09-22 NOTE — PROGRESS NOTES
Heart Failure Clinic  Pharmacist Note     Zeina Reed is a 62 y.o. female seen in the Heart Failure Clinic for HFpEF.  Zeina Reed reports a good understanding and adherence to her medications. Denies any shortness of breath except sometimes on exertion, swelling or weight gain. She uses her lasix 40mg only daily now and denies having to use an additional dose at nighPatient has no issues obtaining or affording any of her medications, now including Mounjaro which she can now afford. She reports that her BP have been running in the 130-140's/70's and did not know about her HR at all.     Medication Use:   Hx of med intolerances: None related to HF  Retail Rx Management: Uses our diabetes clinic for Mounjaro     Past Medical History:   Diagnosis Date    Acute exacerbation of CHF (congestive heart failure) 07/10/2021    Arthritis     COPD (chronic obstructive pulmonary disease)     Diabetes mellitus     Disease of thyroid gland     Dyslipidemia     Elevated cholesterol     GERD (gastroesophageal reflux disease)     History of transfusion     Hyperlipidemia     Hypertension     Palpitations     Sleep apnea      ALLERGIES: Augmentin [amoxicillin-pot clavulanate] and Metformin and related  Current Outpatient Medications   Medication Sig Dispense Refill    acetaminophen-codeine (TYLENOL #3) 300-30 MG per tablet Take 1 tablet by mouth Every 12 (Twelve) Hours as needed 60 tablet 0    aspirin 81 MG EC tablet Take 1 tablet by mouth Daily.      busPIRone (BUSPAR) 5 MG tablet Take 1 tablet by mouth 3 (Three) Times a Day. (Patient taking differently: Take 1 tablet by mouth 3 (Three) Times a Day As Needed.) 90 tablet 2    Continuous Blood Gluc Sensor (Dexcom G7 Sensor) misc Change sensor Every 10 (Ten) Days. 3 each 5    dapagliflozin Propanediol (Farxiga) 10 MG tablet Take 1 tablet (10 mg) by mouth daily 30 tablet 5    fluticasone (FLONASE) 50 MCG/ACT nasal spray Instill 2 sprays in each nostril daily. 16 g 5     "Fluticasone-Salmeterol (ADVAIR/WIXELA) 250-50 MCG/ACT DISKUS Inhale 1 puff 2 (Two) Times a Day. Taking 1 time daily      furosemide (LASIX) 40 MG tablet Take 1 tablet by mouth 2 (Two) Times a Day. (Patient taking differently: Take 1 tablet by mouth Daily.) 180 tablet 0    gabapentin (NEURONTIN) 300 MG capsule Take 1 capsule (300 mg) by mouth 3 times per day 90 capsule 2    insulin aspart (NovoLOG FlexPen) 100 UNIT/ML solution pen-injector sc pen Inject 12 Units under the skin into the appropriate area as directed 3 (Three) Times a Day. 15 mL 2    Insulin Pen Needle (Novofine Pen Needle) 32G X 6 MM misc Use 1 Daily as directed 100 each 0    Insulin Pen Needle (NovoFine Plus Pen Needle) 32G X 4 MM misc USE 1 TIME DAILY 90 each 2    Insulin Syringe-Needle U-100 (BD Insulin Syringe U/F) 30G X 1/2\" 0.5 ML misc Use to inject insulin under the skin four times a day as directed 120 each 8    Insulin Syringe-Needle U-100 (BD Insulin Syringe U/F) 30G X 1/2\" 0.5 ML misc Use to inject insulin four times a day. 500 each 0    lansoprazole (PREVACID) 30 MG capsule Take 1 capsule (30 mg) by mouth daily before a meal 90 capsule 2    levothyroxine (Synthroid) 50 MCG tablet Take 1 tablet (50 mcg) by mouth daily in the morning on an empty stomach 90 tablet 1    loratadine (Claritin) 10 MG tablet Take 1 tablet (10 mg) by mouth daily 90 tablet 3    metFORMIN ER (GLUCOPHAGE-XR) 500 MG 24 hr tablet Take 2 tablets by mouth Daily With Breakfast. 180 tablet 2    methocarbamol (ROBAXIN) 500 MG tablet Take 1 tablet by mouth two times daily as needed 60 tablet 3    metoprolol succinate XL (TOPROL-XL) 200 MG 24 hr tablet Take 1 tablet by mouth Daily. 90 tablet 0    ondansetron ODT (ZOFRAN-ODT) 8 MG disintegrating tablet Place 1 tablet on the tongue and allow to dissolve Every 8 (Eight) Hours As Needed. 30 tablet 3    potassium chloride ER (K-TAB) 20 MEQ tablet controlled-release ER tablet Take 1 tablet by mouth Daily. 60 tablet 3    pravastatin " (PRAVACHOL) 40 MG tablet Take 1 tablet by mouth Daily. 30 tablet 3    promethazine-dextromethorphan (PROMETHAZINE-DM) 6.25-15 MG/5ML syrup Take 5 mL by mouth every 6 hours as needed. 200 mL 0    sacubitril-valsartan (ENTRESTO)  MG tablet Take 1 tablet by mouth 2 (Two) Times a Day. 60 tablet 3    spironolactone (ALDACTONE) 25 MG tablet Take 1 tablet (25 mg) by mouth daily 90 tablet 1    Tirzepatide (Mounjaro) 7.5 MG/0.5ML solution pen-injector Inject 7.5 mg subcutaneously weekly 2 mL 3    DULoxetine (CYMBALTA) 30 MG capsule Take 1 capsule by mouth Daily. 30 capsule 6    Fluticasone-Salmeterol (Advair Diskus) 250-50 MCG/ACT DISKUS Inhale 1 puff by mouth 2 (Two) Times a Day. (Patient taking differently: Inhale 1 puff 2 (Two) Times a Day. 5/19: pt thought she was only supposed to take once daily) 60 each 0    insulin aspart (NovoLOG FlexPen) 100 UNIT/ML solution pen-injector sc pen Inject 12 Units under the skin as directed 3 times a day. (Patient not taking: Reported on 9/22/2023) 15 mL 2    lansoprazole (PREVACID) 30 MG capsule Take 1 capsule (30 mg) by mouth daily before a meal (Patient not taking: Reported on 9/22/2023) 90 capsule 2    lansoprazole (PREVACID) 30 MG capsule Take 1 capsule (30 mg) by mouth daily before a meal (Patient not taking: Reported on 9/22/2023) 90 capsule 2    loratadine (Claritin) 10 MG tablet Take 1 tablet (10 mg) by mouth daily (Patient not taking: Reported on 9/22/2023) 90 tablet 3    loratadine (Claritin) 10 MG tablet Take 1 tablet (10 mg) by mouth daily (Patient not taking: Reported on 9/22/2023) 90 tablet 3    potassium chloride ER (K-TAB) 20 MEQ tablet controlled-release ER tablet Take 2 tablets (40 mEq) by mouth daily with food (Patient not taking: Reported on 9/22/2023) 60 tablet 3    spironolactone (ALDACTONE) 25 MG tablet Take 1 tablet (25 mg) by mouth daily (Patient not taking: Reported on 9/22/2023) 90 tablet 1    spironolactone (ALDACTONE) 25 MG tablet Take 1 tablet by  "mouth Daily. (Patient not taking: Reported on 9/22/2023) 90 tablet 1    Tirzepatide (Mounjaro) 5 MG/0.5ML solution pen-injector Inject 0.5 mL under the skin into the appropriate area as directed 1 (One) Time Per Week. (Patient not taking: Reported on 9/22/2023) 2 mL 2    Tirzepatide (Mounjaro) 5 MG/0.5ML solution pen-injector Inject 5 mg under the skin as directed once weekly on the same day of each week (Patient not taking: Reported on 9/22/2023) 2 mL 3     No current facility-administered medications for this encounter.       Vaccination History:   Pneumonia: UTD  Annual Influenza: UTD    Objective  Vitals:    09/22/23 1351   BP: 132/76   BP Location: Left arm   Patient Position: Sitting   Cuff Size: Adult   Pulse: 90   SpO2: 96%   Weight: 123 kg (271 lb)   Height: 160 cm (63\")     Wt Readings from Last 3 Encounters:   09/22/23 123 kg (271 lb)   09/14/23 122 kg (270 lb)   08/09/23 122 kg (270 lb)         09/22/23  1351   Weight: 123 kg (271 lb)     Lab Results   Component Value Date    GLUCOSE 164 (H) 09/22/2023    BUN 16 09/22/2023    CREATININE 0.68 09/22/2023    EGFRIFNONA 71 12/17/2021    EGFRIFAFRI  09/17/2016      Comment:      <15 Indicative of kidney failure.    BCR 23.5 09/22/2023    K 4.3 09/22/2023    CO2 27.4 09/22/2023    CALCIUM 9.6 09/22/2023    ALBUMIN 4.10 12/17/2021    LABIL2 1.4 (L) 02/12/2015    AST 16 12/17/2021    ALT 29 12/17/2021     Lab Results   Component Value Date    WBC 10.00 12/17/2021    HGB 12.3 12/17/2021    HCT 36.4 12/17/2021    MCV 90.5 12/17/2021     12/17/2021     Lab Results   Component Value Date    TROPONINT <0.010 07/11/2021     Lab Results   Component Value Date    PROBNP 121.8 09/22/2023     Results for orders placed during the hospital encounter of 08/17/22    Adult Transthoracic Echo Complete W/ Cont if Necessary Per Protocol    Interpretation Summary  · Normal left ventricular cavity size and wall thickness noted. All left ventricular wall segments contract " normally.  · Left ventricular ejection fraction appears to be 61 - 65%.  · The aortic valve is structurally normal with no regurgitation or stenosis present.  · The mitral valve is structurally normal with no regurgitation or significant stenosis present.  · There is no evidence of pericardial effusion.         GDMT    Drug Class   Drug   Dose Last Dose Adjustment Additional Titration   Notes   ACEi/ARB/ARNI Entresto 97/103   Confirmed no longer taking Losartan   Beta Blocker Toprol       MRA Spironolactone 25      SGLT2i Farxiga 10  N/A        Drug Therapy Problems    1. GDMT  2. Wants to  meds from the pharmacy- whatever can be filled.     Recommendations:   No changes to recommend at this time.   2.   Called to get Rx's refilled. Patient will pick-up via [facility] after appointment.     Patient was educated on heart failure medications and the importance of medication adherence. All questions were addressed and patient expressed understanding.    Thank you for allowing me to participate in the care of your patient,    Gwen Mark [facility]  09/22/23  14:57 EDT

## 2023-09-22 NOTE — PROGRESS NOTES
iutyygf g [facility]  ANNE Pinto APRN Cloud, Rebekah, APRN  [address]  [address]  [place] [id number]    Thank you for asking me to see Zeina Reed for congestive heart failure.    HPI:     This is a 62 y.o. female with known past medical history of:    FIDEL (CPAP)  HFpEF  LVEF 61-65% on 08/17/22.   Essential HTN  Uncontrolled DM type 2  Fatty liver disease  GERD  COPD     Zeina Reed presents for today for HF referral from PCP.  The patient is typically seen by Nini Marie APRN.  Patient's primary cardiologist is Dr. Zapata & team.     Last known EF 65% in 07/2021 with repeat echocardiogram pending at present.    Last known hospitalization and/or ED visit: July 10, 2021 through July 15, 2021 with acute on chronic decompensated heart failure, obstructive sleep apnea uncontrolled diabetes and sepsis admission.  Accompanied by: Spouse  Patient was reerred by her PCP's office after seeing Ann Marie and reporting worsening dyspnea on exertion and swelling in spite of Bumex 2mg BID.         09/22/23 visit data/details regarding:   Dyspnea: Dyspnea on exertion improved.  Lower extremity swelling: Intermittent swelling   Abdominal swelling: Improved  Home weight: Waxing/waning  Home BP:130s systolic  Home heart rate: 70s  Daily activities of living: Performing with assistance of family.   Pillows/lying flat: 3 pillows  HF zone: Green  Mrs. Reed is feeling well overall.  She does report that since recent colonoscopy she does not feel she has been making as much urine.  We discussed likely mild dehydration following bowel prep coupled with home diuresis.  She expresses understanding.  She reports she did tolerate Cymbalta well but did run out; more refills have been sent to her pharmacy.  She does now have Dexcom monitor which she reports she is doing well with.  In regard to blood pressure she reports that have been well controlled so that she  does not bring log with her to visit.    Specialists:   Cardiology: Dr. Zapata    Review of Systems - Review of Systems   Constitutional: Negative for chills, diaphoresis, fever and malaise/fatigue.   HENT:  Negative for congestion and ear discharge.    Eyes:  Negative for blurred vision and discharge.   Cardiovascular:  Negative for chest pain, claudication, dyspnea on exertion, leg swelling, near-syncope, orthopnea and palpitations.   Respiratory:  Negative for cough and hemoptysis.    Endocrine: Negative for cold intolerance and heat intolerance.   Hematologic/Lymphatic: Negative for adenopathy and bleeding problem.   Skin:  Negative for color change and nail changes.   Musculoskeletal:  Positive for arthritis.   Gastrointestinal:  Negative for bloating and abdominal pain.   Genitourinary:  Negative for bladder incontinence and decreased libido.   Neurological:  Negative for aphonia and brief paralysis.   Psychiatric/Behavioral:  Negative for altered mental status and depression.        All other systems were reviewed and were negative.    Patient Active Problem List   Diagnosis    Palpitations    Essential hypertension    Dyslipidemia    Dizziness    Acute exacerbation of CHF (congestive heart failure)    SOB (shortness of breath)    Morbid obesity with BMI of 45.0-49.9, adult    Physical deconditioning    Chronic heart failure with preserved ejection fraction (HFpEF)    MDD (major depressive disorder)    Type 2 diabetes mellitus with hypoglycemia without coma, with long-term current use of insulin    Encounter for screening for malignant neoplasm of colon    Diarrhea    Family history of colon cancer       family history includes Cancer in her father and mother; Heart attack in her father.     reports that she has never smoked. She has never used smokeless tobacco. She reports that she does not drink alcohol and does not use drugs.    Allergies   Allergen Reactions    Augmentin [Amoxicillin-Pot Clavulanate]  "Diarrhea    Metformin And Related Diarrhea         Current Outpatient Medications:     acetaminophen-codeine (TYLENOL #3) 300-30 MG per tablet, Take 1 tablet by mouth Every 12 (Twelve) Hours as needed, Disp: 60 tablet, Rfl: 0    aspirin 81 MG EC tablet, Take 1 tablet by mouth Daily., Disp: , Rfl:     busPIRone (BUSPAR) 5 MG tablet, Take 1 tablet by mouth 3 (Three) Times a Day. (Patient taking differently: Take 1 tablet by mouth 3 (Three) Times a Day As Needed.), Disp: 90 tablet, Rfl: 2    Continuous Blood Gluc Sensor (Dexcom G7 Sensor) misc, Change sensor Every 10 (Ten) Days., Disp: 3 each, Rfl: 5    dapagliflozin Propanediol (Farxiga) 10 MG tablet, Take 1 tablet (10 mg) by mouth daily, Disp: 30 tablet, Rfl: 5    fluticasone (FLONASE) 50 MCG/ACT nasal spray, Instill 2 sprays in each nostril daily., Disp: 16 g, Rfl: 5    Fluticasone-Salmeterol (ADVAIR/WIXELA) 250-50 MCG/ACT DISKUS, Inhale 1 puff 2 (Two) Times a Day. Taking 1 time daily, Disp: , Rfl:     furosemide (LASIX) 40 MG tablet, Take 1 tablet by mouth 2 (Two) Times a Day. (Patient taking differently: Take 1 tablet by mouth Daily.), Disp: 180 tablet, Rfl: 0    gabapentin (NEURONTIN) 300 MG capsule, Take 1 capsule (300 mg) by mouth 3 times per day, Disp: 90 capsule, Rfl: 2    insulin aspart (NovoLOG FlexPen) 100 UNIT/ML solution pen-injector sc pen, Inject 12 Units under the skin into the appropriate area as directed 3 (Three) Times a Day., Disp: 15 mL, Rfl: 2    Insulin Pen Needle (Novofine Pen Needle) 32G X 6 MM misc, Use 1 Daily as directed, Disp: 100 each, Rfl: 0    Insulin Pen Needle (NovoFine Plus Pen Needle) 32G X 4 MM misc, USE 1 TIME DAILY, Disp: 90 each, Rfl: 2    Insulin Syringe-Needle U-100 (BD Insulin Syringe U/F) 30G X 1/2\" 0.5 ML misc, Use to inject insulin under the skin four times a day as directed, Disp: 120 each, Rfl: 8    Insulin Syringe-Needle U-100 (BD Insulin Syringe U/F) 30G X 1/2\" 0.5 ML misc, Use to inject insulin four times a day., " Disp: 500 each, Rfl: 0    lansoprazole (PREVACID) 30 MG capsule, Take 1 capsule (30 mg) by mouth daily before a meal, Disp: 90 capsule, Rfl: 2    levothyroxine (Synthroid) 50 MCG tablet, Take 1 tablet (50 mcg) by mouth daily in the morning on an empty stomach, Disp: 90 tablet, Rfl: 1    loratadine (Claritin) 10 MG tablet, Take 1 tablet (10 mg) by mouth daily, Disp: 90 tablet, Rfl: 3    metFORMIN ER (GLUCOPHAGE-XR) 500 MG 24 hr tablet, Take 2 tablets by mouth Daily With Breakfast., Disp: 180 tablet, Rfl: 2    methocarbamol (ROBAXIN) 500 MG tablet, Take 1 tablet by mouth two times daily as needed, Disp: 60 tablet, Rfl: 3    metoprolol succinate XL (TOPROL-XL) 200 MG 24 hr tablet, Take 1 tablet by mouth Daily., Disp: 90 tablet, Rfl: 0    ondansetron ODT (ZOFRAN-ODT) 8 MG disintegrating tablet, Place 1 tablet on the tongue and allow to dissolve Every 8 (Eight) Hours As Needed., Disp: 30 tablet, Rfl: 3    potassium chloride ER (K-TAB) 20 MEQ tablet controlled-release ER tablet, Take 1 tablet by mouth Daily., Disp: 60 tablet, Rfl: 3    pravastatin (PRAVACHOL) 40 MG tablet, Take 1 tablet by mouth Daily., Disp: 30 tablet, Rfl: 3    promethazine-dextromethorphan (PROMETHAZINE-DM) 6.25-15 MG/5ML syrup, Take 5 mL by mouth every 6 hours as needed., Disp: 200 mL, Rfl: 0    sacubitril-valsartan (ENTRESTO)  MG tablet, Take 1 tablet by mouth 2 (Two) Times a Day., Disp: 60 tablet, Rfl: 3    spironolactone (ALDACTONE) 25 MG tablet, Take 1 tablet (25 mg) by mouth daily, Disp: 90 tablet, Rfl: 1    Tirzepatide (Mounjaro) 7.5 MG/0.5ML solution pen-injector, Inject 7.5 mg subcutaneously weekly, Disp: 2 mL, Rfl: 3    DULoxetine (CYMBALTA) 30 MG capsule, Take 1 capsule by mouth Daily., Disp: 30 capsule, Rfl: 6    Fluticasone-Salmeterol (Advair Diskus) 250-50 MCG/ACT DISKUS, Inhale 1 puff by mouth 2 (Two) Times a Day. (Patient taking differently: Inhale 1 puff 2 (Two) Times a Day. 5/19: pt thought she was only supposed to take once  daily), Disp: 60 each, Rfl: 0    insulin aspart (NovoLOG FlexPen) 100 UNIT/ML solution pen-injector sc pen, Inject 12 Units under the skin as directed 3 times a day. (Patient not taking: Reported on 9/22/2023), Disp: 15 mL, Rfl: 2    lansoprazole (PREVACID) 30 MG capsule, Take 1 capsule (30 mg) by mouth daily before a meal (Patient not taking: Reported on 9/22/2023), Disp: 90 capsule, Rfl: 2    lansoprazole (PREVACID) 30 MG capsule, Take 1 capsule (30 mg) by mouth daily before a meal (Patient not taking: Reported on 9/22/2023), Disp: 90 capsule, Rfl: 2    loratadine (Claritin) 10 MG tablet, Take 1 tablet (10 mg) by mouth daily (Patient not taking: Reported on 9/22/2023), Disp: 90 tablet, Rfl: 3    loratadine (Claritin) 10 MG tablet, Take 1 tablet (10 mg) by mouth daily (Patient not taking: Reported on 9/22/2023), Disp: 90 tablet, Rfl: 3    potassium chloride ER (K-TAB) 20 MEQ tablet controlled-release ER tablet, Take 2 tablets (40 mEq) by mouth daily with food (Patient not taking: Reported on 9/22/2023), Disp: 60 tablet, Rfl: 3    spironolactone (ALDACTONE) 25 MG tablet, Take 1 tablet (25 mg) by mouth daily (Patient not taking: Reported on 9/22/2023), Disp: 90 tablet, Rfl: 1    spironolactone (ALDACTONE) 25 MG tablet, Take 1 tablet by mouth Daily. (Patient not taking: Reported on 9/22/2023), Disp: 90 tablet, Rfl: 1    Tirzepatide (Mounjaro) 5 MG/0.5ML solution pen-injector, Inject 0.5 mL under the skin into the appropriate area as directed 1 (One) Time Per Week. (Patient not taking: Reported on 9/22/2023), Disp: 2 mL, Rfl: 2    Tirzepatide (Mounjaro) 5 MG/0.5ML solution pen-injector, Inject 5 mg under the skin as directed once weekly on the same day of each week (Patient not taking: Reported on 9/22/2023), Disp: 2 mL, Rfl: 3      Physical Exam:  I have reviewed the patient's current vital signs as documented in the patient's EMR.   Vitals:    09/22/23 1351   BP: 132/76   Pulse: 90   SpO2: 96%     Body mass index is  48.01 kg/m².       09/22/23  1351   Weight: 123 kg (271 lb)        Physical Exam  Vitals and nursing note reviewed.   Constitutional:       General: She is awake.   HENT:      Head: Normocephalic and atraumatic.   Eyes:      General: Lids are normal.      Conjunctiva/sclera:      Right eye: Right conjunctiva is not injected.      Left eye: Left conjunctiva is not injected.   Neck:      Thyroid: No thyroid mass.      Vascular: No carotid bruit.   Cardiovascular:      Rate and Rhythm: Normal rate and regular rhythm.      Heart sounds: S1 normal and S2 normal.   Pulmonary:      Effort: No tachypnea or bradypnea.      Breath sounds: No decreased breath sounds, wheezing, rhonchi or rales.   Abdominal:      General: Bowel sounds are normal.      Palpations: Abdomen is soft.      Tenderness: There is no abdominal tenderness.   Musculoskeletal:      Cervical back: Full passive range of motion without pain.      Right lower leg: No edema.      Left lower leg: No edema.      Comments: Non pitting bilateral edema   Skin:     General: Skin is warm and moist.   Neurological:      Mental Status: She is alert and oriented to person, place, and time.   Psychiatric:         Attention and Perception: Attention normal.         Mood and Affect: Mood normal.         Behavior: Behavior is cooperative.          JVP: Volume/Pulsation: Normal.        DATA REVIEWED:     EKG. I personally reviewed and interpreted the EKG.          ---------------------------------------------------  TTE/KELVIN:  Results for orders placed during the hospital encounter of 08/17/22    Adult Transthoracic Echo Complete W/ Cont if Necessary Per Protocol    Interpretation Summary  · Normal left ventricular cavity size and wall thickness noted. All left ventricular wall segments contract normally.  · Left ventricular ejection fraction appears to be 61 - 65%.  · The aortic valve is structurally normal with no regurgitation or stenosis present.  · The mitral valve is  structurally normal with no regurgitation or significant stenosis present.  · There is no evidence of pericardial effusion.        -----------------------------------------------------  CXR/Imaging:   Imaging Results (Most Recent)       None            I personally reviewed and interpreted the CXR.      -----------------------------------------------------  CT:   No radiology results for the last 30 days.  I personally reviewed the images of the CT scan.  My personal interpretation is below.      ----------------------------------------------------    PFTs:      --------------------------------------------------------------------------------------------------    Laboratory evaluations:    Lab Results   Component Value Date    GLUCOSE 164 (H) 09/22/2023    BUN 16 09/22/2023    CREATININE 0.68 09/22/2023    EGFRIFNONA 71 12/17/2021    EGFRIFAFRI  09/17/2016      Comment:      <15 Indicative of kidney failure.    BCR 23.5 09/22/2023    K 4.3 09/22/2023    CO2 27.4 09/22/2023    CALCIUM 9.6 09/22/2023    ALBUMIN 4.10 12/17/2021    LABIL2 1.4 (L) 02/12/2015    AST 16 12/17/2021    ALT 29 12/17/2021     Lab Results   Component Value Date    WBC 10.00 12/17/2021    HGB 12.3 12/17/2021    HCT 36.4 12/17/2021    MCV 90.5 12/17/2021     12/17/2021     Lab Results   Component Value Date    CHOL 230 (H) 12/17/2021    CHLPL 205 (H) 02/17/2016    TRIG 231 (H) 12/17/2021    HDL 56 12/17/2021     (H) 12/17/2021     Lab Results   Component Value Date    TSH 2.005 04/18/2017    P2IZVWI 9.40 04/18/2017     Lab Results   Component Value Date    HGBA1C 6.3 06/26/2023     Lab Results   Component Value Date    ALT 29 12/17/2021     Lab Results   Component Value Date    HGBA1C 6.3 06/26/2023    HGBA1C 8.70 (H) 07/10/2021    HGBA1C 8.90 (H) 04/18/2017     Lab Results   Component Value Date    CREATININE 0.68 09/22/2023     No results found for: IRON, TIBC, FERRITIN  No results found for: INR, PROTIME     Lab Results    Component Value Date    ABSOLUTELUNG 24 07/21/2023    ABSOLUTELUNG 25 05/19/2023    ABSOLUTELUNG 25 03/03/2023             PAH RISK ASSESSMENT:      1. Chronic heart failure with preserved ejection fraction (HFpEF)    2. Major depressive disorder with current active episode, unspecified depression episode severity, unspecified whether recurrent    3. Osteoarthritis, unspecified osteoarthritis type, unspecified site          ORDERS PLACED TODAY:  Orders Placed This Encounter   Procedures    Basic Metabolic Panel    Magnesium    proBNP    Basic Metabolic Panel    Magnesium    proBNP    ReDs Vest        Diagnoses and all orders for this visit:    1. Chronic heart failure with preserved ejection fraction (HFpEF) (Primary)  -     Basic Metabolic Panel; Future  -     Magnesium; Future  -     proBNP; Future  -     Basic Metabolic Panel; Standing  -     Basic Metabolic Panel  -     Magnesium; Standing  -     Magnesium  -     proBNP; Standing  -     proBNP  -     ReDs Vest    2. Major depressive disorder with current active episode, unspecified depression episode severity, unspecified whether recurrent    3. Osteoarthritis, unspecified osteoarthritis type, unspecified site    Other orders  -     sacubitril-valsartan (ENTRESTO)  MG tablet; Take 1 tablet by mouth 2 (Two) Times a Day.  Dispense: 60 tablet; Refill: 3  -     dapagliflozin Propanediol (Farxiga) 10 MG tablet; Take 1 tablet (10 mg) by mouth daily  Dispense: 30 tablet; Refill: 5  -     DULoxetine (CYMBALTA) 30 MG capsule; Take 1 capsule by mouth Daily.  Dispense: 30 capsule; Refill: 6             MEDS ORDERED TODAY:    New Medications Ordered This Visit   Medications    sacubitril-valsartan (ENTRESTO)  MG tablet     Sig: Take 1 tablet by mouth 2 (Two) Times a Day.     Dispense:  60 tablet     Refill:  3    dapagliflozin Propanediol (Farxiga) 10 MG tablet     Sig: Take 1 tablet (10 mg) by mouth daily     Dispense:  30 tablet     Refill:  5    DULoxetine  (CYMBALTA) 30 MG capsule     Sig: Take 1 capsule by mouth Daily.     Dispense:  30 capsule     Refill:  6          ---------------------------------------------------------------------------------------------------------------------------          ASSESSMENT/PLAN:      Diagnosis Plan   1. Chronic heart failure with preserved ejection fraction (HFpEF)  Basic Metabolic Panel    Magnesium    proBNP    Basic Metabolic Panel    Basic Metabolic Panel    Magnesium    Magnesium    proBNP    proBNP    ReDs Vest      2. Major depressive disorder with current active episode, unspecified depression episode severity, unspecified whether recurrent        3. Osteoarthritis, unspecified osteoarthritis type, unspecified site            not acutely decompensated chronic diastolic heart failure. CHF.      NYHA stage B;FC-II.    Today, Patient is approaching euvolemiaand with  Moderate perfusion. The patient's hemodynamics are currently acceptable. HR is: normal and is at goal. BP/MAP was reviewed and there isroom for medication up-titration.  Clinical trajectory was assessed and haswaxed and waned.     CHF GOAL DIRECTED MEDICAL THERAPY FOR PATIENT ADDRESSED/ADJUSTED:       GDMT:     Drug Class   Drug   Dose Last Dose Adjustment Additional Titration   Notes   ACEi/ARB/ARNI Entresto 97-103mg BID I     Beta Blocker Toprol XL  200mg      MRA  Spironolactone 25mg qd      SGLT2i Farxiga  10mg  N/A        -CHF Specific BB:   Continue Toprol XL 200mg qd.     Educated regarding slow titration and addition of medications with close monitoring.  She is monitoring closely and is aware of hold parameters.         -ACE/ARB/ARNi:   Continue Entresto with patient tolerating well; reports this is affordable with $10 copay card.    Baseline creatinine previously known to be 0.7-0.9     -MRA:   The patient is FC-NYHA Class III and MRA is indicated.   Continue Spironolactone 25mg qd.   BMP reviewed and acceptable.  Quarterly monitoring of potassium and  renal function is currently recommended    -SGLT2 inhibitor therapy:   A BMP at initiation to verify GFR >30 was recommended, as was interval GFR surveillance.  The patient was advised to hold SGLT2I when PO intake is restricted due to a planned surgery, or due to an underlying illness.   Recommend continuing Farxiga (dapagliflozin) 10mg daily with quarterly assessment of GFR.   Denies current  side effects; She does report one recent yeast infection but plans to continue to monitor for more.     -Diuretic regimen:   ReDs Vest reading for 09/22/23  could not be obtained today 2/2 dexcom on the chest; ReDs Vest reading reviewed with patient.    Continue Lasix 40mg qd and instructed to take extra 40mg PRN for worsening shortness of breath, swelling, weight gain.   Instructed to call clinic if symptoms worsen.    BMP, Mag, & ProBNP reviewed with patient and within acceptable limits.     -Fluid restriction/Sodium restriction:   Requested 2000 ml restriction  Patient has been asked to weigh daily and was provided with a printed diuretic strategy.  1,500 mg Na restriction was discussed.  Dietary information/Education provided.      -Acute vs. Chronic underlying conditions other than HF addressed during visit:     Depression:   Osteoarthritis:   Feeling improved with Cymbalta; refills sent to pharmacy.     Debility:   Deconditioning:   While patient is compensated in regard to heart failure, she does have ongoing limitations with dyspnea on exertion and prolonged activity as a results of this illness.  She has great difficulties performing daily activities of living.   May attempt to go down on Toprol XL.      COPD, stable without exacerbation:   Continue outpatient f/u with PCP.     Hyperglycemia with IDDM:   Last hemoglobin a1c 8.6 on 03/2022  Continue Farxiga 10mg; refills sent.   Continue outpatient PCP follow-up.     FIDEL  Continue home CPAP.     Essential HTN:   Continue   Importance of blood pressure optimization  in HFpEF discussed at length with patient.  She reports she has been abiding by hold parameters and did have to hold medication one time.      CONSIDERATIONS:   -Iron/Anemia in CHF:  Hgb WNL.               Class 3 Severe Obesity (BMI >=40). Obesity-related health conditions include the following: diabetes mellitus, dyslipidemias, peripheral vascular disease and osteoarthritis. Obesity is newly identified. BMI is is above average; BMI management plan is completed. We discussed portion control and increasing exercise. Patient is currently actively exercising.         >30 minutes out of 60 minutes face to face spent counseling patient extensively on dietary Na+ intake, importance of activity, weight monitoring, compliance with medications in addition to importance of titration with goal directed medical therapy and follow up appointments.            This document has been electronically signed by Tawanna Arambula PA-C  September 22, 2023 15:15 EDT      Dictated Utilizing Dragon Dictation: Part of this note may be an electronic transcription/translation of spoken language to printed text using the Dragon Dictation System.    Follow-up appointment and medication changes provided in hand delivered After Visit Summary as well as reviewed in the room.

## 2023-09-28 ENCOUNTER — OFFICE VISIT (OUTPATIENT)
Dept: ENDOCRINOLOGY | Facility: CLINIC | Age: 63
End: 2023-09-28
Payer: COMMERCIAL

## 2023-09-28 ENCOUNTER — SPECIALTY PHARMACY (OUTPATIENT)
Dept: PHARMACY | Facility: HOSPITAL | Age: 63
End: 2023-09-28
Payer: COMMERCIAL

## 2023-09-28 VITALS
OXYGEN SATURATION: 95 % | HEART RATE: 77 BPM | WEIGHT: 271.3 LBS | HEIGHT: 63 IN | BODY MASS INDEX: 48.07 KG/M2 | SYSTOLIC BLOOD PRESSURE: 128 MMHG | DIASTOLIC BLOOD PRESSURE: 62 MMHG

## 2023-09-28 DIAGNOSIS — E11.649 TYPE 2 DIABETES MELLITUS WITH HYPOGLYCEMIA WITHOUT COMA, WITH LONG-TERM CURRENT USE OF INSULIN: Primary | ICD-10-CM

## 2023-09-28 DIAGNOSIS — Z79.4 TYPE 2 DIABETES MELLITUS WITH HYPOGLYCEMIA WITHOUT COMA, WITH LONG-TERM CURRENT USE OF INSULIN: Primary | ICD-10-CM

## 2023-09-28 LAB
EXPIRATION DATE: NORMAL
GLUCOSE BLDC GLUCOMTR-MCNC: 141 MG/DL (ref 70–130)
HBA1C MFR BLD: 6.4 %
Lab: NORMAL

## 2023-09-28 RX ORDER — INSULIN DEGLUDEC 200 U/ML
15 INJECTION, SOLUTION SUBCUTANEOUS 2 TIMES DAILY
COMMUNITY
End: 2023-09-28 | Stop reason: DRUGHIGH

## 2023-09-28 RX ORDER — METFORMIN HYDROCHLORIDE 500 MG/1
1000 TABLET, EXTENDED RELEASE ORAL
Qty: 180 TABLET | Refills: 2 | Status: SHIPPED | OUTPATIENT
Start: 2023-09-28

## 2023-09-28 RX ORDER — ACYCLOVIR 400 MG/1
1 TABLET ORAL
Qty: 3 EACH | Refills: 5 | Status: SHIPPED | OUTPATIENT
Start: 2023-09-28

## 2023-09-28 RX ORDER — TIRZEPATIDE 7.5 MG/.5ML
INJECTION, SOLUTION SUBCUTANEOUS
Qty: 2 ML | Refills: 3 | Status: SHIPPED | OUTPATIENT
Start: 2023-09-28

## 2023-09-28 RX ORDER — DAPAGLIFLOZIN 10 MG/1
1 TABLET, FILM COATED ORAL DAILY
Qty: 30 TABLET | Refills: 5 | Status: SHIPPED | OUTPATIENT
Start: 2023-09-28

## 2023-09-28 NOTE — PROGRESS NOTES
Specialty Pharmacy Patient Management Program  Endocrinology Initial Assessment       Zeina Reed is a 62 y.o. female with Type 2 Diabetes seen by an Endocrinology provider and enrolled in the Endocrinology Patient Management program offered by [facility].  An initial outreach was conducted, including assessment of therapy appropriateness and specialty medication education for Medications: novolog, tresiba, metformin, farxiga, and mounjaro. The patient was introduced to services offered by [facility], including: regular assessments, refill coordination, curbside pick-up or mail order delivery options, prior authorization maintenance, and financial assistance programs as applicable. The patient was also provided with contact information for the pharmacy team.     Patient is currently taking Farxiga 10 mg daily, Metformin 1000 mg daily, Mounjaro 7.5 mg weekly.    Patient uses dexcom to monitor blood sugar. Patient reports numerous episodes of hypoglycemia (having numbers in the 40s). Patient's tresiba was recently decreased to 15 units BID by PCP. Patient reports she has not taken any insulin in the past week due to hypoglycemia.   Patient reports decreased appetite with mounjaro, which has caused her to eat less overall. She believes this is leading to some of her hypoglycemia events. Patient states since she stopped insulin about 1 week ago, her blood sugar has been in the 180s.     Patient reports she is tolerating moujaro except for some heartburn. She reports diarrhea, but states this has not worsened since starting medication. Patient reports she had her gall bladder removed in the past which has contributed to her GI issues. She states this has not worsened since starting the injection.    Patient denies personal/family history of thyroid cancer, denies history of pancreatitis, and denies issues with recurrent UTI/yeast infections.   Patient denies kidney or  liver disease/complications. Patient denies history of heart attack or stroke.  Patient does have a history of congestive heart failure (sees HF clinic at [facility]).   Patient states she wears a CPAP for her sleep apnea, and may have a history of asthma or COPD.      In the past, the patient has tried:     Drug Dose Reason for Discontinuation Notes   Metformin IR  Diarrhea Can tolerate ER formulation                                                             Insurance Coverage & Financial Support   Insurance - retired       Relevant Past Medical History and Comorbidities  Relevant medical history and concomitant health conditions were discussed with the patient. The patient's chart has been reviewed for relevant past medical history and comorbid health conditions and updated as necessary.   Past Medical History:   Diagnosis Date    Acute exacerbation of CHF (congestive heart failure) 07/10/2021    Arthritis     COPD (chronic obstructive pulmonary disease)     Diabetes mellitus     Disease of thyroid gland     Dyslipidemia     Elevated cholesterol     GERD (gastroesophageal reflux disease)     History of transfusion     Hyperlipidemia     Hypertension     Palpitations     Sleep apnea      Social History     Socioeconomic History    Marital status:    Tobacco Use    Smoking status: Never    Smokeless tobacco: Never   Vaping Use    Vaping Use: Never used   Substance and Sexual Activity    Alcohol use: No    Drug use: No    Sexual activity: Defer       Problem list reviewed by Geneva Stallings RPH on 9/28/2023 at  3:32 PM      Allergies  Known allergies and reactions were discussed with the patient. The patient's chart has been reviewed for  allergy information and updated as necessary.   Allergies   Allergen Reactions    Augmentin [Amoxicillin-Pot Clavulanate] Diarrhea    Metformin And Related Diarrhea     Immediate Release - tolerates ER       Allergies reviewed by Geneva Stallings RPH on 9/28/2023 at  3:32  PM      Relevant Laboratory Values  A1C Last 3 Results          6/26/2023    11:06 9/28/2023    15:27   HGBA1C Last 3 Results   Hemoglobin A1C 6.3  6.4      Lab Results   Component Value Date    HGBA1C 6.4 09/28/2023     Lab Results   Component Value Date    GLUCOSE 164 (H) 09/22/2023    CALCIUM 9.6 09/22/2023     09/22/2023    K 4.3 09/22/2023    CO2 27.4 09/22/2023     09/22/2023    BUN 16 09/22/2023    CREATININE 0.68 09/22/2023    EGFRIFAFRI  09/17/2016      Comment:      <15 Indicative of kidney failure.    EGFRIFNONA 71 12/17/2021    BCR 23.5 09/22/2023    ANIONGAP 10.6 09/22/2023     Lab Results   Component Value Date    CHOL 230 (H) 12/17/2021    CHLPL 205 (H) 02/17/2016    TRIG 231 (H) 12/17/2021    HDL 56 12/17/2021     (H) 12/17/2021         Current Medication List  This medication list has been reviewed with the patient and evaluated for any interactions or necessary modifications/recommendations, and updated to include all prescription medications, OTC medications, and supplements the patient is currently taking.  This list reflects what is contained in the patient's profile, which has also been marked as reviewed to communicate to other providers it is the most up to date version of the patient's current medication therapy.     Current Outpatient Medications:     acetaminophen-codeine (TYLENOL #3) 300-30 MG per tablet, Take 1 tablet by mouth Every 12 (Twelve) Hours as needed, Disp: 60 tablet, Rfl: 0    aspirin 81 MG EC tablet, Take 1 tablet by mouth Daily., Disp: , Rfl:     busPIRone (BUSPAR) 5 MG tablet, Take 1 tablet by mouth 3 (Three) Times a Day. (Patient taking differently: Take 1 tablet by mouth 3 (Three) Times a Day As Needed.), Disp: 90 tablet, Rfl: 2    Continuous Blood Gluc Sensor (Dexcom G7 Sensor) misc, Change sensor Every 10 (Ten) Days., Disp: 3 each, Rfl: 5    dapagliflozin Propanediol (Farxiga) 10 MG tablet, Take 1 tablet (10 mg) by mouth daily, Disp: 30 tablet, Rfl:  "5    DULoxetine (CYMBALTA) 30 MG capsule, Take 1 capsule by mouth Daily., Disp: 30 capsule, Rfl: 6    fluticasone (FLONASE) 50 MCG/ACT nasal spray, Instill 2 sprays in each nostril daily., Disp: 16 g, Rfl: 5    Fluticasone-Salmeterol (ADVAIR/WIXELA) 250-50 MCG/ACT DISKUS, Inhale 1 puff 2 (Two) Times a Day. Taking 1 time daily, Disp: , Rfl:     furosemide (LASIX) 40 MG tablet, Take 1 tablet by mouth 2 (Two) Times a Day. (Patient taking differently: Take 1 tablet by mouth Daily.), Disp: 180 tablet, Rfl: 0    gabapentin (NEURONTIN) 300 MG capsule, Take 1 capsule (300 mg) by mouth 3 times per day, Disp: 90 capsule, Rfl: 2    insulin aspart (NovoLOG FlexPen) 100 UNIT/ML solution pen-injector sc pen, Inject 12 Units under the skin as directed 3 times a day., Disp: 15 mL, Rfl: 2    Insulin Pen Needle (NovoFine Plus Pen Needle) 32G X 4 MM misc, USE 1 TIME DAILY, Disp: 90 each, Rfl: 2    Insulin Syringe-Needle U-100 (BD Insulin Syringe U/F) 30G X 1/2\" 0.5 ML misc, Use to inject insulin under the skin four times a day as directed, Disp: 120 each, Rfl: 8    lansoprazole (PREVACID) 30 MG capsule, Take 1 capsule (30 mg) by mouth daily before a meal, Disp: 90 capsule, Rfl: 2    lansoprazole (PREVACID) 30 MG capsule, Take 1 capsule (30 mg) by mouth daily before a meal, Disp: 90 capsule, Rfl: 2    lansoprazole (PREVACID) 30 MG capsule, Take 1 capsule (30 mg) by mouth daily before a meal, Disp: 90 capsule, Rfl: 2    levothyroxine (Synthroid) 50 MCG tablet, Take 1 tablet (50 mcg) by mouth daily in the morning on an empty stomach, Disp: 90 tablet, Rfl: 1    loratadine (Claritin) 10 MG tablet, Take 1 tablet (10 mg) by mouth daily, Disp: 90 tablet, Rfl: 3    metFORMIN ER (GLUCOPHAGE-XR) 500 MG 24 hr tablet, Take 2 tablets by mouth Daily With Breakfast., Disp: 180 tablet, Rfl: 2    methocarbamol (ROBAXIN) 500 MG tablet, Take 1 tablet by mouth two times daily as needed, Disp: 60 tablet, Rfl: 3    metoprolol succinate XL (TOPROL-XL) 200 MG " 24 hr tablet, Take 1 tablet by mouth Daily., Disp: 90 tablet, Rfl: 0    ondansetron ODT (ZOFRAN-ODT) 8 MG disintegrating tablet, Place 1 tablet on the tongue and allow to dissolve Every 8 (Eight) Hours As Needed., Disp: 30 tablet, Rfl: 3    potassium chloride ER (K-TAB) 20 MEQ tablet controlled-release ER tablet, Take 2 tablets (40 mEq) by mouth daily with food, Disp: 60 tablet, Rfl: 3    pravastatin (PRAVACHOL) 40 MG tablet, Take 1 tablet by mouth Daily., Disp: 30 tablet, Rfl: 3    sacubitril-valsartan (ENTRESTO)  MG tablet, Take 1 tablet by mouth 2 (Two) Times a Day., Disp: 60 tablet, Rfl: 3    spironolactone (ALDACTONE) 25 MG tablet, Take 1 tablet (25 mg) by mouth daily, Disp: 90 tablet, Rfl: 1    spironolactone (ALDACTONE) 25 MG tablet, Take 1 tablet by mouth Daily., Disp: 90 tablet, Rfl: 1    Tirzepatide (Mounjaro) 7.5 MG/0.5ML solution pen-injector, Inject 7.5 mg subcutaneously weekly, Disp: 2 mL, Rfl: 3    insulin aspart (NovoLOG FlexPen) 100 UNIT/ML solution pen-injector sc pen, Inject 12 Units under the skin into the appropriate area as directed 3 (Three) Times a Day., Disp: 15 mL, Rfl: 2    Insulin Degludec (Tresiba FlexTouch) 200 UNIT/ML solution pen-injector pen injection, Inject 15 Units under the skin into the appropriate area as directed 2 (Two) Times a Day. (Patient not taking: Reported on 9/28/2023), Disp: , Rfl:     loratadine (Claritin) 10 MG tablet, Take 1 tablet (10 mg) by mouth daily, Disp: 90 tablet, Rfl: 3    potassium chloride ER (K-TAB) 20 MEQ tablet controlled-release ER tablet, Take 1 tablet by mouth Daily., Disp: 60 tablet, Rfl: 3    spironolactone (ALDACTONE) 25 MG tablet, Take 1 tablet (25 mg) by mouth daily, Disp: 90 tablet, Rfl: 1  No current facility-administered medications for this visit.    Medicines reviewed by Geneva Stallings RP on 9/28/2023 at  3:38 PM  Medicines reviewed by Geneva Stallings RPH on 9/28/2023 at  3:40 PM  Medicines reviewed by Geneva Stallings RPH on  9/28/2023 at  3:50 PM    Drug Interactions  No significant drug-drug interactions with diabetes medications expected according to literature.      Recommended Medications Assessment  Aspirin -  Currently Taking   Statin -  Currently Taking   ACEi/ARB - Currently Taking     Adherence and Self-Administration  Adherence related to the patient's specialty therapy was discussed with the patient. The Adherence segment of this outreach has been reviewed and updated.   Is there a concern with patient's ability to self administer the medication correctly and without issue?: No  Were any potential barriers to adherence identified during the initial assessment or patient education?: No  Are there any concerns regarding the patient's understanding of the importance of medication adherence?: No      Smoker?  Never (no history of smokeless tobacco either)    Goals of Therapy  Goals related to the patient's specialty therapy were discussed with the patient. The Patient Goals segment of this outreach has been reviewed and updated.    Goals Addressed Today        HEMOGLOBIN A1C < 7      Specialty Pharmacy General Goal      Prevent Hypoglycemia              Reassessment Plan & Follow-Up  Patient's diabetes is controlled with A1C of 6.4%.  Medication Therapy Changes:  None discussed with patient   Related Plans, Therapy Recommendations or Therapy Problems to Be Addressed:    Patient denies issues with affordability or adherence at this time.  Patient has been experiencing hypoglycemia. Would benefit from discontinuation of insulin therapy at this time. Without insulin therapy, blood sugars have been around 180s. Discussed with provider.  Pharmacist to perform regular reassessments no more than (6) months from the previous assessment.  Care Coordinator to set up future refill outreaches, coordinate prescription delivery, and escalate clinical questions to pharmacist.   Welcome information and patient satisfaction survey to be sent by  specialty pharmacy team with patient's initial fill.  Assess vaccination status at next visit.  Assess ASCVD 10 year risk at next visit.    Attestation  I attest the patient was actively involved in and has agreed to the above plan of care. If the prescribed therapy is at any point deemed not appropriate based on the current or future assessments, a consultation will be initiated with the patient's specialty care provider to determine the best course of action. The revised plan of therapy will be documented along with any required assessments and/or additional patient education provided.     Thank you,    Geneva Stallings, PharmD  Community PGY1 Pharmacy Resident    09/28/23  16:23 EDT

## 2023-09-28 NOTE — ASSESSMENT & PLAN NOTE
-Diabetes is at goal with A1c 6.4.  -Discussed dietary and exercise guidelines with patient.  -Discussed the importance of yearly eye exams.  -Discussed the importance of checking BG's regularly.  Continue using CGM.  2 week data download is as follows:  Very High: 20%  High: 52%  IN Range: 27%  Low: <1%  Very Low: 0%  Trend shows overall mild hypers with post meal hypers.  -Discussed medication and the need for adjustments as below.  -Continue Metformin XR 1000 mg QD.  -Continue Mounjaro 7.5 mg weekly. Patient has no personal history of pancreatitis, no family history of MEN syndrome or medullary thyroid cancer. Possible side effects including nausea, bloating, other GI upset and rarely pancreatitis were discussed. She was advised to call the office with any symptoms or concerns.   -Continue Farxiga 10 mg QD.  -Discontinue insulin use as this is causing increased hypos.  -S/S hypoglycemia reviewed with Rule of 15's advised.  -Follow-up in 3 months.

## 2023-10-20 ENCOUNTER — SPECIALTY PHARMACY (OUTPATIENT)
Dept: PHARMACY | Facility: HOSPITAL | Age: 63
End: 2023-10-20
Payer: COMMERCIAL

## 2023-11-17 ENCOUNTER — HOSPITAL ENCOUNTER (OUTPATIENT)
Dept: CARDIOLOGY | Facility: HOSPITAL | Age: 63
Discharge: HOME | End: 2023-11-17
Payer: COMMERCIAL

## 2023-11-17 VITALS
HEIGHT: 63 IN | WEIGHT: 255.6 LBS | DIASTOLIC BLOOD PRESSURE: 68 MMHG | BODY MASS INDEX: 45.29 KG/M2 | SYSTOLIC BLOOD PRESSURE: 128 MMHG | HEART RATE: 72 BPM | OXYGEN SATURATION: 92 %

## 2023-11-17 DIAGNOSIS — I50.32 CHRONIC HEART FAILURE WITH PRESERVED EJECTION FRACTION (HFPEF): ICD-10-CM

## 2023-11-17 DIAGNOSIS — E03.9 HYPOTHYROIDISM, UNSPECIFIED TYPE: Primary | ICD-10-CM

## 2023-11-17 DIAGNOSIS — E78.5 DYSLIPIDEMIA: ICD-10-CM

## 2023-11-17 DIAGNOSIS — D50.9 IRON DEFICIENCY ANEMIA, UNSPECIFIED IRON DEFICIENCY ANEMIA TYPE: ICD-10-CM

## 2023-11-17 DIAGNOSIS — Z79.4 TYPE 2 DIABETES MELLITUS WITH HYPOGLYCEMIA WITHOUT COMA, WITH LONG-TERM CURRENT USE OF INSULIN: ICD-10-CM

## 2023-11-17 DIAGNOSIS — E11.649 TYPE 2 DIABETES MELLITUS WITH HYPOGLYCEMIA WITHOUT COMA, WITH LONG-TERM CURRENT USE OF INSULIN: ICD-10-CM

## 2023-11-17 DIAGNOSIS — R53.81 DEBILITY: ICD-10-CM

## 2023-11-17 LAB
ABSOLUTE LUNG FLUID CONTENT: 26 % (ref 20–35)
BASOPHILS # BLD AUTO: 0.06 10*3/MM3 (ref 0–0.2)
BASOPHILS NFR BLD AUTO: 0.5 % (ref 0–1.5)
DEPRECATED RDW RBC AUTO: 43.9 FL (ref 37–54)
EOSINOPHIL # BLD AUTO: 0.46 10*3/MM3 (ref 0–0.4)
EOSINOPHIL NFR BLD AUTO: 3.8 % (ref 0.3–6.2)
ERYTHROCYTE [DISTWIDTH] IN BLOOD BY AUTOMATED COUNT: 13.6 % (ref 12.3–15.4)
HCT VFR BLD AUTO: 41.2 % (ref 34–46.6)
HGB BLD-MCNC: 13.7 G/DL (ref 12–15.9)
IMM GRANULOCYTES # BLD AUTO: 0.04 10*3/MM3 (ref 0–0.05)
IMM GRANULOCYTES NFR BLD AUTO: 0.3 % (ref 0–0.5)
IRON 24H UR-MRATE: 75 MCG/DL (ref 37–145)
IRON SATN MFR SERPL: 21 % (ref 20–50)
LYMPHOCYTES # BLD AUTO: 2.65 10*3/MM3 (ref 0.7–3.1)
LYMPHOCYTES NFR BLD AUTO: 21.9 % (ref 19.6–45.3)
MCH RBC QN AUTO: 29.5 PG (ref 26.6–33)
MCHC RBC AUTO-ENTMCNC: 33.3 G/DL (ref 31.5–35.7)
MCV RBC AUTO: 88.6 FL (ref 79–97)
MONOCYTES # BLD AUTO: 0.8 10*3/MM3 (ref 0.1–0.9)
MONOCYTES NFR BLD AUTO: 6.6 % (ref 5–12)
NEUTROPHILS NFR BLD AUTO: 66.9 % (ref 42.7–76)
NEUTROPHILS NFR BLD AUTO: 8.1 10*3/MM3 (ref 1.7–7)
NRBC BLD AUTO-RTO: 0 /100 WBC (ref 0–0.2)
PLATELET # BLD AUTO: 308 10*3/MM3 (ref 140–450)
PMV BLD AUTO: 10.3 FL (ref 6–12)
RBC # BLD AUTO: 4.65 10*6/MM3 (ref 3.77–5.28)
TIBC SERPL-MCNC: 358 MCG/DL (ref 298–536)
TRANSFERRIN SERPL-MCNC: 240 MG/DL (ref 200–360)
WBC NRBC COR # BLD AUTO: 12.11 10*3/MM3 (ref 3.4–10.8)

## 2023-11-17 PROCEDURE — 94726 PLETHYSMOGRAPHY LUNG VOLUMES: CPT | Performed by: PHYSICIAN ASSISTANT

## 2023-11-17 PROCEDURE — 80061 LIPID PANEL: CPT | Performed by: PHYSICIAN ASSISTANT

## 2023-11-17 PROCEDURE — 84443 ASSAY THYROID STIM HORMONE: CPT | Performed by: PHYSICIAN ASSISTANT

## 2023-11-17 PROCEDURE — 85025 COMPLETE CBC W/AUTO DIFF WBC: CPT | Performed by: PHYSICIAN ASSISTANT

## 2023-11-17 PROCEDURE — 83540 ASSAY OF IRON: CPT | Performed by: PHYSICIAN ASSISTANT

## 2023-11-17 PROCEDURE — 84439 ASSAY OF FREE THYROXINE: CPT | Performed by: PHYSICIAN ASSISTANT

## 2023-11-17 PROCEDURE — 84466 ASSAY OF TRANSFERRIN: CPT | Performed by: PHYSICIAN ASSISTANT

## 2023-11-17 PROCEDURE — 82607 VITAMIN B-12: CPT | Performed by: PHYSICIAN ASSISTANT

## 2023-11-17 PROCEDURE — 82306 VITAMIN D 25 HYDROXY: CPT | Performed by: PHYSICIAN ASSISTANT

## 2023-11-17 PROCEDURE — 82746 ASSAY OF FOLIC ACID SERUM: CPT | Performed by: PHYSICIAN ASSISTANT

## 2023-11-17 RX ORDER — DAPAGLIFLOZIN 10 MG/1
1 TABLET, FILM COATED ORAL DAILY
Qty: 30 TABLET | Refills: 5 | Status: SHIPPED | OUTPATIENT
Start: 2023-11-17

## 2023-11-17 RX ORDER — METOPROLOL SUCCINATE 200 MG/1
200 TABLET, EXTENDED RELEASE ORAL DAILY
Qty: 90 TABLET | Refills: 2 | Status: SHIPPED | OUTPATIENT
Start: 2023-11-17

## 2023-11-17 RX ORDER — FUROSEMIDE 40 MG/1
40 TABLET ORAL 2 TIMES DAILY
Qty: 180 TABLET | Refills: 1 | Status: SHIPPED | OUTPATIENT
Start: 2023-11-17

## 2023-11-17 RX ORDER — ASPIRIN 81 MG/1
81 TABLET ORAL DAILY
Qty: 30 TABLET | Refills: 2 | Status: SHIPPED | OUTPATIENT
Start: 2023-11-17 | End: 2024-02-15

## 2023-11-17 NOTE — PROGRESS NOTES
Heart Failure Clinic  Pharmacist Note     Zeina Reed is a 63 y.o. female seen in the Heart Failure Clinic for HFpEF.   Zeina Reed reports a good understanding and adherence to her medications.     Denies any shortness of breath except sometimes on exertion, swelling or weight gain.   She uses her lasix 40mg only daily now and denies having to use an additional dose at night.    Patient has no issues obtaining or affording any of her medications    She reports that her BP have been running in the 130-140's/60s-70's.     Is out of aspirin, states she just needs to buy more.     Struggling with a cold and states she cannot get over the cough. Has a prescribed cough syrup (dextromethorphan and promethazine) that she states does help when she takes it.   States she has finished the Z-alessandro and the Medrol dose pack that were filled on 11/10 for this respiratory/cold.     Medication Use:   Hx of med intolerances: None related to HF  Retail Rx Management: Uses our diabetes clinic for Mounjaro, uses  for all medications    Past Medical History:   Diagnosis Date    Acute exacerbation of CHF (congestive heart failure) 07/10/2021    Arthritis     COPD (chronic obstructive pulmonary disease)     Diabetes mellitus     Disease of thyroid gland     Dyslipidemia     Elevated cholesterol     GERD (gastroesophageal reflux disease)     History of transfusion     Hyperlipidemia     Hypertension     Palpitations     Sleep apnea      ALLERGIES: Augmentin [amoxicillin-pot clavulanate] and Metformin and related  Current Outpatient Medications   Medication Sig Dispense Refill    acetaminophen-codeine (TYLENOL #3) 300-30 MG per tablet Take 1 tablet by mouth Every 12 (Twelve) Hours as needed 60 tablet 0    acetaminophen-codeine (TYLENOL with CODEINE #3) 300-30 MG per tablet Take 1 tablet by mouth Every 12 (Twelve) Hours as needed 60 tablet 0    aspirin 81 MG EC tablet Take 1 tablet by mouth Daily.      azithromycin (Zithromax Z-Alessandro) 250  MG tablet Take 2 tablets by mouth one time today, then 1 tablet daily for 4 days. 6 tablet 0    busPIRone (BUSPAR) 5 MG tablet Take 1 tablet by mouth 3 (Three) Times a Day. (Patient taking differently: Take 1 tablet by mouth 3 (Three) Times a Day As Needed.) 90 tablet 2    Continuous Blood Gluc Sensor (Dexcom G7 Sensor) misc Change sensor Every 10 (Ten) Days. 3 each 5    dapagliflozin Propanediol (Farxiga) 10 MG tablet Take 1 tablet (10 mg) by mouth daily 30 tablet 5    DULoxetine (CYMBALTA) 30 MG capsule Take 1 capsule by mouth Daily. 30 capsule 6    fluticasone (FLONASE) 50 MCG/ACT nasal spray Instill 2 sprays in each nostril daily. 16 g 5    Fluticasone-Salmeterol (ADVAIR/WIXELA) 250-50 MCG/ACT DISKUS Inhale 1 puff 2 (Two) Times a Day. Taking 1 time daily      furosemide (LASIX) 40 MG tablet Take 1 tablet by mouth 2 (Two) Times a Day. (Patient taking differently: Take 1 tablet by mouth Daily.) 180 tablet 0    gabapentin (NEURONTIN) 300 MG capsule Take 1 capsule (300 mg) by mouth 3 times per day 90 capsule 2    Insulin Pen Needle (NovoFine Plus Pen Needle) 32G X 4 MM misc USE 1 TIME DAILY 90 each 2    lansoprazole (PREVACID) 30 MG capsule Take 1 capsule (30 mg) by mouth daily before a meal 90 capsule 2    lansoprazole (PREVACID) 30 MG capsule Take 1 capsule (30 mg) by mouth daily before a meal 90 capsule 2    lansoprazole (PREVACID) 30 MG capsule Take 1 capsule (30 mg) by mouth daily before a meal 90 capsule 2    levothyroxine (Synthroid) 50 MCG tablet Take 1 tablet (50 mcg) by mouth daily in the morning on an empty stomach 90 tablet 1    loratadine (Claritin) 10 MG tablet Take 1 tablet (10 mg) by mouth daily 90 tablet 3    loratadine (Claritin) 10 MG tablet Take 1 tablet (10 mg) by mouth daily 90 tablet 3    metFORMIN ER (GLUCOPHAGE-XR) 500 MG 24 hr tablet Take 2 tablets (1000 mg total) by mouth Daily With Breakfast. 180 tablet 2    methocarbamol (ROBAXIN) 500 MG tablet Take 1 tablet by mouth two times daily as  "needed 60 tablet 3    methylPREDNISolone (MEDROL) 4 MG dose pack Use as Directed per package instructions. 21 tablet 0    metoprolol succinate XL (TOPROL-XL) 200 MG 24 hr tablet Take 1 tablet by mouth Daily. 90 tablet 0    ondansetron ODT (ZOFRAN-ODT) 8 MG disintegrating tablet Place 1 tablet on the tongue and allow to dissolve Every 8 (Eight) Hours As Needed. 30 tablet 3    potassium chloride ER (K-TAB) 20 MEQ tablet controlled-release ER tablet Take 1 tablet by mouth Daily. 60 tablet 3    potassium chloride ER (K-TAB) 20 MEQ tablet controlled-release ER tablet Take 2 tablets (40 mEq) by mouth daily with food 60 tablet 3    pravastatin (PRAVACHOL) 40 MG tablet Take 1 tablet by mouth Daily. 30 tablet 3    pravastatin (PRAVACHOL) 40 MG tablet Take 1 tablet (40 mg) by mouth daily 30 tablet 3    promethazine-dextromethorphan (PROMETHAZINE-DM) 6.25-15 MG/5ML syrup Take 5 ml by mouth every 6 hours as needed 200 mL 0    sacubitril-valsartan (ENTRESTO)  MG tablet Take 1 tablet by mouth 2 (Two) Times a Day. 60 tablet 3    spironolactone (ALDACTONE) 25 MG tablet Take 1 tablet (25 mg) by mouth daily 90 tablet 1    spironolactone (ALDACTONE) 25 MG tablet Take 1 tablet (25 mg) by mouth daily 90 tablet 1    spironolactone (ALDACTONE) 25 MG tablet Take 1 tablet by mouth Daily. 90 tablet 1    Tirzepatide (Mounjaro) 10 MG/0.5ML solution pen-injector Inject 10 mg subcutaneously weekly 2 mL 3    Tirzepatide (Mounjaro) 7.5 MG/0.5ML solution pen-injector Inject 7.5 mg subcutaneously weekly 2 mL 3     No current facility-administered medications for this encounter.       Vaccination History:   Pneumonia: UTD  Annual Influenza: UTD    Objective  Vitals:    11/17/23 1312   BP: 128/68   BP Location: Left arm   Patient Position: Sitting   Cuff Size: Adult   Pulse: 72   SpO2: 92%   Weight: 116 kg (255 lb 9.6 oz)   Height: 160 cm (63\")     Wt Readings from Last 3 Encounters:   11/17/23 116 kg (255 lb 9.6 oz)   09/28/23 123 kg (271 lb " 4.8 oz)   09/22/23 123 kg (271 lb)         11/17/23  1312   Weight: 116 kg (255 lb 9.6 oz)     Lab Results   Component Value Date    GLUCOSE 164 (H) 09/22/2023    BUN 16 09/22/2023    CREATININE 0.68 09/22/2023    EGFRIFNONA 71 12/17/2021    EGFRIFAFRI  09/17/2016      Comment:      <15 Indicative of kidney failure.    BCR 23.5 09/22/2023    K 4.3 09/22/2023    CO2 27.4 09/22/2023    CALCIUM 9.6 09/22/2023    ALBUMIN 4.10 12/17/2021    LABIL2 1.4 (L) 02/12/2015    AST 16 12/17/2021    ALT 29 12/17/2021     Lab Results   Component Value Date    WBC 10.00 12/17/2021    HGB 12.3 12/17/2021    HCT 36.4 12/17/2021    MCV 90.5 12/17/2021     12/17/2021     Lab Results   Component Value Date    TROPONINT <0.010 07/11/2021     Lab Results   Component Value Date    PROBNP 121.8 09/22/2023     Results for orders placed during the hospital encounter of 08/17/22    Adult Transthoracic Echo Complete W/ Cont if Necessary Per Protocol    Interpretation Summary  · Normal left ventricular cavity size and wall thickness noted. All left ventricular wall segments contract normally.  · Left ventricular ejection fraction appears to be 61 - 65%.  · The aortic valve is structurally normal with no regurgitation or stenosis present.  · The mitral valve is structurally normal with no regurgitation or significant stenosis present.  · There is no evidence of pericardial effusion.         GDMT    Drug Class   Drug   Dose Last Dose Adjustment Additional Titration   Notes   ACEi/ARB/ARNI Entresto 97/103   Confirmed no longer taking Losartan   Beta Blocker Toprol       MRA Spironolactone 25      SGLT2i Farxiga 10  N/A        Drug Therapy Problems    1. GDMT  2. Needs refills/medications due.     Recommendations:   No changes to recommend at this time. Is on maximum tolerated therapy at goal doses. Continue to monitor blood pressure and renal function. Adjust medication regimen as clinically appropriate.   2.   Needs a refill on the  lasix and the metoprolol. Has a supply at home but needs additional fills. Pravastatin was due, put this through the        [facility]othe[facility] to be filled. Aspirin is covered on her insurance, notified Tawanna so she could send in a prescription. Tawanna to send in refills        For her medications. Scheduled other HF medications for their due date upon patient request.       Patient was educated on heart failure medications and the importance of medication adherence. All questions were addressed and patient expressed understanding.    Thank you for allowing me to participate in the care of your patient,    Geneva Stallings, PharmD  Community PGY1 Pharmacy Resident  [facility]  11/17/23

## 2023-11-17 NOTE — PROGRESS NOTES
iutyygf g [facility]  ANNE Pinto APRN Cloud, Rebekah, APRN  [address] [facility]  [address]  [place] [address]    Thank you for asking me to see Zeina Reed for congestive heart failure.    HPI:     This is a 63 y.o. female with known past medical history of:    FIDEL (CPAP)  HFpEF  LVEF 61-65% on 08/17/22.   Essential HTN  Uncontrolled DM type 2  Fatty liver disease  GERD  COPD     Zeina Reed presents for today for HF referral from PCP.  The patient is typically seen by Nini Marie APRN.  Patient's primary cardiologist is Dr. Zapata & team.     Last known EF 65% in 07/2021 with repeat echocardiogram pending at present.    Last known hospitalization and/or ED visit: July 10, 2021 through July 15, 2021 with acute on chronic decompensated heart failure, obstructive sleep apnea uncontrolled diabetes and sepsis admission.  Accompanied by: Spouse  Patient was reerred by her PCP's office after seeing Ann Marie and reporting worsening dyspnea on exertion and swelling in spite of Bumex 2mg BID.         11/17/23 visit data/details regarding:   Dyspnea: Dyspnea on exertion improved.  Lower extremity swelling: Intermittent swelling   Abdominal swelling: Improved  Home weight: Waxing/waning  Home BP:130s systolic  Home heart rate: 70s  Daily activities of living: Performing with assistance of family.   Pillows/lying flat: 3 pillows  HF zone: Green  Mrs. Reed is doing well from HF standpoint.  She continues to experience ongoing debility and fatigue.  She reports difficulty performing daily activities of living secondary to arthritis.      Specialists:   Cardiology: Dr. Zapata    Review of Systems - Review of Systems   Constitutional: Negative for chills, diaphoresis, fever and malaise/fatigue.   HENT:  Negative for congestion and ear discharge.    Eyes:  Negative for blurred vision and discharge.   Cardiovascular:  Negative for chest pain, claudication,  dyspnea on exertion, leg swelling, near-syncope, orthopnea and palpitations.   Respiratory:  Negative for cough and hemoptysis.    Endocrine: Negative for cold intolerance and heat intolerance.   Hematologic/Lymphatic: Negative for adenopathy and bleeding problem.   Skin:  Negative for color change and nail changes.   Musculoskeletal:  Positive for arthritis.   Gastrointestinal:  Negative for bloating and abdominal pain.   Genitourinary:  Negative for bladder incontinence and decreased libido.   Neurological:  Negative for aphonia and brief paralysis.   Psychiatric/Behavioral:  Negative for altered mental status and depression.          All other systems were reviewed and were negative.    Patient Active Problem List   Diagnosis    Palpitations    Essential hypertension    Dyslipidemia    Dizziness    Acute exacerbation of CHF (congestive heart failure)    SOB (shortness of breath)    Morbid obesity with BMI of 45.0-49.9, adult    Physical deconditioning    Chronic heart failure with preserved ejection fraction (HFpEF)    MDD (major depressive disorder)    Type 2 diabetes mellitus with hypoglycemia without coma, with long-term current use of insulin    Encounter for screening for malignant neoplasm of colon    Diarrhea    Family history of colon cancer       family history includes Cancer in her father and mother; Heart attack in her father.     reports that she has never smoked. She has never used smokeless tobacco. She reports that she does not drink alcohol and does not use drugs.    Allergies   Allergen Reactions    Augmentin [Amoxicillin-Pot Clavulanate] Diarrhea    Metformin And Related Diarrhea     Immediate Release - tolerates ER         Current Outpatient Medications:     acetaminophen-codeine (TYLENOL with CODEINE #3) 300-30 MG per tablet, Take 1 tablet by mouth Every 12 (Twelve) Hours as needed, Disp: 60 tablet, Rfl: 0    busPIRone (BUSPAR) 5 MG tablet, Take 1 tablet by mouth 3 (Three) Times a Day.  (Patient taking differently: Take 1 tablet by mouth 3 (Three) Times a Day As Needed.), Disp: 90 tablet, Rfl: 2    Continuous Blood Gluc Sensor (Dexcom G7 Sensor) misc, Change sensor Every 10 (Ten) Days., Disp: 3 each, Rfl: 5    dapagliflozin Propanediol (Farxiga) 10 MG tablet, Take 1 tablet (10 mg) by mouth daily, Disp: 30 tablet, Rfl: 5    DULoxetine (CYMBALTA) 30 MG capsule, Take 1 capsule by mouth Daily., Disp: 30 capsule, Rfl: 6    fluticasone (FLONASE) 50 MCG/ACT nasal spray, Instill 2 sprays in each nostril daily., Disp: 16 g, Rfl: 5    Fluticasone-Salmeterol (ADVAIR/WIXELA) 250-50 MCG/ACT DISKUS, Inhale 1 puff 2 (Two) Times a Day. Taking 1 time daily, Disp: , Rfl:     furosemide (LASIX) 40 MG tablet, Take 1 tablet by mouth 2 (Two) Times a Day. (Patient taking differently: Take 1 tablet by mouth Daily.), Disp: 180 tablet, Rfl: 0    gabapentin (NEURONTIN) 300 MG capsule, Take 1 capsule (300 mg) by mouth 3 times per day, Disp: 90 capsule, Rfl: 2    Insulin Pen Needle (NovoFine Plus Pen Needle) 32G X 4 MM misc, USE 1 TIME DAILY, Disp: 90 each, Rfl: 2    lansoprazole (PREVACID) 30 MG capsule, Take 1 capsule (30 mg) by mouth daily before a meal, Disp: 90 capsule, Rfl: 2    lansoprazole (PREVACID) 30 MG capsule, Take 1 capsule (30 mg) by mouth daily before a meal, Disp: 90 capsule, Rfl: 2    lansoprazole (PREVACID) 30 MG capsule, Take 1 capsule (30 mg) by mouth daily before a meal, Disp: 90 capsule, Rfl: 2    levothyroxine (Synthroid) 50 MCG tablet, Take 1 tablet (50 mcg) by mouth daily in the morning on an empty stomach, Disp: 90 tablet, Rfl: 1    loratadine (Claritin) 10 MG tablet, Take 1 tablet (10 mg) by mouth daily, Disp: 90 tablet, Rfl: 3    loratadine (Claritin) 10 MG tablet, Take 1 tablet (10 mg) by mouth daily, Disp: 90 tablet, Rfl: 3    metFORMIN ER (GLUCOPHAGE-XR) 500 MG 24 hr tablet, Take 2 tablets (1000 mg total) by mouth Daily With Breakfast., Disp: 180 tablet, Rfl: 2    methocarbamol (ROBAXIN) 500 MG  tablet, Take 1 tablet by mouth two times daily as needed, Disp: 60 tablet, Rfl: 3    metoprolol succinate XL (TOPROL-XL) 200 MG 24 hr tablet, Take 1 tablet by mouth Daily., Disp: 90 tablet, Rfl: 0    ondansetron ODT (ZOFRAN-ODT) 8 MG disintegrating tablet, Place 1 tablet on the tongue and allow to dissolve Every 8 (Eight) Hours As Needed., Disp: 30 tablet, Rfl: 3    potassium chloride ER (K-TAB) 20 MEQ tablet controlled-release ER tablet, Take 1 tablet by mouth Daily., Disp: 60 tablet, Rfl: 3    potassium chloride ER (K-TAB) 20 MEQ tablet controlled-release ER tablet, Take 2 tablets (40 mEq) by mouth daily with food, Disp: 60 tablet, Rfl: 3    pravastatin (PRAVACHOL) 40 MG tablet, Take 1 tablet (40 mg) by mouth daily, Disp: 30 tablet, Rfl: 3    promethazine-dextromethorphan (PROMETHAZINE-DM) 6.25-15 MG/5ML syrup, Take 5 ml by mouth every 6 hours as needed, Disp: 200 mL, Rfl: 0    sacubitril-valsartan (ENTRESTO)  MG tablet, Take 1 tablet by mouth 2 (Two) Times a Day., Disp: 60 tablet, Rfl: 3    spironolactone (ALDACTONE) 25 MG tablet, Take 1 tablet (25 mg) by mouth daily, Disp: 90 tablet, Rfl: 1    spironolactone (ALDACTONE) 25 MG tablet, Take 1 tablet (25 mg) by mouth daily, Disp: 90 tablet, Rfl: 1    spironolactone (ALDACTONE) 25 MG tablet, Take 1 tablet by mouth Daily., Disp: 90 tablet, Rfl: 1    Tirzepatide (Mounjaro) 10 MG/0.5ML solution pen-injector, Inject 10 mg subcutaneously weekly, Disp: 2 mL, Rfl: 3    aspirin 81 MG EC tablet, Take 1 tablet by mouth Daily. (Patient not taking: Reported on 11/17/2023), Disp: , Rfl:     Tirzepatide (Mounjaro) 7.5 MG/0.5ML solution pen-injector, Inject 7.5 mg subcutaneously weekly (Patient not taking: Reported on 11/17/2023), Disp: 2 mL, Rfl: 3      Physical Exam:  I have reviewed the patient's current vital signs as documented in the patient's EMR.   Vitals:    11/17/23 1312   BP: 128/68   Pulse: 72   SpO2: 92%       Body mass index is 45.28 kg/m².       11/17/23  1312    Weight: 116 kg (255 lb 9.6 oz)          Physical Exam  Vitals and nursing note reviewed.   Constitutional:       General: She is awake.   HENT:      Head: Normocephalic and atraumatic.   Eyes:      General: Lids are normal.      Conjunctiva/sclera:      Right eye: Right conjunctiva is not injected.      Left eye: Left conjunctiva is not injected.   Neck:      Thyroid: No thyroid mass.      Vascular: No carotid bruit.   Cardiovascular:      Rate and Rhythm: Normal rate and regular rhythm.      Heart sounds: S1 normal and S2 normal.   Pulmonary:      Effort: No tachypnea or bradypnea.      Breath sounds: No decreased breath sounds, wheezing, rhonchi or rales.   Abdominal:      General: Bowel sounds are normal.      Palpations: Abdomen is soft.      Tenderness: There is no abdominal tenderness.   Musculoskeletal:      Cervical back: Full passive range of motion without pain.      Right lower leg: No edema.      Left lower leg: No edema.      Comments: Non pitting bilateral edema   Skin:     General: Skin is warm and moist.   Neurological:      Mental Status: She is alert and oriented to person, place, and time.   Psychiatric:         Attention and Perception: Attention normal.         Mood and Affect: Mood normal.         Behavior: Behavior is cooperative.          JVP: Volume/Pulsation: Normal.        DATA REVIEWED:     EKG. I personally reviewed and interpreted the EKG.          ---------------------------------------------------  TTE/KELVIN:  Results for orders placed during the hospital encounter of 08/17/22    Adult Transthoracic Echo Complete W/ Cont if Necessary Per Protocol    Interpretation Summary  · Normal left ventricular cavity size and wall thickness noted. All left ventricular wall segments contract normally.  · Left ventricular ejection fraction appears to be 61 - 65%.  · The aortic valve is structurally normal with no regurgitation or stenosis present.  · The mitral valve is structurally normal with no  "regurgitation or significant stenosis present.  · There is no evidence of pericardial effusion.        -----------------------------------------------------  CXR/Imaging:   Imaging Results (Most Recent)       None            I personally reviewed and interpreted the CXR.      -----------------------------------------------------  CT:   No radiology results for the last 30 days.  I personally reviewed the images of the CT scan.  My personal interpretation is below.      ----------------------------------------------------    PFTs:      --------------------------------------------------------------------------------------------------    Laboratory evaluations:    Lab Results   Component Value Date    GLUCOSE 164 (H) 09/22/2023    BUN 16 09/22/2023    CREATININE 0.68 09/22/2023    EGFRIFNONA 71 12/17/2021    EGFRIFAFRI  09/17/2016      Comment:      <15 Indicative of kidney failure.    BCR 23.5 09/22/2023    K 4.3 09/22/2023    CO2 27.4 09/22/2023    CALCIUM 9.6 09/22/2023    ALBUMIN 4.10 12/17/2021    LABIL2 1.4 (L) 02/12/2015    AST 16 12/17/2021    ALT 29 12/17/2021     Lab Results   Component Value Date    WBC 12.11 (H) 11/17/2023    HGB 13.7 11/17/2023    HCT 41.2 11/17/2023    MCV 88.6 11/17/2023     11/17/2023     Lab Results   Component Value Date    CHOL 230 (H) 12/17/2021    CHLPL 205 (H) 02/17/2016    TRIG 231 (H) 12/17/2021    HDL 56 12/17/2021     (H) 12/17/2021     Lab Results   Component Value Date    TSH 2.005 04/18/2017    D5PKDPB 9.40 04/18/2017     Lab Results   Component Value Date    HGBA1C 6.4 09/28/2023     Lab Results   Component Value Date    ALT 29 12/17/2021     Lab Results   Component Value Date    HGBA1C 6.4 09/28/2023    HGBA1C 6.3 06/26/2023    HGBA1C 8.70 (H) 07/10/2021     Lab Results   Component Value Date    CREATININE 0.68 09/22/2023     No results found for: \"IRON\", \"TIBC\", \"FERRITIN\"  No results found for: \"INR\", \"PROTIME\"     Lab Results   Component Value Date    " ABSOLUTELUNG 26 11/17/2023    ABSOLUTELUNG 24 07/21/2023    ABSOLUTELUNG 25 05/19/2023             PAH RISK ASSESSMENT:      1. Hypothyroidism, unspecified type    2. Iron deficiency anemia, unspecified iron deficiency anemia type    3. Chronic heart failure with preserved ejection fraction (HFpEF)    4. Dyslipidemia    5. Type 2 diabetes mellitus with hypoglycemia without coma, with long-term current use of insulin          ORDERS PLACED TODAY:  Orders Placed This Encounter   Procedures    TSH    T4, Free    Vitamin D 25 Hydroxy    Vitamin B12    Folate    Iron Profile    Lipid Panel    TSH    T4, Free    Vitamin D 25 Hydroxy    Vitamin B12    Folate    Lipid Panel    CBC Auto Differential    ReDs Vest    CBC & Differential    CBC & Differential        Diagnoses and all orders for this visit:    1. Hypothyroidism, unspecified type (Primary)  -     TSH; Future  -     T4, Free; Future  -     TSH; Standing  -     TSH  -     T4, Free; Standing  -     T4, Free  -     ReDs Vest    2. Iron deficiency anemia, unspecified iron deficiency anemia type  -     Vitamin D 25 Hydroxy; Future  -     Vitamin B12; Future  -     Folate; Future  -     CBC & Differential; Future  -     Vitamin D 25 Hydroxy; Standing  -     Vitamin D 25 Hydroxy  -     Vitamin B12; Standing  -     Vitamin B12  -     Folate; Standing  -     Folate  -     CBC & Differential; Standing  -     CBC & Differential    3. Chronic heart failure with preserved ejection fraction (HFpEF)  -     ReDs Vest    4. Dyslipidemia  -     Lipid Panel; Future  -     Lipid Panel; Standing  -     Lipid Panel    5. Type 2 diabetes mellitus with hypoglycemia without coma, with long-term current use of insulin    Other orders  -     Iron Profile; Standing  -     Iron Profile             MEDS ORDERED TODAY:    No orders of the defined types were placed in this encounter.          ---------------------------------------------------------------------------------------------------------------------------          ASSESSMENT/PLAN:      Diagnosis Plan   1. Hypothyroidism, unspecified type  TSH    T4, Free    TSH    TSH    T4, Free    T4, Free    ReDs Vest      2. Iron deficiency anemia, unspecified iron deficiency anemia type  Vitamin D 25 Hydroxy    Vitamin B12    Folate    CBC & Differential    Vitamin D 25 Hydroxy    Vitamin D 25 Hydroxy    Vitamin B12    Vitamin B12    Folate    Folate    CBC & Differential    CBC & Differential      3. Chronic heart failure with preserved ejection fraction (HFpEF)  ReDs Vest      4. Dyslipidemia  Lipid Panel    Lipid Panel    Lipid Panel      5. Type 2 diabetes mellitus with hypoglycemia without coma, with long-term current use of insulin            not acutely decompensated chronic diastolic heart failure. CHF.      NYHA stage B;FC-II.    Today, Patient is approaching euvolemiaand with  Moderate perfusion. The patient's hemodynamics are currently acceptable. HR is: normal and is at goal. BP/MAP was reviewed and there isroom for medication up-titration.  Clinical trajectory was assessed and haswaxed and waned.     CHF GOAL DIRECTED MEDICAL THERAPY FOR PATIENT ADDRESSED/ADJUSTED:       GDMT:     Drug Class   Drug   Dose Last Dose Adjustment Additional Titration   Notes   ACEi/ARB/ARNI Entresto 97-103mg BID I     Beta Blocker Toprol XL  200mg      MRA  Spironolactone 25mg qd      SGLT2i Farxiga  10mg  N/A        -CHF Specific BB:   Continue Toprol XL 200mg qd.     Educated regarding slow titration and addition of medications with close monitoring.  She is monitoring closely and is aware of hold parameters.         -ACE/ARB/ARNi:   Continue Entresto with patient tolerating well; reports this is affordable with $10 copay card.    Baseline creatinine previously known to be 0.7-0.9     -MRA:   The patient is FC-NYHA Class III and MRA is indicated.   Continue  Spironolactone 25mg qd.   BMP reviewed and acceptable.  Quarterly monitoring of potassium and renal function is currently recommended    -SGLT2 inhibitor therapy:   A BMP at initiation to verify GFR >30 was recommended, as was interval GFR surveillance.  The patient was advised to hold SGLT2I when PO intake is restricted due to a planned surgery, or due to an underlying illness.   Recommend continuing Farxiga (dapagliflozin) 10mg daily with quarterly assessment of GFR.   Denies current  side effects; She does report one recent yeast infection but plans to continue to monitor for more.     -Diuretic regimen:   ReDs Vest reading for 11/17/23 was 26; ReDs Vest reading reviewed with patient.    Continue Lasix 40mg qd and instructed to take extra 40mg PRN for worsening shortness of breath, swelling, weight gain.   Instructed to call clinic if symptoms worsen.    BMP, Mag, & ProBNP reviewed with patient and within acceptable limits.     -Fluid restriction/Sodium restriction:   Requested 2000 ml restriction  Patient has been asked to weigh daily and was provided with a printed diuretic strategy.  1,500 mg Na restriction was discussed.  Dietary information/Education provided.      -Acute vs. Chronic underlying conditions other than HF addressed during visit:     Depression:   Osteoarthritis:   Feeling somewhat tolerable with Cymbalta; refills sent to pharmacy.     Debility:   Deconditioning:   Patient working on SSI at this time with her PCP as she is currently unable to work for a living or complete daily activities of living given osteoarthritis and multiple medical comorbidities.     COPD, stable without exacerbation:   Continue outpatient f/u with PCP.     Hyperglycemia with IDDM:   Last hemoglobin a1c 8.6 on 03/2022  Continue Farxiga 10mg; refills sent.   Continue outpatient PCP follow-up.     FIDEL  Continue home CPAP.     Essential HTN:   Continue   Importance of blood pressure optimization in HFpEF discussed at  length with patient.  She reports she has been abiding by hold parameters and did have to hold medication one time.      CONSIDERATIONS:   -Iron/Anemia in CHF:  Hgb WNL.               Class 3 Severe Obesity (BMI >=40). Obesity-related health conditions include the following: diabetes mellitus, dyslipidemias, peripheral vascular disease and osteoarthritis. Obesity is newly identified. BMI is is above average; BMI management plan is completed. We discussed portion control and increasing exercise. Patient is currently actively exercising.         >45 minutes out of 60 minutes face to face spent counseling patient extensively on dietary Na+ intake, importance of activity, weight monitoring, compliance with medications in addition to importance of titration with goal directed medical therapy and follow up appointments.            This document has been electronically signed by Tawanna Arambula PA-C  November 17, 2023 13:41 EST      Dictated Utilizing Dragon Dictation: Part of this note may be an electronic transcription/translation of spoken language to printed text using the Dragon Dictation System.    Follow-up appointment and medication changes provided in hand delivered After Visit Summary as well as reviewed in the room.

## 2023-11-17 NOTE — PROGRESS NOTES
[facility]    Date: 11/17/23     Vitals:    11/17/23 1312   BP: 128/68   Pulse: 72   SpO2: 92%      Weight 255.6  Method of arrival: Ambulatory    Weighing self daily: Most days    Monitoring Heart Failure Zones: Yes    Today's HF Zone: Green    Taking medications as prescribed: Yes    Edema No    Shortness of Air: No    Number of pillows used at night:>3    Educational Materials given:  avs                                                                         ReDS Value: 26  25-35 Optimal Value Status      Bryant Savage RN 11/17/23 13:15 EST

## 2023-11-18 LAB
25(OH)D3 SERPL-MCNC: 13.8 NG/ML (ref 30–100)
CHOLEST SERPL-MCNC: 133 MG/DL (ref 0–200)
FOLATE SERPL-MCNC: 7.86 NG/ML (ref 4.78–24.2)
HDLC SERPL-MCNC: 44 MG/DL (ref 40–60)
LDLC SERPL CALC-MCNC: 58 MG/DL (ref 0–100)
LDLC/HDLC SERPL: 1.15 {RATIO}
T4 FREE SERPL-MCNC: 1.62 NG/DL (ref 0.93–1.7)
TRIGL SERPL-MCNC: 191 MG/DL (ref 0–150)
TSH SERPL DL<=0.05 MIU/L-ACNC: 1.93 UIU/ML (ref 0.27–4.2)
VIT B12 BLD-MCNC: 520 PG/ML (ref 211–946)
VLDLC SERPL-MCNC: 31 MG/DL (ref 5–40)

## 2023-11-20 RX ORDER — ERGOCALCIFEROL 1.25 MG/1
50000 CAPSULE ORAL WEEKLY
Qty: 5 CAPSULE | Refills: 1 | Status: SHIPPED | OUTPATIENT
Start: 2023-11-20 | End: 2024-01-09

## 2023-12-05 ENCOUNTER — OFFICE VISIT (OUTPATIENT)
Dept: CARDIOLOGY | Facility: CLINIC | Age: 63
End: 2023-12-05
Payer: COMMERCIAL

## 2023-12-05 VITALS
DIASTOLIC BLOOD PRESSURE: 68 MMHG | SYSTOLIC BLOOD PRESSURE: 137 MMHG | HEIGHT: 63 IN | BODY MASS INDEX: 44.9 KG/M2 | WEIGHT: 253.4 LBS | HEART RATE: 76 BPM | OXYGEN SATURATION: 96 %

## 2023-12-05 DIAGNOSIS — I10 ESSENTIAL HYPERTENSION: ICD-10-CM

## 2023-12-05 DIAGNOSIS — I50.32 CHRONIC HEART FAILURE WITH PRESERVED EJECTION FRACTION (HFPEF): Primary | ICD-10-CM

## 2023-12-05 DIAGNOSIS — E78.5 DYSLIPIDEMIA: ICD-10-CM

## 2023-12-05 NOTE — PROGRESS NOTES
Nini Marie APRN  Zeina Reed  1960  12/05/2023    Patient Active Problem List   Diagnosis    Palpitations    Essential hypertension    Dyslipidemia    Dizziness    Acute exacerbation of CHF (congestive heart failure)    SOB (shortness of breath)    Morbid obesity with BMI of 45.0-49.9, adult    Physical deconditioning    Chronic heart failure with preserved ejection fraction (HFpEF)    MDD (major depressive disorder)    Type 2 diabetes mellitus with hypoglycemia without coma, with long-term current use of insulin    Encounter for screening for malignant neoplasm of colon    Diarrhea    Family history of colon cancer       Dear Nini Marie APRN:    Subjective     History of Present Illness:    Chief Complaint   Patient presents with    Follow-up     ROUTINE       Zeina Reed is a pleasant 63 y.o. female with a past medical history significant for chronic HFpEF, essential hypertension, diabetes mellitus, obstructive sleep apnea compliant with cpap, COPD, GERD.  She does have family history of premature coronary artery disease with a father having a fatal MI in early 50s.  Zeina comes in for routine cardiology follow-up.     Patient denies any chest pain, shortness of breath, palpitations, dizziness, syncope or near syncope. She is following closely with the heart failure as well. Blood pressure is well controlled.     Allergies   Allergen Reactions    Augmentin [Amoxicillin-Pot Clavulanate] Diarrhea    Metformin And Related Diarrhea     Immediate Release - tolerates ER   :      Current Outpatient Medications:     acetaminophen-codeine (TYLENOL with CODEINE #3) 300-30 MG per tablet, Take 1 tablet by mouth Every 12 (Twelve) Hours as needed, Disp: 60 tablet, Rfl: 0    aspirin 81 MG EC tablet, Take 1 tablet by mouth daily., Disp: 30 tablet, Rfl: 2    busPIRone (BUSPAR) 5 MG tablet, Take 1 tablet by mouth 3 (Three) Times a Day. (Patient taking differently: Take 1 tablet by mouth 3 (Three) Times a Day As  Needed.), Disp: 90 tablet, Rfl: 2    Continuous Blood Gluc Sensor (Dexcom G7 Sensor) misc, Change sensor Every 10 (Ten) Days., Disp: 3 each, Rfl: 5    dapagliflozin Propanediol (Farxiga) 10 MG tablet, Take 1 tablet (10 mg) by mouth daily, Disp: 30 tablet, Rfl: 5    DULoxetine (CYMBALTA) 30 MG capsule, Take 1 capsule by mouth Daily., Disp: 30 capsule, Rfl: 6    fluticasone (FLONASE) 50 MCG/ACT nasal spray, Instill 2 sprays in each nostril daily., Disp: 16 g, Rfl: 5    Fluticasone-Salmeterol (ADVAIR/WIXELA) 250-50 MCG/ACT DISKUS, Inhale 1 puff 2 (Two) Times a Day. Taking 1 time daily, Disp: , Rfl:     furosemide (LASIX) 40 MG tablet, Take 1 tablet by mouth 2 (Two) Times a Day., Disp: 180 tablet, Rfl: 1    gabapentin (NEURONTIN) 300 MG capsule, Take 1 capsule (300 mg) by mouth 3 times per day, Disp: 90 capsule, Rfl: 2    lansoprazole (PREVACID) 30 MG capsule, Take 1 capsule (30 mg) by mouth daily before a meal, Disp: 90 capsule, Rfl: 2    lansoprazole (PREVACID) 30 MG capsule, Take 1 capsule (30 mg) by mouth daily before a meal, Disp: 90 capsule, Rfl: 2    levothyroxine (Synthroid) 50 MCG tablet, Take 1 tablet (50 mcg) by mouth daily in the morning on an empty stomach, Disp: 90 tablet, Rfl: 1    loratadine (Claritin) 10 MG tablet, Take 1 tablet (10 mg) by mouth daily, Disp: 90 tablet, Rfl: 3    loratadine (Claritin) 10 MG tablet, Take 1 tablet (10 mg) by mouth daily, Disp: 90 tablet, Rfl: 3    metFORMIN ER (GLUCOPHAGE-XR) 500 MG 24 hr tablet, Take 2 tablets (1000 mg total) by mouth Daily With Breakfast., Disp: 180 tablet, Rfl: 2    methocarbamol (ROBAXIN) 500 MG tablet, Take 1 tablet by mouth two times daily as needed, Disp: 60 tablet, Rfl: 3    metoprolol succinate XL (TOPROL-XL) 200 MG 24 hr tablet, Take 1 tablet by mouth Daily., Disp: 90 tablet, Rfl: 2    ondansetron ODT (ZOFRAN-ODT) 8 MG disintegrating tablet, Place 1 tablet on the tongue and allow to dissolve Every 8 (Eight) Hours As Needed., Disp: 30 tablet, Rfl:  3    potassium chloride ER (K-TAB) 20 MEQ tablet controlled-release ER tablet, Take 1 tablet by mouth Daily., Disp: 60 tablet, Rfl: 3    potassium chloride ER (K-TAB) 20 MEQ tablet controlled-release ER tablet, Take 2 tablets (40 mEq) by mouth daily with food, Disp: 60 tablet, Rfl: 3    pravastatin (PRAVACHOL) 40 MG tablet, Take 1 tablet (40 mg) by mouth daily, Disp: 30 tablet, Rfl: 3    sacubitril-valsartan (ENTRESTO)  MG tablet, Take 1 tablet by mouth 2 (Two) Times a Day., Disp: 60 tablet, Rfl: 3    spironolactone (ALDACTONE) 25 MG tablet, Take 1 tablet (25 mg) by mouth daily, Disp: 90 tablet, Rfl: 1    spironolactone (ALDACTONE) 25 MG tablet, Take 1 tablet by mouth Daily., Disp: 90 tablet, Rfl: 1    Tirzepatide (Mounjaro) 10 MG/0.5ML solution pen-injector, Inject 10 mg subcutaneously weekly, Disp: 2 mL, Rfl: 3    vitamin D (ERGOCALCIFEROL) 1.25 MG (05966 UT) capsule capsule, Take 1 capsule by mouth 1 (One) Time Per Week for 8 doses., Disp: 5 capsule, Rfl: 1    Insulin Pen Needle (NovoFine Plus Pen Needle) 32G X 4 MM misc, USE 1 TIME DAILY (Patient not taking: Reported on 12/5/2023), Disp: 90 each, Rfl: 2    lansoprazole (PREVACID) 30 MG capsule, Take 1 capsule (30 mg) by mouth daily before a meal, Disp: 90 capsule, Rfl: 2    promethazine-dextromethorphan (PROMETHAZINE-DM) 6.25-15 MG/5ML syrup, Take 5 ml by mouth every 6 hours as needed (Patient not taking: Reported on 12/5/2023), Disp: 200 mL, Rfl: 0    spironolactone (ALDACTONE) 25 MG tablet, Take 1 tablet (25 mg) by mouth daily, Disp: 90 tablet, Rfl: 1    The following portions of the patient's history were reviewed and updated as appropriate: allergies, current medications, past family history, past medical history, past social history, past surgical history and problem list.    Social History     Tobacco Use    Smoking status: Never    Smokeless tobacco: Never   Vaping Use    Vaping Use: Never used   Substance Use Topics    Alcohol use: No    Drug use:  "No         Objective   Vitals:    12/05/23 1410   BP: 137/68   Pulse: 76   SpO2: 96%   Weight: 115 kg (253 lb 6.4 oz)   Height: 160 cm (63\")     Body mass index is 44.89 kg/m².    ROS    Constitutional:       General: Not in acute distress.     Appearance: Healthy appearance. Well-developed and not in distress. Not diaphoretic.   Eyes:      Conjunctiva/sclera: Conjunctivae normal.      Pupils: Pupils are equal, round, and reactive to light.   HENT:      Head: Normocephalic and atraumatic.   Neck:      Vascular: No carotid bruit or JVD.   Pulmonary:      Effort: Pulmonary effort is normal. No respiratory distress.      Breath sounds: Normal breath sounds.   Cardiovascular:      Normal rate. Regular rhythm.   Edema:     Peripheral edema absent.   Skin:     General: Skin is cool.   Neurological:      Mental Status: Alert, oriented to person, place, and time and oriented to person, place and time.         Lab Results   Component Value Date     09/22/2023    K 4.3 09/22/2023     09/22/2023    CO2 27.4 09/22/2023    BUN 16 09/22/2023    CREATININE 0.68 09/22/2023    GLUCOSE 164 (H) 09/22/2023    CALCIUM 9.6 09/22/2023    AST 16 12/17/2021    ALT 29 12/17/2021    ALKPHOS 120 (H) 12/17/2021    LABIL2 1.4 (L) 02/12/2015     No results found for: \"CKTOTAL\"  Lab Results   Component Value Date    WBC 12.11 (H) 11/17/2023    HGB 13.7 11/17/2023    HCT 41.2 11/17/2023     11/17/2023     No results found for: \"INR\"  Lab Results   Component Value Date    MG 1.6 09/22/2023     Lab Results   Component Value Date    TSH 1.930 11/17/2023    CHLPL 205 (H) 02/17/2016    TRIG 191 (H) 11/17/2023    HDL 44 11/17/2023    LDL 58 11/17/2023      No results found for: \"BNP\"    During this visit the following were done:  Labs Reviewed []    Labs Ordered []    Radiology Reports Reviewed []    Radiology Ordered []    PCP Records Reviewed []    Referring Provider Records Reviewed []    ER Records Reviewed []    Hospital Records " Reviewed []    History Obtained From Family []    Radiology Images Reviewed []    Other Reviewed []    Records Requested []       Procedures    Assessment & Plan    Diagnosis Plan   1. Chronic heart failure with preserved ejection fraction (HFpEF)        2. Dyslipidemia        3. Essential hypertension                 Recommendations:  Chronic PFpEF  Euvolemic today. Following with heartfailure clinic.   Continue farxiga, entresto, spironolactone, pravastatin.   Essential hypertension  Controlled.     Return in about 6 months (around 6/5/2024).    As always, I appreciate very much the opportunity to participate in the cardiovascular care of your patients.      With Best Regards,    Chandler Langford PA-C

## 2024-01-02 ENCOUNTER — SPECIALTY PHARMACY (OUTPATIENT)
Dept: PHARMACY | Facility: HOSPITAL | Age: 64
End: 2024-01-02
Payer: COMMERCIAL

## 2024-01-02 ENCOUNTER — OFFICE VISIT (OUTPATIENT)
Dept: ENDOCRINOLOGY | Facility: CLINIC | Age: 64
End: 2024-01-02
Payer: COMMERCIAL

## 2024-01-02 VITALS
HEART RATE: 76 BPM | WEIGHT: 240 LBS | DIASTOLIC BLOOD PRESSURE: 70 MMHG | OXYGEN SATURATION: 98 % | BODY MASS INDEX: 42.52 KG/M2 | HEIGHT: 63 IN | SYSTOLIC BLOOD PRESSURE: 134 MMHG

## 2024-01-02 DIAGNOSIS — Z79.4 TYPE 2 DIABETES MELLITUS WITH HYPOGLYCEMIA WITHOUT COMA, WITH LONG-TERM CURRENT USE OF INSULIN: Primary | ICD-10-CM

## 2024-01-02 DIAGNOSIS — E11.649 TYPE 2 DIABETES MELLITUS WITH HYPOGLYCEMIA WITHOUT COMA, WITH LONG-TERM CURRENT USE OF INSULIN: Primary | ICD-10-CM

## 2024-01-02 LAB
EXPIRATION DATE: ABNORMAL
GLUCOSE BLDC GLUCOMTR-MCNC: 136 MG/DL (ref 70–130)
HBA1C MFR BLD: 6.2 % (ref 4.5–5.7)
Lab: ABNORMAL

## 2024-01-02 PROCEDURE — 99214 OFFICE O/P EST MOD 30 MIN: CPT | Performed by: NURSE PRACTITIONER

## 2024-01-02 PROCEDURE — 83036 HEMOGLOBIN GLYCOSYLATED A1C: CPT | Performed by: NURSE PRACTITIONER

## 2024-01-02 PROCEDURE — 95251 CONT GLUC MNTR ANALYSIS I&R: CPT | Performed by: NURSE PRACTITIONER

## 2024-01-02 NOTE — PROGRESS NOTES
Specialty Pharmacy Patient Management Program  Endocrinology Reassessment     Zeina Reed is a 63 y.o. female seen by an Endocrinology Provider for Type 2 Diabetes and enrolled in the Endocrinology Patient Management program offered by [facility].  A follow-up outreach was conducted, including assessment of continued therapy appropriateness, medication adherence, and side effect incidence and management for Metformin, Farxiga and Mounjaro.     Patient is currently taking Farxiga 10 mg daily, Metformin ER 1000 mg daily, and Mounjaro 12.5 mg weekly. She uses Dexcom G6 to monitor BG with readings in the 130s. She denies recurring low BG <70. Occasionally her Dexcom will alarm in the middle of the night with a BG near 68, but her BG will go back up once she wakes up and moves around.     In the past, the patient has tried:                Drug Dose Reason for Discontinuation Notes   Metformin IR   Diarrhea Can tolerate ER formulation                         Changes to Insurance Coverage or Financial Support  None       Relevant Past Medical History and Comorbidities  Relevant medical history and concomitant health conditions were discussed with the patient. The patient's chart has been reviewed for relevant past medical history and comorbid health conditions and updated as necessary.   Past Medical History:   Diagnosis Date    Acute exacerbation of CHF (congestive heart failure) 07/10/2021    Arthritis     COPD (chronic obstructive pulmonary disease)     Diabetes mellitus     Disease of thyroid gland     Dyslipidemia     Elevated cholesterol     GERD (gastroesophageal reflux disease)     History of transfusion     Hyperlipidemia     Hypertension     Palpitations     Sleep apnea      Social History     Socioeconomic History    Marital status:    Tobacco Use    Smoking status: Never    Smokeless tobacco: Never   Vaping Use    Vaping Use: Never used   Substance and Sexual Activity     Alcohol use: No    Drug use: No    Sexual activity: Defer       Problem list reviewed by Mari Tse RPH on 1/2/2024 at  3:27 PM    Hospitalizations and Urgent Care Since Last Assessment  ED Visits, Admissions or Hospitalizations : None  Urgent Office Visits: None required    Allergies  Known allergies and reactions were discussed with the patient. The patient's chart has been reviewed for allergy information and updated as necessary.   Allergies   Allergen Reactions    Augmentin [Amoxicillin-Pot Clavulanate] Diarrhea    Metformin And Related Diarrhea     Immediate Release - tolerates ER       Allergies reviewed by Mari Tse RPH on 1/2/2024 at  3:26 PM    Relevant Laboratory Values  A1C Last 3 Results          6/26/2023    11:06 9/28/2023    15:27 1/2/2024    15:24   HGBA1C Last 3 Results   Hemoglobin A1C 6.3  6.4  6.2      Lab Results   Component Value Date    HGBA1C 6.2 (A) 01/02/2024     Lab Results   Component Value Date    GLUCOSE 164 (H) 09/22/2023    CALCIUM 9.6 09/22/2023     09/22/2023    K 4.3 09/22/2023    CO2 27.4 09/22/2023     09/22/2023    BUN 16 09/22/2023    CREATININE 0.68 09/22/2023    EGFRIFAFRI  09/17/2016      Comment:      <15 Indicative of kidney failure.    EGFRIFNONA 71 12/17/2021    BCR 23.5 09/22/2023    ANIONGAP 10.6 09/22/2023     Lab Results   Component Value Date    CHOL 133 11/17/2023    CHLPL 205 (H) 02/17/2016    TRIG 191 (H) 11/17/2023    HDL 44 11/17/2023    LDL 58 11/17/2023         Current Medication List  This medication list has been reviewed with the patient and evaluated for any interactions or necessary modifications/recommendations, and updated to include all prescription medications, OTC medications, and supplements the patient is currently taking. This list reflects what is contained in the patient's profile, which has also been marked as reviewed to communicate to other providers it is the most up to date version of the patient's current medication  therapy.     Current Outpatient Medications:     acetaminophen-codeine (TYLENOL with CODEINE #3) 300-30 MG per tablet, Take 1 tablet by mouth Every 12 (Twelve) Hours as needed, Disp: 60 tablet, Rfl: 0    aspirin 81 MG EC tablet, Take 1 tablet by mouth daily., Disp: 30 tablet, Rfl: 2    busPIRone (BUSPAR) 5 MG tablet, Take 1 tablet by mouth 3 (Three) Times a Day. (Patient taking differently: Take 1 tablet by mouth 3 (Three) Times a Day As Needed.), Disp: 90 tablet, Rfl: 2    Continuous Blood Gluc Sensor (Dexcom G7 Sensor) misc, Change sensor Every 10 (Ten) Days., Disp: 3 each, Rfl: 5    dapagliflozin Propanediol (Farxiga) 10 MG tablet, Take 1 tablet (10 mg) by mouth daily, Disp: 30 tablet, Rfl: 5    DULoxetine (CYMBALTA) 30 MG capsule, Take 1 capsule by mouth Daily., Disp: 30 capsule, Rfl: 6    ergocalciferol (ERGOCALCIFEROL) 1.25 MG (48431 UT) capsule, Take 1 capsule (1.25 mg) by mouth twice weekly., Disp: 24 capsule, Rfl: 0    fluticasone (FLONASE) 50 MCG/ACT nasal spray, Instill 2 sprays in each nostril daily., Disp: 16 g, Rfl: 5    Fluticasone-Salmeterol (ADVAIR/WIXELA) 250-50 MCG/ACT DISKUS, Inhale 1 puff 2 (Two) Times a Day. Taking 1 time daily, Disp: , Rfl:     furosemide (LASIX) 40 MG tablet, Take 1 tablet by mouth 2 (Two) Times a Day., Disp: 180 tablet, Rfl: 1    gabapentin (NEURONTIN) 300 MG capsule, Take 1 capsule (300 mg) by mouth 3 times per day, Disp: 90 capsule, Rfl: 1    lansoprazole (PREVACID) 30 MG capsule, Take 1 capsule (30 mg) by mouth daily before a meal, Disp: 90 capsule, Rfl: 2    lansoprazole (PREVACID) 30 MG capsule, Take 1 capsule (30 mg) by mouth daily before a meal, Disp: 90 capsule, Rfl: 2    lansoprazole (PREVACID) 30 MG capsule, Take 1 capsule (30 mg) by mouth daily before a meal, Disp: 90 capsule, Rfl: 2    levothyroxine (Synthroid) 50 MCG tablet, Take 1 tablet (50 mcg) by mouth daily in the morning on an empty stomach, Disp: 90 tablet, Rfl: 1    loratadine (Claritin) 10 MG tablet,  Take 1 tablet (10 mg) by mouth daily, Disp: 90 tablet, Rfl: 3    loratadine (Claritin) 10 MG tablet, Take 1 tablet (10 mg) by mouth daily, Disp: 90 tablet, Rfl: 3    metFORMIN ER (GLUCOPHAGE-XR) 500 MG 24 hr tablet, Take 2 tablets (1000 mg total) by mouth Daily With Breakfast., Disp: 180 tablet, Rfl: 2    methocarbamol (ROBAXIN) 500 MG tablet, Take 1 tablet by mouth two times daily as needed, Disp: 60 tablet, Rfl: 3    metoprolol succinate XL (TOPROL-XL) 200 MG 24 hr tablet, Take 1 tablet by mouth Daily., Disp: 90 tablet, Rfl: 2    metoprolol succinate XL (TOPROL-XL) 200 MG 24 hr tablet, Take 1 tablet (200 mg) by mouth daily, Disp: 90 tablet, Rfl: 0    ondansetron ODT (ZOFRAN-ODT) 8 MG disintegrating tablet, Place 1 tablet on the tongue and allow to dissolve Every 8 (Eight) Hours As Needed., Disp: 30 tablet, Rfl: 3    potassium chloride ER (K-TAB) 20 MEQ tablet controlled-release ER tablet, Take 1 tablet by mouth Daily., Disp: 60 tablet, Rfl: 3    potassium chloride ER (K-TAB) 20 MEQ tablet controlled-release ER tablet, Take 2 tablets (40 mEq) by mouth daily with food, Disp: 60 tablet, Rfl: 3    pravastatin (PRAVACHOL) 40 MG tablet, Take 1 tablet (40 mg) by mouth daily, Disp: 30 tablet, Rfl: 3    promethazine-dextromethorphan (PROMETHAZINE-DM) 6.25-15 MG/5ML syrup, Take 5 ml by mouth every 6 hours as needed, Disp: 200 mL, Rfl: 0    sacubitril-valsartan (ENTRESTO)  MG tablet, Take 1 tablet by mouth 2 (Two) Times a Day., Disp: 60 tablet, Rfl: 3    spironolactone (ALDACTONE) 25 MG tablet, Take 1 tablet (25 mg) by mouth daily, Disp: 90 tablet, Rfl: 1    spironolactone (ALDACTONE) 25 MG tablet, Take 1 tablet (25 mg) by mouth daily, Disp: 90 tablet, Rfl: 1    Tirzepatide (Mounjaro) 12.5 MG/0.5ML solution pen-injector pen, Inject 12.5 mg subcutaneously weekly., Disp: 2 mL, Rfl: 3    vitamin D (ERGOCALCIFEROL) 1.25 MG (02507 UT) capsule capsule, Take 1 capsule by mouth 1 (One) Time Per Week for 8 doses., Disp: 5  capsule, Rfl: 1    Medicines reviewed by Mari Tse RPH on 1/2/2024 at  3:30 PM    Drug Interactions  No significant drug-drug interactions with diabetes medications expected according to literature.    Recommended Medications Assessment  Aspirin: Currently Taking   Statin: Currently Taking   ACEi/ARB: Currently Taking     Adverse Drug Reactions  Adverse Reactions Experienced: None    Medication tolerability: Tolerating with no to minimal ADRs  Medication plan: Continue therapy with normal follow-up  Plan for ADR Management: None required     Adherence, Self-Administration, and Current Therapy Problems  Adherence related to the patient's specialty therapy was discussed with the patient. The Adherence segment of this outreach has been reviewed and updated.   Adherence Questions  Linked Medication(s) Assessed: Continuous Blood Gluc Sensor, Dapagliflozin Propanediol, Metformin Hcl (Biguanides), Tirzepatide  On average, how many doses/injections does the patient miss per month?: 0  What are the identified reasons for non-adherence or missed doses? : no problems identified  What is the estimated medication adherence level?: %  Based on the patient/caregiver response and refill history, does this patient require an MTP to track adherence improvements?: no    Additional Barriers to Patient Self-Administration: None  Methods for Supporting Patient Self-Administration: None required    Recently Close Medication Therapy Problems  No medication therapy recommendations to display    Goals of Therapy  Goals related to the patient's specialty therapy was discussed with the patient. The Patient Goals segment of this outreach has been reviewed and updated.    Goals Addressed Today        HEMOGLOBIN A1C < 7      Specialty Pharmacy General Goal      Prevent Hypoglycemia             Quality of Life Assessment   Quality of Life related to the patient's enrollment in the patient management program and services provided was  discussed with the patient. The QOL segment of this outreach has been reviewed and updated.   Quality of Life Improvement Scale: 9-A good deal better    Reassessment Plan & Follow-Up  Patient's diabetes continues to improve with A1C down to 6.2% from 6.4%. No pharmacologic recommendations at this time.   Medication Therapy Changes: None today. Continue current regimen.    Related Plans, Therapy Recommendations, or Issues to Be Addressed: None    Pharmacist to perform regular reassessments no more than (6) months from the previous assessment.  Care Coordinator to set up future refill outreaches, coordinate prescription delivery, and escalate clinical questions to pharmacist.     Attestation  I attest the patient was actively involved in and has agreed to the above plan of care. If the prescribed therapy is at any point deemed not appropriate based on the current or future assessments, a consultation will be initiated with the patient's specialty care provider to determine the best course of action. The revised plan of therapy will be documented along with any required assessments and/or additional patient education provided.    Mari Tse, [place], ADRIENNE HAMMONDS  Clinical Specialty Pharmacist, Endocrinology  1/2/2024  15:36 EST

## 2024-01-02 NOTE — PROGRESS NOTES
Chief Complaint   Patient presents with    Diabetes        Referring Provider  No ref. provider found     HPI   Zeina Reed is a 63 y.o. female had concerns including Diabetes.    T2DM.    She has been doing well and denies any changes to her health since her last visit.    Diabetes:  Diabetes was diagnosed 2010.  Complications include neuropathy, retinopathy-injections.  Last ophtho exam was q6 months-[facility].  Current medications for diabetes include Mounjaro 12.5 mg weekly, Metformin 1000 mg QD, Farxiga 10 mg QD.   Previous meds: Metformin-quick release-GI issues, Jardiance-yeast.  She checks her blood sugar with Dexcom CGM.   Hypos: more often, mostly overnight    ACE/ARB:no, Statin: yes  Labs:  A1C:today in office  Lipid Panel:yes  STEPHANIE: yes    The following portions of the patient's history were reviewed and updated as appropriate: allergies, current medications, past family history, past medical history, past social history, past surgical history, and problem list.     Diet: she doesn't follow a specific diet.    Past Medical History:   Diagnosis Date    Acute exacerbation of CHF (congestive heart failure) 07/10/2021    Arthritis     COPD (chronic obstructive pulmonary disease)     Diabetes mellitus     Disease of thyroid gland     Dyslipidemia     Elevated cholesterol     GERD (gastroesophageal reflux disease)     History of transfusion     Hyperlipidemia     Hypertension     Palpitations     Sleep apnea      Past Surgical History:   Procedure Laterality Date    CHOLECYSTECTOMY      COLONOSCOPY N/A 2/21/2017    Procedure: COLONOSCOPY FOR SCREENING  CPTCODE:26410;  Surgeon: Socrates Price III, MD;  Location: [facility];  Service:     COLONOSCOPY N/A 9/14/2023    Procedure: COLONOSCOPY;  Surgeon: Natacha Jo MD;  Location: [facility] OR;  Service: Gastroenterology;  Laterality: N/A;    HYSTERECTOMY      SKIN GRAFT      TUBAL ABDOMINAL LIGATION        Family History   Problem  Relation Age of Onset    Cancer Mother     Heart attack Father     Cancer Father       Social History     Socioeconomic History    Marital status:    Tobacco Use    Smoking status: Never    Smokeless tobacco: Never   Vaping Use    Vaping Use: Never used   Substance and Sexual Activity    Alcohol use: No    Drug use: No    Sexual activity: Defer      Allergies   Allergen Reactions    Augmentin [Amoxicillin-Pot Clavulanate] Diarrhea    Metformin And Related Diarrhea     Immediate Release - tolerates ER      Current Outpatient Medications on File Prior to Visit   Medication Sig Dispense Refill    acetaminophen-codeine (TYLENOL with CODEINE #3) 300-30 MG per tablet Take 1 tablet by mouth Every 12 (Twelve) Hours as needed 60 tablet 0    aspirin 81 MG EC tablet Take 1 tablet by mouth daily. 30 tablet 2    busPIRone (BUSPAR) 5 MG tablet Take 1 tablet by mouth 3 (Three) Times a Day. (Patient taking differently: Take 1 tablet by mouth 3 (Three) Times a Day As Needed.) 90 tablet 2    Continuous Blood Gluc Sensor (Dexcom G7 Sensor) misc Change sensor Every 10 (Ten) Days. 3 each 5    dapagliflozin Propanediol (Farxiga) 10 MG tablet Take 1 tablet (10 mg) by mouth daily 30 tablet 5    DULoxetine (CYMBALTA) 30 MG capsule Take 1 capsule by mouth Daily. 30 capsule 6    ergocalciferol (ERGOCALCIFEROL) 1.25 MG (21339 UT) capsule Take 1 capsule (1.25 mg) by mouth twice weekly. 24 capsule 0    fluticasone (FLONASE) 50 MCG/ACT nasal spray Instill 2 sprays in each nostril daily. 16 g 5    Fluticasone-Salmeterol (ADVAIR/WIXELA) 250-50 MCG/ACT DISKUS Inhale 1 puff 2 (Two) Times a Day. Taking 1 time daily      furosemide (LASIX) 40 MG tablet Take 1 tablet by mouth 2 (Two) Times a Day. 180 tablet 1    gabapentin (NEURONTIN) 300 MG capsule Take 1 capsule (300 mg) by mouth 3 times per day 90 capsule 1    lansoprazole (PREVACID) 30 MG capsule Take 1 capsule (30 mg) by mouth daily before a meal 90 capsule 2    lansoprazole (PREVACID) 30 MG  capsule Take 1 capsule (30 mg) by mouth daily before a meal 90 capsule 2    lansoprazole (PREVACID) 30 MG capsule Take 1 capsule (30 mg) by mouth daily before a meal 90 capsule 2    levothyroxine (Synthroid) 50 MCG tablet Take 1 tablet (50 mcg) by mouth daily in the morning on an empty stomach 90 tablet 1    loratadine (Claritin) 10 MG tablet Take 1 tablet (10 mg) by mouth daily 90 tablet 3    loratadine (Claritin) 10 MG tablet Take 1 tablet (10 mg) by mouth daily 90 tablet 3    metFORMIN ER (GLUCOPHAGE-XR) 500 MG 24 hr tablet Take 2 tablets (1000 mg total) by mouth Daily With Breakfast. 180 tablet 2    methocarbamol (ROBAXIN) 500 MG tablet Take 1 tablet by mouth two times daily as needed 60 tablet 3    metoprolol succinate XL (TOPROL-XL) 200 MG 24 hr tablet Take 1 tablet by mouth Daily. 90 tablet 2    metoprolol succinate XL (TOPROL-XL) 200 MG 24 hr tablet Take 1 tablet (200 mg) by mouth daily 90 tablet 0    ondansetron ODT (ZOFRAN-ODT) 8 MG disintegrating tablet Place 1 tablet on the tongue and allow to dissolve Every 8 (Eight) Hours As Needed. 30 tablet 3    potassium chloride ER (K-TAB) 20 MEQ tablet controlled-release ER tablet Take 1 tablet by mouth Daily. 60 tablet 3    potassium chloride ER (K-TAB) 20 MEQ tablet controlled-release ER tablet Take 2 tablets (40 mEq) by mouth daily with food 60 tablet 3    pravastatin (PRAVACHOL) 40 MG tablet Take 1 tablet (40 mg) by mouth daily 30 tablet 3    promethazine-dextromethorphan (PROMETHAZINE-DM) 6.25-15 MG/5ML syrup Take 5 ml by mouth every 6 hours as needed 200 mL 0    sacubitril-valsartan (ENTRESTO)  MG tablet Take 1 tablet by mouth 2 (Two) Times a Day. 60 tablet 3    spironolactone (ALDACTONE) 25 MG tablet Take 1 tablet (25 mg) by mouth daily 90 tablet 1    spironolactone (ALDACTONE) 25 MG tablet Take 1 tablet (25 mg) by mouth daily 90 tablet 1    spironolactone (ALDACTONE) 25 MG tablet Take 1 tablet by mouth Daily. 90 tablet 1    Tirzepatide (Mounjaro)  "12.5 MG/0.5ML solution pen-injector pen Inject 12.5 mg subcutaneously weekly. 2 mL 3    vitamin D (ERGOCALCIFEROL) 1.25 MG (28592 UT) capsule capsule Take 1 capsule by mouth 1 (One) Time Per Week for 8 doses. 5 capsule 1    [DISCONTINUED] acetaminophen-codeine (TYLENOL with CODEINE #3) 300-30 MG per tablet Take 1 tablet by mouth Every 12 (Twelve) Hours As Needed. 60 tablet 0    [DISCONTINUED] Insulin Pen Needle (NovoFine Plus Pen Needle) 32G X 4 MM misc USE 1 TIME DAILY (Patient not taking: Reported on 1/2/2024) 90 each 2    [DISCONTINUED] Tirzepatide (Mounjaro) 10 MG/0.5ML solution pen-injector Inject 10 mg subcutaneously weekly 2 mL 3     No current facility-administered medications on file prior to visit.        Review of Systems   Constitutional:  Positive for fatigue. Negative for unexpected weight gain and unexpected weight loss.   Eyes:  Positive for blurred vision and visual disturbance.   Endocrine: Positive for polydipsia, polyphagia and polyuria.   Psychiatric/Behavioral:  Positive for sleep disturbance.    All other systems reviewed and are negative.    /70 (BP Location: Left arm, Patient Position: Sitting, Cuff Size: Adult)   Pulse 76   Ht 160 cm (63\")   Wt 109 kg (240 lb)   SpO2 98%   BMI 42.51 kg/m²      Physical Exam  Vitals reviewed.   Constitutional:       Appearance: Normal appearance.   Eyes:      Extraocular Movements: Extraocular movements intact.   Cardiovascular:      Rate and Rhythm: Normal rate.   Pulmonary:      Effort: Pulmonary effort is normal.   Skin:     General: Skin is warm and dry.   Neurological:      General: No focal deficit present.      Mental Status: She is alert and oriented to person, place, and time.   Psychiatric:         Mood and Affect: Mood normal.         Behavior: Behavior normal.         Thought Content: Thought content normal.         Judgment: Judgment normal.       CMP:  Lab Results   Component Value Date    BUN 16 09/22/2023    CREATININE 0.68 " "09/22/2023    EGFRIFNONA 71 12/17/2021    EGFRIFAFRI  09/17/2016      Comment:      <15 Indicative of kidney failure.    BCR 23.5 09/22/2023     09/22/2023    K 4.3 09/22/2023    CO2 27.4 09/22/2023    CALCIUM 9.6 09/22/2023    ALBUMIN 4.10 12/17/2021    LABIL2 1.4 (L) 02/12/2015    BILITOT 1.1 12/17/2021    ALKPHOS 120 (H) 12/17/2021    AST 16 12/17/2021    ALT 29 12/17/2021     Lipid Panel:  Lab Results   Component Value Date    CHOL 133 11/17/2023    TRIG 191 (H) 11/17/2023    HDL 44 11/17/2023    VLDL 31 11/17/2023    LDL 58 11/17/2023     HbA1c:  Lab Results   Component Value Date    HGBA1C 6.2 (A) 01/02/2024    HGBA1C 6.4 09/28/2023     Glucose:  Lab Results   Component Value Date    POCGLU 136 (A) 01/02/2024     Microalbumin:  No results found for: \"MALBCRERATIO\"  TSH:  Lab Results   Component Value Date    TSH 1.930 11/17/2023       Assessment and Plan    Diagnoses and all orders for this visit:    1. Type 2 diabetes mellitus with hypoglycemia without coma, with long-term current use of insulin (Primary)  Assessment & Plan:  -Diabetes is at goal with A1c 6.2.  -Discussed dietary and exercise guidelines with patient.  -Discussed the importance of yearly eye exams.  -Discussed the importance of checking BG's regularly.  Continue using CGM.  2 week data download is as follows:  Very High: 7%  High: 38%  IN Range: 54%  Low: <1%  Very Low: <1%  Trend shows some post meal hypers.  -Discussed medication and the need for adjustments as below.  -Continue Metformin XR 1000 mg QD.  -Continue Mounjaro 7.5 mg weekly. Patient has no personal history of pancreatitis, no family history of MEN syndrome or medullary thyroid cancer. Possible side effects including nausea, bloating, other GI upset and rarely pancreatitis were discussed. She was advised to call the office with any symptoms or concerns.   -Continue Farxiga 10 mg QD. Discussed the medication's MOA and that it may cause more frequent urination particularly " upon starting the medication. Take one tablet in the morning with or without food. Encouraged to drink plenty of water as to not get dehydrated especially in warmer weather or with activity. Also discussed there may be an increased incidence of UTIs or yeast infections and to monitor for signs/symptoms.  -Discontinue insulin use as this is causing increased hypos.  -S/S hypoglycemia reviewed with Rule of 15's advised.  -Follow-up in 3 months.      Orders:  -     POC Glucose, Blood  -     POC Glycosylated Hemoglobin (Hb A1C)           Return in about 3 months (around 4/2/2024) for Follow-up appointment, A1C. The patient was instructed to contact the clinic with any interval questions or concerns.        This document has been electronically signed by MAURO Viera  January 2, 2024 15:47 EST   Endocrinology

## 2024-01-02 NOTE — ASSESSMENT & PLAN NOTE
-Diabetes is at goal with A1c 6.2.  -Discussed dietary and exercise guidelines with patient.  -Discussed the importance of yearly eye exams.  -Discussed the importance of checking BG's regularly.  Continue using CGM.  2 week data download is as follows:  Very High: 7%  High: 38%  IN Range: 54%  Low: <1%  Very Low: <1%  Trend shows some post meal hypers.  -Discussed medication and the need for adjustments as below.  -Continue Metformin XR 1000 mg QD.  -Continue Mounjaro 7.5 mg weekly. Patient has no personal history of pancreatitis, no family history of MEN syndrome or medullary thyroid cancer. Possible side effects including nausea, bloating, other GI upset and rarely pancreatitis were discussed. She was advised to call the office with any symptoms or concerns.   -Continue Farxiga 10 mg QD. Discussed the medication's MOA and that it may cause more frequent urination particularly upon starting the medication. Take one tablet in the morning with or without food. Encouraged to drink plenty of water as to not get dehydrated especially in warmer weather or with activity. Also discussed there may be an increased incidence of UTIs or yeast infections and to monitor for signs/symptoms.  -Discontinue insulin use as this is causing increased hypos.  -S/S hypoglycemia reviewed with Rule of 15's advised.  -Follow-up in 3 months.

## 2024-02-06 RX ORDER — SACUBITRIL AND VALSARTAN 97; 103 MG/1; MG/1
1 TABLET, FILM COATED ORAL 2 TIMES DAILY
Qty: 60 TABLET | Refills: 3 | Status: SHIPPED | OUTPATIENT
Start: 2024-02-06

## 2024-02-09 ENCOUNTER — SPECIALTY PHARMACY (OUTPATIENT)
Dept: PHARMACY | Facility: HOSPITAL | Age: 64
End: 2024-02-09

## 2024-03-01 ENCOUNTER — HOSPITAL ENCOUNTER (OUTPATIENT)
Dept: CARDIOLOGY | Facility: HOSPITAL | Age: 64
Discharge: HOME | End: 2024-03-01
Admitting: PHYSICIAN ASSISTANT
Payer: COMMERCIAL

## 2024-03-01 VITALS
WEIGHT: 236.4 LBS | HEART RATE: 78 BPM | BODY MASS INDEX: 41.88 KG/M2 | SYSTOLIC BLOOD PRESSURE: 135 MMHG | DIASTOLIC BLOOD PRESSURE: 75 MMHG | OXYGEN SATURATION: 97 %

## 2024-03-01 DIAGNOSIS — E11.649 TYPE 2 DIABETES MELLITUS WITH HYPOGLYCEMIA WITHOUT COMA, WITH LONG-TERM CURRENT USE OF INSULIN: ICD-10-CM

## 2024-03-01 DIAGNOSIS — Z79.4 TYPE 2 DIABETES MELLITUS WITH HYPOGLYCEMIA WITHOUT COMA, WITH LONG-TERM CURRENT USE OF INSULIN: ICD-10-CM

## 2024-03-01 DIAGNOSIS — I10 ESSENTIAL HYPERTENSION: ICD-10-CM

## 2024-03-01 DIAGNOSIS — I50.32 CHRONIC HEART FAILURE WITH PRESERVED EJECTION FRACTION (HFPEF): Primary | ICD-10-CM

## 2024-03-01 LAB
ALBUMIN SERPL-MCNC: 4 G/DL (ref 3.5–5.2)
ALBUMIN/GLOB SERPL: 1.3 G/DL
ALP SERPL-CCNC: 108 U/L (ref 39–117)
ALT SERPL W P-5'-P-CCNC: 9 U/L (ref 1–33)
ANION GAP SERPL CALCULATED.3IONS-SCNC: 10.4 MMOL/L (ref 5–15)
ANION GAP SERPL CALCULATED.3IONS-SCNC: 11.1 MMOL/L (ref 5–15)
AST SERPL-CCNC: 11 U/L (ref 1–32)
BASOPHILS # BLD AUTO: 0.03 10*3/MM3 (ref 0–0.2)
BASOPHILS NFR BLD AUTO: 0.3 % (ref 0–1.5)
BILIRUB SERPL-MCNC: 1.4 MG/DL (ref 0–1.2)
BUN SERPL-MCNC: 10 MG/DL (ref 8–23)
BUN SERPL-MCNC: 9 MG/DL (ref 8–23)
BUN/CREAT SERPL: 14.8 (ref 7–25)
BUN/CREAT SERPL: 16.1 (ref 7–25)
CALCIUM SPEC-SCNC: 9 MG/DL (ref 8.6–10.5)
CALCIUM SPEC-SCNC: 9 MG/DL (ref 8.6–10.5)
CHLORIDE SERPL-SCNC: 103 MMOL/L (ref 98–107)
CHLORIDE SERPL-SCNC: 104 MMOL/L (ref 98–107)
CO2 SERPL-SCNC: 27.6 MMOL/L (ref 22–29)
CO2 SERPL-SCNC: 27.9 MMOL/L (ref 22–29)
CREAT SERPL-MCNC: 0.61 MG/DL (ref 0.57–1)
CREAT SERPL-MCNC: 0.62 MG/DL (ref 0.57–1)
DEPRECATED RDW RBC AUTO: 43.8 FL (ref 37–54)
EGFRCR SERPLBLD CKD-EPI 2021: 100.2 ML/MIN/1.73
EGFRCR SERPLBLD CKD-EPI 2021: 100.6 ML/MIN/1.73
EOSINOPHIL # BLD AUTO: 0.17 10*3/MM3 (ref 0–0.4)
EOSINOPHIL NFR BLD AUTO: 2 % (ref 0.3–6.2)
ERYTHROCYTE [DISTWIDTH] IN BLOOD BY AUTOMATED COUNT: 13.4 % (ref 12.3–15.4)
GLOBULIN UR ELPH-MCNC: 3.1 GM/DL
GLUCOSE SERPL-MCNC: 135 MG/DL (ref 65–99)
GLUCOSE SERPL-MCNC: 146 MG/DL (ref 65–99)
HCT VFR BLD AUTO: 41.4 % (ref 34–46.6)
HGB BLD-MCNC: 13.8 G/DL (ref 12–15.9)
IMM GRANULOCYTES # BLD AUTO: 0.03 10*3/MM3 (ref 0–0.05)
IMM GRANULOCYTES NFR BLD AUTO: 0.3 % (ref 0–0.5)
LYMPHOCYTES # BLD AUTO: 1.34 10*3/MM3 (ref 0.7–3.1)
LYMPHOCYTES NFR BLD AUTO: 15.5 % (ref 19.6–45.3)
MAGNESIUM SERPL-MCNC: 1.9 MG/DL (ref 1.6–2.4)
MCH RBC QN AUTO: 29.9 PG (ref 26.6–33)
MCHC RBC AUTO-ENTMCNC: 33.3 G/DL (ref 31.5–35.7)
MCV RBC AUTO: 89.6 FL (ref 79–97)
MONOCYTES # BLD AUTO: 0.67 10*3/MM3 (ref 0.1–0.9)
MONOCYTES NFR BLD AUTO: 7.7 % (ref 5–12)
NEUTROPHILS NFR BLD AUTO: 6.42 10*3/MM3 (ref 1.7–7)
NEUTROPHILS NFR BLD AUTO: 74.2 % (ref 42.7–76)
NRBC BLD AUTO-RTO: 0 /100 WBC (ref 0–0.2)
NT-PROBNP SERPL-MCNC: 122.2 PG/ML (ref 0–900)
PLATELET # BLD AUTO: 256 10*3/MM3 (ref 140–450)
PMV BLD AUTO: 10.5 FL (ref 6–12)
POTASSIUM SERPL-SCNC: 3.7 MMOL/L (ref 3.5–5.2)
POTASSIUM SERPL-SCNC: 3.7 MMOL/L (ref 3.5–5.2)
PROT SERPL-MCNC: 7.1 G/DL (ref 6–8.5)
RBC # BLD AUTO: 4.62 10*6/MM3 (ref 3.77–5.28)
SODIUM SERPL-SCNC: 142 MMOL/L (ref 136–145)
SODIUM SERPL-SCNC: 142 MMOL/L (ref 136–145)
WBC NRBC COR # BLD AUTO: 8.66 10*3/MM3 (ref 3.4–10.8)

## 2024-03-01 PROCEDURE — 83880 ASSAY OF NATRIURETIC PEPTIDE: CPT | Performed by: PHYSICIAN ASSISTANT

## 2024-03-01 PROCEDURE — 84439 ASSAY OF FREE THYROXINE: CPT | Performed by: PHYSICIAN ASSISTANT

## 2024-03-01 PROCEDURE — 83735 ASSAY OF MAGNESIUM: CPT | Performed by: PHYSICIAN ASSISTANT

## 2024-03-01 PROCEDURE — 80061 LIPID PANEL: CPT | Performed by: PHYSICIAN ASSISTANT

## 2024-03-01 PROCEDURE — 80050 GENERAL HEALTH PANEL: CPT | Performed by: PHYSICIAN ASSISTANT

## 2024-03-01 PROCEDURE — 82306 VITAMIN D 25 HYDROXY: CPT | Performed by: PHYSICIAN ASSISTANT

## 2024-03-01 PROCEDURE — 99214 OFFICE O/P EST MOD 30 MIN: CPT | Performed by: PHYSICIAN ASSISTANT

## 2024-03-01 PROCEDURE — 80048 BASIC METABOLIC PNL TOTAL CA: CPT | Performed by: PHYSICIAN ASSISTANT

## 2024-03-01 PROCEDURE — 83036 HEMOGLOBIN GLYCOSYLATED A1C: CPT | Performed by: PHYSICIAN ASSISTANT

## 2024-03-01 RX ORDER — METOPROLOL SUCCINATE 200 MG/1
200 TABLET, EXTENDED RELEASE ORAL DAILY
Qty: 90 TABLET | Refills: 2 | Status: SHIPPED | OUTPATIENT
Start: 2024-03-01

## 2024-03-01 RX ORDER — FUROSEMIDE 40 MG/1
40 TABLET ORAL 2 TIMES DAILY
Qty: 180 TABLET | Refills: 1 | Status: SHIPPED | OUTPATIENT
Start: 2024-03-01

## 2024-03-01 RX ORDER — SACUBITRIL AND VALSARTAN 97; 103 MG/1; MG/1
1 TABLET, FILM COATED ORAL 2 TIMES DAILY
Qty: 60 TABLET | Refills: 3 | Status: SHIPPED | OUTPATIENT
Start: 2024-03-01

## 2024-03-01 RX ORDER — ONDANSETRON 8 MG/1
8 TABLET, ORALLY DISINTEGRATING ORAL EVERY 8 HOURS PRN
Qty: 30 TABLET | Refills: 3 | Status: SHIPPED | OUTPATIENT
Start: 2024-03-01

## 2024-03-01 RX ORDER — SPIRONOLACTONE 25 MG/1
TABLET ORAL
Qty: 90 TABLET | Refills: 1 | Status: SHIPPED | OUTPATIENT
Start: 2024-03-01

## 2024-03-01 NOTE — PROGRESS NOTES
[id number] g [facility]  ANNE Pinto APRN Cloud, Rebekah, APRN  [address] [place] [id number]    Thank you for asking me to see Zeina Reed for congestive heart failure.    HPI:     This is a 63 y.o. female with known past medical history of:    FIDEL (CPAP)  HFpEF  LVEF 61-65% on 08/17/22.   Essential HTN  Uncontrolled DM type 2  Fatty liver disease  GERD  COPD     Zeina Reed presents for today for HF referral from PCP.  The patient is typically seen by Nini Marie APRN.  Patient's primary cardiologist is Dr. Zapata & team.     Last known EF 65% in 07/2021 with repeat echocardiogram pending at present.    Last known hospitalization and/or ED visit: July 10, 2021 through July 15, 2021 with acute on chronic decompensated heart failure, obstructive sleep apnea uncontrolled diabetes and sepsis admission.  Accompanied by: Spouse  Patient was reerred by her PCP's office after seeing Ann Marie and reporting worsening dyspnea on exertion and swelling in spite of Bumex 2mg BID.         03/01/24 visit data/details regarding:   Dyspnea: Dyspnea on exertion improved.  Lower extremity swelling: Intermittent swelling   Abdominal swelling: Improved  Home weight: Waxing/waning  Home BP:130s systolic  Home heart rate: 70s  Daily activities of living: Performing with assistance of family.   Pillows/lying flat: 3 pillows  HF zone: Green  Mrs. Reed is doing well.  She has lost 20 lbs.  She still has significant issues with her osteoarthritis, but blood pressures has been good.   She is not requiring her second dose of Lasix at present and is just taking one dose daily with extra PRN.      Specialists:   Cardiology: Dr. Zapata    Review of Systems - Review of Systems   Constitutional: Negative for chills, diaphoresis, fever and malaise/fatigue.   HENT:  Negative for congestion and ear discharge.    Eyes:  Negative for blurred vision and discharge.    Cardiovascular:  Negative for chest pain, claudication, dyspnea on exertion, leg swelling, near-syncope, orthopnea and palpitations.   Respiratory:  Negative for cough and hemoptysis.    Endocrine: Negative for cold intolerance and heat intolerance.   Hematologic/Lymphatic: Negative for adenopathy and bleeding problem.   Skin:  Negative for color change and nail changes.   Musculoskeletal:  Positive for arthritis.   Gastrointestinal:  Negative for bloating and abdominal pain.   Genitourinary:  Negative for bladder incontinence and decreased libido.   Neurological:  Negative for aphonia and brief paralysis.   Psychiatric/Behavioral:  Negative for altered mental status and depression.          All other systems were reviewed and were negative.    Patient Active Problem List   Diagnosis    Palpitations    Essential hypertension    Dyslipidemia    Dizziness    Acute exacerbation of CHF (congestive heart failure)    SOB (shortness of breath)    Morbid obesity with BMI of 45.0-49.9, adult    Physical deconditioning    Chronic heart failure with preserved ejection fraction (HFpEF)    MDD (major depressive disorder)    Type 2 diabetes mellitus with hypoglycemia without coma, with long-term current use of insulin    Encounter for screening for malignant neoplasm of colon    Diarrhea    Family history of colon cancer       family history includes Cancer in her father and mother; Heart attack in her father.     reports that she has never smoked. She has never used smokeless tobacco. She reports that she does not drink alcohol and does not use drugs.    Allergies   Allergen Reactions    Augmentin [Amoxicillin-Pot Clavulanate] Diarrhea    Metformin And Related Diarrhea     Immediate Release - tolerates ER         Current Outpatient Medications:     acetaminophen-codeine (TYLENOL with CODEINE #4) 300-60 MG per tablet, Take ½ tablet by mouth every 12 (twelve) hours as needed for severe pain., Disp: 30 tablet, Rfl: 0    busPIRone  (BUSPAR) 5 MG tablet, Take 1 tablet by mouth 3 (Three) Times a Day. (Patient taking differently: Take 1 tablet by mouth 3 (Three) Times a Day As Needed.), Disp: 90 tablet, Rfl: 2    Continuous Blood Gluc Sensor (Dexcom G7 Sensor) misc, Change sensor Every 10 (Ten) Days., Disp: 3 each, Rfl: 5    dapagliflozin Propanediol (Farxiga) 10 MG tablet, Take 1 tablet (10 mg) by mouth daily, Disp: 30 tablet, Rfl: 5    DULoxetine (CYMBALTA) 60 MG capsule, Take 1 capsule by mouth daily, Disp: 90 capsule, Rfl: 1    ergocalciferol (ERGOCALCIFEROL) 1.25 MG (31437 UT) capsule, Take 1 capsule (1.25 mg) by mouth twice weekly, Disp: 24 capsule, Rfl: 0    fluticasone (FLONASE) 50 MCG/ACT nasal spray, Instill 2 sprays in each nostril daily., Disp: 16 g, Rfl: 5    Fluticasone-Salmeterol (ADVAIR/WIXELA) 250-50 MCG/ACT DISKUS, Inhale 1 puff 2 (Two) Times a Day. Taking 1 time daily, Disp: , Rfl:     furosemide (LASIX) 40 MG tablet, Take 1 tablet by mouth 2 (Two) Times a Day., Disp: 180 tablet, Rfl: 1    gabapentin (NEURONTIN) 300 MG capsule, Take 1 capsule (300 mg) by mouth 3 times per day, Disp: 90 capsule, Rfl: 1    lansoprazole (PREVACID) 30 MG capsule, Take 1 capsule (30 mg) by mouth daily before a meal, Disp: 90 capsule, Rfl: 2    levothyroxine (Synthroid) 50 MCG tablet, Take 1 tablet (50 mcg) by mouth daily in the morning on an empty stomach, Disp: 90 tablet, Rfl: 1    loratadine (Claritin) 10 MG tablet, Take 1 tablet (10 mg) by mouth daily, Disp: 90 tablet, Rfl: 3    metFORMIN ER (GLUCOPHAGE-XR) 500 MG 24 hr tablet, Take 2 tablets (1000 mg total) by mouth Daily With Breakfast., Disp: 180 tablet, Rfl: 2    methocarbamol (ROBAXIN) 500 MG tablet, Take 1 tablet by mouth two times daily as needed, Disp: 60 tablet, Rfl: 3    metoprolol succinate XL (TOPROL-XL) 200 MG 24 hr tablet, Take 1 tablet by mouth Daily., Disp: 90 tablet, Rfl: 2    ondansetron ODT (ZOFRAN-ODT) 8 MG disintegrating tablet, Place 1 tablet on the tongue and allow to  dissolve Every 8 (Eight) Hours As Needed., Disp: 30 tablet, Rfl: 3    potassium chloride ER (K-TAB) 20 MEQ tablet controlled-release ER tablet, Take 1 tablet by mouth Daily., Disp: 60 tablet, Rfl: 3    pravastatin (PRAVACHOL) 40 MG tablet, Take 1 tablet by mouth Daily., Disp: 90 tablet, Rfl: 1    promethazine-dextromethorphan (PROMETHAZINE-DM) 6.25-15 MG/5ML syrup, Take 5 ml by mouth every 6 hours as needed, Disp: 200 mL, Rfl: 0    sacubitril-valsartan (Entresto)  MG tablet, Take 1 tablet by mouth 2 (Two) Times a Day., Disp: 60 tablet, Rfl: 3    spironolactone (ALDACTONE) 25 MG tablet, Take 1 tablet (25 mg) by mouth daily, Disp: 90 tablet, Rfl: 1    Tirzepatide (Mounjaro) 12.5 MG/0.5ML solution pen-injector pen, Inject 12.5 mg subcutaneously weekly., Disp: 2 mL, Rfl: 3    DULoxetine (CYMBALTA) 30 MG capsule, Take 1 capsule by mouth Daily. (Patient not taking: Reported on 3/1/2024), Disp: 30 capsule, Rfl: 6    lansoprazole (PREVACID) 30 MG capsule, Take 1 capsule (30 mg) by mouth daily before a meal (Patient not taking: Reported on 3/1/2024), Disp: 90 capsule, Rfl: 2    lansoprazole (PREVACID) 30 MG capsule, Take 1 capsule (30 mg) by mouth daily before a meal (Patient not taking: Reported on 3/1/2024), Disp: 90 capsule, Rfl: 2    loratadine (Claritin) 10 MG tablet, Take 1 tablet (10 mg) by mouth daily (Patient not taking: Reported on 3/1/2024), Disp: 90 tablet, Rfl: 3    spironolactone (ALDACTONE) 25 MG tablet, Take 1 tablet (25 mg) by mouth daily (Patient not taking: Reported on 3/1/2024), Disp: 90 tablet, Rfl: 1      Physical Exam:  I have reviewed the patient's current vital signs as documented in the patient's EMR.   Vitals:    03/01/24 1303   BP: 135/75   Pulse: 78   SpO2: 97%         Body mass index is 41.88 kg/m².       03/01/24  1303   Weight: 107 kg (236 lb 6.4 oz)            Physical Exam  Vitals and nursing note reviewed.   Constitutional:       General: She is awake.   HENT:      Head: Normocephalic  and atraumatic.   Eyes:      General: Lids are normal.      Conjunctiva/sclera:      Right eye: Right conjunctiva is not injected.      Left eye: Left conjunctiva is not injected.   Neck:      Thyroid: No thyroid mass.      Vascular: No carotid bruit.   Cardiovascular:      Rate and Rhythm: Normal rate and regular rhythm.      Heart sounds: S1 normal and S2 normal.   Pulmonary:      Effort: No tachypnea or bradypnea.      Breath sounds: No decreased breath sounds, wheezing, rhonchi or rales.   Abdominal:      General: Bowel sounds are normal.      Palpations: Abdomen is soft.      Tenderness: There is no abdominal tenderness.   Musculoskeletal:      Cervical back: Full passive range of motion without pain.      Right lower leg: No edema.      Left lower leg: No edema.      Comments: Non pitting bilateral edema   Skin:     General: Skin is warm and moist.   Neurological:      Mental Status: She is alert and oriented to person, place, and time.   Psychiatric:         Attention and Perception: Attention normal.         Mood and Affect: Mood normal.         Behavior: Behavior is cooperative.            JVP: Volume/Pulsation: Normal.        DATA REVIEWED:     EKG. I personally reviewed and interpreted the EKG.          ---------------------------------------------------  TTE/KELVIN:  Results for orders placed during the hospital encounter of 08/17/22    Adult Transthoracic Echo Complete W/ Cont if Necessary Per Protocol    Interpretation Summary  · Normal left ventricular cavity size and wall thickness noted. All left ventricular wall segments contract normally.  · Left ventricular ejection fraction appears to be 61 - 65%.  · The aortic valve is structurally normal with no regurgitation or stenosis present.  · The mitral valve is structurally normal with no regurgitation or significant stenosis present.  · There is no evidence of pericardial  "effusion.        -----------------------------------------------------  CXR/Imaging:   Imaging Results (Most Recent)       None            I personally reviewed and interpreted the CXR.      -----------------------------------------------------  CT:   No radiology results for the last 30 days.  I personally reviewed the images of the CT scan.  My personal interpretation is below.      ----------------------------------------------------    PFTs:      --------------------------------------------------------------------------------------------------    Laboratory evaluations:    Lab Results   Component Value Date    GLUCOSE 135 (H) 03/01/2024    BUN 10 03/01/2024    CREATININE 0.62 03/01/2024    EGFRIFNONA 71 12/17/2021    EGFRIFAFRI  09/17/2016      Comment:      <15 Indicative of kidney failure.    BCR 16.1 03/01/2024    K 3.7 03/01/2024    CO2 27.6 03/01/2024    CALCIUM 9.0 03/01/2024    ALBUMIN 4.0 03/01/2024    LABIL2 1.4 (L) 02/12/2015    AST 11 03/01/2024    ALT 9 03/01/2024     Lab Results   Component Value Date    WBC 8.66 03/01/2024    HGB 13.8 03/01/2024    HCT 41.4 03/01/2024    MCV 89.6 03/01/2024     03/01/2024     Lab Results   Component Value Date    CHOL 133 11/17/2023    CHLPL 205 (H) 02/17/2016    TRIG 191 (H) 11/17/2023    HDL 44 11/17/2023    LDL 58 11/17/2023     Lab Results   Component Value Date    TSH 1.930 11/17/2023    C1UYRDW 9.40 04/18/2017     Lab Results   Component Value Date    HGBA1C 6.2 (A) 01/02/2024     Lab Results   Component Value Date    ALT 9 03/01/2024     Lab Results   Component Value Date    HGBA1C 6.2 (A) 01/02/2024    HGBA1C 6.4 09/28/2023    HGBA1C 6.3 06/26/2023     Lab Results   Component Value Date    CREATININE 0.62 03/01/2024     Lab Results   Component Value Date    IRON 75 11/17/2023    TIBC 358 11/17/2023     No results found for: \"INR\", \"PROTIME\"     Lab Results   Component Value Date    ABSOLUTELUNG 26 11/17/2023    ABSOLUTELUNG 24 07/21/2023    " [facility] 25 05/19/2023             PAH RISK ASSESSMENT:      1. Chronic heart failure with preserved ejection fraction (HFpEF)    2. Essential hypertension    3. Type 2 diabetes mellitus with hypoglycemia without coma, with long-term current use of insulin          ORDERS PLACED TODAY:  Orders Placed This Encounter   Procedures    proBNP    Basic Metabolic Panel    Magnesium    proBNP    Basic Metabolic Panel    Magnesium    Lipid Panel    Hemoglobin A1c    Vitamin D 25 Hydroxy    TSH    T4, Free    Comprehensive Metabolic Panel    Lipid Panel    Hemoglobin A1c    Vitamin D 25 Hydroxy    TSH    T4, Free    Comprehensive Metabolic Panel    CBC Auto Differential    CBC & Differential    CBC & Differential        Diagnoses and all orders for this visit:    1. Chronic heart failure with preserved ejection fraction (HFpEF) (Primary)  -     proBNP; Future  -     Basic Metabolic Panel; Future  -     Magnesium; Future  -     proBNP; Standing  -     proBNP  -     Basic Metabolic Panel; Standing  -     Basic Metabolic Panel  -     Magnesium; Standing  -     Magnesium  -     Lipid Panel; Future  -     Hemoglobin A1c; Future  -     CBC & Differential; Future  -     Vitamin D 25 Hydroxy; Future  -     TSH; Future  -     T4, Free; Future  -     Comprehensive Metabolic Panel; Future  -     Lipid Panel; Standing  -     Lipid Panel  -     Hemoglobin A1c; Standing  -     Hemoglobin A1c  -     CBC & Differential; Standing  -     CBC & Differential  -     Vitamin D 25 Hydroxy; Standing  -     Vitamin D,25-Hydroxy  -     TSH; Standing  -     TSH  -     T4, Free; Standing  -     T4, Free  -     Comprehensive Metabolic Panel; Standing  -     Comprehensive Metabolic Panel    2. Essential hypertension  -     Lipid Panel; Future  -     Hemoglobin A1c; Future  -     Lipid Panel; Standing  -     Lipid Panel  -     Hemoglobin A1c; Standing  -     Hemoglobin A1c    3. Type 2 diabetes mellitus with hypoglycemia without coma, with long-term  current use of insulin  -     Lipid Panel; Future  -     Hemoglobin A1c; Future  -     CBC & Differential; Future  -     Lipid Panel; Standing  -     Lipid Panel  -     Hemoglobin A1c; Standing  -     Hemoglobin A1c  -     CBC & Differential; Standing  -     CBC & Differential    Other orders  -     ondansetron ODT (ZOFRAN-ODT) 8 MG disintegrating tablet; Place 1 tablet on the tongue and allow to dissolve Every 8 (Eight) Hours As Needed.  Dispense: 30 tablet; Refill: 3             MEDS ORDERED TODAY:    New Medications Ordered This Visit   Medications    ondansetron ODT (ZOFRAN-ODT) 8 MG disintegrating tablet     Sig: Place 1 tablet on the tongue and allow to dissolve Every 8 (Eight) Hours As Needed.     Dispense:  30 tablet     Refill:  3          ---------------------------------------------------------------------------------------------------------------------------          ASSESSMENT/PLAN:      Diagnosis Plan   1. Chronic heart failure with preserved ejection fraction (HFpEF)  proBNP    Basic Metabolic Panel    Magnesium    proBNP    proBNP    Basic Metabolic Panel    Basic Metabolic Panel    Magnesium    Magnesium    Lipid Panel    Hemoglobin A1c    CBC & Differential    Vitamin D 25 Hydroxy    TSH    T4, Free    Comprehensive Metabolic Panel    Lipid Panel    Lipid Panel    Hemoglobin A1c    Hemoglobin A1c    CBC & Differential    CBC & Differential    Vitamin D 25 Hydroxy    Vitamin D,25-Hydroxy    TSH    TSH    T4, Free    T4, Free    Comprehensive Metabolic Panel    Comprehensive Metabolic Panel      2. Essential hypertension  Lipid Panel    Hemoglobin A1c    Lipid Panel    Lipid Panel    Hemoglobin A1c    Hemoglobin A1c      3. Type 2 diabetes mellitus with hypoglycemia without coma, with long-term current use of insulin  Lipid Panel    Hemoglobin A1c    CBC & Differential    Lipid Panel    Lipid Panel    Hemoglobin A1c    Hemoglobin A1c    CBC & Differential    CBC & Differential          not acutely  decompensated chronic diastolic heart failure. CHF.      NYHA stage B;FC-II.    Today, Patient is approaching euvolemiaand with  Moderate perfusion. The patient's hemodynamics are currently acceptable. HR is: normal and is at goal. BP/MAP was reviewed and there isroom for medication up-titration.  Clinical trajectory was assessed and haswaxed and waned.     CHF GOAL DIRECTED MEDICAL THERAPY FOR PATIENT ADDRESSED/ADJUSTED:       GDMT:     Drug Class   Drug   Dose Last Dose Adjustment Additional Titration   Notes   ACEi/ARB/ARNI Entresto 97-103mg BID I     Beta Blocker Toprol XL  200mg      MRA  Spironolactone 25mg qd      SGLT2i Farxiga  10mg  N/A        -CHF Specific BB:   Continue Toprol XL 200mg qd.     Educated regarding slow titration and addition of medications with close monitoring.  She is monitoring closely and is aware of hold parameters.         -ACE/ARB/ARNi:   Continue Entresto with patient tolerating well; reports this is affordable with $10 copay card.    Baseline creatinine previously known to be 0.7-0.9     -MRA:   The patient is FC-NYHA Class III and MRA is indicated.   Continue Spironolactone 25mg qd.   BMP reviewed and acceptable.  Quarterly monitoring of potassium and renal function is currently recommended    -SGLT2 inhibitor therapy:   A BMP at initiation to verify GFR >30 was recommended, as was interval GFR surveillance.  The patient was advised to hold SGLT2I when PO intake is restricted due to a planned surgery, or due to an underlying illness.   Recommend continuing Farxiga (dapagliflozin) 10mg daily with quarterly assessment of GFR.   Denies current  side effects; She does report one recent yeast infection but plans to continue to monitor for more.     -Diuretic regimen:   ReDs Vest reading for 03/01/24 was 25; ReDs Vest reading reviewed with patient.    Continue Lasix 40mg qd and instructed to take extra 40mg PRN for worsening shortness of breath, swelling, weight gain.   Instructed to  call clinic if symptoms worsen.    BMP, Mag, & ProBNP reviewed with patient and within acceptable limits.     -Fluid restriction/Sodium restriction:   Requested 2000 ml restriction  Patient has been asked to weigh daily and was provided with a printed diuretic strategy.  1,500 mg Na restriction was discussed.  Dietary information/Education provided.      -Acute vs. Chronic underlying conditions other than HF addressed during visit:     Depression:   Osteoarthritis:   Feeling somewhat tolerable with Cymbalta; refills sent to pharmacy.     Debility:   Deconditioning:   Patient working on SSI at this time with her PCP as she is currently unable to work for a living or complete daily activities of living given osteoarthritis and multiple medical comorbidities.     COPD, stable without exacerbation:   Continue outpatient f/u with PCP.     Hyperglycemia with IDDM:   Last hemoglobin a1c 8.6 on 03/2022  Continue Farxiga 10mg; refills sent.   Continue outpatient PCP follow-up.   Endo f/u in April 2023.      FIDEL  Continue home CPAP.     Essential HTN:   Continue   Importance of blood pressure optimization in HFpEF discussed at length with patient.  She reports she has been abiding by hold parameters and did have to hold medication one time.      CONSIDERATIONS:   -Iron/Anemia in CHF:  CBC with hgb WNL.  Iron panel pending.                Class 3 Severe Obesity (BMI >=40). Obesity-related health conditions include the following: diabetes mellitus, dyslipidemias, peripheral vascular disease and osteoarthritis. Obesity is newly identified. BMI is is above average; BMI management plan is completed. We discussed portion control and increasing exercise. Patient is currently actively exercising.         >30 minutes out of 60 minutes face to face spent counseling patient extensively on dietary Na+ intake, importance of activity, weight monitoring, compliance with medications in addition to importance of titration with goal directed  medical therapy and follow up appointments.            This document has been electronically signed by Tawanna Arambula PA-C  March 1, 2024 13:56 EST      Dictated Utilizing Dragon Dictation: Part of this note may be an electronic transcription/translation of spoken language to printed text using the Dragon Dictation System.    Follow-up appointment and medication changes provided in hand delivered After Visit Summary as well as reviewed in the room.

## 2024-03-01 NOTE — PROGRESS NOTES
Heart Failure Clinic  Pharmacist Note     Zeina Reed is a 63 y.o. female seen in the Heart Failure Clinic for HFpEF.   Zeina Reed reports a good understanding and adherence to her medications. Denies any shortness of breath, swelling or weight gain. She currently takes Lasix 40mg daily and denies taking any PRN doses. She reports that her BP's range in the 117-140's/70's.     Patient has no issues obtaining or affording any of her medications    Medication Use:   Hx of med intolerances: None related to HF  Retail Rx Management: Uses our diabetes clinic for Mounjaro, uses  for all medications. I have the HF meds set up for refills, but patient reports that she usually will call in for refills when she needs them.     Past Medical History:   Diagnosis Date    Acute exacerbation of CHF (congestive heart failure) 07/10/2021    Arthritis     COPD (chronic obstructive pulmonary disease)     Diabetes mellitus     Disease of thyroid gland     Dyslipidemia     Elevated cholesterol     GERD (gastroesophageal reflux disease)     History of transfusion     Hyperlipidemia     Hypertension     Palpitations     Sleep apnea      ALLERGIES: Augmentin [amoxicillin-pot clavulanate] and Metformin and related  Current Outpatient Medications   Medication Sig Dispense Refill    acetaminophen-codeine (TYLENOL with CODEINE #4) 300-60 MG per tablet Take ½ tablet by mouth every 12 (twelve) hours as needed for severe pain. 30 tablet 0    busPIRone (BUSPAR) 5 MG tablet Take 1 tablet by mouth 3 (Three) Times a Day. (Patient taking differently: Take 1 tablet by mouth 3 (Three) Times a Day As Needed.) 90 tablet 2    Continuous Blood Gluc Sensor (Dexcom G7 Sensor) misc Change sensor Every 10 (Ten) Days. 3 each 5    dapagliflozin Propanediol (Farxiga) 10 MG tablet Take 1 tablet (10 mg) by mouth daily 30 tablet 5    DULoxetine (CYMBALTA) 60 MG capsule Take 1 capsule by mouth daily 90 capsule 1    ergocalciferol (ERGOCALCIFEROL) 1.25 MG (25641  UT) capsule Take 1 capsule (1.25 mg) by mouth twice weekly 24 capsule 0    fluticasone (FLONASE) 50 MCG/ACT nasal spray Instill 2 sprays in each nostril daily. 16 g 5    Fluticasone-Salmeterol (ADVAIR/WIXELA) 250-50 MCG/ACT DISKUS Inhale 1 puff 2 (Two) Times a Day. Taking 1 time daily      furosemide (LASIX) 40 MG tablet Take 1 tablet by mouth 2 (Two) Times a Day. 180 tablet 1    gabapentin (NEURONTIN) 300 MG capsule Take 1 capsule (300 mg) by mouth 3 times per day 90 capsule 1    lansoprazole (PREVACID) 30 MG capsule Take 1 capsule (30 mg) by mouth daily before a meal 90 capsule 2    levothyroxine (Synthroid) 50 MCG tablet Take 1 tablet (50 mcg) by mouth daily in the morning on an empty stomach 90 tablet 1    loratadine (Claritin) 10 MG tablet Take 1 tablet (10 mg) by mouth daily 90 tablet 3    metFORMIN ER (GLUCOPHAGE-XR) 500 MG 24 hr tablet Take 2 tablets (1000 mg total) by mouth Daily With Breakfast. 180 tablet 2    methocarbamol (ROBAXIN) 500 MG tablet Take 1 tablet by mouth two times daily as needed 60 tablet 3    metoprolol succinate XL (TOPROL-XL) 200 MG 24 hr tablet Take 1 tablet by mouth Daily. 90 tablet 2    ondansetron ODT (ZOFRAN-ODT) 8 MG disintegrating tablet Place 1 tablet on the tongue and allow to dissolve Every 8 (Eight) Hours As Needed. 30 tablet 3    potassium chloride ER (K-TAB) 20 MEQ tablet controlled-release ER tablet Take 1 tablet by mouth Daily. 60 tablet 3    pravastatin (PRAVACHOL) 40 MG tablet Take 1 tablet by mouth Daily. 90 tablet 1    promethazine-dextromethorphan (PROMETHAZINE-DM) 6.25-15 MG/5ML syrup Take 5 ml by mouth every 6 hours as needed 200 mL 0    sacubitril-valsartan (Entresto)  MG tablet Take 1 tablet by mouth 2 (Two) Times a Day. 60 tablet 3    spironolactone (ALDACTONE) 25 MG tablet Take 1 tablet (25 mg) by mouth daily 90 tablet 1    Tirzepatide (Mounjaro) 12.5 MG/0.5ML solution pen-injector pen Inject 12.5 mg subcutaneously weekly. 2 mL 3    DULoxetine (CYMBALTA)  30 MG capsule Take 1 capsule by mouth Daily. (Patient not taking: Reported on 3/1/2024) 30 capsule 6    lansoprazole (PREVACID) 30 MG capsule Take 1 capsule (30 mg) by mouth daily before a meal (Patient not taking: Reported on 3/1/2024) 90 capsule 2    lansoprazole (PREVACID) 30 MG capsule Take 1 capsule (30 mg) by mouth daily before a meal (Patient not taking: Reported on 3/1/2024) 90 capsule 2    loratadine (Claritin) 10 MG tablet Take 1 tablet (10 mg) by mouth daily (Patient not taking: Reported on 3/1/2024) 90 tablet 3    spironolactone (ALDACTONE) 25 MG tablet Take 1 tablet (25 mg) by mouth daily (Patient not taking: Reported on 3/1/2024) 90 tablet 1     No current facility-administered medications for this encounter.       Vaccination History:   Pneumonia: UTD  Annual Influenza: UTD    Objective  Vitals:    03/01/24 1303   BP: 135/75   BP Location: Left arm   Patient Position: Sitting   Cuff Size: Adult   Pulse: 78   SpO2: 97%   Weight: 107 kg (236 lb 6.4 oz)       Wt Readings from Last 3 Encounters:   03/01/24 107 kg (236 lb 6.4 oz)   01/02/24 109 kg (240 lb)   12/05/23 115 kg (253 lb 6.4 oz)         03/01/24  1303   Weight: 107 kg (236 lb 6.4 oz)       Lab Results   Component Value Date    GLUCOSE 146 (H) 03/01/2024    GLUCOSE 135 (H) 03/01/2024    BUN 9 03/01/2024    BUN 10 03/01/2024    CREATININE 0.61 03/01/2024    CREATININE 0.62 03/01/2024    EGFRIFNONA 71 12/17/2021    EGFRIFAFRI  09/17/2016      Comment:      <15 Indicative of kidney failure.    BCR 14.8 03/01/2024    BCR 16.1 03/01/2024    K 3.7 03/01/2024    K 3.7 03/01/2024    CO2 27.9 03/01/2024    CO2 27.6 03/01/2024    CALCIUM 9.0 03/01/2024    CALCIUM 9.0 03/01/2024    ALBUMIN 4.0 03/01/2024    LABIL2 1.4 (L) 02/12/2015    AST 11 03/01/2024    ALT 9 03/01/2024     Lab Results   Component Value Date    WBC 8.66 03/01/2024    HGB 13.8 03/01/2024    HCT 41.4 03/01/2024    MCV 89.6 03/01/2024     03/01/2024     Lab Results   Component Value  Date    TROPONINT <0.010 07/11/2021     Lab Results   Component Value Date    PROBNP 122.2 03/01/2024     Results for orders placed during the hospital encounter of 08/17/22    Adult Transthoracic Echo Complete W/ Cont if Necessary Per Protocol    Interpretation Summary  · Normal left ventricular cavity size and wall thickness noted. All left ventricular wall segments contract normally.  · Left ventricular ejection fraction appears to be 61 - 65%.  · The aortic valve is structurally normal with no regurgitation or stenosis present.  · The mitral valve is structurally normal with no regurgitation or significant stenosis present.  · There is no evidence of pericardial effusion.         GDMT    Drug Class   Drug   Dose Last Dose Adjustment Additional Titration   Notes   ACEi/ARB/ARNI Entresto 97/103   Confirmed no longer taking Losartan   Beta Blocker Toprol       MRA Spironolactone 25      SGLT2i Farxiga 10  N/A        Drug Therapy Problems    1. Needs refills on Zofran    Recommendations:     Tawanna to send in.       Patient was educated on heart failure medications and the importance of medication adherence. All questions were addressed and patient expressed understanding.    Thank you for allowing me to participate in the care of your patient,    Gwen Mark [facility]  03/01/24  14:06 EST

## 2024-03-01 NOTE — PROGRESS NOTES
Heart Failure Clinic    Date: 03/01/24     Vitals:    03/01/24 1303   BP: 135/75   Pulse: 78   SpO2: 97%    Weight 236.4    Method of arrival: Ambulatory    Weighing self daily: Most days    Monitoring Heart Failure Zones: Yes    Today's HF Zone: Green    Taking medications as prescribed: Yes    Edema No    Shortness of Air: No    Number of pillows used at night:>3    Educational Materials given:  avs                                                                         ReDS Value: 25  25-35 Optimal Value Status      ADONIS Drew 03/01/24 13:03 EST

## 2024-03-02 LAB
25(OH)D3 SERPL-MCNC: 50.7 NG/ML (ref 30–100)
CHOLEST SERPL-MCNC: 142 MG/DL (ref 0–200)
HBA1C MFR BLD: 6.1 % (ref 4.8–5.6)
HDLC SERPL-MCNC: 41 MG/DL (ref 40–60)
LDLC SERPL CALC-MCNC: 73 MG/DL (ref 0–100)
LDLC/HDLC SERPL: 1.66 {RATIO}
T4 FREE SERPL-MCNC: 1.47 NG/DL (ref 0.93–1.7)
TRIGL SERPL-MCNC: 164 MG/DL (ref 0–150)
TSH SERPL DL<=0.05 MIU/L-ACNC: 2.44 UIU/ML (ref 0.27–4.2)
VLDLC SERPL-MCNC: 28 MG/DL (ref 5–40)

## 2024-03-04 ENCOUNTER — SPECIALTY PHARMACY (OUTPATIENT)
Dept: PHARMACY | Facility: HOSPITAL | Age: 64
End: 2024-03-04
Payer: COMMERCIAL

## 2024-03-20 ENCOUNTER — SPECIALTY PHARMACY (OUTPATIENT)
Dept: PHARMACY | Facility: HOSPITAL | Age: 64
End: 2024-03-20
Payer: COMMERCIAL

## 2024-04-03 ENCOUNTER — SPECIALTY PHARMACY (OUTPATIENT)
Dept: PHARMACY | Facility: HOSPITAL | Age: 64
End: 2024-04-03
Payer: COMMERCIAL

## 2024-04-05 ENCOUNTER — SPECIALTY PHARMACY (OUTPATIENT)
Dept: PHARMACY | Facility: HOSPITAL | Age: 64
End: 2024-04-05
Payer: COMMERCIAL

## 2024-04-09 ENCOUNTER — OFFICE VISIT (OUTPATIENT)
Dept: ENDOCRINOLOGY | Facility: CLINIC | Age: 64
End: 2024-04-09
Payer: COMMERCIAL

## 2024-04-09 ENCOUNTER — SPECIALTY PHARMACY (OUTPATIENT)
Dept: PHARMACY | Facility: HOSPITAL | Age: 64
End: 2024-04-09
Payer: COMMERCIAL

## 2024-04-09 VITALS
OXYGEN SATURATION: 97 % | DIASTOLIC BLOOD PRESSURE: 82 MMHG | SYSTOLIC BLOOD PRESSURE: 144 MMHG | HEART RATE: 73 BPM | BODY MASS INDEX: 40.82 KG/M2 | WEIGHT: 230.4 LBS | HEIGHT: 63 IN

## 2024-04-09 DIAGNOSIS — E11.649 TYPE 2 DIABETES MELLITUS WITH HYPOGLYCEMIA WITHOUT COMA, WITH LONG-TERM CURRENT USE OF INSULIN: Primary | ICD-10-CM

## 2024-04-09 DIAGNOSIS — Z79.4 TYPE 2 DIABETES MELLITUS WITH HYPOGLYCEMIA WITHOUT COMA, WITH LONG-TERM CURRENT USE OF INSULIN: Primary | ICD-10-CM

## 2024-04-09 LAB
EXPIRATION DATE: ABNORMAL
GLUCOSE BLDC GLUCOMTR-MCNC: 144 MG/DL (ref 70–130)
Lab: ABNORMAL

## 2024-04-09 PROCEDURE — 99214 OFFICE O/P EST MOD 30 MIN: CPT | Performed by: NURSE PRACTITIONER

## 2024-04-09 PROCEDURE — 95251 CONT GLUC MNTR ANALYSIS I&R: CPT | Performed by: NURSE PRACTITIONER

## 2024-04-09 NOTE — ASSESSMENT & PLAN NOTE
-Diabetes is unchanged with A1c 6.1, yet with elevated BG's the last few weeks.  -Discussed dietary and exercise guidelines with patient.  -Discussed the importance of yearly eye exams.  -Discussed the importance of checking BG's regularly.  Continue using CGM.  2 week data download is as follows:  Very High: 6%  High: 33%  IN Range: 60%  Low: <1%  Very Low: <1%  Trend shows overall hyperglycemia with some post meal hypers.  -Discussed medication and the need for adjustments as below.  -Continue Metformin XR 1000 mg QD.  -Start back on Mounjaro 15 mg weekly. Patient has no personal history of pancreatitis, no family history of MEN syndrome or medullary thyroid cancer. Possible side effects including nausea, bloating, other GI upset and rarely pancreatitis were discussed. She was advised to call the office with any symptoms or concerns.   -Continue Farxiga 10 mg QD. Discussed the medication's MOA and that it may cause more frequent urination particularly upon starting the medication. Take one tablet in the morning with or without food. Encouraged to drink plenty of water as to not get dehydrated especially in warmer weather or with activity. Also discussed there may be an increased incidence of UTIs or yeast infections and to monitor for signs/symptoms.  -Discontinue insulin use as this is causing increased hypos.  -S/S hypoglycemia reviewed with Rule of 15's advised.  -Follow-up in 3 months.

## 2024-04-09 NOTE — PROGRESS NOTES
Chief Complaint   Patient presents with    Follow-up        Referring Provider  No ref. provider found     HPI   Zeina Reed is a 63 y.o. female had concerns including Follow-up.    T2DM.    She has been doing well and denies any changes to her health since her last visit.  She has been out of her Mounjaro for the last 3 weeks d/t  shortage.  During this time she has noted increased hyperglycemia.    Diabetes:  Diabetes was diagnosed 2010.  Complications include neuropathy, retinopathy-injections.  Last ophtho exam was q6 months-[facility].  Current medications for diabetes include Mounjaro 12.5 mg weekly, Metformin 1000 mg QD, Farxiga 10 mg QD.   Previous meds: Metformin-quick release-GI issues, Jardiance-yeast.  She checks her blood sugar with Dexcom CGM.   Hypos: more often, mostly overnight    ACE/ARB:no, Statin: yes  Labs:  A1C:today in office  Lipid Panel:yes  STEPHANIE: yes    The following portions of the patient's history were reviewed and updated as appropriate: allergies, current medications, past family history, past medical history, past social history, past surgical history, and problem list.     Diet: she doesn't follow a specific diet.    Past Medical History:   Diagnosis Date    Acute exacerbation of CHF (congestive heart failure) 07/10/2021    Arthritis     COPD (chronic obstructive pulmonary disease)     Diabetes mellitus     Disease of thyroid gland     Dyslipidemia     Elevated cholesterol     GERD (gastroesophageal reflux disease)     History of transfusion     Hyperlipidemia     Hypertension     Palpitations     Sleep apnea      Past Surgical History:   Procedure Laterality Date    CHOLECYSTECTOMY      COLONOSCOPY N/A 2/21/2017    Procedure: COLONOSCOPY FOR SCREENING  CPTCODE:04714;  Surgeon: Socrates Price III, MD;  Location: [facility] OR;  Service:     COLONOSCOPY N/A 9/14/2023    Procedure: COLONOSCOPY;  Surgeon: Natacha Jo MD;  Location: [facility] OR;   Service: Gastroenterology;  Laterality: N/A;    HYSTERECTOMY      SKIN GRAFT      TUBAL ABDOMINAL LIGATION        Family History   Problem Relation Age of Onset    Cancer Mother     Heart attack Father     Cancer Father       Social History     Socioeconomic History    Marital status:    Tobacco Use    Smoking status: Never    Smokeless tobacco: Never   Vaping Use    Vaping status: Never Used   Substance and Sexual Activity    Alcohol use: No    Drug use: No    Sexual activity: Defer      Allergies   Allergen Reactions    Augmentin [Amoxicillin-Pot Clavulanate] Diarrhea    Metformin And Related Diarrhea     Immediate Release - tolerates ER      Current Outpatient Medications on File Prior to Visit   Medication Sig Dispense Refill    acetaminophen-codeine (TYLENOL with CODEINE #4) 300-60 MG per tablet Take ½ tablet by mouth every 12 (twelve) hours as needed for severe pain. 30 tablet 0    acetaminophen-codeine (TYLENOL with CODEINE #4) 300-60 MG per tablet Take 1/2 tablet by mouth every 12 hours as needed for severe pain. 60 tablet 0    busPIRone (BUSPAR) 5 MG tablet Take 1 tablet by mouth 3 (Three) Times a Day. (Patient taking differently: Take 1 tablet by mouth 3 (Three) Times a Day As Needed.) 90 tablet 2    Continuous Blood Gluc Sensor (Dexcom G7 Sensor) misc Change sensor Every 10 (Ten) Days. 3 each 5    dapagliflozin Propanediol (Farxiga) 10 MG tablet Take 1 tablet (10 mg) by mouth daily 30 tablet 5    DULoxetine (CYMBALTA) 30 MG capsule Take 1 capsule by mouth Daily. 30 capsule 6    DULoxetine (CYMBALTA) 60 MG capsule Take 1 capsule by mouth daily 90 capsule 1    ergocalciferol (ERGOCALCIFEROL) 1.25 MG (62707 UT) capsule Take 1 capsule (1.25 mg) by mouth twice weekly 24 capsule 0    ergocalciferol (ERGOCALCIFEROL) 1.25 MG (94329 UT) capsule Take 1 capsule (1.25 mg) by mouth twice weekly 12 capsule 3    fluticasone (FLONASE) 50 MCG/ACT nasal spray Instill 2 sprays in each nostril daily. 16 g 5     fluticasone (FLONASE) 50 MCG/ACT nasal spray Use 2 sprays in each nostril daily 16 mL 5    Fluticasone Furoate-Vilanterol (Breo Ellipta) 200-25 MCG/ACT inhaler Inhale 1 puff by mouth daily 60 each 3    Fluticasone-Salmeterol (ADVAIR/WIXELA) 250-50 MCG/ACT DISKUS Inhale 1 puff 2 (Two) Times a Day. Taking 1 time daily      furosemide (LASIX) 40 MG tablet Take 1 tablet by mouth 2 (Two) Times a Day. 180 tablet 1    gabapentin (NEURONTIN) 300 MG capsule Take 1 capsule (300 mg) by mouth 3 times per day 90 capsule 1    lansoprazole (PREVACID) 30 MG capsule Take 1 capsule (30 mg) by mouth daily before a meal 90 capsule 2    lansoprazole (PREVACID) 30 MG capsule Take 1 capsule (30 mg) by mouth daily before a meal 90 capsule 2    lansoprazole (PREVACID) 30 MG capsule Take 1 capsule (30 mg) by mouth daily before a meal 90 capsule 2    levothyroxine (Synthroid) 50 MCG tablet Take 1 tablet (50 mcg) by mouth daily in the morning on an empty stomach 90 tablet 1    loratadine (Claritin) 10 MG tablet Take 1 tablet (10 mg) by mouth daily 90 tablet 3    loratadine (Claritin) 10 MG tablet Take 1 tablet (10 mg) by mouth daily 90 tablet 3    metFORMIN ER (GLUCOPHAGE-XR) 500 MG 24 hr tablet Take 2 tablets (1000 mg total) by mouth Daily With Breakfast. 180 tablet 2    methocarbamol (ROBAXIN) 500 MG tablet Take 1 tablet by mouth two times daily as needed 60 tablet 3    metoprolol succinate XL (TOPROL-XL) 200 MG 24 hr tablet Take 1 tablet by mouth Daily. 90 tablet 2    ondansetron ODT (ZOFRAN-ODT) 8 MG disintegrating tablet Place 1 tablet on the tongue and allow to dissolve Every 8 (Eight) Hours As Needed. 30 tablet 3    potassium chloride ER (K-TAB) 20 MEQ tablet controlled-release ER tablet Take 1 tablet by mouth Daily. 60 tablet 3    potassium chloride ER (K-TAB) 20 MEQ tablet controlled-release ER tablet Take 2 tablets (40 mEq) by mouth daily with food 60 tablet 3    pravastatin (PRAVACHOL) 40 MG tablet Take 1 tablet by mouth Daily. 90  "tablet 1    pravastatin (PRAVACHOL) 40 MG tablet Take 1 tablet (40 mg) by mouth daily 90 tablet 1    promethazine-dextromethorphan (PROMETHAZINE-DM) 6.25-15 MG/5ML syrup Take 5 ml by mouth every 6 hours as needed 200 mL 0    sacubitril-valsartan (Entresto)  MG tablet Take 1 tablet by mouth 2 (Two) Times a Day. 60 tablet 3    spironolactone (ALDACTONE) 25 MG tablet Take 1 tablet (25 mg) by mouth daily 90 tablet 1    spironolactone (ALDACTONE) 25 MG tablet Take 1 tablet (25 mg) by mouth daily 90 tablet 1    spironolactone (ALDACTONE) 25 MG tablet Take 1 tablet (25 mg) by mouth daily 90 tablet 1    Tirzepatide (Mounjaro) 12.5 MG/0.5ML solution pen-injector pen Inject 12.5 mg subcutaneously weekly. (Patient not taking: Reported on 4/9/2024) 2 mL 3    Tirzepatide (Mounjaro) 15 MG/0.5ML solution pen-injector pen Inject 15 mg subcutaneously once weekly. (Patient not taking: Reported on 4/9/2024) 2 mL 0     No current facility-administered medications on file prior to visit.        Review of Systems   Constitutional:  Positive for fatigue. Negative for unexpected weight gain and unexpected weight loss.   Eyes:  Positive for blurred vision and visual disturbance.   Endocrine: Positive for polydipsia, polyphagia and polyuria.   Psychiatric/Behavioral:  Positive for sleep disturbance.    All other systems reviewed and are negative.    /82 (BP Location: Right arm, Patient Position: Sitting, Cuff Size: Large Adult)   Pulse 73   Ht 160 cm (63\")   Wt 105 kg (230 lb 6.4 oz)   SpO2 97%   BMI 40.81 kg/m²      Physical Exam  Vitals reviewed.   Constitutional:       Appearance: Normal appearance.   Eyes:      Extraocular Movements: Extraocular movements intact.   Cardiovascular:      Rate and Rhythm: Normal rate.   Pulmonary:      Effort: Pulmonary effort is normal.   Skin:     General: Skin is warm and dry.   Neurological:      General: No focal deficit present.      Mental Status: She is alert and oriented to person, " "place, and time.   Psychiatric:         Mood and Affect: Mood normal.         Behavior: Behavior normal.         Thought Content: Thought content normal.         Judgment: Judgment normal.       CMP:  Lab Results   Component Value Date    BUN 9 03/01/2024    BUN 10 03/01/2024    CREATININE 0.61 03/01/2024    CREATININE 0.62 03/01/2024    EGFRIFNONA 71 12/17/2021    EGFRIFAFRI  09/17/2016      Comment:      <15 Indicative of kidney failure.    BCR 14.8 03/01/2024    BCR 16.1 03/01/2024     03/01/2024     03/01/2024    K 3.7 03/01/2024    K 3.7 03/01/2024    CO2 27.9 03/01/2024    CO2 27.6 03/01/2024    CALCIUM 9.0 03/01/2024    CALCIUM 9.0 03/01/2024    ALBUMIN 4.0 03/01/2024    LABIL2 1.4 (L) 02/12/2015    BILITOT 1.4 (H) 03/01/2024    ALKPHOS 108 03/01/2024    AST 11 03/01/2024    ALT 9 03/01/2024     Lipid Panel:  Lab Results   Component Value Date    CHOL 142 03/01/2024    TRIG 164 (H) 03/01/2024    HDL 41 03/01/2024    VLDL 28 03/01/2024    LDL 73 03/01/2024     HbA1c:  Lab Results   Component Value Date    HGBA1C 6.10 (H) 03/01/2024    HGBA1C 6.2 (A) 01/02/2024     Glucose:  Lab Results   Component Value Date    POCGLU 144 (A) 04/09/2024     Microalbumin:  No results found for: \"MALBCRERATIO\"  TSH:  Lab Results   Component Value Date    TSH 2.440 03/01/2024       Assessment and Plan    Diagnoses and all orders for this visit:    1. Type 2 diabetes mellitus with hypoglycemia without coma, with long-term current use of insulin (Primary)  Assessment & Plan:  -Diabetes is unchanged with A1c 6.1, yet with elevated BG's the last few weeks.  -Discussed dietary and exercise guidelines with patient.  -Discussed the importance of yearly eye exams.  -Discussed the importance of checking BG's regularly.  Continue using CGM.  2 week data download is as follows:  Very High: 6%  High: 33%  IN Range: 60%  Low: <1%  Very Low: <1%  Trend shows overall hyperglycemia with some post meal hypers.  -Discussed medication " and the need for adjustments as below.  -Continue Metformin XR 1000 mg QD.  -Start back on Mounjaro 15 mg weekly. Patient has no personal history of pancreatitis, no family history of MEN syndrome or medullary thyroid cancer. Possible side effects including nausea, bloating, other GI upset and rarely pancreatitis were discussed. She was advised to call the office with any symptoms or concerns.   -Continue Farxiga 10 mg QD. Discussed the medication's MOA and that it may cause more frequent urination particularly upon starting the medication. Take one tablet in the morning with or without food. Encouraged to drink plenty of water as to not get dehydrated especially in warmer weather or with activity. Also discussed there may be an increased incidence of UTIs or yeast infections and to monitor for signs/symptoms.  -Discontinue insulin use as this is causing increased hypos.  -S/S hypoglycemia reviewed with Rule of 15's advised.  -Follow-up in 3 months.      Orders:  -     POC Glucose, Blood           Return in about 3 months (around 7/9/2024) for Follow-up appointment, A1C. The patient was instructed to contact the clinic with any interval questions or concerns.        This document has been electronically signed by MAURO Viera  April 9, 2024 16:30 EDT   Endocrinology

## 2024-04-09 NOTE — PROGRESS NOTES
Specialty Pharmacy Patient Management Program  Endocrinology Reassessment     Zeina Reed is a 63 y.o. female seen by an Endocrinology Provider for Type 2 Diabetes and enrolled in the Endocrinology Patient Management program offered by [facility].  A follow-up outreach was conducted, including assessment of continued therapy appropriateness, medication adherence, and side effect incidence and management for Metformin, Farxiga and Mounjaro.     Patient is currently prescribed Farxiga 10 mg daily, Metformin ER 1000 mg daily, and Mounjaro 15 mg weekly. She uses Dexcom G6 to monitor BG with readings averaging in the 100s-200s. She denies recurring low BG <70.     Patient reports she was off her Mounjaro for weeks due to national backorder on the product. Patient states she is going to  her medication now that it is in stock.     In the past, the patient has tried:                Drug Dose Reason for Discontinuation Notes   Metformin IR   Diarrhea Can tolerate ER formulation                         Changes to Insurance Coverage or Financial Support  None       Relevant Past Medical History and Comorbidities  Relevant medical history and concomitant health conditions were discussed with the patient. The patient's chart has been reviewed for relevant past medical history and comorbid health conditions and updated as necessary.   Past Medical History:   Diagnosis Date    Acute exacerbation of CHF (congestive heart failure) 07/10/2021    Arthritis     COPD (chronic obstructive pulmonary disease)     Diabetes mellitus     Disease of thyroid gland     Dyslipidemia     Elevated cholesterol     GERD (gastroesophageal reflux disease)     History of transfusion     Hyperlipidemia     Hypertension     Palpitations     Sleep apnea      Social History     Socioeconomic History    Marital status:    Tobacco Use    Smoking status: Never    Smokeless tobacco: Never   Vaping Use    Vaping status:  Never Used   Substance and Sexual Activity    Alcohol use: No    Drug use: No    Sexual activity: Defer            Hospitalizations and Urgent Care Since Last Assessment  ED Visits, Admissions or Hospitalizations : None  Urgent Office Visits: None required    Allergies  Known allergies and reactions were discussed with the patient. The patient's chart has been reviewed for allergy information and updated as necessary.   Allergies   Allergen Reactions    Augmentin [Amoxicillin-Pot Clavulanate] Diarrhea    Metformin And Related Diarrhea     Immediate Release - tolerates ER            Relevant Laboratory Values  A1C Last 3 Results          9/28/2023    15:27 1/2/2024    15:24 3/1/2024    13:24   HGBA1C Last 3 Results   Hemoglobin A1C 6.4  6.2  6.10      Lab Results   Component Value Date    HGBA1C 6.10 (H) 03/01/2024     Lab Results   Component Value Date    GLUCOSE 146 (H) 03/01/2024    GLUCOSE 135 (H) 03/01/2024    CALCIUM 9.0 03/01/2024    CALCIUM 9.0 03/01/2024     03/01/2024     03/01/2024    K 3.7 03/01/2024    K 3.7 03/01/2024    CO2 27.9 03/01/2024    CO2 27.6 03/01/2024     03/01/2024     03/01/2024    BUN 9 03/01/2024    BUN 10 03/01/2024    CREATININE 0.61 03/01/2024    CREATININE 0.62 03/01/2024    EGFRIFAFRI  09/17/2016      Comment:      <15 Indicative of kidney failure.    EGFRIFNONA 71 12/17/2021    BCR 14.8 03/01/2024    BCR 16.1 03/01/2024    ANIONGAP 11.1 03/01/2024    ANIONGAP 10.4 03/01/2024     Lab Results   Component Value Date    CHOL 142 03/01/2024    CHLPL 205 (H) 02/17/2016    TRIG 164 (H) 03/01/2024    HDL 41 03/01/2024    LDL 73 03/01/2024         Current Medication List  This medication list has been reviewed with the patient and evaluated for any interactions or necessary modifications/recommendations, and updated to include all prescription medications, OTC medications, and supplements the patient is currently taking. This list reflects what is contained in the  patient's profile, which has also been marked as reviewed to communicate to other providers it is the most up to date version of the patient's current medication therapy.     Current Outpatient Medications:     acetaminophen-codeine (TYLENOL with CODEINE #4) 300-60 MG per tablet, Take ½ tablet by mouth every 12 (twelve) hours as needed for severe pain., Disp: 30 tablet, Rfl: 0    acetaminophen-codeine (TYLENOL with CODEINE #4) 300-60 MG per tablet, Take 1/2 tablet by mouth every 12 hours as needed for severe pain., Disp: 60 tablet, Rfl: 0    busPIRone (BUSPAR) 5 MG tablet, Take 1 tablet by mouth 3 (Three) Times a Day. (Patient taking differently: Take 1 tablet by mouth 3 (Three) Times a Day As Needed.), Disp: 90 tablet, Rfl: 2    Continuous Blood Gluc Sensor (Dexcom G7 Sensor) misc, Change sensor Every 10 (Ten) Days., Disp: 3 each, Rfl: 5    dapagliflozin Propanediol (Farxiga) 10 MG tablet, Take 1 tablet (10 mg) by mouth daily, Disp: 30 tablet, Rfl: 5    DULoxetine (CYMBALTA) 30 MG capsule, Take 1 capsule by mouth Daily. (Patient not taking: Reported on 3/1/2024), Disp: 30 capsule, Rfl: 6    DULoxetine (CYMBALTA) 60 MG capsule, Take 1 capsule by mouth daily, Disp: 90 capsule, Rfl: 1    ergocalciferol (ERGOCALCIFEROL) 1.25 MG (99420 UT) capsule, Take 1 capsule (1.25 mg) by mouth twice weekly, Disp: 24 capsule, Rfl: 0    ergocalciferol (ERGOCALCIFEROL) 1.25 MG (28663 UT) capsule, Take 1 capsule (1.25 mg) by mouth twice weekly, Disp: 12 capsule, Rfl: 3    fluticasone (FLONASE) 50 MCG/ACT nasal spray, Instill 2 sprays in each nostril daily., Disp: 16 g, Rfl: 5    fluticasone (FLONASE) 50 MCG/ACT nasal spray, Use 2 sprays in each nostril daily, Disp: 16 mL, Rfl: 5    Fluticasone Furoate-Vilanterol (Breo Ellipta) 200-25 MCG/ACT inhaler, Inhale 1 puff by mouth daily, Disp: 60 each, Rfl: 3    Fluticasone-Salmeterol (ADVAIR/WIXELA) 250-50 MCG/ACT DISKUS, Inhale 1 puff 2 (Two) Times a Day. Taking 1 time daily, Disp: , Rfl:      furosemide (LASIX) 40 MG tablet, Take 1 tablet by mouth 2 (Two) Times a Day., Disp: 180 tablet, Rfl: 1    gabapentin (NEURONTIN) 300 MG capsule, Take 1 capsule (300 mg) by mouth 3 times per day, Disp: 90 capsule, Rfl: 1    lansoprazole (PREVACID) 30 MG capsule, Take 1 capsule (30 mg) by mouth daily before a meal, Disp: 90 capsule, Rfl: 2    lansoprazole (PREVACID) 30 MG capsule, Take 1 capsule (30 mg) by mouth daily before a meal (Patient not taking: Reported on 3/1/2024), Disp: 90 capsule, Rfl: 2    lansoprazole (PREVACID) 30 MG capsule, Take 1 capsule (30 mg) by mouth daily before a meal, Disp: 90 capsule, Rfl: 2    levothyroxine (Synthroid) 50 MCG tablet, Take 1 tablet (50 mcg) by mouth daily in the morning on an empty stomach, Disp: 90 tablet, Rfl: 1    loratadine (Claritin) 10 MG tablet, Take 1 tablet (10 mg) by mouth daily, Disp: 90 tablet, Rfl: 3    loratadine (Claritin) 10 MG tablet, Take 1 tablet (10 mg) by mouth daily (Patient not taking: Reported on 3/1/2024), Disp: 90 tablet, Rfl: 3    metFORMIN ER (GLUCOPHAGE-XR) 500 MG 24 hr tablet, Take 2 tablets (1000 mg total) by mouth Daily With Breakfast., Disp: 180 tablet, Rfl: 2    methocarbamol (ROBAXIN) 500 MG tablet, Take 1 tablet by mouth two times daily as needed, Disp: 60 tablet, Rfl: 3    metoprolol succinate XL (TOPROL-XL) 200 MG 24 hr tablet, Take 1 tablet by mouth Daily., Disp: 90 tablet, Rfl: 2    ondansetron ODT (ZOFRAN-ODT) 8 MG disintegrating tablet, Place 1 tablet on the tongue and allow to dissolve Every 8 (Eight) Hours As Needed., Disp: 30 tablet, Rfl: 3    potassium chloride ER (K-TAB) 20 MEQ tablet controlled-release ER tablet, Take 1 tablet by mouth Daily., Disp: 60 tablet, Rfl: 3    potassium chloride ER (K-TAB) 20 MEQ tablet controlled-release ER tablet, Take 2 tablets (40 mEq) by mouth daily with food, Disp: 60 tablet, Rfl: 3    pravastatin (PRAVACHOL) 40 MG tablet, Take 1 tablet by mouth Daily., Disp: 90 tablet, Rfl: 1    pravastatin  (PRAVACHOL) 40 MG tablet, Take 1 tablet (40 mg) by mouth daily, Disp: 90 tablet, Rfl: 1    promethazine-dextromethorphan (PROMETHAZINE-DM) 6.25-15 MG/5ML syrup, Take 5 ml by mouth every 6 hours as needed, Disp: 200 mL, Rfl: 0    sacubitril-valsartan (Entresto)  MG tablet, Take 1 tablet by mouth 2 (Two) Times a Day., Disp: 60 tablet, Rfl: 3    spironolactone (ALDACTONE) 25 MG tablet, Take 1 tablet (25 mg) by mouth daily (Patient not taking: Reported on 3/1/2024), Disp: 90 tablet, Rfl: 1    spironolactone (ALDACTONE) 25 MG tablet, Take 1 tablet (25 mg) by mouth daily, Disp: 90 tablet, Rfl: 1    spironolactone (ALDACTONE) 25 MG tablet, Take 1 tablet (25 mg) by mouth daily, Disp: 90 tablet, Rfl: 1    Tirzepatide (Mounjaro) 12.5 MG/0.5ML solution pen-injector pen, Inject 12.5 mg subcutaneously weekly., Disp: 2 mL, Rfl: 3    Tirzepatide (Mounjaro) 15 MG/0.5ML solution pen-injector pen, Inject 15 mg subcutaneously once weekly., Disp: 2 mL, Rfl: 0         Drug Interactions  No significant drug-drug interactions with diabetes medications expected according to literature.    Recommended Medications Assessment  Aspirin: Currently Taking   Statin: Currently Taking   ACEi/ARB: Currently Taking     Adverse Drug Reactions  Adverse Reactions Experienced: None          Plan for ADR Management: None required     Adherence, Self-Administration, and Current Therapy Problems  Adherence related to the patient's specialty therapy was discussed with the patient. The Adherence segment of this outreach has been reviewed and updated.        Additional Barriers to Patient Self-Administration: None  Methods for Supporting Patient Self-Administration: None required    Recently Close Medication Therapy Problems  No medication therapy recommendations to display    Goals of Therapy  Goals related to the patient's specialty therapy was discussed with the patient. The Patient Goals segment of this outreach has been reviewed and updated.     Goals Addressed Today    None        Quality of Life Assessment   Quality of Life related to the patient's enrollment in the patient management program and services provided was discussed with the patient. The QOL segment of this outreach has been reviewed and updated.        Reassessment Plan & Follow-Up  Patient's diabetes is controlled and remains unchanged with A1C of 6.10%.   No pharmacologic recommendations at this time.   Medication Therapy Changes: None today. Continue current regimen.    Related Plans, Therapy Recommendations, or Issues to Be Addressed: None    Pharmacist to perform regular reassessments no more than (6) months from the previous assessment.  Care Coordinator to set up future refill outreaches, coordinate prescription delivery, and escalate clinical questions to pharmacist.     Attestation  I attest the patient was actively involved in and has agreed to the above plan of care. If the prescribed therapy is at any point deemed not appropriate based on the current or future assessments, a consultation will be initiated with the patient's specialty care provider to determine the best course of action. The revised plan of therapy will be documented along with any required assessments and/or additional patient education provided.    Thank you,    Geneva Stallings, [place]  Community PGY1 Pharmacy Resident  [facility]  04/09/24  15:13 EDT

## 2024-04-16 ENCOUNTER — SPECIALTY PHARMACY (OUTPATIENT)
Dept: PHARMACY | Facility: HOSPITAL | Age: 64
End: 2024-04-16
Payer: COMMERCIAL

## 2024-05-06 DIAGNOSIS — E11.649 TYPE 2 DIABETES MELLITUS WITH HYPOGLYCEMIA WITHOUT COMA, WITH LONG-TERM CURRENT USE OF INSULIN: ICD-10-CM

## 2024-05-06 DIAGNOSIS — Z79.4 TYPE 2 DIABETES MELLITUS WITH HYPOGLYCEMIA WITHOUT COMA, WITH LONG-TERM CURRENT USE OF INSULIN: ICD-10-CM

## 2024-05-07 ENCOUNTER — SPECIALTY PHARMACY (OUTPATIENT)
Dept: PHARMACY | Facility: HOSPITAL | Age: 64
End: 2024-05-07
Payer: COMMERCIAL

## 2024-05-07 RX ORDER — ACYCLOVIR 400 MG/1
1 TABLET ORAL
Qty: 3 EACH | Refills: 5 | Status: SHIPPED | OUTPATIENT
Start: 2024-05-07

## 2024-05-29 ENCOUNTER — SPECIALTY PHARMACY (OUTPATIENT)
Dept: PHARMACY | Facility: HOSPITAL | Age: 64
End: 2024-05-29
Payer: COMMERCIAL

## 2024-05-29 NOTE — PROGRESS NOTES
Specialty Pharmacy Refill Coordination Note     Zeina is a 63 y.o. female contacted today regarding refills of  Mounjaro specialty medication(s).    Reviewed and verified with patient:       Specialty medication(s) and dose(s) confirmed: yes    Refill Questions      Flowsheet Row Most Recent Value   Changes to allergies? No   Changes to medications? No   New conditions or infections since last clinic visit No   Unplanned office visit, urgent care, ED, or hospital admission in the last 4 weeks  No   How does patient/caregiver feel medication is working? Very good   Financial problems or insurance changes  No   Since the previous refill, were any specialty medication doses or scheduled injections missed or delayed?  No   Does this patient require a clinical escalation to a pharmacist? No            Delivery Questions      Flowsheet Row Most Recent Value   Copay verified? No   Copay amount na   Copay form of payment No copayment ($0)                   Follow-up: 30 day(s)     Minal Raygoza Pharmacy Technician  Specialty Pharmacy Technician

## 2024-06-04 ENCOUNTER — SPECIALTY PHARMACY (OUTPATIENT)
Dept: PHARMACY | Facility: HOSPITAL | Age: 64
End: 2024-06-04
Payer: COMMERCIAL

## 2024-06-04 DIAGNOSIS — E11.649 TYPE 2 DIABETES MELLITUS WITH HYPOGLYCEMIA WITHOUT COMA, WITH LONG-TERM CURRENT USE OF INSULIN: Primary | ICD-10-CM

## 2024-06-04 DIAGNOSIS — Z79.4 TYPE 2 DIABETES MELLITUS WITH HYPOGLYCEMIA WITHOUT COMA, WITH LONG-TERM CURRENT USE OF INSULIN: Primary | ICD-10-CM

## 2024-06-04 DIAGNOSIS — Z79.4 TYPE 2 DIABETES MELLITUS WITH HYPOGLYCEMIA WITHOUT COMA, WITH LONG-TERM CURRENT USE OF INSULIN: ICD-10-CM

## 2024-06-04 DIAGNOSIS — E11.649 TYPE 2 DIABETES MELLITUS WITH HYPOGLYCEMIA WITHOUT COMA, WITH LONG-TERM CURRENT USE OF INSULIN: ICD-10-CM

## 2024-06-04 RX ORDER — DAPAGLIFLOZIN 10 MG/1
1 TABLET, FILM COATED ORAL DAILY
Qty: 30 TABLET | Refills: 5 | Status: CANCELLED | OUTPATIENT
Start: 2024-06-04

## 2024-06-04 NOTE — PROGRESS NOTES
Specialty Pharmacy Patient Management Program  Endocrinology Reassessment     Zeina Reed is a 63 y.o. female seen by an Endocrinology Provider for Type 2 Diabetes and enrolled in the Endocrinology Patient Management program offered by [facility].  A follow-up outreach was conducted, including assessment of continued therapy appropriateness, medication adherence, and side effect incidence and management for Mounjaro.     Patient is unable to get Mounjaro 15 mg due to issues with manufacture backorder at this time. Patient reports tolerating current regimen well at this time. Patient and provider are agreeable to decreasing dose at this time.     In the past, the patient has tried:                Drug Dose Reason for Discontinuation Notes   Metformin IR   Diarrhea Can tolerate ER formulation                         Changes to Insurance Coverage or Financial Support  None       Relevant Past Medical History and Comorbidities  Relevant medical history and concomitant health conditions were discussed with the patient. The patient's chart has been reviewed for relevant past medical history and comorbid health conditions and updated as necessary.   Past Medical History:   Diagnosis Date    Acute exacerbation of CHF (congestive heart failure) 07/10/2021    Arthritis     COPD (chronic obstructive pulmonary disease)     Diabetes mellitus     Disease of thyroid gland     Dyslipidemia     Elevated cholesterol     GERD (gastroesophageal reflux disease)     History of transfusion     Hyperlipidemia     Hypertension     Palpitations     Sleep apnea      Social History     Socioeconomic History    Marital status:    Tobacco Use    Smoking status: Never    Smokeless tobacco: Never   Vaping Use    Vaping status: Never Used   Substance and Sexual Activity    Alcohol use: No    Drug use: No    Sexual activity: Defer       Problem list reviewed by Mari Tse, PharmD on 6/4/2024 at  1:49  PM    Hospitalizations and Urgent Care Since Last Assessment  ED Visits, Admissions or Hospitalizations : None  Urgent Office Visits: None required    Allergies  Known allergies and reactions were discussed with the patient. The patient's chart has been reviewed for allergy information and updated as necessary.   Allergies   Allergen Reactions    Augmentin [Amoxicillin-Pot Clavulanate] Diarrhea    Metformin And Related Diarrhea     Immediate Release - tolerates ER       Allergies reviewed by Mari Tse, PharmD on 6/4/2024 at  1:48 PM    Relevant Laboratory Values  A1C Last 3 Results          9/28/2023    15:27 1/2/2024    15:24 3/1/2024    13:24   HGBA1C Last 3 Results   Hemoglobin A1C 6.4  6.2  6.10      Lab Results   Component Value Date    HGBA1C 6.10 (H) 03/01/2024     Lab Results   Component Value Date    GLUCOSE 146 (H) 03/01/2024    GLUCOSE 135 (H) 03/01/2024    CALCIUM 9.0 03/01/2024    CALCIUM 9.0 03/01/2024     03/01/2024     03/01/2024    K 3.7 03/01/2024    K 3.7 03/01/2024    CO2 27.9 03/01/2024    CO2 27.6 03/01/2024     03/01/2024     03/01/2024    BUN 9 03/01/2024    BUN 10 03/01/2024    CREATININE 0.61 03/01/2024    CREATININE 0.62 03/01/2024    EGFRIFAFRI  09/17/2016      Comment:      <15 Indicative of kidney failure.    EGFRIFNONA 71 12/17/2021    BCR 14.8 03/01/2024    BCR 16.1 03/01/2024    ANIONGAP 11.1 03/01/2024    ANIONGAP 10.4 03/01/2024     Lab Results   Component Value Date    CHOL 142 03/01/2024    CHLPL 205 (H) 02/17/2016    TRIG 164 (H) 03/01/2024    HDL 41 03/01/2024    LDL 73 03/01/2024         Current Medication List  This medication list has been reviewed with the patient and evaluated for any interactions or necessary modifications/recommendations, and updated to include all prescription medications, OTC medications, and supplements the patient is currently taking. This list reflects what is contained in the patient's profile, which has also been marked  as reviewed to communicate to other providers it is the most up to date version of the patient's current medication therapy.     Current Outpatient Medications:     acetaminophen-codeine (TYLENOL with CODEINE #4) 300-60 MG per tablet, Take ½ tablet by mouth every 12 (twelve) hours as needed for severe pain., Disp: 30 tablet, Rfl: 0    acetaminophen-codeine (TYLENOL with CODEINE #4) 300-60 MG per tablet, Take 1/2 tablet by mouth every 12 hours as needed for severe pain., Disp: 60 tablet, Rfl: 0    busPIRone (BUSPAR) 5 MG tablet, Take 1 tablet by mouth 3 (Three) Times a Day. (Patient taking differently: Take 1 tablet by mouth 3 (Three) Times a Day As Needed.), Disp: 90 tablet, Rfl: 2    Continuous Glucose Sensor (Dexcom G7 Sensor) misc, Change sensor Every 10 (Ten) Days., Disp: 3 each, Rfl: 5    dapagliflozin Propanediol (Farxiga) 10 MG tablet, Take 1 tablet (10 mg) by mouth daily, Disp: 30 tablet, Rfl: 5    DULoxetine (CYMBALTA) 30 MG capsule, Take 1 capsule by mouth Daily., Disp: 30 capsule, Rfl: 6    DULoxetine (CYMBALTA) 60 MG capsule, Take 1 capsule by mouth daily, Disp: 90 capsule, Rfl: 1    ergocalciferol (ERGOCALCIFEROL) 1.25 MG (90146 UT) capsule, Take 1 capsule (1.25 mg) by mouth twice weekly, Disp: 24 capsule, Rfl: 0    ergocalciferol (ERGOCALCIFEROL) 1.25 MG (99715 UT) capsule, Take 1 capsule (1.25 mg) by mouth twice weekly, Disp: 12 capsule, Rfl: 3    fluticasone (FLONASE) 50 MCG/ACT nasal spray, Instill 2 sprays in each nostril daily., Disp: 16 g, Rfl: 5    fluticasone (FLONASE) 50 MCG/ACT nasal spray, Use 2 sprays in each nostril daily, Disp: 16 mL, Rfl: 5    Fluticasone Furoate-Vilanterol (Breo Ellipta) 200-25 MCG/ACT inhaler, Inhale 1 puff by mouth daily, Disp: 60 each, Rfl: 3    Fluticasone-Salmeterol (ADVAIR/WIXELA) 250-50 MCG/ACT DISKUS, Inhale 1 puff 2 (Two) Times a Day. Taking 1 time daily, Disp: , Rfl:     furosemide (LASIX) 40 MG tablet, Take 1 tablet by mouth 2 (Two) Times a Day., Disp: 180  tablet, Rfl: 1    gabapentin (NEURONTIN) 300 MG capsule, Take 1 capsule (300 mg) by mouth 3 times per day, Disp: 90 capsule, Rfl: 1    gabapentin (NEURONTIN) 300 MG capsule, Take 1 capsule (300 mg) by mouth 3 times per day, Disp: 90 capsule, Rfl: 0    lansoprazole (PREVACID) 30 MG capsule, Take 1 capsule (30 mg) by mouth daily before a meal, Disp: 90 capsule, Rfl: 2    lansoprazole (PREVACID) 30 MG capsule, Take 1 capsule (30 mg) by mouth daily before a meal, Disp: 90 capsule, Rfl: 2    lansoprazole (PREVACID) 30 MG capsule, Take 1 capsule (30 mg) by mouth daily before a meal, Disp: 90 capsule, Rfl: 2    levothyroxine (Synthroid) 50 MCG tablet, Take 1 tablet (50 mcg) by mouth daily in the morning on an empty stomach, Disp: 90 tablet, Rfl: 1    loratadine (Claritin) 10 MG tablet, Take 1 tablet (10 mg) by mouth daily, Disp: 90 tablet, Rfl: 3    loratadine (Claritin) 10 MG tablet, Take 1 tablet (10 mg) by mouth daily, Disp: 90 tablet, Rfl: 3    metFORMIN ER (GLUCOPHAGE-XR) 500 MG 24 hr tablet, Take 2 tablets (1000 mg total) by mouth Daily With Breakfast., Disp: 180 tablet, Rfl: 2    methocarbamol (ROBAXIN) 500 MG tablet, Take 1 tablet by mouth two times daily as needed, Disp: 60 tablet, Rfl: 3    metoprolol succinate XL (TOPROL-XL) 200 MG 24 hr tablet, Take 1 tablet by mouth Daily., Disp: 90 tablet, Rfl: 2    ondansetron ODT (ZOFRAN-ODT) 8 MG disintegrating tablet, Place 1 tablet on the tongue and allow to dissolve Every 8 (Eight) Hours As Needed., Disp: 30 tablet, Rfl: 3    potassium chloride ER (K-TAB) 20 MEQ tablet controlled-release ER tablet, Take 1 tablet by mouth Daily., Disp: 60 tablet, Rfl: 3    potassium chloride ER (K-TAB) 20 MEQ tablet controlled-release ER tablet, Take 2 tablets (40 mEq) by mouth daily with food, Disp: 60 tablet, Rfl: 3    pravastatin (PRAVACHOL) 40 MG tablet, Take 1 tablet by mouth Daily., Disp: 90 tablet, Rfl: 1    pravastatin (PRAVACHOL) 40 MG tablet, Take 1 tablet (40 mg) by mouth  daily, Disp: 90 tablet, Rfl: 1    promethazine-dextromethorphan (PROMETHAZINE-DM) 6.25-15 MG/5ML syrup, Take 5 ml by mouth every 6 hours as needed, Disp: 200 mL, Rfl: 0    sacubitril-valsartan (Entresto)  MG tablet, Take 1 tablet by mouth 2 (Two) Times a Day., Disp: 60 tablet, Rfl: 3    spironolactone (ALDACTONE) 25 MG tablet, Take 1 tablet (25 mg) by mouth daily, Disp: 90 tablet, Rfl: 1    spironolactone (ALDACTONE) 25 MG tablet, Take 1 tablet (25 mg) by mouth daily, Disp: 90 tablet, Rfl: 1    spironolactone (ALDACTONE) 25 MG tablet, Take 1 tablet (25 mg) by mouth daily, Disp: 90 tablet, Rfl: 1    Tirzepatide (MOUNJARO) 12.5 MG/0.5ML solution pen-injector pen, Inject 0.5 mL under the skin into the appropriate area as directed 1 (One) Time Per Week., Disp: 2 mL, Rfl: 5    Tirzepatide (Mounjaro) 15 MG/0.5ML solution pen-injector pen, Inject 15 mg subcutaneously once weekly., Disp: 2 mL, Rfl: 1    Medicines reviewed by Mari Tse, PharmD on 6/4/2024 at  1:48 PM    Drug Interactions  No significant drug-drug interactions with diabetes medications expected according to literature.    Recommended Medications Assessment  Aspirin: Currently Taking   Statin: Currently Taking   ACEi/ARB: Currently Taking     Adverse Drug Reactions  Adverse Reactions Experienced: None          Plan for ADR Management: None required     Adherence, Self-Administration, and Current Therapy Problems  Adherence related to the patient's specialty therapy was discussed with the patient. The Adherence segment of this outreach has been reviewed and updated.        Additional Barriers to Patient Self-Administration: None  Methods for Supporting Patient Self-Administration: None required    Recently Close Medication Therapy Problems  No medication therapy recommendations to display    Goals of Therapy  Goals related to the patient's specialty therapy was discussed with the patient. The Patient Goals segment of this outreach has been reviewed  and updated.    Goals Addressed Today        Specialty Pharmacy General Goal      Prevent Hypoglycemia               Reassessment Plan & Follow-Up  Medication Therapy Changes: Per Shannan Almanza, APRN will order  Mounjaro 12.5 mg weekly   Related Plans, Therapy Recommendations, or Issues to Be Addressed: [facility].   Pharmacist to perform regular reassessments no more than (6) months from the previous assessment.  Care Coordinator to set up future refill outreaches, coordinate prescription delivery, and escalate clinical questions to pharmacist.     Attestation  I attest the patient was actively involved in and has agreed to the above plan of care. If the prescribed therapy is at any point deemed not appropriate based on the current or future assessments, a consultation will be initiated with the patient's specialty care provider to determine the best course of action. The revised plan of therapy will be documented along with any required assessments and/or additional patient education provided.    Mari Tse, PharmD, ADRIENNE HAMMONDS  Clinical Specialty Pharmacist, Endocrinology   06/04/24  13:49 EDT

## 2024-06-05 DIAGNOSIS — E11.649 TYPE 2 DIABETES MELLITUS WITH HYPOGLYCEMIA WITHOUT COMA, WITH LONG-TERM CURRENT USE OF INSULIN: ICD-10-CM

## 2024-06-05 DIAGNOSIS — Z79.4 TYPE 2 DIABETES MELLITUS WITH HYPOGLYCEMIA WITHOUT COMA, WITH LONG-TERM CURRENT USE OF INSULIN: ICD-10-CM

## 2024-06-05 RX ORDER — DAPAGLIFLOZIN 10 MG/1
1 TABLET, FILM COATED ORAL DAILY
Qty: 90 TABLET | Refills: 0 | Status: SHIPPED | OUTPATIENT
Start: 2024-06-05

## 2024-06-14 ENCOUNTER — HOSPITAL ENCOUNTER (OUTPATIENT)
Dept: CARDIOLOGY | Facility: HOSPITAL | Age: 64
Discharge: HOME | End: 2024-06-14
Admitting: PHYSICIAN ASSISTANT
Payer: COMMERCIAL

## 2024-06-14 VITALS
WEIGHT: 217 LBS | SYSTOLIC BLOOD PRESSURE: 115 MMHG | HEIGHT: 63 IN | DIASTOLIC BLOOD PRESSURE: 74 MMHG | OXYGEN SATURATION: 95 % | HEART RATE: 83 BPM | BODY MASS INDEX: 38.45 KG/M2

## 2024-06-14 DIAGNOSIS — Z79.4 TYPE 2 DIABETES MELLITUS WITH HYPOGLYCEMIA WITHOUT COMA, WITH LONG-TERM CURRENT USE OF INSULIN: ICD-10-CM

## 2024-06-14 DIAGNOSIS — G47.33 OSA (OBSTRUCTIVE SLEEP APNEA): ICD-10-CM

## 2024-06-14 DIAGNOSIS — I50.32 CHRONIC HEART FAILURE WITH PRESERVED EJECTION FRACTION (HFPEF): Primary | ICD-10-CM

## 2024-06-14 DIAGNOSIS — I10 ESSENTIAL HYPERTENSION: ICD-10-CM

## 2024-06-14 DIAGNOSIS — E11.649 TYPE 2 DIABETES MELLITUS WITH HYPOGLYCEMIA WITHOUT COMA, WITH LONG-TERM CURRENT USE OF INSULIN: ICD-10-CM

## 2024-06-14 LAB
ABSOLUTE LUNG FLUID CONTENT: 24 % (ref 20–35)
ANION GAP SERPL CALCULATED.3IONS-SCNC: 10.8 MMOL/L (ref 5–15)
BUN SERPL-MCNC: 15 MG/DL (ref 8–23)
BUN/CREAT SERPL: 20 (ref 7–25)
CALCIUM SPEC-SCNC: 9.3 MG/DL (ref 8.6–10.5)
CHLORIDE SERPL-SCNC: 103 MMOL/L (ref 98–107)
CO2 SERPL-SCNC: 24.2 MMOL/L (ref 22–29)
CREAT SERPL-MCNC: 0.75 MG/DL (ref 0.57–1)
EGFRCR SERPLBLD CKD-EPI 2021: 89.6 ML/MIN/1.73
GLUCOSE SERPL-MCNC: 133 MG/DL (ref 65–99)
MAGNESIUM SERPL-MCNC: 1.9 MG/DL (ref 1.6–2.4)
NT-PROBNP SERPL-MCNC: 84.8 PG/ML (ref 0–900)
POTASSIUM SERPL-SCNC: 3.5 MMOL/L (ref 3.5–5.2)
SODIUM SERPL-SCNC: 138 MMOL/L (ref 136–145)

## 2024-06-14 PROCEDURE — 80048 BASIC METABOLIC PNL TOTAL CA: CPT | Performed by: PHYSICIAN ASSISTANT

## 2024-06-14 PROCEDURE — 83880 ASSAY OF NATRIURETIC PEPTIDE: CPT

## 2024-06-14 PROCEDURE — 83735 ASSAY OF MAGNESIUM: CPT | Performed by: PHYSICIAN ASSISTANT

## 2024-06-14 PROCEDURE — 36415 COLL VENOUS BLD VENIPUNCTURE: CPT | Performed by: PHYSICIAN ASSISTANT

## 2024-06-14 RX ORDER — FUROSEMIDE 40 MG/1
40 TABLET ORAL DAILY
Qty: 90 TABLET | Refills: 1 | Status: SHIPPED | OUTPATIENT
Start: 2024-06-14

## 2024-06-14 RX ORDER — DAPAGLIFLOZIN 10 MG/1
1 TABLET, FILM COATED ORAL DAILY
Qty: 90 TABLET | Refills: 1 | Status: SHIPPED | OUTPATIENT
Start: 2024-06-14

## 2024-06-14 NOTE — PROGRESS NOTES
[facility]    Date: 06/14/24     Vitals:    06/14/24 1313   BP: 115/74   Pulse: 83   SpO2: 95%    Weight 217    Method of arrival: Ambulatory    Weighing self daily: Yes    Monitoring Heart Failure Zones: Yes    Today's HF Zone: Green    Taking medications as prescribed: Yes    Edema No    Shortness of Air: No    Number of pillows used at night:>3 Pt sleeps on adjustable bed     Educational Materials given:  AVS                                                                         ReDS Value: 24  21-24 Low Normal      Patt Bhagat MA 06/14/24 13:14 EDT

## 2024-06-14 NOTE — PROGRESS NOTES
[facility]  Pharmacist Note     Zeina Reed is a 63 y.o. female seen in the [facility] Failure [facility] for HFpEF.   Zeina Reed reports a good understanding and adherence to her medications. Reports some SOB with activity but denies any swelling. She is currently taking Lasix 40mg daily and not BID. She reports that her BP runs in the 130-140's/70's and has not issues with it. She is currently taking Potassium supplement, 20MEQ daily, not 40.       Patient has no issues obtaining or affording any of her medications    Medication Use:   Hx of med intolerances: None related to HF  Retail Rx Management: Uses our diabetes clinic for [facility], uses  for all medications. I have the HF meds set up for refills, but patient reports that she usually will call in for refills when she needs them.     Past Medical History:   Diagnosis Date    Acute exacerbation of CHF (congestive heart failure) 07/10/2021    Arthritis     COPD (chronic obstructive pulmonary disease)     Diabetes mellitus     Disease of thyroid gland     Dyslipidemia     Elevated cholesterol     GERD (gastroesophageal reflux disease)     History of transfusion     Hyperlipidemia     Hypertension     Palpitations     Sleep apnea      ALLERGIES: Augmentin [amoxicillin-pot clavulanate] and Metformin and related  Current Outpatient Medications   Medication Sig Dispense Refill    acetaminophen-codeine (TYLENOL with CODEINE #4) 300-60 MG per tablet Take ½ tablet by mouth every 12 (twelve) hours as needed for severe pain. 30 tablet 0    busPIRone (BUSPAR) 5 MG tablet Take 1 tablet by mouth 3 (Three) Times a Day. (Patient taking differently: Take 1 tablet by mouth 3 (Three) Times a Day As Needed.) 90 tablet 2    Continuous Glucose Sensor (Dexcom G7 Sensor) misc Change sensor Every 10 (Ten) Days. 3 each 5    dapagliflozin Propanediol (Farxiga) 10 MG tablet Take 1 tablet by mouth daily. 90 tablet 1    DULoxetine (CYMBALTA) 60 MG capsule Take 1 capsule by mouth  daily 90 capsule 1    ergocalciferol (ERGOCALCIFEROL) 1.25 MG (29852 UT) capsule Take 1 capsule (1.25 mg) by mouth twice weekly 12 capsule 3    fluticasone (FLONASE) 50 MCG/ACT nasal spray Instill 2 sprays in each nostril daily. 16 g 5    Fluticasone-Salmeterol (ADVAIR/WIXELA) 250-50 MCG/ACT DISKUS Inhale 1 puff 2 (Two) Times a Day. Taking 1 time daily      furosemide (LASIX) 40 MG tablet Take 1 tablet by mouth Daily. 90 tablet 1    gabapentin (NEURONTIN) 300 MG capsule Take 1 capsule (300 mg) by mouth 3 times per day 90 capsule 1    lansoprazole (PREVACID) 30 MG capsule Take 1 capsule (30 mg) by mouth daily before a meal 90 capsule 2    levothyroxine (Synthroid) 50 MCG tablet Take 1 tablet (50 mcg) by mouth daily in the morning on an empty stomach 90 tablet 1    loratadine (Claritin) 10 MG tablet Take 1 tablet (10 mg) by mouth daily 90 tablet 3    metFORMIN ER (GLUCOPHAGE-XR) 500 MG 24 hr tablet Take 2 tablets (1000 mg total) by mouth Daily With Breakfast. 180 tablet 2    methocarbamol (ROBAXIN) 500 MG tablet Take 1 tablet by mouth two times daily as needed 60 tablet 3    metoprolol succinate XL (TOPROL-XL) 200 MG 24 hr tablet Take 1 tablet by mouth Daily. 90 tablet 2    ondansetron ODT (ZOFRAN-ODT) 8 MG disintegrating tablet Place 1 tablet on the tongue and allow to dissolve Every 8 (Eight) Hours As Needed. 30 tablet 3    potassium chloride ER (K-TAB) 20 MEQ tablet controlled-release ER tablet Take 1 tablet by mouth Daily. 60 tablet 3    pravastatin (PRAVACHOL) 40 MG tablet Take 1 tablet by mouth Daily. 90 tablet 1    sacubitril-valsartan (Entresto)  MG tablet Take 1 tablet by mouth 2 (Two) Times a Day. 60 tablet 3    spironolactone (ALDACTONE) 25 MG tablet Take 1 tablet (25 mg) by mouth daily 90 tablet 1    Tirzepatide (Mounjaro) 15 MG/0.5ML solution pen-injector pen Inject 15 mg subcutaneously once weekly. 2 mL 1    acetaminophen-codeine (TYLENOL with CODEINE #4) 300-60 MG per tablet Take 1/2 tablet by  "mouth every 12 hours as needed for severe pain. (Patient not taking: Reported on 6/14/2024) 60 tablet 0    DULoxetine (CYMBALTA) 30 MG capsule Take 1 capsule by mouth Daily. (Patient not taking: Reported on 6/14/2024) 30 capsule 6    fluticasone (FLONASE) 50 MCG/ACT nasal spray Use 2 sprays in each nostril daily (Patient not taking: Reported on 6/14/2024) 16 mL 5    lansoprazole (PREVACID) 30 MG capsule Take 1 capsule (30 mg) by mouth daily before a meal (Patient not taking: Reported on 6/14/2024) 90 capsule 2    lansoprazole (PREVACID) 30 MG capsule Take 1 capsule (30 mg) by mouth daily before a meal (Patient not taking: Reported on 6/14/2024) 90 capsule 2    loratadine (Claritin) 10 MG tablet Take 1 tablet (10 mg) by mouth daily (Patient not taking: Reported on 6/14/2024) 90 tablet 3    potassium chloride ER (K-TAB) 20 MEQ tablet controlled-release ER tablet Take 2 tablets (40 mEq) by mouth daily with food (Patient not taking: Reported on 6/14/2024) 60 tablet 3    pravastatin (PRAVACHOL) 40 MG tablet Take 1 tablet (40 mg) by mouth daily (Patient not taking: Reported on 6/14/2024) 90 tablet 1    spironolactone (ALDACTONE) 25 MG tablet Take 1 tablet (25 mg) by mouth daily (Patient not taking: Reported on 6/14/2024) 90 tablet 1    spironolactone (ALDACTONE) 25 MG tablet Take 1 tablet (25 mg) by mouth daily (Patient not taking: Reported on 6/14/2024) 90 tablet 1    Tirzepatide (MOUNJARO) 12.5 MG/0.5ML solution pen-injector pen Inject 0.5 mL under the skin into the appropriate area as directed 1 (One) Time Per Week. (Patient not taking: Reported on 6/14/2024) 2 mL 5     No current facility-administered medications for this encounter.       Vaccination History:   Pneumonia: UTD  Annual Influenza: UTD    Objective  Vitals:    06/14/24 1313   BP: 115/74   BP Location: Left arm   Patient Position: Sitting   Cuff Size: Adult   Pulse: 83   SpO2: 95%   Weight: 98.4 kg (217 lb)   Height: 160 cm (63\")         Wt Readings from " Last 3 Encounters:   06/14/24 98.4 kg (217 lb)   04/09/24 105 kg (230 lb 6.4 oz)   03/01/24 107 kg (236 lb 6.4 oz)         06/14/24  1313   Weight: 98.4 kg (217 lb)         Lab Results   Component Value Date    GLUCOSE 133 (H) 06/14/2024    BUN 15 06/14/2024    CREATININE 0.75 06/14/2024    EGFRIFNONA 71 12/17/2021    EGFRIFAFRI  09/17/2016      Comment:      <15 Indicative of kidney failure.    BCR 20.0 06/14/2024    K 3.5 06/14/2024    CO2 24.2 06/14/2024    CALCIUM 9.3 06/14/2024    ALBUMIN 4.0 03/01/2024    LABIL2 1.4 (L) 02/12/2015    AST 11 03/01/2024    ALT 9 03/01/2024     Lab Results   Component Value Date    WBC 8.66 03/01/2024    HGB 13.8 03/01/2024    HCT 41.4 03/01/2024    MCV 89.6 03/01/2024     03/01/2024     Lab Results   Component Value Date    TROPONINT <0.010 07/11/2021     Lab Results   Component Value Date    PROBNP 84.8 06/14/2024     Results for orders placed during the hospital encounter of 08/17/22    Adult Transthoracic Echo Complete W/ Cont if Necessary Per Protocol    Interpretation Summary  · Normal left ventricular cavity size and wall thickness noted. All left ventricular wall segments contract normally.  · Left ventricular ejection fraction appears to be 61 - 65%.  · The aortic valve is structurally normal with no regurgitation or stenosis present.  · The mitral valve is structurally normal with no regurgitation or significant stenosis present.  · There is no evidence of pericardial effusion.         GDMT    Drug Class   Drug   Dose Last Dose Adjustment Additional Titration   Notes   ACEi/ARB/ARNI Entresto 97/103   Confirmed no longer taking Losartan   Beta Blocker Toprol       MRA Spironolactone 25      SGLT2i Farxiga 10 >3m         Drug Therapy Problems    1. Needs refills on Lasix and Farxiga    Recommendations:     Majo to send in.       Patient was educated on heart failure medications and the importance of medication adherence. All questions were addressed and  patient expressed understanding.    Thank you for allowing me to participate in the care of your patient,    Gwenfrancy Mark, [facility]  06/14/24  14:30 EDT

## 2024-06-14 NOTE — PROGRESS NOTES
[facility]       Nini Marie APRN  [address] [place] [address]    Thank you for asking me to see Zeina Reed for congestive heart failure.    HPI:     This is a 63 y.o. female with known past medical history of :    FIDEL (CPAP)  HFpEF  LVEF 61-65% and 2022  Essential HTN  DM type 2  Fatty liver disease  GERD  COPD  Arthritis  Hyperlipidemia      Zeina Reed presents for today for HFpEF.  The patient is typically seen by Rachel Savage APRN.  Patient's primary cardiologist is Chandler Langford PA-C.     Last known EF 61-65%.   Last known hospitalization and/or ED visit:    Recent hospitalizations in the last 3 years.  Last hospitalization noted in 2021 for acute congestive heart failure, acute hypoxic respiratory failure, and acute COPD exacerbation in the setting of rhinovirus  Accompanied by: (Self)        06/14/24 visit data/details regarding:   Dyspnea: Denies acute shortness of breath today.  Lower extremity swelling: Denies acute swelling today  Abdominal swelling: Denies acute swelling today  Home weight: Weight monitoring booklet provided during initial visit; has lost approximately 13 pounds since the previous recorded weight   Home BP: BP monitoring booklet provided during initial visit; reports that blood pressure has been maintaining around 130-140 systolic over 70s diastolic; appropriate today  Home heart rate: HR monitoring booklet provided during initial visit; heart rate appropriate today.  No concerning fluctuations per her report  Daily activities of living: Notes some mild shortness of breath with exertion at baseline, but does improve with rest.  Mass take breaks as needed to complete daily tasks.  Was very active in the past while working multiple jobs.  Pillows/lying flat: Sleeps in bed with 3 pillows  HF zone: Green      Specialists:   Cardiology: Chandler Langford PA-C  Followed in the past with gastroenterology and underwent colonoscopy in  2023        Review of Systems - ROS   Mild shortness of breath with exertion but currently at baseline.  No edema, palpitations.     All other systems were reviewed and were negative.    Patient Active Problem List   Diagnosis    Palpitations    Essential hypertension    Dyslipidemia    Dizziness    Acute exacerbation of CHF (congestive heart failure)    SOB (shortness of breath)    Morbid obesity with BMI of 45.0-49.9, adult    Physical deconditioning    Chronic heart failure with preserved ejection fraction (HFpEF)    MDD (major depressive disorder)    Type 2 diabetes mellitus with hypoglycemia without coma, with long-term current use of insulin    Encounter for screening for malignant neoplasm of colon    Diarrhea    Family history of colon cancer       family history includes Cancer in her father and mother; Heart attack in her father.     reports that she has never smoked. She has never used smokeless tobacco. She reports that she does not drink alcohol and does not use drugs.    Allergies   Allergen Reactions    Augmentin [Amoxicillin-Pot Clavulanate] Diarrhea    Metformin And Related Diarrhea     Immediate Release - tolerates ER         Current Outpatient Medications:     acetaminophen-codeine (TYLENOL with CODEINE #4) 300-60 MG per tablet, Take ½ tablet by mouth every 12 (twelve) hours as needed for severe pain., Disp: 30 tablet, Rfl: 0    busPIRone (BUSPAR) 5 MG tablet, Take 1 tablet by mouth 3 (Three) Times a Day. (Patient taking differently: Take 1 tablet by mouth 3 (Three) Times a Day As Needed.), Disp: 90 tablet, Rfl: 2    Continuous Glucose Sensor (Dexcom G7 Sensor) misc, Change sensor Every 10 (Ten) Days., Disp: 3 each, Rfl: 5    dapagliflozin Propanediol (Farxiga) 10 MG tablet, Take 1 tablet by mouth daily., Disp: 90 tablet, Rfl: 1    DULoxetine (CYMBALTA) 60 MG capsule, Take 1 capsule by mouth daily, Disp: 90 capsule, Rfl: 1    ergocalciferol (ERGOCALCIFEROL) 1.25 MG (94062 UT) capsule, Take 1  capsule (1.25 mg) by mouth twice weekly, Disp: 12 capsule, Rfl: 3    fluticasone (FLONASE) 50 MCG/ACT nasal spray, Instill 2 sprays in each nostril daily., Disp: 16 g, Rfl: 5    Fluticasone-Salmeterol (ADVAIR/WIXELA) 250-50 MCG/ACT DISKUS, Inhale 1 puff 2 (Two) Times a Day. Taking 1 time daily, Disp: , Rfl:     furosemide (LASIX) 40 MG tablet, Take 1 tablet by mouth Daily., Disp: 90 tablet, Rfl: 1    gabapentin (NEURONTIN) 300 MG capsule, Take 1 capsule (300 mg) by mouth 3 times per day, Disp: 90 capsule, Rfl: 1    lansoprazole (PREVACID) 30 MG capsule, Take 1 capsule (30 mg) by mouth daily before a meal, Disp: 90 capsule, Rfl: 2    levothyroxine (Synthroid) 50 MCG tablet, Take 1 tablet (50 mcg) by mouth daily in the morning on an empty stomach, Disp: 90 tablet, Rfl: 1    loratadine (Claritin) 10 MG tablet, Take 1 tablet (10 mg) by mouth daily, Disp: 90 tablet, Rfl: 3    metFORMIN ER (GLUCOPHAGE-XR) 500 MG 24 hr tablet, Take 2 tablets (1000 mg total) by mouth Daily With Breakfast., Disp: 180 tablet, Rfl: 2    methocarbamol (ROBAXIN) 500 MG tablet, Take 1 tablet by mouth two times daily as needed, Disp: 60 tablet, Rfl: 3    metoprolol succinate XL (TOPROL-XL) 200 MG 24 hr tablet, Take 1 tablet by mouth Daily., Disp: 90 tablet, Rfl: 2    ondansetron ODT (ZOFRAN-ODT) 8 MG disintegrating tablet, Place 1 tablet on the tongue and allow to dissolve Every 8 (Eight) Hours As Needed., Disp: 30 tablet, Rfl: 3    potassium chloride ER (K-TAB) 20 MEQ tablet controlled-release ER tablet, Take 1 tablet by mouth Daily., Disp: 60 tablet, Rfl: 3    pravastatin (PRAVACHOL) 40 MG tablet, Take 1 tablet by mouth Daily., Disp: 90 tablet, Rfl: 1    sacubitril-valsartan (Entresto)  MG tablet, Take 1 tablet by mouth 2 (Two) Times a Day., Disp: 60 tablet, Rfl: 3    spironolactone (ALDACTONE) 25 MG tablet, Take 1 tablet (25 mg) by mouth daily, Disp: 90 tablet, Rfl: 1    Tirzepatide (Mounjaro) 15 MG/0.5ML solution pen-injector pen, Inject  15 mg subcutaneously once weekly., Disp: 2 mL, Rfl: 1    acetaminophen-codeine (TYLENOL with CODEINE #4) 300-60 MG per tablet, Take 1/2 tablet by mouth every 12 hours as needed for severe pain. (Patient not taking: Reported on 6/14/2024), Disp: 60 tablet, Rfl: 0    DULoxetine (CYMBALTA) 30 MG capsule, Take 1 capsule by mouth Daily. (Patient not taking: Reported on 6/14/2024), Disp: 30 capsule, Rfl: 6    fluticasone (FLONASE) 50 MCG/ACT nasal spray, Use 2 sprays in each nostril daily (Patient not taking: Reported on 6/14/2024), Disp: 16 mL, Rfl: 5    lansoprazole (PREVACID) 30 MG capsule, Take 1 capsule (30 mg) by mouth daily before a meal (Patient not taking: Reported on 6/14/2024), Disp: 90 capsule, Rfl: 2    lansoprazole (PREVACID) 30 MG capsule, Take 1 capsule (30 mg) by mouth daily before a meal (Patient not taking: Reported on 6/14/2024), Disp: 90 capsule, Rfl: 2    loratadine (Claritin) 10 MG tablet, Take 1 tablet (10 mg) by mouth daily (Patient not taking: Reported on 6/14/2024), Disp: 90 tablet, Rfl: 3    potassium chloride ER (K-TAB) 20 MEQ tablet controlled-release ER tablet, Take 2 tablets (40 mEq) by mouth daily with food (Patient not taking: Reported on 6/14/2024), Disp: 60 tablet, Rfl: 3    pravastatin (PRAVACHOL) 40 MG tablet, Take 1 tablet (40 mg) by mouth daily (Patient not taking: Reported on 6/14/2024), Disp: 90 tablet, Rfl: 1    spironolactone (ALDACTONE) 25 MG tablet, Take 1 tablet (25 mg) by mouth daily (Patient not taking: Reported on 6/14/2024), Disp: 90 tablet, Rfl: 1    spironolactone (ALDACTONE) 25 MG tablet, Take 1 tablet (25 mg) by mouth daily (Patient not taking: Reported on 6/14/2024), Disp: 90 tablet, Rfl: 1    Tirzepatide (MOUNJARO) 12.5 MG/0.5ML solution pen-injector pen, Inject 0.5 mL under the skin into the appropriate area as directed 1 (One) Time Per Week. (Patient not taking: Reported on 6/14/2024), Disp: 2 mL, Rfl: 5      Physical Exam:  I have reviewed the patient's current  vital signs as documented in the patient's EMR.   Vitals:    06/14/24 1313   BP: 115/74   Pulse: 83   SpO2: 95%     Body mass index is 38.44 kg/m².       06/14/24  1313   Weight: 98.4 kg (217 lb)      Physical Exam  Vitals reviewed.   Constitutional:       General: She is not in acute distress.     Appearance: She is not diaphoretic.   HENT:      Head: Normocephalic and atraumatic.   Cardiovascular:      Rate and Rhythm: Normal rate and regular rhythm.   Pulmonary:      Effort: Pulmonary effort is normal.      Breath sounds: No wheezing, rhonchi or rales.   Musculoskeletal:      Right lower leg: No edema.      Left lower leg: No edema.      Comments: Varicose veins noted   Neurological:      Mental Status: She is alert and oriented to person, place, and time.   Psychiatric:         Behavior: Behavior normal.            DATA REVIEWED:     EKG. I personally reviewed the EKG.  June 2023            ---------------------------------------------------  TTE/KELVIN:  Results for orders placed during the hospital encounter of 08/17/22    Adult Transthoracic Echo Complete W/ Cont if Necessary Per Protocol    Interpretation Summary  · Normal left ventricular cavity size and wall thickness noted. All left ventricular wall segments contract normally.  · Left ventricular ejection fraction appears to be 61 - 65%.  · The aortic valve is structurally normal with no regurgitation or stenosis present.  · The mitral valve is structurally normal with no regurgitation or significant stenosis present.  · There is no evidence of pericardial effusion.        Stress test:  November 2021  A pharmacological stress test was performed using regadenoson without low-level exercise.  Findings consistent with a normal ECG stress test.  Myocardial perfusion imaging indicates a normal myocardial perfusion study with no evidence of ischemia.  Normal LV cavity size. Normal LV wall motion noted.  Left ventricular ejection fraction is hyperdynamic  "(Calculated EF > 70%). .  Impressions are consistent with a low risk study.    -----------------------------------------------------  CXR/Imaging:     No radiology results for the last 30 days.      ----------------------------------------------------    PFTs: September 2021.  I agree with the results.  Spirometry is normal with no significant bronchodilator effect seen on this occasion.  Diffusion capacity is normal.  Lung volumes are normal.  Flow volume loop  is normal.            --------------------------------------------------------------------------------------------------    Laboratory evaluations:    Lab Results   Component Value Date    GLUCOSE 133 (H) 06/14/2024    BUN 15 06/14/2024    CREATININE 0.75 06/14/2024    EGFRIFNONA 71 12/17/2021    EGFRIFAFRI  09/17/2016      Comment:      <15 Indicative of kidney failure.    BCR 20.0 06/14/2024    K 3.5 06/14/2024    CO2 24.2 06/14/2024    CALCIUM 9.3 06/14/2024    ALBUMIN 4.0 03/01/2024    LABIL2 1.4 (L) 02/12/2015    AST 11 03/01/2024    ALT 9 03/01/2024     Lab Results   Component Value Date    WBC 8.66 03/01/2024    HGB 13.8 03/01/2024    HCT 41.4 03/01/2024    MCV 89.6 03/01/2024     03/01/2024     Lab Results   Component Value Date    CHOL 142 03/01/2024    CHLPL 205 (H) 02/17/2016    TRIG 164 (H) 03/01/2024    HDL 41 03/01/2024    LDL 73 03/01/2024     Lab Results   Component Value Date    TSH 2.440 03/01/2024    K0KUHZR 9.40 04/18/2017     Lab Results   Component Value Date    HGBA1C 6.10 (H) 03/01/2024     Lab Results   Component Value Date    ALT 9 03/01/2024     Lab Results   Component Value Date    HGBA1C 6.10 (H) 03/01/2024    HGBA1C 6.2 (A) 01/02/2024    HGBA1C 6.4 09/28/2023     Lab Results   Component Value Date    CREATININE 0.75 06/14/2024     Lab Results   Component Value Date    IRON 75 11/17/2023    TIBC 358 11/17/2023     No results found for: \"INR\", \"PROTIME\"     Lab Results   Component Value Date    ABSOLUTELUNG 24 06/14/2024    " ABSOLUTELUNG 26 11/17/2023    ABSOLUTELUNG 24 07/21/2023       PAH RISK ASSESSMENT: Not mentioned on previous echo      1. Chronic heart failure with preserved ejection fraction (HFpEF)    2. Type 2 diabetes mellitus with hypoglycemia without coma, with long-term current use of insulin          ORDERS PLACED TODAY:  Orders Placed This Encounter   Procedures    ReDs Vest    BNP    Basic Metabolic Panel    Magnesium        Diagnoses and all orders for this visit:    1. Chronic heart failure with preserved ejection fraction (HFpEF) (Primary)  -     ReDs Vest  -     BNP  -     Basic Metabolic Panel; Standing  -     Magnesium; Standing  -     Basic Metabolic Panel  -     Magnesium    2. Type 2 diabetes mellitus with hypoglycemia without coma, with long-term current use of insulin  -     dapagliflozin Propanediol (Farxiga) 10 MG tablet; Take 1 tablet by mouth daily.  Dispense: 90 tablet; Refill: 1    Other orders  -     furosemide (LASIX) 40 MG tablet; Take 1 tablet by mouth Daily.  Dispense: 90 tablet; Refill: 1           MEDS ORDERED TODAY:    New Medications Ordered This Visit   Medications    dapagliflozin Propanediol (Farxiga) 10 MG tablet     Sig: Take 1 tablet by mouth daily.     Dispense:  90 tablet     Refill:  1    furosemide (LASIX) 40 MG tablet     Sig: Take 1 tablet by mouth Daily.     Dispense:  90 tablet     Refill:  1       -------------------------------------------------------------------------------------        ASSESSMENT/PLAN:      Diagnosis Plan   1. Chronic heart failure with preserved ejection fraction (HFpEF)  ReDs Vest    BNP    Basic Metabolic Panel    Magnesium    Basic Metabolic Panel    Magnesium      2. Type 2 diabetes mellitus with hypoglycemia without coma, with long-term current use of insulin  dapagliflozin Propanediol (Farxiga) 10 MG tablet          not acutely decompensated chronic diastolic heart failure. CHF.     NYHA stage Stage C: Structural heart disease is present AND symptoms  have occurredFC-Class II: Slight limitation of physical activity. Comfortable at rest Ordinary physical activity results in fatigue, palpitation, dyspnea (shortness of breath). NYHA FC change: did not change.       Today, Patient appears euvolemic.and with  Excellent perfusion. The patient's hemodynamics are currently acceptable. HR is: normal and is at goal. BP/MAP was reviewed and there isroom for medication up-titration.  Clinical trajectory was assessed and hasremained stable.         CHF GOAL DIRECTED MEDICAL THERAPY FOR PATIENT ADDRESSED/ADJUSTED:     GDMT: HFpEF    Drug Class   Drug   Dose Last Dose Adjustment Notes   ACEi/ARB/ARNI Entresto  mg twice daily     Beta Blocker Metoprolol 200 mg daily     MRA Spironolactone 25 mg daily     SGLT2i Farxiga 10 mg daily  N/A   Secondaries if applicable:  Lasix 40 mg daily     Potassium chloride 20 mEq daily      -CHF Specific BB:   Continue metoprolol 200 mg daily  No reports of significant heart rate fluctuations at this time.  Is aware of possible bradycardia with use, contact us/seek further evaluation as needed if heart rate drops below 60      -ACE/ARB/ARNi:   Continue Entresto  mg twice daily  Receives assistance with coverage via [facility] pharmacy   baseline creatinine previously known to be 0.7-1.       -MRA:   The patient is FC-NYHA Class III and MRA is indicated.   Continue spironolactone 25 mg daily  labs remain stable, symptoms/swelling minimal  Potassium remains appropriate on replacement therapy  Quarterly monitoring of potassium and renal function is currently recommended      -SGLT2 inhibitor therapy:   A BMP at initiation to verify GFR >30 was recommended, as was interval GFR surveillance.  The patient was advised to hold SGLT2I when PO intake is restricted due to a planned surgery, or due to an underlying illness.   Continue Farxiga (dapagliflozin) 10mg daily with quarterly assessment of GFR.   Refilled today  Pt was advised SEs, some  severe, including hypersensitivity and Margaret's; coupled with discussion regarding common side effects of UTIs and female genital mycotic infections were discussed. If you will be NPO, or are sick (poor PO intake, N&V) please hold the medication until you are back to a normal diet.   No notable  side effects      -Diuretic regimen:   ReDS Vest reading for. 06/14/24 is  24; ReDs Vest reading reviewed with patient.    Continue Lasix 40 mg daily  Can take extra 40 mg as needed for worsening shortness of breath, swelling, weight gain  Continue potassium 20 mEq daily  BMP, Mag, & ProBNP reviewed with patient.,  remain within appropriate limits        -Fluid restriction/Sodium restriction:   Recommend 2000 ml restriction  Patient has been asked to weigh daily and was provided with a printed diuretic strategy.  Recommend 1,500 mg Na restriction.        -Acute vs. Chronic underlying conditions other than HF addressed during visit:   Sleep apnea:  Remains compliant with current device.  Discussed that we can place an order for supplies as needed or can follow-up with pulmonology office as needed.    Hypertension:  Blood pressure and averages per report are appropriate  Continue with current regimen        --------------------------------------------------------------------------------      Summary:   No change in medications.   Symptomatically stable at this cedrick  Labs reviewed after visit, remained appropriate.        This document has been electronically signed by Majo Infante PA-C  June 14, 2024 21:43 EDT      Dictated Utilizing Dragon Dictation: Part of this note may be an electronic transcription/translation of spoken language to printed text using the Dragon Dictation System.    Follow-up appointment and medication changes provided in hand delivered After Visit Summary as well as reviewed in the room.

## 2024-06-27 ENCOUNTER — SPECIALTY PHARMACY (OUTPATIENT)
Dept: PHARMACY | Facility: HOSPITAL | Age: 64
End: 2024-06-27
Payer: COMMERCIAL

## 2024-06-27 NOTE — PROGRESS NOTES
Specialty Pharmacy Refill Coordination Note     Zeina is a 63 y.o. female contacted today regarding refills of  Mounjaro specialty medication(s)    Reviewed and verified with patient:       Specialty medication(s) and dose(s) confirmed: yes    Refill Questions      Flowsheet Row Most Recent Value   Changes to allergies? No   Changes to medications? No   New conditions or infections since last clinic visit No   Unplanned office visit, urgent care, ED, or hospital admission in the last 4 weeks  No   How does patient/caregiver feel medication is working? Very good   Financial problems or insurance changes  No   Since the previous refill, were any specialty medication doses or scheduled injections missed or delayed?  No   Does this patient require a clinical escalation to a pharmacist? No            Delivery Questions      Flowsheet Row Most Recent Value   Copay verified? No   Copay amount na   Copay form of payment No copayment ($0)                   Follow-up: 30 day(s)     Minal Raygoza Pharmacy Technician  Specialty Pharmacy Technician

## 2024-07-03 DIAGNOSIS — E11.649 TYPE 2 DIABETES MELLITUS WITH HYPOGLYCEMIA WITHOUT COMA, WITH LONG-TERM CURRENT USE OF INSULIN: ICD-10-CM

## 2024-07-03 DIAGNOSIS — Z79.4 TYPE 2 DIABETES MELLITUS WITH HYPOGLYCEMIA WITHOUT COMA, WITH LONG-TERM CURRENT USE OF INSULIN: ICD-10-CM

## 2024-07-05 RX ORDER — METFORMIN HYDROCHLORIDE 500 MG/1
1000 TABLET, EXTENDED RELEASE ORAL
Qty: 180 TABLET | Refills: 2 | Status: SHIPPED | OUTPATIENT
Start: 2024-07-05

## 2024-07-26 ENCOUNTER — SPECIALTY PHARMACY (OUTPATIENT)
Dept: PHARMACY | Facility: HOSPITAL | Age: 64
End: 2024-07-26
Payer: COMMERCIAL

## 2024-07-26 DIAGNOSIS — E11.649 TYPE 2 DIABETES MELLITUS WITH HYPOGLYCEMIA WITHOUT COMA, WITH LONG-TERM CURRENT USE OF INSULIN: Primary | ICD-10-CM

## 2024-07-26 DIAGNOSIS — Z79.4 TYPE 2 DIABETES MELLITUS WITH HYPOGLYCEMIA WITHOUT COMA, WITH LONG-TERM CURRENT USE OF INSULIN: Primary | ICD-10-CM

## 2024-07-26 NOTE — PROGRESS NOTES
Specialty Pharmacy Patient Management Program  Endocrinology Reassessment     Zeina Reed is a 63 y.o. female seen by an Endocrinology Provider for Type 2 Diabetes and enrolled in the Endocrinology Patient Management program offered by [facility].  A follow-up outreach was conducted, including assessment of continued therapy appropriateness, medication adherence, and side effect incidence and management for Mounjaro.     Patient needs refills on Mounjaro 15 mg at this time. She is currently taking and denies any side effects or issues at this time. She denies any episode of hypoglycemia.     In the past, the patient has tried:                Drug Dose Reason for Discontinuation Notes   Metformin IR   Diarrhea Can tolerate ER formulation                         Changes to Insurance Coverage or Financial Support  None       Relevant Past Medical History and Comorbidities  Relevant medical history and concomitant health conditions were discussed with the patient. The patient's chart has been reviewed for relevant past medical history and comorbid health conditions and updated as necessary.   Past Medical History:   Diagnosis Date    Acute exacerbation of CHF (congestive heart failure) 07/10/2021    Arthritis     COPD (chronic obstructive pulmonary disease)     Diabetes mellitus     Disease of thyroid gland     Dyslipidemia     Elevated cholesterol     GERD (gastroesophageal reflux disease)     History of transfusion     Hyperlipidemia     Hypertension     Palpitations     Sleep apnea      Social History     Socioeconomic History    Marital status:    Tobacco Use    Smoking status: Never    Smokeless tobacco: Never   Vaping Use    Vaping status: Never Used   Substance and Sexual Activity    Alcohol use: No    Drug use: No    Sexual activity: Defer       Problem list reviewed by Mari Tse, PharmD on 7/26/2024 at  4:04 PM    Hospitalizations and Urgent Care Since Last Assessment  ED  Visits, Admissions or Hospitalizations : None  Urgent Office Visits: None required    Allergies  Known allergies and reactions were discussed with the patient. The patient's chart has been reviewed for allergy information and updated as necessary.   Allergies   Allergen Reactions    Augmentin [Amoxicillin-Pot Clavulanate] Diarrhea    Metformin And Related Diarrhea     Immediate Release - tolerates ER       Allergies reviewed by Mari Tse, PharmD on 7/26/2024 at  4:03 PM    Relevant Laboratory Values  A1C Last 3 Results          9/28/2023    15:27 1/2/2024    15:24 3/1/2024    13:24   HGBA1C Last 3 Results   Hemoglobin A1C 6.4  6.2  6.10      Lab Results   Component Value Date    HGBA1C 6.10 (H) 03/01/2024     Lab Results   Component Value Date    GLUCOSE 133 (H) 06/14/2024    CALCIUM 9.3 06/14/2024     06/14/2024    K 3.5 06/14/2024    CO2 24.2 06/14/2024     06/14/2024    BUN 15 06/14/2024    CREATININE 0.75 06/14/2024    EGFRIFAFRI  09/17/2016      Comment:      <15 Indicative of kidney failure.    EGFRIFNONA 71 12/17/2021    BCR 20.0 06/14/2024    ANIONGAP 10.8 06/14/2024     Lab Results   Component Value Date    CHOL 142 03/01/2024    CHLPL 205 (H) 02/17/2016    TRIG 164 (H) 03/01/2024    HDL 41 03/01/2024    LDL 73 03/01/2024         Current Medication List  This medication list has been reviewed with the patient and evaluated for any interactions or necessary modifications/recommendations, and updated to include all prescription medications, OTC medications, and supplements the patient is currently taking. This list reflects what is contained in the patient's profile, which has also been marked as reviewed to communicate to other providers it is the most up to date version of the patient's current medication therapy.     Current Outpatient Medications:     busPIRone (BUSPAR) 5 MG tablet, Take 1 tablet by mouth 3 (Three) Times a Day. (Patient taking differently: Take 1 tablet by mouth 3 (Three)  Times a Day As Needed.), Disp: 90 tablet, Rfl: 2    Continuous Glucose Sensor (Dexcom G7 Sensor) misc, Change sensor Every 10 (Ten) Days., Disp: 3 each, Rfl: 5    dapagliflozin Propanediol (Farxiga) 10 MG tablet, Take 1 tablet by mouth daily., Disp: 90 tablet, Rfl: 1    DULoxetine (CYMBALTA) 30 MG capsule, Take 1 capsule by mouth Daily., Disp: 30 capsule, Rfl: 6    DULoxetine (CYMBALTA) 60 MG capsule, Take 1 capsule by mouth daily, Disp: 90 capsule, Rfl: 1    ergocalciferol (ERGOCALCIFEROL) 1.25 MG (17626 UT) capsule, Take 1 capsule (1.25 mg) by mouth twice weekly, Disp: 12 capsule, Rfl: 3    ergocalciferol (ERGOCALCIFEROL) 1.25 MG (06169 UT) capsule, Take 1 capsule (1.25 mg) by mouth twice weekly, Disp: 12 capsule, Rfl: 3    fluticasone (FLONASE) 50 MCG/ACT nasal spray, Instill 2 sprays in each nostril daily., Disp: 16 g, Rfl: 5    fluticasone (FLONASE) 50 MCG/ACT nasal spray, Use 2 sprays in each nostril daily, Disp: 16 mL, Rfl: 5    Fluticasone-Salmeterol (ADVAIR/WIXELA) 250-50 MCG/ACT DISKUS, Inhale 1 puff 2 (Two) Times a Day. Taking 1 time daily, Disp: , Rfl:     furosemide (LASIX) 40 MG tablet, Take 1 tablet by mouth Daily., Disp: 90 tablet, Rfl: 1    gabapentin (NEURONTIN) 300 MG capsule, Take 1 capsule (300 mg) by mouth 3 times per day, Disp: 90 capsule, Rfl: 0    levothyroxine (Synthroid) 50 MCG tablet, Take 1 tablet (50 mcg) by mouth daily in the morning on an empty stomach, Disp: 90 tablet, Rfl: 1    metFORMIN ER (GLUCOPHAGE-XR) 500 MG 24 hr tablet, Take 2 tablets (1000 mg total) by mouth Daily With Breakfast., Disp: 180 tablet, Rfl: 2    methocarbamol (ROBAXIN) 500 MG tablet, Take 1 tablet by mouth two times daily as needed, Disp: 60 tablet, Rfl: 3    metoprolol succinate XL (TOPROL-XL) 200 MG 24 hr tablet, Take 1 tablet by mouth Daily., Disp: 90 tablet, Rfl: 2    ondansetron ODT (ZOFRAN-ODT) 8 MG disintegrating tablet, Place 1 tablet on the tongue and allow to dissolve Every 8 (Eight) Hours As Needed.,  Disp: 30 tablet, Rfl: 3    potassium chloride ER (K-TAB) 20 MEQ tablet controlled-release ER tablet, Take 1 tablet by mouth Daily., Disp: 60 tablet, Rfl: 3    potassium chloride ER (K-TAB) 20 MEQ tablet controlled-release ER tablet, Take 2 tablets (40 mEq) by mouth daily with food, Disp: 60 tablet, Rfl: 3    potassium chloride ER (K-TAB) 20 MEQ tablet controlled-release ER tablet, Take 2 tablets (40 mEq) by mouth daily with food, Disp: 60 tablet, Rfl: 3    sacubitril-valsartan (Entresto)  MG tablet, Take 1 tablet by mouth 2 (Two) Times a Day., Disp: 60 tablet, Rfl: 3    spironolactone (ALDACTONE) 25 MG tablet, Take 1 tablet (25 mg) by mouth daily, Disp: 90 tablet, Rfl: 1    spironolactone (ALDACTONE) 25 MG tablet, Take 1 tablet (25 mg) by mouth daily, Disp: 90 tablet, Rfl: 1    Tirzepatide (Mounjaro) 15 MG/0.5ML solution pen-injector pen, Inject 15 mg subcutaneously weekly, Disp: 2 mL, Rfl: 1    lansoprazole (PREVACID) 30 MG capsule, Take 1 capsule (30 mg) by mouth daily before a meal (Patient not taking: Reported on 7/26/2024), Disp: 90 capsule, Rfl: 2    lansoprazole (PREVACID) 30 MG capsule, Take 1 capsule (30 mg) by mouth daily before a meal (Patient not taking: Reported on 6/14/2024), Disp: 90 capsule, Rfl: 2    lansoprazole (PREVACID) 30 MG capsule, Take 1 capsule (30 mg) by mouth daily before a meal (Patient not taking: Reported on 7/26/2024), Disp: 90 capsule, Rfl: 2    lansoprazole (PREVACID) 30 MG capsule, Take 1 capsule (30 mg) by mouth daily before a meal (Patient not taking: Reported on 7/26/2024), Disp: 90 capsule, Rfl: 2    loratadine (Claritin) 10 MG tablet, Take 1 tablet (10 mg) by mouth daily (Patient not taking: Reported on 7/26/2024), Disp: 90 tablet, Rfl: 3    loratadine (Claritin) 10 MG tablet, Take 1 tablet (10 mg) by mouth daily (Patient not taking: Reported on 6/14/2024), Disp: 90 tablet, Rfl: 3    loratadine (Claritin) 10 MG tablet, Take 1 tablet (10 mg) by mouth daily (Patient not  taking: Reported on 7/26/2024), Disp: 90 tablet, Rfl: 3    pravastatin (PRAVACHOL) 40 MG tablet, Take 1 tablet by mouth Daily. (Patient not taking: Reported on 7/26/2024), Disp: 90 tablet, Rfl: 1    pravastatin (PRAVACHOL) 40 MG tablet, Take 1 tablet (40 mg) by mouth daily (Patient not taking: Reported on 6/14/2024), Disp: 90 tablet, Rfl: 1    Tirzepatide (MOUNJARO) 12.5 MG/0.5ML solution pen-injector pen, Inject 0.5 mL under the skin into the appropriate area as directed 1 (One) Time Per Week. (Patient not taking: Reported on 7/26/2024), Disp: 2 mL, Rfl: 5    Medicines reviewed by Mari Tse, PharmD on 7/26/2024 at  4:06 PM    Drug Interactions  No significant drug-drug interactions with diabetes medications expected according to literature.    Recommended Medications Assessment  Aspirin: Currently Taking   Statin: Currently Taking   ACEi/ARB: Currently Taking     Adverse Drug Reactions  Adverse Reactions Experienced: None          Plan for ADR Management: None required     Adherence, Self-Administration, and Current Therapy Problems  Adherence related to the patient's specialty therapy was discussed with the patient. The Adherence segment of this outreach has been reviewed and updated.        Additional Barriers to Patient Self-Administration: None  Methods for Supporting Patient Self-Administration: None required    Recently Close Medication Therapy Problems  No medication therapy recommendations to display    Goals of Therapy  Goals related to the patient's specialty therapy was discussed with the patient. The Patient Goals segment of this outreach has been reviewed and updated.    Goals Addressed Today        Specialty Pharmacy General Goal      Prevent Hypoglycemia               Reassessment Plan & Follow-Up  Medication Therapy Changes: Per MAURO Viera will order refill for  Mounjaro 15 mg weekly   Related Plans, Therapy Recommendations, or Issues to Be Addressed: Will send updated RX to  [facility] for patient to pick-up.   Pharmacist to perform regular reassessments no more than (6) months from the previous assessment.  Care Coordinator to set up future refill outreaches, coordinate prescription delivery, and escalate clinical questions to pharmacist.     Attestation  I attest the patient was actively involved in and has agreed to the above plan of care. If the prescribed therapy is at any point deemed not appropriate based on the current or future assessments, a consultation will be initiated with the patient's specialty care provider to determine the best course of action. The revised plan of therapy will be documented along with any required assessments and/or additional patient education provided.    Mari Tse, PharmD, [facility]  Clinical Specialty Pharmacist, Endocrinology   07/26/24  16:09 EDT

## 2024-08-06 ENCOUNTER — TELEPHONE (OUTPATIENT)
Dept: ENDOCRINOLOGY | Facility: CLINIC | Age: 64
End: 2024-08-06
Payer: COMMERCIAL

## 2024-08-06 NOTE — TELEPHONE ENCOUNTER
Please call and see if she is asking about a yeast infection? If she feels like it is yeast, I can send in the medication for that.  If she feels like she has a UTI, she needs to be evaluated at primary care for that.

## 2024-08-08 RX ORDER — SACUBITRIL AND VALSARTAN 97; 103 MG/1; MG/1
1 TABLET, FILM COATED ORAL 2 TIMES DAILY
Qty: 60 TABLET | Refills: 3 | Status: CANCELLED | OUTPATIENT
Start: 2024-08-08

## 2024-08-09 RX ORDER — SACUBITRIL AND VALSARTAN 97; 103 MG/1; MG/1
1 TABLET, FILM COATED ORAL 2 TIMES DAILY
Qty: 60 TABLET | Refills: 4 | Status: SHIPPED | OUTPATIENT
Start: 2024-08-09

## 2024-08-21 ENCOUNTER — SPECIALTY PHARMACY (OUTPATIENT)
Dept: PHARMACY | Facility: HOSPITAL | Age: 64
End: 2024-08-21
Payer: COMMERCIAL

## 2024-08-21 NOTE — PROGRESS NOTES
Specialty Pharmacy Refill Coordination Note     Zeina is a 63 y.o. female contacted today regarding refills of  Mounjaro specialty medication(s).    Reviewed and verified with patient:       Specialty medication(s) and dose(s) confirmed: yes    Refill Questions      Flowsheet Row Most Recent Value   Changes to allergies? No   Changes to medications? No   New conditions or infections since last clinic visit No   Unplanned office visit, urgent care, ED, or hospital admission in the last 4 weeks  No   How does patient/caregiver feel medication is working? Very good   Financial problems or insurance changes  No   Since the previous refill, were any specialty medication doses or scheduled injections missed or delayed?  No   Does this patient require a clinical escalation to a pharmacist? No            Delivery Questions      Flowsheet Row Most Recent Value   Delivery method  at Pharmacy   Number of medications in delivery 1   Medication(s) being filled and delivered Tirzepatide   Doses left of specialty medications na   Copay verified? No   Copay amount 0   Copay form of payment No copayment ($0)   Signature Required No                   Follow-up: 30 day(s)     Minal Raygoza, Pharmacy Technician  Specialty Pharmacy Technician

## 2024-08-29 ENCOUNTER — TELEPHONE (OUTPATIENT)
Dept: PHARMACY | Facility: HOSPITAL | Age: 64
End: 2024-08-29

## 2024-09-17 ENCOUNTER — SPECIALTY PHARMACY (OUTPATIENT)
Dept: PHARMACY | Facility: HOSPITAL | Age: 64
End: 2024-09-17
Payer: COMMERCIAL

## 2024-10-09 ENCOUNTER — HOSPITAL ENCOUNTER (OUTPATIENT)
Dept: CARDIOLOGY | Facility: HOSPITAL | Age: 64
Discharge: HOME | End: 2024-10-09
Payer: COMMERCIAL

## 2024-10-09 VITALS
HEIGHT: 63 IN | BODY MASS INDEX: 37.85 KG/M2 | HEART RATE: 73 BPM | OXYGEN SATURATION: 96 % | WEIGHT: 213.6 LBS | DIASTOLIC BLOOD PRESSURE: 74 MMHG | SYSTOLIC BLOOD PRESSURE: 114 MMHG

## 2024-10-09 DIAGNOSIS — Z79.4 TYPE 2 DIABETES MELLITUS WITH OTHER DIABETIC ARTHROPATHY, WITH LONG-TERM CURRENT USE OF INSULIN: ICD-10-CM

## 2024-10-09 DIAGNOSIS — G47.33 OSA ON CPAP: ICD-10-CM

## 2024-10-09 DIAGNOSIS — I50.32 CHRONIC HEART FAILURE WITH PRESERVED EJECTION FRACTION (HFPEF): Primary | ICD-10-CM

## 2024-10-09 DIAGNOSIS — J44.9 COPD WITHOUT EXACERBATION: ICD-10-CM

## 2024-10-09 DIAGNOSIS — I10 ESSENTIAL HYPERTENSION: ICD-10-CM

## 2024-10-09 DIAGNOSIS — E11.618 TYPE 2 DIABETES MELLITUS WITH OTHER DIABETIC ARTHROPATHY, WITH LONG-TERM CURRENT USE OF INSULIN: ICD-10-CM

## 2024-10-09 LAB — ABSOLUTE LUNG FLUID CONTENT: 22 % (ref 20–35)

## 2024-10-09 PROCEDURE — 94726 PLETHYSMOGRAPHY LUNG VOLUMES: CPT | Performed by: PHYSICIAN ASSISTANT

## 2024-10-09 RX ORDER — DAPAGLIFLOZIN 10 MG/1
1 TABLET, FILM COATED ORAL DAILY
Qty: 90 TABLET | Refills: 1 | Status: SHIPPED | OUTPATIENT
Start: 2024-10-09

## 2024-10-09 RX ORDER — FUROSEMIDE 40 MG
40 TABLET ORAL DAILY
Qty: 90 TABLET | Refills: 1 | Status: SHIPPED | OUTPATIENT
Start: 2024-10-09

## 2024-10-09 NOTE — PROGRESS NOTES
Heart Failure Clinic    Date: 10/09/24     Vitals:    10/09/24 1333   BP: 114/74   Pulse: 73   SpO2: 96%    Weight 213    Method of arrival: Ambulatory    Weighing self daily: Most days    Monitoring Heart Failure Zones: Yes    Today's HF Zone: Green    Taking medications as prescribed: Yes    Edema No    Shortness of Air: No    Number of pillows used at night:>3    Educational Materials given:  AVS                                                                         ReDS Value: 22  21-24 Low Normal      Patt Bhagat MA 10/09/24 13:35 EDT

## 2024-10-09 NOTE — PROGRESS NOTES
Heart Failure Clinic  Pharmacist Note     Zeina Reed is a 63 y.o. female seen in the Heart Failure Clinic for HFpEF.   Zeina Reed reports a good understanding and adherence to her medications. She reports that normally her BP would run in the 130-145's in the morning and was fine and therefore, she stopped checking her BP. Then one morning about a week ago, she took her Entresto as usual in the morning and her BP dropped down to 67/37 and she was extremely dizzy. Since then she has been checking her BP's in the morning and BP is in the 110's and randomly <100 and therefore, she reports that she has not taken her Entresto in 5 days due to her low BP. She reports adherence to all of her other doses.     Denies any swelling or SOB and currently takes Lasix 40mg daily.      She did have a UTI back in August, her first one in a long time that was treated with Macrobid and pyridium.       Medication Use:   Hx of med intolerances: None related to HF  Retail Rx Management: Uses our diabetes clinic for Mounjaro, uses  for all medications. I have the HF meds set up for refills, but patient reports that she usually will call in for refills when she needs them.     Past Medical History:   Diagnosis Date    Acute exacerbation of CHF (congestive heart failure) 07/10/2021    Arthritis     COPD (chronic obstructive pulmonary disease)     Diabetes mellitus     Disease of thyroid gland     Dyslipidemia     Elevated cholesterol     GERD (gastroesophageal reflux disease)     History of transfusion     Hyperlipidemia     Hypertension     Palpitations     Sleep apnea      ALLERGIES: Augmentin [amoxicillin-pot clavulanate] and Metformin and related  Current Outpatient Medications   Medication Sig Dispense Refill    acetaminophen-codeine (TYLENOL with CODEINE #4) 300-60 MG per tablet Take ½ a tablet by mouth every 12 hours as needed for severe pain 60 tablet 0    busPIRone (BUSPAR) 5 MG tablet Take 1 tablet by mouth 3 (Three) Times  a Day. (Patient taking differently: Take 1 tablet by mouth 3 (Three) Times a Day As Needed.) 90 tablet 2    Continuous Glucose Sensor (Dexcom G7 Sensor) misc Change sensor Every 10 (Ten) Days. 3 each 5    dapagliflozin Propanediol (Farxiga) 10 MG tablet Take 1 tablet by mouth daily. 90 tablet 1    DULoxetine (CYMBALTA) 60 MG capsule Take 1 capsule by mouth daily 90 capsule 1    ergocalciferol (ERGOCALCIFEROL) 1.25 MG (51563 UT) capsule Take 1 capsule (1.25 mg) by mouth twice weekly 12 capsule 3    fluticasone (FLONASE) 50 MCG/ACT nasal spray Instill 2 sprays in each nostril daily. 16 g 5    Fluticasone-Salmeterol (ADVAIR/WIXELA) 250-50 MCG/ACT DISKUS Inhale 1 puff 2 (Two) Times a Day. Taking 1 time daily      furosemide (LASIX) 40 MG tablet Take 1 tablet by mouth Daily. 90 tablet 1    gabapentin (NEURONTIN) 300 MG capsule Take 1 capsule by mouth 3 times per day. 90 capsule 0    lansoprazole (PREVACID) 30 MG capsule Take 1 capsule (30 mg) by mouth daily before a meal 90 capsule 2    levothyroxine (Synthroid) 50 MCG tablet Take 1 tablet (50 mcg) by mouth daily in the morning on an empty stomach 90 tablet 1    loratadine (Claritin) 10 MG tablet Take 1 tablet (10 mg) by mouth daily 90 tablet 3    metFORMIN ER (GLUCOPHAGE-XR) 500 MG 24 hr tablet Take 2 tablets (1000 mg total) by mouth Daily With Breakfast. 180 tablet 2    methocarbamol (ROBAXIN) 500 MG tablet Take 1 tablet by mouth two times daily as needed 60 tablet 3    metoprolol succinate XL (TOPROL-XL) 200 MG 24 hr tablet Take 1 tablet by mouth Daily. 90 tablet 2    ondansetron ODT (ZOFRAN-ODT) 8 MG disintegrating tablet Place 1 tablet on the tongue and allow to dissolve Every 8 (Eight) Hours As Needed. 30 tablet 3    potassium chloride ER (K-TAB) 20 MEQ tablet controlled-release ER tablet Take 1 tablet by mouth Daily. 60 tablet 3    pravastatin (PRAVACHOL) 40 MG tablet Take 1 tablet by mouth Daily. 90 tablet 1    spironolactone (ALDACTONE) 25 MG tablet Take 1  tablet (25 mg) by mouth daily 90 tablet 1    Tirzepatide (Mounjaro) 15 MG/0.5ML solution pen-injector pen Inject 15 mg subcutaneously weekly 2 mL 5    ergocalciferol (ERGOCALCIFEROL) 1.25 MG (25616 UT) capsule Take 1 capsule (1.25 mg) by mouth twice weekly (Patient not taking: Reported on 10/9/2024) 12 capsule 3    fluticasone (FLONASE) 50 MCG/ACT nasal spray Use 2 sprays in each nostril daily (Patient not taking: Reported on 10/9/2024) 16 mL 5    lansoprazole (PREVACID) 30 MG capsule Take 1 capsule (30 mg) by mouth daily before a meal (Patient not taking: Reported on 6/14/2024) 90 capsule 2    lansoprazole (PREVACID) 30 MG capsule Take 1 capsule (30 mg) by mouth daily before a meal (Patient not taking: Reported on 7/26/2024) 90 capsule 2    lansoprazole (PREVACID) 30 MG capsule Take 1 capsule (30 mg) by mouth daily before a meal (Patient not taking: Reported on 7/26/2024) 90 capsule 2    loratadine (Claritin) 10 MG tablet Take 1 tablet (10 mg) by mouth daily (Patient not taking: Reported on 6/14/2024) 90 tablet 3    loratadine (Claritin) 10 MG tablet Take 1 tablet (10 mg) by mouth daily (Patient not taking: Reported on 7/26/2024) 90 tablet 3    potassium chloride ER (K-TAB) 20 MEQ tablet controlled-release ER tablet Take 2 tablets (40 mEq) by mouth daily with food (Patient not taking: Reported on 10/9/2024) 60 tablet 3    potassium chloride ER (K-TAB) 20 MEQ tablet controlled-release ER tablet Take 2 tablets (40 mEq) by mouth daily with food (Patient not taking: Reported on 10/9/2024) 60 tablet 3    pravastatin (PRAVACHOL) 40 MG tablet Take 1 tablet (40 mg) by mouth daily (Patient not taking: Reported on 6/14/2024) 90 tablet 1    spironolactone (ALDACTONE) 25 MG tablet Take 1 tablet (25 mg) by mouth daily (Patient not taking: Reported on 10/9/2024) 90 tablet 1    Tirzepatide (MOUNJARO) 12.5 MG/0.5ML solution pen-injector pen Inject 0.5 mL under the skin into the appropriate area as directed 1 (One) Time Per Week.  "(Patient not taking: Reported on 7/26/2024) 2 mL 5     No current facility-administered medications for this encounter.       Vaccination History:   Pneumonia: UTD  Annual Influenza: UTD 23/24    Objective  Vitals:    10/09/24 1333   BP: 114/74   BP Location: Right arm   Patient Position: Sitting   Cuff Size: Adult   Pulse: 73   SpO2: 96%   Weight: 96.9 kg (213 lb 9.6 oz)   Height: 160 cm (63\")         Wt Readings from Last 3 Encounters:   10/09/24 96.9 kg (213 lb 9.6 oz)   06/14/24 98.4 kg (217 lb)   04/09/24 105 kg (230 lb 6.4 oz)         10/09/24  1333   Weight: 96.9 kg (213 lb 9.6 oz)         Lab Results   Component Value Date    GLUCOSE 133 (H) 06/14/2024    BUN 15 06/14/2024    CREATININE 0.75 06/14/2024    EGFRIFNONA 71 12/17/2021    EGFRIFAFRI  09/17/2016      Comment:      <15 Indicative of kidney failure.    BCR 20.0 06/14/2024    K 3.5 06/14/2024    CO2 24.2 06/14/2024    CALCIUM 9.3 06/14/2024    ALBUMIN 4.0 03/01/2024    LABIL2 1.4 (L) 02/12/2015    AST 11 03/01/2024    ALT 9 03/01/2024     Lab Results   Component Value Date    WBC 8.66 03/01/2024    HGB 13.8 03/01/2024    HCT 41.4 03/01/2024    MCV 89.6 03/01/2024     03/01/2024     Lab Results   Component Value Date    TROPONINT <0.010 07/11/2021     Lab Results   Component Value Date    PROBNP 84.8 06/14/2024     Results for orders placed during the hospital encounter of 08/17/22    Adult Transthoracic Echo Complete W/ Cont if Necessary Per Protocol    Interpretation Summary  · Normal left ventricular cavity size and wall thickness noted. All left ventricular wall segments contract normally.  · Left ventricular ejection fraction appears to be 61 - 65%.  · The aortic valve is structurally normal with no regurgitation or stenosis present.  · The mitral valve is structurally normal with no regurgitation or significant stenosis present.  · There is no evidence of pericardial effusion.         GDMT    Drug Class   Drug   Dose Last Dose Adjustment " Additional Titration   Notes   ACEi/ARB/ARNI Stopped 10/9/24 for hypotension  Confirmed no longer taking Losartan   Beta Blocker Toprol       MRA Spironolactone 25      SGLT2i Farxiga 10 >3m     Lasix 40mg daily      Drug Therapy Problems    1. GDMT- hypotension  2. Refill coordinations  3. IMZ    Recommendations:     Tawanna to stop Entresto altogether at this time.   Patient would like for HF meds to be set up to refill and for her not to have to call to get them ready. I set up her refill coordinations.   Recommended her annual flu shot      Patient was educated on heart failure medications and the importance of medication adherence. All questions were addressed and patient expressed understanding.    Thank you for allowing me to participate in the care of your patient,    Gwen Mark, PharmD  10/09/24  15:13 EDT

## 2024-10-09 NOTE — PROGRESS NOTES
iuty[facility]f g [facility]  ANNE Pinto APRN  Referring, [place] [address]    Thank you for asking me to see Zeina Reed for congestive heart failure.    HPI:     This is a 63 y.o. female with known past medical history of:    FIDLE (CPAP)  HFpEF  LVEF 61-65% on 08/17/22.   Essential HTN  Uncontrolled DM type 2  Fatty liver disease  GERD  COPD     Zeina Reed presents for today for HF referral from PCP.  The patient is typically seen by Rachel Savage APRN.  Patient's primary cardiologist is Dr. Zapata & team.     Last known EF 65% in 07/2021 with repeat echocardiogram pending at present.    Last known hospitalization and/or ED visit: July 10, 2021 through July 15, 2021 with acute on chronic decompensated heart failure, obstructive sleep apnea uncontrolled diabetes and sepsis admission.  Accompanied by: Spouse  Patient was reerred by her PCP's office after seeing Ann Marie and reporting worsening dyspnea on exertion and swelling in spite of Bumex 2mg BID.         10/10/24 visit data/details regarding:   Dyspnea: Dyspnea on exertion improved.  Lower extremity swelling: Intermittent swelling   Abdominal swelling: Improved  Home weight: Waxing/waning  Home BP:80s-120s systolic; one brief episode.    Home heart rate: 70s  Daily activities of living: Performing with assistance of family.   Pillows/lying flat: 3 pillows  HF zone: Green  Mrs. Reed reports she has been having some lower blood pressures more recently and had one as low as 67/37 when she took her morning BP meds without checking her BP first.   She has been holding the Entresto.  She has lost quite a bit of weight, which is likely impacting.  We discuss continuing to hold Entresto.   Otherwise, Mrs. Reed reports she is doing well since stopping Entresto.  She reports palpitations if she attempts to decrease or miss her Toprol XL.      Specialists:    Cardiology: Dr. Zapata    Review of Systems - Review of Systems   Constitutional: Negative for chills, diaphoresis, fever and malaise/fatigue.   HENT:  Negative for congestion and ear discharge.    Eyes:  Negative for blurred vision and discharge.   Cardiovascular:  Negative for chest pain, claudication, dyspnea on exertion, leg swelling, near-syncope, orthopnea and palpitations.   Respiratory:  Negative for cough and hemoptysis.    Endocrine: Negative for cold intolerance and heat intolerance.   Hematologic/Lymphatic: Negative for adenopathy and bleeding problem.   Skin:  Negative for color change and nail changes.   Musculoskeletal:  Positive for arthritis.   Gastrointestinal:  Negative for bloating and abdominal pain.   Genitourinary:  Negative for bladder incontinence and decreased libido.   Neurological:  Negative for aphonia and brief paralysis.   Psychiatric/Behavioral:  Negative for altered mental status and depression.          All other systems were reviewed and were negative.    Patient Active Problem List   Diagnosis    Palpitations    Essential hypertension    Dyslipidemia    Dizziness    Acute exacerbation of CHF (congestive heart failure)    SOB (shortness of breath)    Morbid obesity with BMI of 45.0-49.9, adult    Physical deconditioning    Chronic heart failure with preserved ejection fraction (HFpEF)    MDD (major depressive disorder)    Type 2 diabetes mellitus with hypoglycemia without coma, with long-term current use of insulin    Encounter for screening for malignant neoplasm of colon    Diarrhea    Family history of colon cancer    FIDEL (obstructive sleep apnea)       family history includes Cancer in her father and mother; Heart attack in her father.     reports that she has never smoked. She has never used smokeless tobacco. She reports that she does not drink alcohol and does not use drugs.    Allergies   Allergen Reactions    Augmentin [Amoxicillin-Pot Clavulanate] Diarrhea    Metformin  And Related Diarrhea     Immediate Release - tolerates ER         Current Outpatient Medications:     acetaminophen-codeine (TYLENOL with CODEINE #4) 300-60 MG per tablet, Take ½ a tablet by mouth every 12 hours as needed for severe pain, Disp: 60 tablet, Rfl: 0    busPIRone (BUSPAR) 5 MG tablet, Take 1 tablet by mouth 3 (Three) Times a Day. (Patient taking differently: Take 1 tablet by mouth 3 (Three) Times a Day As Needed.), Disp: 90 tablet, Rfl: 2    Continuous Glucose Sensor (Dexcom G7 Sensor) misc, Change sensor Every 10 (Ten) Days., Disp: 3 each, Rfl: 5    dapagliflozin Propanediol (Farxiga) 10 MG tablet, Take 1 tablet by mouth daily., Disp: 90 tablet, Rfl: 1    DULoxetine (CYMBALTA) 60 MG capsule, Take 1 capsule by mouth daily, Disp: 90 capsule, Rfl: 1    ergocalciferol (ERGOCALCIFEROL) 1.25 MG (20336 UT) capsule, Take 1 capsule (1.25 mg) by mouth twice weekly, Disp: 12 capsule, Rfl: 3    fluticasone (FLONASE) 50 MCG/ACT nasal spray, Instill 2 sprays in each nostril daily., Disp: 16 g, Rfl: 5    Fluticasone-Salmeterol (ADVAIR/WIXELA) 250-50 MCG/ACT DISKUS, Inhale 1 puff 2 (Two) Times a Day. Taking 1 time daily, Disp: , Rfl:     furosemide (LASIX) 40 MG tablet, Take 1 tablet by mouth Daily., Disp: 90 tablet, Rfl: 1    gabapentin (NEURONTIN) 300 MG capsule, Take 1 capsule by mouth 3 times per day., Disp: 90 capsule, Rfl: 0    lansoprazole (PREVACID) 30 MG capsule, Take 1 capsule (30 mg) by mouth daily before a meal, Disp: 90 capsule, Rfl: 2    levothyroxine (Synthroid) 50 MCG tablet, Take 1 tablet (50 mcg) by mouth daily in the morning on an empty stomach, Disp: 90 tablet, Rfl: 1    loratadine (Claritin) 10 MG tablet, Take 1 tablet (10 mg) by mouth daily, Disp: 90 tablet, Rfl: 3    metFORMIN ER (GLUCOPHAGE-XR) 500 MG 24 hr tablet, Take 2 tablets (1000 mg total) by mouth Daily With Breakfast., Disp: 180 tablet, Rfl: 2    methocarbamol (ROBAXIN) 500 MG tablet, Take 1 tablet by mouth two times daily as needed,  Disp: 60 tablet, Rfl: 3    metoprolol succinate XL (TOPROL-XL) 200 MG 24 hr tablet, Take 1 tablet by mouth Daily., Disp: 90 tablet, Rfl: 2    ondansetron ODT (ZOFRAN-ODT) 8 MG disintegrating tablet, Place 1 tablet on the tongue and allow to dissolve Every 8 (Eight) Hours As Needed., Disp: 30 tablet, Rfl: 3    potassium chloride ER (K-TAB) 20 MEQ tablet controlled-release ER tablet, Take 1 tablet by mouth Daily., Disp: 60 tablet, Rfl: 3    pravastatin (PRAVACHOL) 40 MG tablet, Take 1 tablet by mouth Daily., Disp: 90 tablet, Rfl: 1    spironolactone (ALDACTONE) 25 MG tablet, Take 1 tablet (25 mg) by mouth daily, Disp: 90 tablet, Rfl: 1    Tirzepatide (Mounjaro) 15 MG/0.5ML solution pen-injector pen, Inject 15 mg subcutaneously weekly, Disp: 2 mL, Rfl: 5    ergocalciferol (ERGOCALCIFEROL) 1.25 MG (31346 UT) capsule, Take 1 capsule (1.25 mg) by mouth twice weekly (Patient not taking: Reported on 10/9/2024), Disp: 12 capsule, Rfl: 3    fluticasone (FLONASE) 50 MCG/ACT nasal spray, Use 2 sprays in each nostril daily (Patient not taking: Reported on 10/9/2024), Disp: 16 mL, Rfl: 5    lansoprazole (PREVACID) 30 MG capsule, Take 1 capsule (30 mg) by mouth daily before a meal (Patient not taking: Reported on 6/14/2024), Disp: 90 capsule, Rfl: 2    lansoprazole (PREVACID) 30 MG capsule, Take 1 capsule (30 mg) by mouth daily before a meal (Patient not taking: Reported on 7/26/2024), Disp: 90 capsule, Rfl: 2    lansoprazole (PREVACID) 30 MG capsule, Take 1 capsule (30 mg) by mouth daily before a meal (Patient not taking: Reported on 7/26/2024), Disp: 90 capsule, Rfl: 2    loratadine (Claritin) 10 MG tablet, Take 1 tablet (10 mg) by mouth daily (Patient not taking: Reported on 6/14/2024), Disp: 90 tablet, Rfl: 3    loratadine (Claritin) 10 MG tablet, Take 1 tablet (10 mg) by mouth daily (Patient not taking: Reported on 7/26/2024), Disp: 90 tablet, Rfl: 3    potassium chloride ER (K-TAB) 20 MEQ tablet controlled-release ER tablet,  Take 2 tablets (40 mEq) by mouth daily with food (Patient not taking: Reported on 10/9/2024), Disp: 60 tablet, Rfl: 3    potassium chloride ER (K-TAB) 20 MEQ tablet controlled-release ER tablet, Take 2 tablets (40 mEq) by mouth daily with food (Patient not taking: Reported on 10/9/2024), Disp: 60 tablet, Rfl: 3    pravastatin (PRAVACHOL) 40 MG tablet, Take 1 tablet (40 mg) by mouth daily (Patient not taking: Reported on 6/14/2024), Disp: 90 tablet, Rfl: 1    spironolactone (ALDACTONE) 25 MG tablet, Take 1 tablet (25 mg) by mouth daily (Patient not taking: Reported on 10/9/2024), Disp: 90 tablet, Rfl: 1    Tirzepatide (MOUNJARO) 12.5 MG/0.5ML solution pen-injector pen, Inject 0.5 mL under the skin into the appropriate area as directed 1 (One) Time Per Week. (Patient not taking: Reported on 7/26/2024), Disp: 2 mL, Rfl: 5      Physical Exam:  I have reviewed the patient's current vital signs as documented in the patient's EMR.   Vitals:    10/09/24 1333   BP: 114/74   Pulse: 73   SpO2: 96%           Body mass index is 37.84 kg/m².       10/09/24  1333   Weight: 96.9 kg (213 lb 9.6 oz)              Physical Exam  Vitals and nursing note reviewed.   Constitutional:       General: She is awake.   HENT:      Head: Normocephalic and atraumatic.   Eyes:      General: Lids are normal.      Conjunctiva/sclera:      Right eye: Right conjunctiva is not injected.      Left eye: Left conjunctiva is not injected.   Neck:      Thyroid: No thyroid mass.      Vascular: No carotid bruit.   Cardiovascular:      Rate and Rhythm: Normal rate and regular rhythm.      Heart sounds: S1 normal and S2 normal.   Pulmonary:      Effort: No tachypnea or bradypnea.      Breath sounds: No decreased breath sounds, wheezing, rhonchi or rales.   Abdominal:      General: Bowel sounds are normal.      Palpations: Abdomen is soft.      Tenderness: There is no abdominal tenderness.   Musculoskeletal:      Cervical back: Full passive range of motion  without pain.      Right lower leg: No edema.      Left lower leg: No edema.      Comments: Non pitting bilateral edema   Skin:     General: Skin is warm and moist.   Neurological:      Mental Status: She is alert and oriented to person, place, and time.   Psychiatric:         Attention and Perception: Attention normal.         Mood and Affect: Mood normal.         Behavior: Behavior is cooperative.            JVP: Volume/Pulsation: Normal.        DATA REVIEWED:     EKG. I personally reviewed and interpreted the EKG.          ---------------------------------------------------  TTE/KELVIN:  Results for orders placed during the hospital encounter of 08/17/22    Adult Transthoracic Echo Complete W/ Cont if Necessary Per Protocol    Interpretation Summary  · Normal left ventricular cavity size and wall thickness noted. All left ventricular wall segments contract normally.  · Left ventricular ejection fraction appears to be 61 - 65%.  · The aortic valve is structurally normal with no regurgitation or stenosis present.  · The mitral valve is structurally normal with no regurgitation or significant stenosis present.  · There is no evidence of pericardial effusion.        -----------------------------------------------------  CXR/Imaging:   Imaging Results (Most Recent)       None            I personally reviewed and interpreted the CXR.      -----------------------------------------------------  CT:   No radiology results for the last 30 days.  I personally reviewed the images of the CT scan.  My personal interpretation is below.      ----------------------------------------------------    PFTs:      --------------------------------------------------------------------------------------------------    Laboratory evaluations:    Lab Results   Component Value Date    GLUCOSE 133 (H) 06/14/2024    BUN 15 06/14/2024    CREATININE 0.75 06/14/2024    EGFRIFNONA 71 12/17/2021    EGFRIFAFRI  09/17/2016      Comment:      <15  "Indicative of kidney failure.    BCR 20.0 06/14/2024    K 3.5 06/14/2024    CO2 24.2 06/14/2024    CALCIUM 9.3 06/14/2024    ALBUMIN 4.0 03/01/2024    LABIL2 1.4 (L) 02/12/2015    AST 11 03/01/2024    ALT 9 03/01/2024     Lab Results   Component Value Date    WBC 8.66 03/01/2024    HGB 13.8 03/01/2024    HCT 41.4 03/01/2024    MCV 89.6 03/01/2024     03/01/2024     Lab Results   Component Value Date    CHOL 142 03/01/2024    CHLPL 205 (H) 02/17/2016    TRIG 164 (H) 03/01/2024    HDL 41 03/01/2024    LDL 73 03/01/2024     Lab Results   Component Value Date    TSH 2.440 03/01/2024    D1XGZZO 9.40 04/18/2017     Lab Results   Component Value Date    HGBA1C 6.10 (H) 03/01/2024     Lab Results   Component Value Date    ALT 9 03/01/2024     Lab Results   Component Value Date    HGBA1C 6.10 (H) 03/01/2024    HGBA1C 6.2 (A) 01/02/2024    HGBA1C 6.4 09/28/2023     Lab Results   Component Value Date    CREATININE 0.75 06/14/2024     Lab Results   Component Value Date    IRON 75 11/17/2023    TIBC 358 11/17/2023     No results found for: \"INR\", \"PROTIME\"     Lab Results   Component Value Date    ABSOLUTELUNG 22 10/09/2024    ABSOLUTELUNG 24 06/14/2024    ABSOLUTELUNG 26 11/17/2023             PAH RISK ASSESSMENT:      1. Chronic heart failure with preserved ejection fraction (HFpEF)    2. Type 2 diabetes mellitus with other diabetic arthropathy, with long-term current use of insulin    3. Essential hypertension    4. COPD without exacerbation    5. FIDEL on CPAP            ORDERS PLACED TODAY:  Orders Placed This Encounter   Procedures    ReDs Vest        Diagnoses and all orders for this visit:    1. Chronic heart failure with preserved ejection fraction (HFpEF) (Primary)  -     ReDs Vest  -     furosemide (LASIX) 40 MG tablet; Take 1 tablet by mouth Daily.  Dispense: 90 tablet; Refill: 1  -     dapagliflozin Propanediol (Farxiga) 10 MG tablet; Take 1 tablet by mouth daily.  Dispense: 90 tablet; Refill: 1    2. Type 2 " diabetes mellitus with other diabetic arthropathy, with long-term current use of insulin  -     dapagliflozin Propanediol (Farxiga) 10 MG tablet; Take 1 tablet by mouth daily.  Dispense: 90 tablet; Refill: 1    3. Essential hypertension    4. COPD without exacerbation    5. FIDEL on CPAP               MEDS ORDERED TODAY:    New Medications Ordered This Visit   Medications    furosemide (LASIX) 40 MG tablet     Sig: Take 1 tablet by mouth Daily.     Dispense:  90 tablet     Refill:  1    dapagliflozin Propanediol (Farxiga) 10 MG tablet     Sig: Take 1 tablet by mouth daily.     Dispense:  90 tablet     Refill:  1          ---------------------------------------------------------------------------------------------------------------------------          ASSESSMENT/PLAN:      Diagnosis Plan   1. Chronic heart failure with preserved ejection fraction (HFpEF)  ReDs Vest    furosemide (LASIX) 40 MG tablet    dapagliflozin Propanediol (Farxiga) 10 MG tablet      2. Type 2 diabetes mellitus with other diabetic arthropathy, with long-term current use of insulin  dapagliflozin Propanediol (Farxiga) 10 MG tablet      3. Essential hypertension        4. COPD without exacerbation        5. FIDEL on CPAP              not acutely decompensated chronic diastolic heart failure. CHF.      NYHA stage B;FC-II.    Today, Patient is approaching euvolemiaand with  Moderate perfusion. The patient's hemodynamics are currently acceptable. HR is: normal and is at goal. BP/MAP was reviewed and there isroom for medication up-titration.  Clinical trajectory was assessed and haswaxed and waned.     CHF GOAL DIRECTED MEDICAL THERAPY FOR PATIENT ADDRESSED/ADJUSTED:       GDMT:     Drug Class   Drug   Dose Last Dose Adjustment Additional Titration   Notes   ACEi/ARB/ARNI I     Beta Blocker Toprol XL  200mg      MRA  Spironolactone 25mg qd      SGLT2i Farxiga  10mg  N/A        -CHF Specific BB:   Continue Toprol XL 200mg qd.     Educated regarding slow  titration and addition of medications with close monitoring.  She is monitoring closely and is aware of hold parameters.         -ACE/ARB/ARNi:   Entresto held for now due to recent hypotension after weight loss.    Baseline creatinine previously known to be 0.7-0.9     -MRA:   The patient is FC-NYHA Class III and MRA is indicated.   Continue Spironolactone 25mg qd.   BMP reviewed and acceptable.  Quarterly monitoring of potassium and renal function is currently recommended    -SGLT2 inhibitor therapy:   A BMP at initiation to verify GFR >30 was recommended, as was interval GFR surveillance.  The patient was advised to hold SGLT2I when PO intake is restricted due to a planned surgery, or due to an underlying illness.   Recommend continuing Farxiga (dapagliflozin) 10mg daily with quarterly assessment of GFR.   Denies current  side effects; She does report one recent yeast infection but plans to continue to monitor for more.     -Diuretic regimen:   ReDs Vest reading for 10/10/24 was 22; ReDs Vest reading reviewed with patient.    Continue Lasix 40mg qd and instructed to take extra 40mg PRN for worsening shortness of breath, swelling, weight gain.   Instructed to call clinic if symptoms worsen.    BMP, Mag, & ProBNP reviewed with patient and within acceptable limits.     -Fluid restriction/Sodium restriction:   Requested 2000 ml restriction  Patient has been asked to weigh daily and was provided with a printed diuretic strategy.  1,500 mg Na restriction was discussed.  Dietary information/Education provided.      -Acute vs. Chronic underlying conditions other than HF addressed during visit:     Depression:   Osteoarthritis:   Feeling somewhat tolerable with Cymbalta; refills sent to pharmacy.     COPD, stable without exacerbation:   Continue outpatient f/u with PCP.     Hyperglycemia with IDDM:   Last hemoglobin a1c 8.6 on 03/2022  Continue Farxiga 10mg; refills sent.   Continue outpatient PCP follow-up.   Endo f/u  in April 2023.      FIDEL on CPAP  Continue home CPAP.     Essential HTN:   Hold Entresto for now.   Keep Toprol XL 200mg qd.                  Class 3 Severe Obesity (BMI >=40). Obesity-related health conditions include the following: diabetes mellitus, dyslipidemias, peripheral vascular disease and osteoarthritis. Obesity is newly identified. BMI is is above average; BMI management plan is completed. We discussed portion control and increasing exercise. Patient is currently actively exercising.         >45 minutes out of 60 minutes face to face spent counseling patient extensively on dietary Na+ intake, importance of activity, weight monitoring, compliance with medications in addition to importance of titration with goal directed medical therapy and follow up appointments.            This document has been electronically signed by Tawanna Arambula PA-C  October 10, 2024 11:31 EDT      Dictated Utilizing Dragon Dictation: Part of this note may be an electronic transcription/translation of spoken language to printed text using the Dragon Dictation System.    Follow-up appointment and medication changes provided in hand delivered After Visit Summary as well as reviewed in the room.

## 2024-10-15 ENCOUNTER — SPECIALTY PHARMACY (OUTPATIENT)
Dept: PHARMACY | Facility: HOSPITAL | Age: 64
End: 2024-10-15
Payer: COMMERCIAL

## 2024-10-15 NOTE — PROGRESS NOTES
Specialty Pharmacy Refill Coordination Note     Zeina is a 64 y.o. female contacted today regarding refills of  Mounjaro specialty medication(s).    Reviewed and verified with patient:       Specialty medication(s) and dose(s) confirmed: yes    Refill Questions      Flowsheet Row Most Recent Value   Changes to allergies? No   Changes to medications? No   New conditions or infections since last clinic visit No   Unplanned office visit, urgent care, ED, or hospital admission in the last 4 weeks  No   How does patient/caregiver feel medication is working? Very good   Financial problems or insurance changes  No   Since the previous refill, were any specialty medication doses or scheduled injections missed or delayed?  No   Does this patient require a clinical escalation to a pharmacist? No            Delivery Questions      Flowsheet Row Most Recent Value   Delivery method  at Pharmacy   Delivery address verified with patient/caregiver? Yes   Delivery address Home   Number of medications in delivery 1   Medication(s) being filled and delivered Tirzepatide (MOUNJARO)   Doses left of specialty medications na   Copay verified? No   Copay amount 0   Copay form of payment No copayment ($0)   Signature Required No                   Follow-up: 30 day(s)     Minal Raygoza, Pharmacy Technician  Specialty Pharmacy Technician

## 2024-11-11 ENCOUNTER — SPECIALTY PHARMACY (OUTPATIENT)
Dept: PHARMACY | Facility: HOSPITAL | Age: 64
End: 2024-11-11
Payer: COMMERCIAL

## 2024-11-11 NOTE — PROGRESS NOTES
Specialty Pharmacy Refill Coordination Note     Zeina is a 64 y.o. female contacted today regarding refills of  Mounjaro specialty medication(s).    Reviewed and verified with patient:       Specialty medication(s) and dose(s) confirmed: yes    Refill Questions      Flowsheet Row Most Recent Value   Changes to allergies? No   Changes to medications? No   New conditions or infections since last clinic visit No   Unplanned office visit, urgent care, ED, or hospital admission in the last 4 weeks  No   How does patient/caregiver feel medication is working? Very good   Financial problems or insurance changes  No   Since the previous refill, were any specialty medication doses or scheduled injections missed or delayed?  No   Does this patient require a clinical escalation to a pharmacist? No            Delivery Questions      Flowsheet Row Most Recent Value   Delivery method  at Pharmacy   Number of medications in delivery 1   Medication(s) being filled and delivered Tirzepatide (MOUNJARO)   Doses left of specialty medications na   Copay verified? No   Copay amount na   Copay form of payment No copayment ($0)   Signature Required Yes                   Follow-up: 30 day(s)     Minal Raygoza Pharmacy Technician  Specialty Pharmacy Technician

## 2024-12-13 ENCOUNTER — SPECIALTY PHARMACY (OUTPATIENT)
Dept: PHARMACY | Facility: HOSPITAL | Age: 64
End: 2024-12-13
Payer: COMMERCIAL

## 2024-12-13 NOTE — PROGRESS NOTES
Specialty Pharmacy Refill Coordination Note     Zeina is a 64 y.o. female contacted today regarding refills of  Mounjaro and Farxiga specialty medication(s).    Reviewed and verified with patient:       Specialty medication(s) and dose(s) confirmed: yes    Refill Questions      Flowsheet Row Most Recent Value   Changes to allergies? No   Changes to medications? No   New conditions or infections since last clinic visit No   Unplanned office visit, urgent care, ED, or hospital admission in the last 4 weeks  No   How does patient/caregiver feel medication is working? Very good   Financial problems or insurance changes  No   Since the previous refill, were any specialty medication doses or scheduled injections missed or delayed?  No   Does this patient require a clinical escalation to a pharmacist? No            Delivery Questions      Flowsheet Row Most Recent Value   Delivery method  at Pharmacy   Delivery address verified with patient/caregiver? Yes   Delivery address Home   Number of medications in delivery 1   Medication(s) being filled and delivered Dapagliflozin Propanediol, Tirzepatide (MOUNJARO), Potassium Chloride (K-TAB), Pravastatin Sodium (PRAVACHOL), DULoxetine HCl (CYMBALTA)   Doses left of specialty medications na   Copay verified? No   Copay amount na   Copay form of payment No copayment ($0)   Signature Required Yes                   Follow-up: 30 day(s)     Minal Raygoza, Pharmacy Technician  Specialty Pharmacy Technician

## 2024-12-23 RX ORDER — METOPROLOL SUCCINATE 200 MG/1
200 TABLET, EXTENDED RELEASE ORAL DAILY
Qty: 90 TABLET | Refills: 2 | Status: SHIPPED | OUTPATIENT
Start: 2024-12-23

## 2024-12-27 ENCOUNTER — SPECIALTY PHARMACY (OUTPATIENT)
Dept: PHARMACY | Facility: HOSPITAL | Age: 64
End: 2024-12-27
Payer: COMMERCIAL

## 2025-01-02 ENCOUNTER — TELEPHONE (OUTPATIENT)
Dept: PHARMACY | Facility: HOSPITAL | Age: 65
End: 2025-01-02
Payer: COMMERCIAL

## 2025-01-02 NOTE — TELEPHONE ENCOUNTER
Ms. Reed called today to request a refill on her Mounjaro. She states she has been doing well and denies any adverse effects other than some occasional heartburn which she reports is tolerable.      She plans on coming to her Endo appt. Later this month but needs a refill prior to the visit.  She also reports still taking metformin and Farxiga.     Spoke with Shannan and she agrees to send refill until her follow-up.     Aislinn Ernandez, PharmD  01/02/25  13:04 EST

## 2025-01-06 ENCOUNTER — SPECIALTY PHARMACY (OUTPATIENT)
Dept: PHARMACY | Facility: HOSPITAL | Age: 65
End: 2025-01-06
Payer: COMMERCIAL

## 2025-01-17 ENCOUNTER — SPECIALTY PHARMACY (OUTPATIENT)
Dept: PHARMACY | Facility: HOSPITAL | Age: 65
End: 2025-01-17
Payer: COMMERCIAL

## 2025-01-17 ENCOUNTER — OFFICE VISIT (OUTPATIENT)
Dept: ENDOCRINOLOGY | Facility: CLINIC | Age: 65
End: 2025-01-17
Payer: COMMERCIAL

## 2025-01-17 VITALS
SYSTOLIC BLOOD PRESSURE: 132 MMHG | OXYGEN SATURATION: 98 % | HEART RATE: 78 BPM | WEIGHT: 204.2 LBS | DIASTOLIC BLOOD PRESSURE: 68 MMHG | BODY MASS INDEX: 36.17 KG/M2

## 2025-01-17 DIAGNOSIS — E11.649 TYPE 2 DIABETES MELLITUS WITH HYPOGLYCEMIA WITHOUT COMA, WITH LONG-TERM CURRENT USE OF INSULIN: Primary | ICD-10-CM

## 2025-01-17 DIAGNOSIS — E11.319 TYPE 2 DIABETES MELLITUS WITH RETINOPATHY OF RIGHT EYE, WITHOUT LONG-TERM CURRENT USE OF INSULIN, MACULAR EDEMA PRESENCE UNSPECIFIED, UNSPECIFIED RETINOPATHY SEVERITY: Primary | ICD-10-CM

## 2025-01-17 DIAGNOSIS — Z79.4 TYPE 2 DIABETES MELLITUS WITH HYPOGLYCEMIA WITHOUT COMA, WITH LONG-TERM CURRENT USE OF INSULIN: Primary | ICD-10-CM

## 2025-01-17 LAB
EXPIRATION DATE: ABNORMAL
EXPIRATION DATE: NORMAL
GLUCOSE BLDC GLUCOMTR-MCNC: 131 MG/DL (ref 70–130)
HBA1C MFR BLD: 5.6 % (ref 4.5–5.7)
Lab: ABNORMAL
Lab: NORMAL

## 2025-01-17 PROCEDURE — 82043 UR ALBUMIN QUANTITATIVE: CPT

## 2025-01-17 PROCEDURE — 83036 HEMOGLOBIN GLYCOSYLATED A1C: CPT

## 2025-01-17 PROCEDURE — 80053 COMPREHEN METABOLIC PANEL: CPT

## 2025-01-17 PROCEDURE — 82570 ASSAY OF URINE CREATININE: CPT

## 2025-01-17 PROCEDURE — 99214 OFFICE O/P EST MOD 30 MIN: CPT

## 2025-01-17 PROCEDURE — 84443 ASSAY THYROID STIM HORMONE: CPT

## 2025-01-17 PROCEDURE — 82947 ASSAY GLUCOSE BLOOD QUANT: CPT

## 2025-01-17 PROCEDURE — 80061 LIPID PANEL: CPT

## 2025-01-17 NOTE — PROGRESS NOTES
Chief Complaint   Patient presents with    Diabetes     F/u       HPI   Zeina Reed is a 64 y.o. female who presents to the clinic today for follow-up of type 2 diabetes. Diabetes was diagnosed in 2010. A1c today is 5.6. She is currently taking metformin XR 1000 mg daily, Mounjaro 15 mg injection weekly, and Farxiga 10 mg daily.  She checks her glucose twice daily. She reports her average fasting glucose ranges from 100s to 140s. She previously wore Dexcom G7 CGM. She reports it has become unaffordable for her, but hopes to wear it again in the future. She denies any hypoglycemia. She does report some mild heartburn controlled with over-the-counter medication. She has cut out potatoes and her appetite is decreased with use of Mounjaro, which she believes has helped control her diabetes as well. She reports one yeast infection since being on Farxiga. She denies any recent UTIs or yeast infections.     Past medications: Metformin quick release- GI issues, Jardiance- yeast infection  Diabetic complications: retinopathy and peripheral neuropathy shots in right eye, cataracts  Last optho exam: UTD  Last microalbumin: today  Last foot exam: today  Statin: yes   Lipid panel: today  ACE/ARB: no, heart failure clinic stopped Entresto recently    The following portions of the patient's history were reviewed and updated as appropriate: allergies, current medications, past family history, past medical history, past social history, past surgical history, and problem list.    /68 (BP Location: Right arm, Patient Position: Sitting, Cuff Size: Adult)   Pulse 78   Wt 92.6 kg (204 lb 3.2 oz)   SpO2 98%   BMI 36.17 kg/m²    Past Medical History:   Diagnosis Date    Acute exacerbation of CHF (congestive heart failure) 07/10/2021    Arthritis     COPD (chronic obstructive pulmonary disease)     Diabetes mellitus     Disease of thyroid gland     Dyslipidemia     Elevated cholesterol     GERD (gastroesophageal reflux disease)      History of transfusion     Hyperlipidemia     Hypertension     Palpitations     Sleep apnea      Past Surgical History:   Procedure Laterality Date    CHOLECYSTECTOMY      COLONOSCOPY N/A 2/21/2017    Procedure: COLONOSCOPY FOR SCREENING  CPTCODE:95925;  Surgeon: Socrates Price III, MD;  Location:  [facility];  Service:     COLONOSCOPY N/A 9/14/2023    Procedure: COLONOSCOPY;  Surgeon: Natacha Jo MD;  Location:  [facility];  Service: Gastroenterology;  Laterality: N/A;    HYSTERECTOMY      SKIN GRAFT      TUBAL ABDOMINAL LIGATION        Family History   Problem Relation Age of Onset    Cancer Mother     Heart attack Father     Cancer Father       Social History     Socioeconomic History    Marital status:    Tobacco Use    Smoking status: Never     Passive exposure: Never    Smokeless tobacco: Never   Vaping Use    Vaping status: Never Used   Substance and Sexual Activity    Alcohol use: No    Drug use: No    Sexual activity: Defer      Allergies   Allergen Reactions    Augmentin [Amoxicillin-Pot Clavulanate] Diarrhea    Metformin And Related Diarrhea     Immediate Release - tolerates ER      Current Outpatient Medications on File Prior to Visit   Medication Sig Dispense Refill    acetaminophen-codeine (TYLENOL with CODEINE #4) 300-60 MG per tablet Take ½ a tablet by mouth every 12 hours as needed for severe pain 60 tablet 0    busPIRone (BUSPAR) 5 MG tablet Take 1 tablet by mouth 3 (Three) Times a Day. 90 tablet 2    dapagliflozin Propanediol (Farxiga) 10 MG tablet Take 1 tablet by mouth daily. 90 tablet 1    ergocalciferol (ERGOCALCIFEROL) 1.25 MG (12423 UT) capsule Take 1 capsule (1.25 mg) by mouth twice weekly (Patient not taking: Reported on 1/17/2025) 12 capsule 3    fluticasone (FLONASE) 50 MCG/ACT nasal spray Instill 2 sprays in each nostril daily. (Patient taking differently: Administer 2 sprays into the nostril(s) as directed by provider Daily As Needed.) 16 g 5     Fluticasone-Salmeterol (ADVAIR/WIXELA) 250-50 MCG/ACT DISKUS Inhale 1 puff 2 (Two) Times a Day. Taking 1 time daily      gabapentin (NEURONTIN) 300 MG capsule Take 1 capsule by mouth 3 times per day. 90 capsule 0    lansoprazole (PREVACID) 30 MG capsule Take 1 capsule (30 mg) by mouth daily before a meal 90 capsule 2    levothyroxine (Synthroid) 50 MCG tablet Take 1 tablet by mouth Every Morning on an empty stomach. 90 tablet 1    loratadine (Claritin) 10 MG tablet Take 1 tablet (10 mg) by mouth daily 90 tablet 3    methocarbamol (ROBAXIN) 500 MG tablet Take 1 tablet by mouth two times daily as needed 60 tablet 3    metoprolol succinate XL (TOPROL-XL) 200 MG 24 hr tablet Take 1 tablet by mouth Daily. 90 tablet 2    ondansetron ODT (ZOFRAN-ODT) 8 MG disintegrating tablet Place 1 tablet on the tongue and allow to dissolve Every 8 (Eight) Hours As Needed. 30 tablet 3    potassium chloride ER (K-TAB) 20 MEQ tablet controlled-release ER tablet Take 1 tablet by mouth Daily. 60 tablet 3    spironolactone (ALDACTONE) 25 MG tablet Take 1 tablet (25 mg) by mouth daily 90 tablet 1    [DISCONTINUED] DULoxetine (CYMBALTA) 60 MG capsule Take 1 capsule by mouth daily (Patient not taking: Reported on 1/17/2025) 90 capsule 1    [DISCONTINUED] furosemide (LASIX) 40 MG tablet Take 1 tablet by mouth Daily. 90 tablet 1    [DISCONTINUED] metFORMIN ER (GLUCOPHAGE-XR) 500 MG 24 hr tablet Take 2 tablets (1000 mg total) by mouth Daily With Breakfast. 180 tablet 2    [DISCONTINUED] pravastatin (PRAVACHOL) 40 MG tablet Take 1 tablet by mouth Daily. 90 tablet 1    [DISCONTINUED] Tirzepatide (MOUNJARO) 12.5 MG/0.5ML solution pen-injector pen Inject 0.5 mL under the skin into the appropriate area as directed 1 (One) Time Per Week. (Patient not taking: Reported on 1/17/2025) 2 mL 5    [DISCONTINUED] Tirzepatide 15 MG/0.5ML solution auto-injector Inject 15 mg under the skin into the appropriate area as directed 1 (One) Time Per Week. 2 mL 0     Continuous Glucose Sensor (Dexcom G7 Sensor) misc Change sensor Every 10 (Ten) Days. (Patient not taking: Reported on 1/17/2025) 3 each 5    ergocalciferol (ERGOCALCIFEROL) 1.25 MG (18521 UT) capsule Take 1 capsule (1.25 mg) by mouth twice weekly (Patient not taking: Reported on 1/17/2025) 12 capsule 3    fluticasone (FLONASE) 50 MCG/ACT nasal spray Use 2 sprays in each nostril daily (Patient not taking: Reported on 1/17/2025) 16 mL 5    lansoprazole (PREVACID) 30 MG capsule Take 1 capsule (30 mg) by mouth daily before a meal (Patient not taking: Reported on 1/17/2025) 90 capsule 2    lansoprazole (PREVACID) 30 MG capsule Take 1 capsule (30 mg) by mouth daily before a meal (Patient not taking: Reported on 1/17/2025) 90 capsule 2    lansoprazole (PREVACID) 30 MG capsule Take 1 capsule (30 mg) by mouth daily before a meal (Patient not taking: Reported on 1/17/2025) 90 capsule 2    loratadine (Claritin) 10 MG tablet Take 1 tablet (10 mg) by mouth daily (Patient not taking: Reported on 1/17/2025) 90 tablet 3    loratadine (Claritin) 10 MG tablet Take 1 tablet (10 mg) by mouth daily (Patient not taking: Reported on 1/17/2025) 90 tablet 3    potassium chloride ER (K-TAB) 20 MEQ tablet controlled-release ER tablet Take 2 tablets (40 mEq) by mouth daily with food (Patient not taking: Reported on 1/17/2025) 60 tablet 3    potassium chloride ER (K-TAB) 20 MEQ tablet controlled-release ER tablet Take 2 tablets (40 mEq) by mouth daily with food (Patient not taking: Reported on 1/17/2025) 60 tablet 3    pravastatin (PRAVACHOL) 40 MG tablet Take 1 tablet (40 mg) by mouth daily 90 tablet 1    [DISCONTINUED] spironolactone (ALDACTONE) 25 MG tablet Take 1 tablet (25 mg) by mouth daily (Patient not taking: Reported on 1/17/2025) 90 tablet 1     No current facility-administered medications on file prior to visit.      Review of Systems   Constitutional:  Positive for fatigue.   Gastrointestinal:  Negative for abdominal pain,  constipation, diarrhea, nausea and vomiting.   Endocrine: Negative for polydipsia, polyphagia and polyuria.   Genitourinary:  Negative for dysuria.      Physical Exam  Vitals reviewed.   Constitutional:       Appearance: Normal appearance.   Eyes:      Extraocular Movements: Extraocular movements intact.   Cardiovascular:      Rate and Rhythm: Normal rate.   Pulmonary:      Effort: Pulmonary effort is normal.   Skin:     General: Skin is warm.   Neurological:      General: No focal deficit present.      Mental Status: She is alert and oriented to person, place, and time.   Psychiatric:         Mood and Affect: Mood normal.         Behavior: Behavior normal.         Thought Content: Thought content normal.         Judgment: Judgment normal.       Labs/Imaging  CMP:  Lab Results   Component Value Date    BUN 14 01/17/2025    CREATININE 0.79 01/17/2025    EGFR 83.6 01/17/2025    BCR 17.7 01/17/2025     01/17/2025    K 3.8 01/17/2025    CO2 28.0 01/17/2025    CALCIUM 9.2 01/17/2025    ALBUMIN 4.0 01/17/2025    AGRATIO 1.4 01/17/2025    BILITOT 0.9 01/17/2025    ALKPHOS 95 01/17/2025    AST 15 01/17/2025    ALT 15 01/17/2025     Lipid Panel:  Lab Results   Component Value Date    CHOL 140 01/17/2025    TRIG 218 (H) 01/17/2025    HDL 48 01/17/2025    VLDL 35 01/17/2025    LDL 57 01/17/2025     HbA1c:  Lab Results   Component Value Date    HGBA1C 5.6 01/17/2025    HGBA1C 6.10 (H) 03/01/2024    HGBA1C 6.2 (A) 01/02/2024     Microalbumin:  Lab Results   Component Value Date    MALBCRERATIO  01/17/2025      Comment:      Unable to calculate     TSH:  Lab Results   Component Value Date    TSH 2.280 01/17/2025       Assessment and Plan  Diagnoses and all orders for this visit:    1. Type 2 diabetes mellitus with retinopathy of right eye, without long-term current use of insulin, macular edema presence unspecified, unspecified retinopathy severity (Primary)  Assessment & Plan:  -Diabetes is controlled with A1c 5.6.    -Continue Mounjaro 15 mg injection weekly  -Continue metformin extended release 1000 mg daily  -Continue Farxiga 10 mg daily  -Discussed dietary and exercise guidelines with patient. 150 g carbs per day and 150 minutes of exercise per week. Increase protein with complex carbs. Avoid foods high in refined sugars and sugary drinks.   -Discussed the importance of yearly eye exams.  -Discussed Glucagon use and appropriate timing for this.   -S/S hypoglycemia reviewed with Rule of 15's advised.  -Discussed long term risks of untreated/undertreated diabetes including retinopathy, neuropathy, nephropathy, amputations, hospitalizations, MI, CVA.    -Patient has no personal history of pancreatitis, no family history of MEN syndrome or medullary thyroid cancer. Possible side effects including nausea, bloating, other GI upset and rarely pancreatitis were discussed. She was advised to call the office with any symptoms or concerns.    -Discussed the medication's MOA and protective cardiovascular and renal benefits for diabetes. Medication may cause  more frequent urination particularly upon starting the medication. Take one tablet in the morning with or without food. Encouraged to drink plenty of water as to not get dehydrated, particularly in warmer weather or with activity. Also discussed there may be an increased incidence of UTIs or yeast infections and to monitor for signs/symptoms.      Orders:  -     POC Glycosylated Hemoglobin (Hb A1C)  -     POC Glucose, Blood  -     Comprehensive Metabolic Panel  -     Lipid Panel  -     Microalbumin / Creatinine Urine Ratio - Urine, Clean Catch  -     TSH       Return in about 3 months (around 4/17/2025). The patient was instructed to contact the clinic with any interval questions or concerns.    Please note that portions of this document were completed with a voice recognition program. Efforts were made to edit the dictations, but occasionally words are mis-transcribed.  This  document has been electronically signed by MAURO Moreno  January 20, 2025 13:27 EST

## 2025-01-18 LAB
ALBUMIN SERPL-MCNC: 4 G/DL (ref 3.5–5.2)
ALBUMIN UR-MCNC: <1.2 MG/DL
ALBUMIN/GLOB SERPL: 1.4 G/DL
ALP SERPL-CCNC: 95 U/L (ref 39–117)
ALT SERPL W P-5'-P-CCNC: 15 U/L (ref 1–33)
ANION GAP SERPL CALCULATED.3IONS-SCNC: 12 MMOL/L (ref 5–15)
AST SERPL-CCNC: 15 U/L (ref 1–32)
BILIRUB SERPL-MCNC: 0.9 MG/DL (ref 0–1.2)
BUN SERPL-MCNC: 14 MG/DL (ref 8–23)
BUN/CREAT SERPL: 17.7 (ref 7–25)
CALCIUM SPEC-SCNC: 9.2 MG/DL (ref 8.6–10.5)
CHLORIDE SERPL-SCNC: 102 MMOL/L (ref 98–107)
CHOLEST SERPL-MCNC: 140 MG/DL (ref 0–200)
CO2 SERPL-SCNC: 28 MMOL/L (ref 22–29)
CREAT SERPL-MCNC: 0.79 MG/DL (ref 0.57–1)
CREAT UR-MCNC: 68.5 MG/DL
EGFRCR SERPLBLD CKD-EPI 2021: 83.6 ML/MIN/1.73
GLOBULIN UR ELPH-MCNC: 2.8 GM/DL
GLUCOSE SERPL-MCNC: 96 MG/DL (ref 65–99)
HDLC SERPL-MCNC: 48 MG/DL (ref 40–60)
LDLC SERPL CALC-MCNC: 57 MG/DL (ref 0–100)
LDLC/HDLC SERPL: 1.01 {RATIO}
MICROALBUMIN/CREAT UR: NORMAL MG/G{CREAT}
POTASSIUM SERPL-SCNC: 3.8 MMOL/L (ref 3.5–5.2)
PROT SERPL-MCNC: 6.8 G/DL (ref 6–8.5)
SODIUM SERPL-SCNC: 142 MMOL/L (ref 136–145)
TRIGL SERPL-MCNC: 218 MG/DL (ref 0–150)
TSH SERPL DL<=0.05 MIU/L-ACNC: 2.28 UIU/ML (ref 0.27–4.2)
VLDLC SERPL-MCNC: 35 MG/DL (ref 5–40)

## 2025-01-20 RX ORDER — BISMUTH SUBSALICYLATE 262 MG/1
524 TABLET, CHEWABLE ORAL WEEKLY
COMMUNITY

## 2025-01-20 RX ORDER — SIMETHICONE 80 MG
80 TABLET,CHEWABLE ORAL WEEKLY
COMMUNITY

## 2025-01-20 NOTE — ASSESSMENT & PLAN NOTE
-Diabetes is controlled with A1c 5.6.   -Continue Mounjaro 15 mg injection weekly  -Continue metformin extended release 1000 mg daily  -Continue Farxiga 10 mg daily  -Discussed dietary and exercise guidelines with patient. 150 g carbs per day and 150 minutes of exercise per week. Increase protein with complex carbs. Avoid foods high in refined sugars and sugary drinks.   -Discussed the importance of yearly eye exams.  -Discussed Glucagon use and appropriate timing for this.   -S/S hypoglycemia reviewed with Rule of 15's advised.  -Discussed long term risks of untreated/undertreated diabetes including retinopathy, neuropathy, nephropathy, amputations, hospitalizations, MI, CVA.    -Patient has no personal history of pancreatitis, no family history of MEN syndrome or medullary thyroid cancer. Possible side effects including nausea, bloating, other GI upset and rarely pancreatitis were discussed. She was advised to call the office with any symptoms or concerns.    -Discussed the medication's MOA and protective cardiovascular and renal benefits for diabetes. Medication may cause  more frequent urination particularly upon starting the medication. Take one tablet in the morning with or without food. Encouraged to drink plenty of water as to not get dehydrated, particularly in warmer weather or with activity. Also discussed there may be an increased incidence of UTIs or yeast infections and to monitor for signs/symptoms.

## 2025-01-20 NOTE — PROGRESS NOTES
Specialty Pharmacy Patient Management Program  Endocrinology Reassessment     Zeina Reed is a 64 y.o. female seen by an Endocrinology Provider for Type 2 Diabetes and enrolled in the Endocrinology Patient Management program offered by [facility].  A follow-up outreach was conducted, including assessment of continued therapy appropriateness, medication adherence, and side effect incidence and management for Mounjaro.     The pt currently takes Mounjaro 15mg subq weekly, Metformin XR 1000mg po daily, and Farxiga 10mg po daily for Type 2 Diabetes.  The pt reports no adherence issues and no low episodes of BG < 70.  The pt states that she checks her glucose twice daily and that her normal BG range is 115-140.  Pt states that she does have some side effects of mild heartburn, belching, and passing gas.  Pt states that these side effects are tolerable and that she will take 2 pepto-bismol tables and one simethicone tablet on the day of her weekly Mounjaro injection to alleviate these mild side-effects (these meds were added to home med list).  Provider Vashti WADE notified.    The patient denies any personal or family history of thyroid cancer, denies any history of pancreatitis, and reports one yeast infection last year.  In the past, the patient has tried:                Drug Dose Reason for Discontinuation Notes   Metformin IR   Diarrhea Can tolerate ER formulation    Jardiance   Yeast infection                 Changes to Insurance Coverage or Financial Support  None       Relevant Past Medical History and Comorbidities  Relevant medical history and concomitant health conditions were discussed with the patient. The patient's chart has been reviewed for relevant past medical history and comorbid health conditions and updated as necessary.   Past Medical History:   Diagnosis Date    Acute exacerbation of CHF (congestive heart failure) 07/10/2021    Arthritis     COPD (chronic  obstructive pulmonary disease)     Diabetes mellitus     Disease of thyroid gland     Dyslipidemia     Elevated cholesterol     GERD (gastroesophageal reflux disease)     History of transfusion     Hyperlipidemia     Hypertension     Palpitations     Sleep apnea      Social History     Socioeconomic History    Marital status:    Tobacco Use    Smoking status: Never     Passive exposure: Never    Smokeless tobacco: Never   Vaping Use    Vaping status: Never Used   Substance and Sexual Activity    Alcohol use: No    Drug use: No    Sexual activity: Defer       Problem list reviewed by Manfred Garcia RPH on 1/20/2025 at  7:51 AM    Hospitalizations and Urgent Care Since Last Assessment  ED Visits, Admissions or Hospitalizations : None  Urgent Office Visits: None required    Allergies  Known allergies and reactions were discussed with the patient. The patient's chart has been reviewed for allergy information and updated as necessary.   Allergies   Allergen Reactions    Augmentin [Amoxicillin-Pot Clavulanate] Diarrhea    Metformin And Related Diarrhea     Immediate Release - tolerates ER       Allergies reviewed by Manfred Garcia RPH on 1/17/2025 at 10:30 AM  Allergies reviewed by Manfred Garcia RPH on 1/20/2025 at  7:51 AM    Relevant Laboratory Values  A1C Last 3 Results          3/1/2024    13:24 1/17/2025    10:21   HGBA1C Last 3 Results   Hemoglobin A1C 6.10  5.6      Lab Results   Component Value Date    HGBA1C 5.6 01/17/2025     Lab Results   Component Value Date    GLUCOSE 96 01/17/2025    CALCIUM 9.2 01/17/2025     01/17/2025    K 3.8 01/17/2025    CO2 28.0 01/17/2025     01/17/2025    BUN 14 01/17/2025    CREATININE 0.79 01/17/2025    EGFRIFAFRI  09/17/2016      Comment:      <15 Indicative of kidney failure.    EGFRIFNONA 71 12/17/2021    BCR 17.7 01/17/2025    ANIONGAP 12.0 01/17/2025     Lab Results   Component Value Date    CHOL 140 01/17/2025    CHLPL 205 (H) 02/17/2016    TRIG 218  (H) 01/17/2025    HDL 48 01/17/2025    LDL 57 01/17/2025     Microalbumin          1/17/2025    11:34   Microalbumin   Microalbumin, Urine <1.2        Current Medication List  This medication list has been reviewed with the patient and evaluated for any interactions or necessary modifications/recommendations, and updated to include all prescription medications, OTC medications, and supplements the patient is currently taking. This list reflects what is contained in the patient's profile, which has also been marked as reviewed to communicate to other providers it is the most up to date version of the patient's current medication therapy.     Current Outpatient Medications:     acetaminophen-codeine (TYLENOL with CODEINE #4) 300-60 MG per tablet, Take ½ a tablet by mouth every 12 hours as needed for severe pain, Disp: 60 tablet, Rfl: 0    bismuth subsalicylate (PEPTO BISMOL) 262 MG chewable tablet, Chew 2 tablets 1 (One) Time Per Week. On the day of mounjaro injection, Disp: , Rfl:     busPIRone (BUSPAR) 5 MG tablet, Take 1 tablet by mouth 3 (Three) Times a Day., Disp: 90 tablet, Rfl: 2    dapagliflozin Propanediol (Farxiga) 10 MG tablet, Take 1 tablet by mouth daily., Disp: 90 tablet, Rfl: 1    fluticasone (FLONASE) 50 MCG/ACT nasal spray, Instill 2 sprays in each nostril daily. (Patient taking differently: Administer 2 sprays into the nostril(s) as directed by provider Daily As Needed.), Disp: 16 g, Rfl: 5    Fluticasone-Salmeterol (ADVAIR/WIXELA) 250-50 MCG/ACT DISKUS, Inhale 1 puff 2 (Two) Times a Day. Taking 1 time daily, Disp: , Rfl:     furosemide (LASIX) 40 MG tablet, Take 1 tablet by mouth Daily., Disp: 90 tablet, Rfl: 1    gabapentin (NEURONTIN) 300 MG capsule, Take 1 capsule by mouth 3 times per day., Disp: 90 capsule, Rfl: 0    lansoprazole (PREVACID) 30 MG capsule, Take 1 capsule (30 mg) by mouth daily before a meal, Disp: 90 capsule, Rfl: 2    levothyroxine (Synthroid) 50 MCG tablet, Take 1 tablet by  mouth Every Morning on an empty stomach., Disp: 90 tablet, Rfl: 1    loratadine (Claritin) 10 MG tablet, Take 1 tablet (10 mg) by mouth daily, Disp: 90 tablet, Rfl: 3    metFORMIN ER (GLUCOPHAGE-XR) 500 MG 24 hr tablet, Take 2 tablets (1000 mg total) by mouth Daily With Breakfast., Disp: 180 tablet, Rfl: 2    methocarbamol (ROBAXIN) 500 MG tablet, Take 1 tablet by mouth two times daily as needed, Disp: 60 tablet, Rfl: 3    metoprolol succinate XL (TOPROL-XL) 200 MG 24 hr tablet, Take 1 tablet by mouth Daily., Disp: 90 tablet, Rfl: 2    ondansetron ODT (ZOFRAN-ODT) 8 MG disintegrating tablet, Place 1 tablet on the tongue and allow to dissolve Every 8 (Eight) Hours As Needed., Disp: 30 tablet, Rfl: 3    potassium chloride ER (K-TAB) 20 MEQ tablet controlled-release ER tablet, Take 1 tablet by mouth Daily., Disp: 60 tablet, Rfl: 3    simethicone (MYLICON) 80 MG chewable tablet, Chew 1 tablet 1 (One) Time Per Week. On the day of mounjaro injection, Disp: , Rfl:     spironolactone (ALDACTONE) 25 MG tablet, Take 1 tablet (25 mg) by mouth daily, Disp: 90 tablet, Rfl: 1    Tirzepatide 15 MG/0.5ML solution auto-injector, Inject 15 mg under the skin into the appropriate area as directed 1 (One) Time Per Week., Disp: 2 mL, Rfl: 5    Continuous Glucose Sensor (Dexcom G7 Sensor) misc, Change sensor Every 10 (Ten) Days. (Patient not taking: Reported on 1/17/2025), Disp: 3 each, Rfl: 5    DULoxetine (CYMBALTA) 60 MG capsule, Take 1 capsule by mouth daily (Patient not taking: Reported on 1/17/2025), Disp: 90 capsule, Rfl: 1    ergocalciferol (ERGOCALCIFEROL) 1.25 MG (67087 UT) capsule, Take 1 capsule (1.25 mg) by mouth twice weekly (Patient not taking: Reported on 1/17/2025), Disp: 12 capsule, Rfl: 3    ergocalciferol (ERGOCALCIFEROL) 1.25 MG (61659 UT) capsule, Take 1 capsule (1.25 mg) by mouth twice weekly (Patient not taking: Reported on 1/17/2025), Disp: 12 capsule, Rfl: 3    fluticasone (FLONASE) 50 MCG/ACT nasal spray, Use 2  sprays in each nostril daily (Patient not taking: Reported on 1/17/2025), Disp: 16 mL, Rfl: 5    lansoprazole (PREVACID) 30 MG capsule, Take 1 capsule (30 mg) by mouth daily before a meal (Patient not taking: Reported on 1/17/2025), Disp: 90 capsule, Rfl: 2    lansoprazole (PREVACID) 30 MG capsule, Take 1 capsule (30 mg) by mouth daily before a meal (Patient not taking: Reported on 1/17/2025), Disp: 90 capsule, Rfl: 2    lansoprazole (PREVACID) 30 MG capsule, Take 1 capsule (30 mg) by mouth daily before a meal (Patient not taking: Reported on 1/17/2025), Disp: 90 capsule, Rfl: 2    loratadine (Claritin) 10 MG tablet, Take 1 tablet (10 mg) by mouth daily (Patient not taking: Reported on 1/17/2025), Disp: 90 tablet, Rfl: 3    loratadine (Claritin) 10 MG tablet, Take 1 tablet (10 mg) by mouth daily (Patient not taking: Reported on 1/17/2025), Disp: 90 tablet, Rfl: 3    potassium chloride ER (K-TAB) 20 MEQ tablet controlled-release ER tablet, Take 2 tablets (40 mEq) by mouth daily with food (Patient not taking: Reported on 1/17/2025), Disp: 60 tablet, Rfl: 3    potassium chloride ER (K-TAB) 20 MEQ tablet controlled-release ER tablet, Take 2 tablets (40 mEq) by mouth daily with food (Patient not taking: Reported on 1/17/2025), Disp: 60 tablet, Rfl: 3    pravastatin (PRAVACHOL) 40 MG tablet, Take 1 tablet (40 mg) by mouth daily, Disp: 90 tablet, Rfl: 1    Medicines reviewed by Manfred Garcia RPH on 1/17/2025 at 10:33 AM  Medicines reviewed by Manfred Garcia RPH on 1/20/2025 at  8:13 AM  Medicines reviewed by Manfred Garcia RPH on 1/20/2025 at  8:20 AM    Drug Interactions  -Furosemide can diminish the therapeutic effect of mounjaro and metformin  -Metoprolol may enhance the hypoglycemic effect of antidiabetic agents  -Pepto-bismol may enhance the hypoglycemic effect of antidiabetic agents (pt only takes this med once weekly)    Recommended Medications Assessment  Aspirin: Not taking  Statin: Currently Taking    ACEi/ARB: Not taking (heart failure clinic stopped Entresto)    Adverse Drug Reactions  Adverse Reactions Experienced: Pt reports some mild heartburn, belching, and passing gas after taking Mounjaro.  Pt states that this is tolerable for her.  Pt takes 2 pepto-bismol tabs and a gas-x tab on the same day she takes the mounjaro every week.  Plan for ADR Management: None required as these are mild, tolerable side effects for the pt.    Adherence, Self-Administration, and Current Therapy Problems  Adherence related to the patient's specialty therapy was discussed with the patient. The Adherence segment of this outreach has been reviewed and updated.   Adherence Questions  Linked Medication(s) Assessed: Tirzepatide  On average, how many doses/injections does the patient miss per month?: 0  What are the identified reasons for non-adherence or missed doses? : no problems identified  What is the estimated medication adherence level?: %  Based on the patient/caregiver response and refill history, does this patient require an MTP to track adherence improvements?: no    Additional Barriers to Patient Self-Administration: None  Methods for Supporting Patient Self-Administration: None required    Recently Close Medication Therapy Problems  No medication therapy recommendations to display    Goals of Therapy  Goals related to the patient's specialty therapy was discussed with the patient. The Patient Goals segment of this outreach has been reviewed and updated.    Goals Addressed Today        HEMOGLOBIN A1C < 7      Currently at goal as A1c = 5.6%       Specialty Pharmacy General Goal      Prevent Hypoglycemia--At goal as pt reports no low BG < 70 episodes            Quality of Life Improvement Scale: 9-A good deal better  Reassessment Plan & Follow-Up  Diabetes is under control as A1c = 5.6%  Medication Therapy Changes: Per MAURO Alexis, continue the following medications:  Mounjaro 15 mg weekly   Metformin XR  1000mg po daily  Farxiga 10mg po daily  Related Plans, Therapy Recommendations, or Issues to Be Addressed: [facility]   Instructed pt to contact office if she experiences any more UTI's or yeast infections.  Educated pt that UTI's/yeast infections are a potential side effect of Farxiga. Of note, heart failure clinic prescribes this medication.  Recommended repeat lipid panel be re-drawn as pt's TG were elevated with last check in March 2024.  Lipid panel ordered and TG elevated again.  Notified provider Vashti WADE and recommended additional TG therapy.  Pharmacist to perform regular reassessments no more than (6) months from the previous assessment.  Care Coordinator to set up future refill outreaches, coordinate prescription delivery, and escalate clinical questions to pharmacist.     Attestation  I attest the patient was actively involved in and has agreed to the above plan of care. If the prescribed therapy is at any point deemed not appropriate based on the current or future assessments, a consultation will be initiated with the patient's specialty care provider to determine the best course of action. The revised plan of therapy will be documented along with any required assessments and/or additional patient education provided.    Manfred Garcia RPH  01/20/25  08:22 EST

## 2025-01-23 RX ORDER — ONDANSETRON 8 MG/1
8 TABLET, ORALLY DISINTEGRATING ORAL EVERY 8 HOURS PRN
Qty: 30 TABLET | Refills: 3 | OUTPATIENT
Start: 2025-01-23

## 2025-02-07 ENCOUNTER — SPECIALTY PHARMACY (OUTPATIENT)
Dept: PHARMACY | Facility: HOSPITAL | Age: 65
End: 2025-02-07
Payer: COMMERCIAL

## 2025-02-07 NOTE — PROGRESS NOTES
Specialty Pharmacy Refill Coordination Note     Zeina is a 64 y.o. female contacted today regarding refills of  Mounjaro specialty medication(s).    Reviewed and verified with patient:       Specialty medication(s) and dose(s) confirmed: yes    Refill Questions      Flowsheet Row Most Recent Value   Changes to allergies? No   Changes to medications? No   New conditions or infections since last clinic visit No   Unplanned office visit, urgent care, ED, or hospital admission in the last 4 weeks  No   How does patient/caregiver feel medication is working? Very good   Financial problems or insurance changes  No   Since the previous refill, were any specialty medication doses or scheduled injections missed or delayed?  No   Does this patient require a clinical escalation to a pharmacist? No            Delivery Questions      Flowsheet Row Most Recent Value   Delivery method [employer]   Delivery address verified with patient/caregiver? Yes   Delivery address Home   Number of medications in delivery 1   Medication(s) being filled and delivered Tirzepatide   Doses left of specialty medications na   Copay verified? No   Copay amount na   Copay form of payment Credit/debit on file   Ship Date 02/10   Delivery Date Selection 02/11/25   Signature Required No                   Follow-up: 30 day(s)     Minal Raygoza, Pharmacy Technician  Specialty Pharmacy Technician

## 2025-03-10 ENCOUNTER — DISEASE STATE MANAGEMENT VISIT (OUTPATIENT)
Dept: PHARMACY | Facility: HOSPITAL | Age: 65
End: 2025-03-10
Payer: COMMERCIAL

## 2025-03-10 ENCOUNTER — SPECIALTY PHARMACY (OUTPATIENT)
Dept: PHARMACY | Facility: HOSPITAL | Age: 65
End: 2025-03-10
Payer: COMMERCIAL

## 2025-03-10 NOTE — PROGRESS NOTES
Specialty Pharmacy Patient Management Program  Refill Outreach     Zeina was contacted today regarding refills of their medication(s).    Refill Questions      Flowsheet Row Most Recent Value   Changes to allergies? No   Changes to medications? No   New conditions or infections since last clinic visit No   Unplanned office visit, urgent care, ED, or hospital admission in the last 4 weeks  No   How does patient/caregiver feel medication is working? Very good   Financial problems or insurance changes  No   Since the previous refill, were any specialty medication doses or scheduled injections missed or delayed?  No   Does this patient require a clinical escalation to a pharmacist? No            Delivery Questions      Flowsheet Row Most Recent Value   Delivery method [employer]   Delivery address verified with patient/caregiver? Yes   Delivery address Home   Number of medications in delivery 3   Medication(s) being filled and delivered Metoprolol Succinate (TOPROL-XL), Dapagliflozin Propanediol, Tirzepatide   Doses left of specialty medications na   Copay verified? Yes   Copay amount 25.00$   Copay form of payment Credit/debit on file   Delivery Date Selection 03/11/25   Signature Required No                 Follow-up: 30 day(s)     Minal Raygoza, Pharmacy Technician  3/10/2025  14:36 EDT

## 2025-03-27 ENCOUNTER — DISEASE STATE MANAGEMENT VISIT (OUTPATIENT)
Dept: CARDIOLOGY | Facility: HOSPITAL | Age: 65
End: 2025-03-27
Payer: COMMERCIAL

## 2025-04-09 ENCOUNTER — SPECIALTY PHARMACY (OUTPATIENT)
Dept: PHARMACY | Facility: HOSPITAL | Age: 65
End: 2025-04-09
Payer: COMMERCIAL

## 2025-04-09 NOTE — PROGRESS NOTES
Specialty Pharmacy Patient Management Program  Refill Outreach     Zeina was contacted today regarding refills of their medication(s).    Refill Questions      Flowsheet Row Most Recent Value   Changes to allergies? No   Changes to medications? No   New conditions or infections since last clinic visit No   Unplanned office visit, urgent care, ED, or hospital admission in the last 4 weeks  No   How does patient/caregiver feel medication is working? Very good   Financial problems or insurance changes  No   Since the previous refill, were any specialty medication doses or scheduled injections missed or delayed?  No   Does this patient require a clinical escalation to a pharmacist? No            Delivery Questions      Flowsheet Row Most Recent Value   Delivery method [employer]   Delivery address verified with patient/caregiver? Yes   Delivery address Home   Number of medications in delivery 2   Medication(s) being filled and delivered Tirzepatide, Potassium Chloride (K-TAB)   Doses left of specialty medications na   Copay verified? Yes   Copay amount 30.00$   Copay form of payment Credit/debit on file   Signature Required No   Do you consent to receive electronic handouts?  Yes                 Follow-up: 30 day(s)     Minal Raygoza, Pharmacy Technician  4/9/2025  15:29 EDT

## 2025-04-23 ENCOUNTER — SPECIALTY PHARMACY (OUTPATIENT)
Dept: PHARMACY | Facility: HOSPITAL | Age: 65
End: 2025-04-23
Payer: COMMERCIAL

## 2025-04-23 ENCOUNTER — OFFICE VISIT (OUTPATIENT)
Dept: ENDOCRINOLOGY | Facility: CLINIC | Age: 65
End: 2025-04-23
Payer: COMMERCIAL

## 2025-04-23 VITALS
HEART RATE: 74 BPM | BODY MASS INDEX: 37.73 KG/M2 | SYSTOLIC BLOOD PRESSURE: 113 MMHG | WEIGHT: 213 LBS | DIASTOLIC BLOOD PRESSURE: 63 MMHG | OXYGEN SATURATION: 98 %

## 2025-04-23 DIAGNOSIS — E11.319 TYPE 2 DIABETES MELLITUS WITH RETINOPATHY OF RIGHT EYE, WITHOUT LONG-TERM CURRENT USE OF INSULIN, MACULAR EDEMA PRESENCE UNSPECIFIED, UNSPECIFIED RETINOPATHY SEVERITY: Primary | ICD-10-CM

## 2025-04-23 DIAGNOSIS — E11.649 TYPE 2 DIABETES MELLITUS WITH HYPOGLYCEMIA WITHOUT COMA, WITH LONG-TERM CURRENT USE OF INSULIN: ICD-10-CM

## 2025-04-23 DIAGNOSIS — Z79.4 TYPE 2 DIABETES MELLITUS WITH HYPOGLYCEMIA WITHOUT COMA, WITH LONG-TERM CURRENT USE OF INSULIN: ICD-10-CM

## 2025-04-23 LAB
EXPIRATION DATE: ABNORMAL
EXPIRATION DATE: ABNORMAL
GLUCOSE BLDC GLUCOMTR-MCNC: 144 MG/DL (ref 70–130)
HBA1C MFR BLD: 5.8 % (ref 4.5–5.7)
Lab: ABNORMAL
Lab: ABNORMAL

## 2025-04-23 NOTE — PROGRESS NOTES
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 female who presents to the clinic today for follow-up of type 2 diabetes. Diabetes was diagnosed in 2010. A1c today is 5.8.  Fasting blood sugar is running 130s.  She is currently taking Mounjaro 15 mg injection weekly, metformin extended release 1000 mg daily and Farxiga 10 mg daily for her diabetes.  She does report some heartburn the first day after she takes her Mounjaro, but she takes Pepto-Bismol and Gas-X the night before which relieves her symptoms.  She denies any hypoglycemia, yeast infections or UTIs.  She did have cataract surgery this week and is seeing her retina specialist Monday.    Past medications: Metformin quick release- GI issues, Jardiance- yeast infection  Diabetic complications: retinopathy and peripheral neuropathy shots in right eye, cataracts  Last optho exam: UTD  Last microalbumin: UTD  Last foot exam: UTD  Statin: yes   Lipid panel: UTD  ACE/ARB: no, heart failure clinic stopped Entresto    The following portions of the patient's history were reviewed and updated as appropriate: allergies, current medications, past family history, past medical history, past social history, past surgical history, and problem list.    /63 (BP Location: Right arm, Patient Position: Sitting, Cuff Size: Large Adult)   Pulse 74   Wt 96.6 kg (213 lb)   SpO2 98%   BMI 37.73 kg/m²    Past Medical History:   Diagnosis Date    Acute exacerbation of CHF (congestive heart failure) 07/10/2021    Arthritis     Cholelithiasis Remove in 1990    COPD (chronic obstructive pulmonary disease)     Coronary artery disease four years ago    Diabetes mellitus     Disease of thyroid gland     Dyslipidemia     Elevated cholesterol     GERD (gastroesophageal  reflux disease)     Hernia 10 years ago    History of transfusion     Hyperlipidemia     Hypertension     Knee swelling May 2014    Palpitations     Sleep apnea      Past Surgical History:   Procedure Laterality Date    CARDIAC CATHETERIZATION      CHOLECYSTECTOMY      COLONOSCOPY N/A 02/21/2017    Procedure: COLONOSCOPY FOR SCREENING  CPTCODE:14507;  Surgeon: Socrates Price III, MD;  Location:  [facility] OR;  Service:     COLONOSCOPY N/A 09/14/2023    Procedure: COLONOSCOPY;  Surgeon: Natacha Jo MD;  Location: [facility] OR;  Service: Gastroenterology;  Laterality: N/A;    HYSTERECTOMY      SKIN GRAFT      TUBAL ABDOMINAL LIGATION        Family History   Problem Relation Age of Onset    Cancer Mother     Heart attack Father     Cancer Father       Social History     Socioeconomic History    Marital status:    Tobacco Use    Smoking status: Never     Passive exposure: Never    Smokeless tobacco: Never   Vaping Use    Vaping status: Never Used   Substance and Sexual Activity    Alcohol use: No    Drug use: No    Sexual activity: Defer      Allergies   Allergen Reactions    Augmentin [Amoxicillin-Pot Clavulanate] Diarrhea    Metformin And Related Diarrhea     Immediate Release - tolerates ER      Current Outpatient Medications on File Prior to Visit   Medication Sig Dispense Refill    acetaminophen-codeine (TYLENOL with CODEINE #4) 300-60 MG per tablet Take ½ a tablet by mouth every 12 hours as needed for severe pain 60 tablet 0    acetaminophen-codeine (TYLENOL with CODEINE #4) 300-60 MG per tablet Take ½ tablet by mouth Every 12 (Twelve) Hours As Needed for severe pain. 60 tablet 0    acetaminophen-codeine (TYLENOL with CODEINE #4) 300-60 MG per tablet Take 0.5 tablets by mouth Every 12 (Twelve) Hours As Needed. 60 tablet 0    bismuth subsalicylate (PEPTO BISMOL) 262 MG chewable tablet Chew 2 tablets 1 (One) Time Per Week. On the day of mounjaro injection      busPIRone (BUSPAR) 5 MG tablet Take  1 tablet by mouth 3 (Three) Times a Day. (Patient taking differently: Take 1 tablet by mouth 3 (Three) Times a Day. prn) 90 tablet 2    dapagliflozin Propanediol (Farxiga) 10 MG tablet Take 1 tablet by mouth daily. 90 tablet 1    DULoxetine (CYMBALTA) 60 MG capsule Take 1 capsule by mouth daily 90 capsule 1    fluticasone (FLONASE) 50 MCG/ACT nasal spray Instill 2 sprays in each nostril daily. 16 g 5    Fluticasone-Salmeterol (ADVAIR/WIXELA) 250-50 MCG/ACT DISKUS Inhale 1 puff 2 (Two) Times a Day. Taking 1 time daily      furosemide (LASIX) 40 MG tablet Take 1 tablet (40 mg) by mouth 2 times per day 180 tablet 0    furosemide (LASIX) 40 MG tablet Take 1 tablet (40 mg) by mouth 2 times per day 180 tablet 0    gabapentin (NEURONTIN) 300 MG capsule Take 1 capsule by mouth 3 times per day. 90 capsule 0    gabapentin (NEURONTIN) 300 MG capsule Take 1 capsule (300 mg) by mouth 3 times per day 90 capsule 1    lansoprazole (PREVACID) 30 MG capsule Take 1 capsule (30 mg) by mouth daily before a meal 90 capsule 2    lansoprazole (PREVACID) 30 MG capsule Take 1 capsule by mouth Daily before a meal (Patient not taking: Reported on 4/23/2025) 90 capsule 2    levothyroxine (Synthroid) 50 MCG tablet Take 1 tablet by mouth Every Morning on an empty stomach. 90 tablet 1    levothyroxine (Synthroid) 50 MCG tablet Take 1 tablet (50 mcg) by mouth daily in the morning on an empty stomach 90 tablet 1    loratadine (Claritin) 10 MG tablet Take 1 tablet (10 mg) by mouth daily 90 tablet 3    loratadine (CLARITIN) 10 MG tablet Take 1 tablet by mouth daily (Patient not taking: Reported on 4/23/2025) 90 tablet 1    metFORMIN ER (GLUCOPHAGE-XR) 500 MG 24 hr tablet Take 2 tablets by mouth daily with evening meal 180 tablet 0    metFORMIN ER (GLUCOPHAGE-XR) 500 MG 24 hr tablet Take 2 tablets by mouth daily with evening meal 180 tablet 0    methocarbamol (ROBAXIN) 500 MG tablet Take 1 tablet by mouth two times daily as needed 60 tablet 3     metoprolol succinate XL (TOPROL-XL) 200 MG 24 hr tablet Take 1 tablet by mouth Daily. 90 tablet 2    ondansetron ODT (ZOFRAN-ODT) 8 MG disintegrating tablet Place 1 tablet on the tongue and allow to dissolve Every 8 (Eight) Hours As Needed. 30 tablet 3    potassium chloride ER (K-TAB) 20 MEQ tablet controlled-release ER tablet Take 1 tablet by mouth Daily. 60 tablet 3    potassium chloride ER (K-TAB) 20 MEQ tablet controlled-release ER tablet Take 2 tablets by mouth Daily with food. (Patient not taking: Reported on 4/23/2025) 60 tablet 3    pravastatin (PRAVACHOL) 40 MG tablet Take 1 tablet (40 mg) by mouth daily 90 tablet 1    pravastatin (PRAVACHOL) 40 MG tablet Take 1 tablet (40 mg) by mouth daily 90 tablet 1    pravastatin (PRAVACHOL) 40 MG tablet Take 1 tablet by mouth Daily. 90 tablet 1    simethicone (MYLICON) 80 MG chewable tablet Chew 1 tablet 1 (One) Time Per Week. On the day of mounjaro injection      spironolactone (ALDACTONE) 25 MG tablet Take 1 tablet (25 mg) by mouth daily 90 tablet 1    spironolactone (ALDACTONE) 25 MG tablet Take 1 tablet (25 mg) by mouth daily 90 tablet 1    spironolactone (ALDACTONE) 25 MG tablet Take 1 tablet (25 mg) by mouth daily 90 tablet 1    Tirzepatide 15 MG/0.5ML solution auto-injector Inject 15 mg under the skin into the appropriate area as directed 1 (One) Time Per Week. 2 mL 5    ergocalciferol (ERGOCALCIFEROL) 1.25 MG (26268 UT) capsule Take 1 capsule (1.25 mg) by mouth twice weekly (Patient not taking: Reported on 1/17/2025) 12 capsule 3    ergocalciferol (ERGOCALCIFEROL) 1.25 MG (34575 UT) capsule Take 1 capsule (1.25 mg) by mouth twice weekly (Patient not taking: Reported on 4/23/2025) 12 capsule 3    fluticasone (FLONASE) 50 MCG/ACT nasal spray Use 2 sprays in each nostril daily 16 mL 5    lansoprazole (PREVACID) 30 MG capsule Take 1 capsule (30 mg) by mouth daily before a meal (Patient not taking: Reported on 4/23/2025) 90 capsule 2    lansoprazole (PREVACID) 30  MG capsule Take 1 capsule (30 mg) by mouth daily before a meal (Patient not taking: Reported on 4/23/2025) 90 capsule 2    lansoprazole (PREVACID) 30 MG capsule Take 1 capsule (30 mg) by mouth daily before a meal (Patient not taking: Reported on 4/23/2025) 90 capsule 2    loratadine (Claritin) 10 MG tablet Take 1 tablet (10 mg) by mouth daily (Patient not taking: Reported on 4/23/2025) 90 tablet 3    loratadine (Claritin) 10 MG tablet Take 1 tablet (10 mg) by mouth daily (Patient not taking: Reported on 4/23/2025) 90 tablet 3    potassium chloride ER (K-TAB) 20 MEQ tablet controlled-release ER tablet Take 2 tablets (40 mEq) by mouth daily with food (Patient not taking: Reported on 4/23/2025) 60 tablet 3    potassium chloride ER (K-TAB) 20 MEQ tablet controlled-release ER tablet Take 2 tablets (40 mEq) by mouth daily with food (Patient not taking: Reported on 4/23/2025) 60 tablet 3     No current facility-administered medications on file prior to visit.      Review of Systems   Gastrointestinal:  Negative for abdominal pain, constipation, diarrhea, nausea and vomiting.   Endocrine: Negative for polydipsia, polyphagia and polyuria.   Genitourinary:  Negative for dysuria.      Physical Exam  Vitals reviewed.   Constitutional:       Appearance: Normal appearance.   Eyes:      Extraocular Movements: Extraocular movements intact.   Cardiovascular:      Rate and Rhythm: Normal rate.   Pulmonary:      Effort: Pulmonary effort is normal.   Skin:     General: Skin is warm.   Neurological:      General: No focal deficit present.      Mental Status: She is alert and oriented to person, place, and time.   Psychiatric:         Mood and Affect: Mood normal.         Behavior: Behavior normal.         Thought Content: Thought content normal.         Judgment: Judgment normal.       Labs/Imaging  CMP:  Lab Results   Component Value Date    BUN 14 01/17/2025    CREATININE 0.79 01/17/2025    EGFR 83.6 01/17/2025    BCR 17.7 01/17/2025      01/17/2025    K 3.8 01/17/2025    CO2 28.0 01/17/2025    CALCIUM 9.2 01/17/2025    ALBUMIN 4.0 01/17/2025    AGRATIO 1.4 01/17/2025    BILITOT 0.9 01/17/2025    ALKPHOS 95 01/17/2025    AST 15 01/17/2025    ALT 15 01/17/2025     Lipid Panel:  Lab Results   Component Value Date    CHOL 140 01/17/2025    TRIG 218 (H) 01/17/2025    HDL 48 01/17/2025    VLDL 35 01/17/2025    LDL 57 01/17/2025     HbA1c:  Lab Results   Component Value Date    HGBA1C 5.8 (A) 04/23/2025    HGBA1C 5.6 01/17/2025    HGBA1C 6.10 (H) 03/01/2024     Microalbumin:  Lab Results   Component Value Date    MALBCRERATIO  01/17/2025      Comment:      Unable to calculate     TSH:  Lab Results   Component Value Date    TSH 2.280 01/17/2025       Assessment and Plan  Diagnoses and all orders for this visit:    1. 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   -Continue Mounjaro 15 mg injection weekly  -Continue metformin extended release 1000 mg daily  -Continue Farxiga 10 mg daily  -Discussed dietary and exercise guidelines with patient. 150 g carbs per day and 150 minutes of exercise per week. Increase protein with complex carbs. Avoid foods high in refined sugars and sugary drinks.   -Discussed the importance of yearly eye exams.  -Discussed Glucagon use and appropriate timing for this.   -S/S hypoglycemia reviewed with Rule of 15's advised.  -Discussed long term risks of untreated/undertreated diabetes including retinopathy, neuropathy, nephropathy, amputations, hospitalizations, MI, CVA.   -Patient has no personal history of pancreatitis, no family history of MEN syndrome or medullary thyroid cancer. Possible side effects including nausea, bloating, other GI upset and rarely pancreatitis were discussed. She was advised to call the office with any  symptoms or concerns.    -Discussed the medication's MOA and protective cardiovascular and renal benefits for diabetes. Medication may cause  more frequent urination particularly upon starting the medication. Take one tablet in the morning with or without food. Encouraged to drink plenty of water as to not get dehydrated, particularly in warmer weather or with activity. Also discussed there may be an increased incidence of UTIs or yeast infections and to monitor for signs/symptoms.       Orders:  -     POC Glycosylated Hemoglobin (Hb A1C)  -     POC Glucose, Blood       Return in about 3 months (around 7/23/2025). The patient was instructed to contact the clinic with any interval questions or concerns.    Please note that portions of this document were completed with a voice recognition program. Efforts were made to edit the dictations, but occasionally words are mis-transcribed.  This document has been electronically signed by MAURO Moreno  April 23, 2025 14:49 EDT

## 2025-04-23 NOTE — ASSESSMENT & PLAN NOTE
-Diabetes is controlled with A1c 5.8.   -Continue Mounjaro 15 mg injection weekly  -Continue metformin extended release 1000 mg daily  -Continue Farxiga 10 mg daily  -Discussed dietary and exercise guidelines with patient. 150 g carbs per day and 150 minutes of exercise per week. Increase protein with complex carbs. Avoid foods high in refined sugars and sugary drinks.   -Discussed the importance of yearly eye exams.  -Discussed Glucagon use and appropriate timing for this.   -S/S hypoglycemia reviewed with Rule of 15's advised.  -Discussed long term risks of untreated/undertreated diabetes including retinopathy, neuropathy, nephropathy, amputations, hospitalizations, MI, CVA.   -Patient has no personal history of pancreatitis, no family history of MEN syndrome or medullary thyroid cancer. Possible side effects including nausea, bloating, other GI upset and rarely pancreatitis were discussed. She was advised to call the office with any symptoms or concerns.    -Discussed the medication's MOA and protective cardiovascular and renal benefits for diabetes. Medication may cause  more frequent urination particularly upon starting the medication. Take one tablet in the morning with or without food. Encouraged to drink plenty of water as to not get dehydrated, particularly in warmer weather or with activity. Also discussed there may be an increased incidence of UTIs or yeast infections and to monitor for signs/symptoms.

## 2025-04-23 NOTE — PROGRESS NOTES
Specialty Pharmacy Patient Management Program  Endocrinology Reassessment     Zeina Reed is a 64 y.o. female seen by an Endocrinology Provider for Type 2 Diabetes and enrolled in the Endocrinology Patient Management program offered by [facility].  A follow-up outreach was conducted, including assessment of continued therapy appropriateness, medication adherence, and side effect incidence and management for Mounjaro.     The pt currently takes Mounjaro 15mg subq weekly, Metformin XR 1000mg po daily, and Farxiga 10mg po daily for Type 2 Diabetes.  The pt reports no adherence issues and no low episodes of BG < 70.  The pt states that she checks her glucose twice daily and that her normal BG range is 130-160.  Pt states that she does have some side effects of mild heartburn, belching, and passing gas.  Pt states that these side effects are tolerable and that she will take 2 pepto-bismol tables and one simethicone tablet on the day of her weekly Mounjaro injection to alleviate these mild side-effects (these meds were added to home med list).  Provider Vashti WADE notified.    Patient reports having an appointment with a retina specialist on Monday.     The patient denies any personal or family history of thyroid cancer, denies any history of pancreatitis, and reports one yeast infection last year.  In the past, the patient has tried:                Drug Dose Reason for Discontinuation Notes   Metformin IR   Diarrhea Can tolerate ER formulation    Jardiance   Yeast infection                 Changes to Insurance Coverage or Financial Support  None       Relevant Past Medical History and Comorbidities  Relevant medical history and concomitant health conditions were discussed with the patient. The patient's chart has been reviewed for relevant past medical history and comorbid health conditions and updated as necessary.   Past Medical History:   Diagnosis Date    Acute exacerbation of CHF  (congestive heart failure) 07/10/2021    Arthritis     Cholelithiasis Remove in 1990    COPD (chronic obstructive pulmonary disease)     Coronary artery disease four years ago    Diabetes mellitus     Disease of thyroid gland     Dyslipidemia     Elevated cholesterol     GERD (gastroesophageal reflux disease)     Hernia 10 years ago    History of transfusion     Hyperlipidemia     Hypertension     Knee swelling May 2014    Palpitations     Sleep apnea      Social History     Socioeconomic History    Marital status:    Tobacco Use    Smoking status: Never     Passive exposure: Never    Smokeless tobacco: Never   Vaping Use    Vaping status: Never Used   Substance and Sexual Activity    Alcohol use: No    Drug use: No    Sexual activity: Defer       Problem list reviewed by Venita Gutierrez RPH on 4/23/2025 at  2:02 PM  Problem list reviewed by Venita Gutierrez RPH on 4/23/2025 at  2:19 PM    Hospitalizations and Urgent Care Since Last Assessment  ED Visits, Admissions or Hospitalizations : None  Urgent Office Visits: None required    Allergies  Known allergies and reactions were discussed with the patient. The patient's chart has been reviewed for allergy information and updated as necessary.   Allergies   Allergen Reactions    Augmentin [Amoxicillin-Pot Clavulanate] Diarrhea    Metformin And Related Diarrhea     Immediate Release - tolerates ER       Allergies reviewed by Venita Gutierrez RPH on 4/23/2025 at  2:02 PM  Allergies reviewed by Venita Gutierrez RPH on 4/23/2025 at  2:19 PM    Relevant Laboratory Values  A1C Last 3 Results          1/17/2025    10:21 4/23/2025    14:17   HGBA1C Last 3 Results   Hemoglobin A1C 5.6  5.8      Lab Results   Component Value Date    HGBA1C 5.8 (A) 04/23/2025     Lab Results   Component Value Date    GLUCOSE 96 01/17/2025    CALCIUM 9.2 01/17/2025     01/17/2025    K 3.8 01/17/2025    CO2 28.0 01/17/2025     01/17/2025    BUN 14 01/17/2025    CREATININE  0.79 01/17/2025    EGFRIFAFRI  09/17/2016      Comment:      <15 Indicative of kidney failure.    EGFRIFNONA 71 12/17/2021    BCR 17.7 01/17/2025    ANIONGAP 12.0 01/17/2025     Lab Results   Component Value Date    CHOL 140 01/17/2025    CHLPL 205 (H) 02/17/2016    TRIG 218 (H) 01/17/2025    HDL 48 01/17/2025    LDL 57 01/17/2025     Microalbumin          1/17/2025    11:34   Microalbumin   Microalbumin, Urine <1.2        Current Medication List  This medication list has been reviewed with the patient and evaluated for any interactions or necessary modifications/recommendations, and updated to include all prescription medications, OTC medications, and supplements the patient is currently taking. This list reflects what is contained in the patient's profile, which has also been marked as reviewed to communicate to other providers it is the most up to date version of the patient's current medication therapy.     Current Outpatient Medications:     acetaminophen-codeine (TYLENOL with CODEINE #4) 300-60 MG per tablet, Take ½ a tablet by mouth every 12 hours as needed for severe pain, Disp: 60 tablet, Rfl: 0    acetaminophen-codeine (TYLENOL with CODEINE #4) 300-60 MG per tablet, Take ½ tablet by mouth Every 12 (Twelve) Hours As Needed for severe pain., Disp: 60 tablet, Rfl: 0    acetaminophen-codeine (TYLENOL with CODEINE #4) 300-60 MG per tablet, Take 0.5 tablets by mouth Every 12 (Twelve) Hours As Needed., Disp: 60 tablet, Rfl: 0    bismuth subsalicylate (PEPTO BISMOL) 262 MG chewable tablet, Chew 2 tablets 1 (One) Time Per Week. On the day of mounjaro injection, Disp: , Rfl:     busPIRone (BUSPAR) 5 MG tablet, Take 1 tablet by mouth 3 (Three) Times a Day. (Patient taking differently: Take 1 tablet by mouth 3 (Three) Times a Day. prn), Disp: 90 tablet, Rfl: 2    dapagliflozin Propanediol (Farxiga) 10 MG tablet, Take 1 tablet by mouth daily., Disp: 90 tablet, Rfl: 1    DULoxetine (CYMBALTA) 60 MG capsule, Take 1  capsule by mouth daily, Disp: 90 capsule, Rfl: 1    fluticasone (FLONASE) 50 MCG/ACT nasal spray, Instill 2 sprays in each nostril daily., Disp: 16 g, Rfl: 5    fluticasone (FLONASE) 50 MCG/ACT nasal spray, Use 2 sprays in each nostril daily, Disp: 16 mL, Rfl: 5    Fluticasone-Salmeterol (ADVAIR/WIXELA) 250-50 MCG/ACT DISKUS, Inhale 1 puff 2 (Two) Times a Day. Taking 1 time daily, Disp: , Rfl:     furosemide (LASIX) 40 MG tablet, Take 1 tablet (40 mg) by mouth 2 times per day, Disp: 180 tablet, Rfl: 0    furosemide (LASIX) 40 MG tablet, Take 1 tablet (40 mg) by mouth 2 times per day, Disp: 180 tablet, Rfl: 0    gabapentin (NEURONTIN) 300 MG capsule, Take 1 capsule by mouth 3 times per day., Disp: 90 capsule, Rfl: 0    gabapentin (NEURONTIN) 300 MG capsule, Take 1 capsule (300 mg) by mouth 3 times per day, Disp: 90 capsule, Rfl: 1    lansoprazole (PREVACID) 30 MG capsule, Take 1 capsule (30 mg) by mouth daily before a meal, Disp: 90 capsule, Rfl: 2    levothyroxine (Synthroid) 50 MCG tablet, Take 1 tablet by mouth Every Morning on an empty stomach., Disp: 90 tablet, Rfl: 1    levothyroxine (Synthroid) 50 MCG tablet, Take 1 tablet (50 mcg) by mouth daily in the morning on an empty stomach, Disp: 90 tablet, Rfl: 1    loratadine (Claritin) 10 MG tablet, Take 1 tablet (10 mg) by mouth daily, Disp: 90 tablet, Rfl: 3    metFORMIN ER (GLUCOPHAGE-XR) 500 MG 24 hr tablet, Take 2 tablets by mouth daily with evening meal, Disp: 180 tablet, Rfl: 0    metFORMIN ER (GLUCOPHAGE-XR) 500 MG 24 hr tablet, Take 2 tablets by mouth daily with evening meal, Disp: 180 tablet, Rfl: 0    methocarbamol (ROBAXIN) 500 MG tablet, Take 1 tablet by mouth two times daily as needed, Disp: 60 tablet, Rfl: 3    metoprolol succinate XL (TOPROL-XL) 200 MG 24 hr tablet, Take 1 tablet by mouth Daily., Disp: 90 tablet, Rfl: 2    ondansetron ODT (ZOFRAN-ODT) 8 MG disintegrating tablet, Place 1 tablet on the tongue and allow to dissolve Every 8 (Eight) Hours  As Needed., Disp: 30 tablet, Rfl: 3    potassium chloride ER (K-TAB) 20 MEQ tablet controlled-release ER tablet, Take 1 tablet by mouth Daily., Disp: 60 tablet, Rfl: 3    pravastatin (PRAVACHOL) 40 MG tablet, Take 1 tablet (40 mg) by mouth daily, Disp: 90 tablet, Rfl: 1    pravastatin (PRAVACHOL) 40 MG tablet, Take 1 tablet (40 mg) by mouth daily, Disp: 90 tablet, Rfl: 1    pravastatin (PRAVACHOL) 40 MG tablet, Take 1 tablet by mouth Daily., Disp: 90 tablet, Rfl: 1    simethicone (MYLICON) 80 MG chewable tablet, Chew 1 tablet 1 (One) Time Per Week. On the day of mounjaro injection, Disp: , Rfl:     spironolactone (ALDACTONE) 25 MG tablet, Take 1 tablet (25 mg) by mouth daily, Disp: 90 tablet, Rfl: 1    spironolactone (ALDACTONE) 25 MG tablet, Take 1 tablet (25 mg) by mouth daily, Disp: 90 tablet, Rfl: 1    spironolactone (ALDACTONE) 25 MG tablet, Take 1 tablet (25 mg) by mouth daily, Disp: 90 tablet, Rfl: 1    Tirzepatide 15 MG/0.5ML solution auto-injector, Inject 15 mg under the skin into the appropriate area as directed 1 (One) Time Per Week., Disp: 2 mL, Rfl: 5    ergocalciferol (ERGOCALCIFEROL) 1.25 MG (51370 UT) capsule, Take 1 capsule (1.25 mg) by mouth twice weekly (Patient not taking: Reported on 1/17/2025), Disp: 12 capsule, Rfl: 3    ergocalciferol (ERGOCALCIFEROL) 1.25 MG (53666 UT) capsule, Take 1 capsule (1.25 mg) by mouth twice weekly (Patient not taking: Reported on 4/23/2025), Disp: 12 capsule, Rfl: 3    lansoprazole (PREVACID) 30 MG capsule, Take 1 capsule (30 mg) by mouth daily before a meal (Patient not taking: Reported on 4/23/2025), Disp: 90 capsule, Rfl: 2    lansoprazole (PREVACID) 30 MG capsule, Take 1 capsule (30 mg) by mouth daily before a meal (Patient not taking: Reported on 4/23/2025), Disp: 90 capsule, Rfl: 2    lansoprazole (PREVACID) 30 MG capsule, Take 1 capsule (30 mg) by mouth daily before a meal (Patient not taking: Reported on 4/23/2025), Disp: 90 capsule, Rfl: 2    lansoprazole  (PREVACID) 30 MG capsule, Take 1 capsule by mouth Daily before a meal (Patient not taking: Reported on 4/23/2025), Disp: 90 capsule, Rfl: 2    loratadine (Claritin) 10 MG tablet, Take 1 tablet (10 mg) by mouth daily (Patient not taking: Reported on 4/23/2025), Disp: 90 tablet, Rfl: 3    loratadine (Claritin) 10 MG tablet, Take 1 tablet (10 mg) by mouth daily (Patient not taking: Reported on 4/23/2025), Disp: 90 tablet, Rfl: 3    loratadine (CLARITIN) 10 MG tablet, Take 1 tablet by mouth daily (Patient not taking: Reported on 4/23/2025), Disp: 90 tablet, Rfl: 1    potassium chloride ER (K-TAB) 20 MEQ tablet controlled-release ER tablet, Take 2 tablets (40 mEq) by mouth daily with food (Patient not taking: Reported on 4/23/2025), Disp: 60 tablet, Rfl: 3    potassium chloride ER (K-TAB) 20 MEQ tablet controlled-release ER tablet, Take 2 tablets (40 mEq) by mouth daily with food (Patient not taking: Reported on 4/23/2025), Disp: 60 tablet, Rfl: 3    potassium chloride ER (K-TAB) 20 MEQ tablet controlled-release ER tablet, Take 2 tablets by mouth Daily with food. (Patient not taking: Reported on 4/23/2025), Disp: 60 tablet, Rfl: 3    Medicines reviewed by Venita Gutierrez, [facility] on 4/23/2025 at  2:24 PM    Drug Interactions  -Furosemide can diminish the therapeutic effect of mounjaro and metformin  -Metoprolol may enhance the hypoglycemic effect of antidiabetic agents  -Pepto-bismol may enhance the hypoglycemic effect of antidiabetic agents (pt only takes this med once weekly)    Recommended Medications Assessment  Aspirin: Not taking  Statin: Currently Taking   ACEi/ARB: Not taking (heart failure clinic stopped Entresto)    Adverse Drug Reactions  Adverse Reactions Experienced: Pt reports some mild heartburn, belching, and passing gas after taking Mounjaro.  Pt states that this is tolerable for her.  Pt takes 2 pepto-bismol tabs and a gas-x tab on the same day she takes the mounjaro every week.  Plan for ADR Management:  None required as these are mild, tolerable side effects for the pt.    Adherence, Self-Administration, and Current Therapy Problems  Adherence related to the patient's specialty therapy was discussed with the patient. The Adherence segment of this outreach has been reviewed and updated.   Adherence Questions  Linked Medication(s) Assessed: Dapagliflozin Propanediol, metFORMIN HCl (GLUCOPHAGE-XR), Tirzepatide  On average, how many doses/injections does the patient miss per month?: 0  What are the identified reasons for non-adherence or missed doses? : no problems identified  What is the estimated medication adherence level?: %  Based on the patient/caregiver response and refill history, does this patient require an MTP to track adherence improvements?: no    Additional Barriers to Patient Self-Administration: None  Methods for Supporting Patient Self-Administration: None required    Recently Close Medication Therapy Problems  No medication therapy recommendations to display    Goals of Therapy  Goals related to the patient's specialty therapy was discussed with the patient. The Patient Goals segment of this outreach has been reviewed and updated.    Goals Addressed Today        HEMOGLOBIN A1C < 7      Specialty Pharmacy General Goal      Prevent Hypoglycemia            Quality of Life Improvement Scale: 8-Moderately better  Reassessment Plan & Follow-Up  Patient's diabetes is at goal as A1c = 5.8%  Medication Therapy Changes: Per verbal order from MAURO Alexis, continue the following medications:  Mounjaro 15 mg weekly   Metformin XR 1000mg po daily  Farxiga 10mg po daily  Related Plans, Therapy Recommendations, or Issues to Be Addressed:   Will send updated Rx for Mounjaro to [facility] and medications will be shipped to patient.   Patient's metformin is managed by Rachel Savage and Farxiga is managed by Tawanna Arambula in heart failure clinic.  Instructed pt to contact office if she experiences any more  UTI's or yeast infections. Educated pt that UTI's/yeast infections are a potential side effect of Farxiga. Of note, heart failure clinic prescribes this medication.  Pharmacist to perform regular reassessments no more than (6) months from the previous assessment.  Care Coordinator to set up future refill outreaches, coordinate prescription delivery, and escalate clinical questions to pharmacist.     Attestation  I attest the patient was actively involved in and has agreed to the above plan of care. If the prescribed therapy is at any point deemed not appropriate based on the current or future assessments, a consultation will be initiated with the patient's specialty care provider to determine the best course of action. The revised plan of therapy will be documented along with any required assessments and/or additional patient education provided.    Venita Gutierrez RP  04/23/25  14:58 EDT

## 2025-05-15 ENCOUNTER — SPECIALTY PHARMACY (OUTPATIENT)
Dept: PHARMACY | Facility: HOSPITAL | Age: 65
End: 2025-05-15
Payer: COMMERCIAL

## 2025-05-15 NOTE — PROGRESS NOTES
Specialty Pharmacy Patient Management Program  Refill Outreach     Zeina was contacted today regarding refills of their medication(s).    Refill Questions      Flowsheet Row Most Recent Value   Changes to allergies? No   Changes to medications? No   New conditions or infections since last clinic visit No   Unplanned office visit, urgent care, ED, or hospital admission in the last 4 weeks  No   How does patient/caregiver feel medication is working? Very good   Financial problems or insurance changes  No   Since the previous refill, were any specialty medication doses or scheduled injections missed or delayed?  No   Does this patient require a clinical escalation to a pharmacist? No            Delivery Questions      Flowsheet Row Most Recent Value   Delivery method UPS   Delivery address verified with patient/caregiver? Yes   Delivery address Home   Number of medications in delivery 2   Medication(s) being filled and delivered Potassium Chloride (K-TAB), Tirzepatide   Doses left of specialty medications na   Copay verified? Yes   Copay amount 35.00$   Copay form of payment Credit/debit on file   Delivery Date Selection 05/20/25   Signature Required No   Do you consent to receive electronic handouts?  Yes                 Follow-up: 90 day(s)     Minal Raygoza, Pharmacy Technician  5/15/2025  14:08 EDT

## 2025-06-05 DIAGNOSIS — Z79.4 TYPE 2 DIABETES MELLITUS WITH OTHER DIABETIC ARTHROPATHY, WITH LONG-TERM CURRENT USE OF INSULIN: ICD-10-CM

## 2025-06-05 DIAGNOSIS — E11.618 TYPE 2 DIABETES MELLITUS WITH OTHER DIABETIC ARTHROPATHY, WITH LONG-TERM CURRENT USE OF INSULIN: ICD-10-CM

## 2025-06-05 DIAGNOSIS — I50.32 CHRONIC HEART FAILURE WITH PRESERVED EJECTION FRACTION (HFPEF): ICD-10-CM

## 2025-06-05 RX ORDER — DAPAGLIFLOZIN 10 MG/1
1 TABLET, FILM COATED ORAL DAILY
Qty: 90 TABLET | Refills: 1 | Status: SHIPPED | OUTPATIENT
Start: 2025-06-05

## 2025-06-06 ENCOUNTER — SPECIALTY PHARMACY (OUTPATIENT)
Dept: PHARMACY | Facility: HOSPITAL | Age: 65
End: 2025-06-06
Payer: COMMERCIAL

## 2025-06-06 ENCOUNTER — DISEASE STATE MANAGEMENT VISIT (OUTPATIENT)
Dept: PHARMACY | Facility: HOSPITAL | Age: 65
End: 2025-06-06
Payer: COMMERCIAL

## 2025-06-06 NOTE — PROGRESS NOTES
Specialty Pharmacy Patient Management Program  Refill Outreach     Zeina was contacted today regarding refills of their medication(s).    Refill Questions      Flowsheet Row Most Recent Value   Changes to allergies? No   Changes to medications? No   New conditions or infections since last clinic visit No   Unplanned office visit, urgent care, ED, or hospital admission in the last 4 weeks  No   How does patient/caregiver feel medication is working? Very good   Financial problems or insurance changes  No   Since the previous refill, were any specialty medication doses or scheduled injections missed or delayed?  No   Does this patient require a clinical escalation to a pharmacist? No            Delivery Questions      Flowsheet Row Most Recent Value   Delivery method UPS   Delivery address verified with patient/caregiver? Yes   Delivery address Home   Number of medications in delivery 1   Medication(s) being filled and delivered Dapagliflozin Propanediol   Doses left of specialty medications na   Copay verified? Yes   Copay amount 0.00$   Copay form of payment No copayment ($0)   Delivery Date Selection 06/09/25   Signature Required No   Do you consent to receive electronic handouts?  Yes                 Follow-up: 90 day(s)     Minal Raygoza, Pharmacy Technician  6/6/2025  14:54 EDT

## 2025-06-25 ENCOUNTER — DISEASE STATE MANAGEMENT VISIT (OUTPATIENT)
Dept: PHARMACY | Facility: HOSPITAL | Age: 65
End: 2025-06-25
Payer: COMMERCIAL

## 2025-06-27 ENCOUNTER — SPECIALTY PHARMACY (OUTPATIENT)
Dept: PHARMACY | Facility: HOSPITAL | Age: 65
End: 2025-06-27
Payer: COMMERCIAL

## 2025-06-27 NOTE — PROGRESS NOTES
Specialty Pharmacy Patient Management Program  Refill Outreach     Zeina was contacted today regarding refills of their medication(s).    Refill Questions      Flowsheet Row Most Recent Value   Changes to allergies? No   Changes to medications? No   New conditions or infections since last clinic visit No   Unplanned office visit, urgent care, ED, or hospital admission in the last 4 weeks  No   How does patient/caregiver feel medication is working? Very good   Financial problems or insurance changes  No   Since the previous refill, were any specialty medication doses or scheduled injections missed or delayed?  No   Does this patient require a clinical escalation to a pharmacist? No            Delivery Questions      Flowsheet Row Most Recent Value   Delivery method UPS   Delivery address verified with patient/caregiver? Yes   Delivery address Home   Number of medications in delivery 1   Medication(s) being filled and delivered metFORMIN HCl (GLUCOPHAGE-XR)   Doses left of specialty medications na   Copay verified? Yes   Copay amount 0.00$   Copay form of payment No copayment ($0)   Delivery Date Selection 07/01/25   Signature Required No   Do you consent to receive electronic handouts?  Yes                 Follow-up: 30 day(s)     Minal Raygoza, Pharmacy Technician  6/27/2025  14:52 EDT

## 2025-08-15 ENCOUNTER — HOSPITAL ENCOUNTER (OUTPATIENT)
Dept: CARDIOLOGY | Facility: HOSPITAL | Age: 65
Discharge: HOME | End: 2025-08-15
Payer: COMMERCIAL

## 2025-08-15 VITALS
OXYGEN SATURATION: 97 % | WEIGHT: 213 LBS | DIASTOLIC BLOOD PRESSURE: 63 MMHG | BODY MASS INDEX: 37.74 KG/M2 | SYSTOLIC BLOOD PRESSURE: 110 MMHG | HEIGHT: 63 IN | HEART RATE: 71 BPM

## 2025-08-15 DIAGNOSIS — E61.1 IRON DEFICIENCY: ICD-10-CM

## 2025-08-15 DIAGNOSIS — J44.9 COPD WITHOUT EXACERBATION: ICD-10-CM

## 2025-08-15 DIAGNOSIS — I50.32 CHRONIC HEART FAILURE WITH PRESERVED EJECTION FRACTION (HFPEF): Primary | ICD-10-CM

## 2025-08-15 DIAGNOSIS — Z79.4 TYPE 2 DIABETES MELLITUS WITH OTHER DIABETIC ARTHROPATHY, WITH LONG-TERM CURRENT USE OF INSULIN: ICD-10-CM

## 2025-08-15 DIAGNOSIS — E11.618 TYPE 2 DIABETES MELLITUS WITH OTHER DIABETIC ARTHROPATHY, WITH LONG-TERM CURRENT USE OF INSULIN: ICD-10-CM

## 2025-08-15 DIAGNOSIS — I10 ESSENTIAL HYPERTENSION: ICD-10-CM

## 2025-08-15 DIAGNOSIS — G47.33 OSA ON CPAP: ICD-10-CM

## 2025-08-15 LAB
ABSOLUTE LUNG FLUID CONTENT: 22 % (ref 20–35)
ALBUMIN SERPL-MCNC: 3.7 G/DL (ref 3.5–5.2)
ALBUMIN/GLOB SERPL: 1.6 G/DL
ALP SERPL-CCNC: 83 U/L (ref 39–117)
ALT SERPL W P-5'-P-CCNC: 8 U/L (ref 1–33)
ANION GAP SERPL CALCULATED.3IONS-SCNC: 12.3 MMOL/L (ref 5–15)
AST SERPL-CCNC: 13 U/L (ref 1–32)
BILIRUB SERPL-MCNC: 1.1 MG/DL (ref 0–1.2)
BUN SERPL-MCNC: 11.7 MG/DL (ref 8–23)
BUN/CREAT SERPL: 18 (ref 7–25)
CALCIUM SPEC-SCNC: 8.5 MG/DL (ref 8.6–10.5)
CHLORIDE SERPL-SCNC: 103 MMOL/L (ref 98–107)
CO2 SERPL-SCNC: 24.7 MMOL/L (ref 22–29)
CREAT SERPL-MCNC: 0.65 MG/DL (ref 0.57–1)
DEPRECATED RDW RBC AUTO: 41.1 FL (ref 37–54)
EGFRCR SERPLBLD CKD-EPI 2021: 98.5 ML/MIN/1.73
ERYTHROCYTE [DISTWIDTH] IN BLOOD BY AUTOMATED COUNT: 12.4 % (ref 12.3–15.4)
FERRITIN SERPL-MCNC: 89.4 NG/ML (ref 13–150)
GLOBULIN UR ELPH-MCNC: 2.3 GM/DL
GLUCOSE SERPL-MCNC: 126 MG/DL (ref 65–99)
HCT VFR BLD AUTO: 39.9 % (ref 34–46.6)
HGB BLD-MCNC: 13.5 G/DL (ref 12–15.9)
IRON 24H UR-MRATE: 93 MCG/DL (ref 37–145)
IRON SATN MFR SERPL: 25 % (ref 20–50)
MAGNESIUM SERPL-MCNC: 1.7 MG/DL (ref 1.6–2.4)
MCH RBC QN AUTO: 30.4 PG (ref 26.6–33)
MCHC RBC AUTO-ENTMCNC: 33.8 G/DL (ref 31.5–35.7)
MCV RBC AUTO: 89.9 FL (ref 79–97)
NT-PROBNP SERPL-MCNC: 195 PG/ML (ref 0–900)
PLATELET # BLD AUTO: 230 10*3/MM3 (ref 140–450)
PMV BLD AUTO: 10.2 FL (ref 6–12)
POTASSIUM SERPL-SCNC: 3.6 MMOL/L (ref 3.5–5.2)
PROT SERPL-MCNC: 6 G/DL (ref 6–8.5)
RBC # BLD AUTO: 4.44 10*6/MM3 (ref 3.77–5.28)
SODIUM SERPL-SCNC: 140 MMOL/L (ref 136–145)
TIBC SERPL-MCNC: 365 MCG/DL (ref 298–536)
TRANSFERRIN SERPL-MCNC: 245 MG/DL (ref 200–360)
WBC NRBC COR # BLD AUTO: 7.98 10*3/MM3 (ref 3.4–10.8)

## 2025-08-15 PROCEDURE — 83540 ASSAY OF IRON: CPT | Performed by: PHYSICIAN ASSISTANT

## 2025-08-15 PROCEDURE — 36415 COLL VENOUS BLD VENIPUNCTURE: CPT | Performed by: PHYSICIAN ASSISTANT

## 2025-08-15 PROCEDURE — 80053 COMPREHEN METABOLIC PANEL: CPT | Performed by: PHYSICIAN ASSISTANT

## 2025-08-15 PROCEDURE — 84466 ASSAY OF TRANSFERRIN: CPT | Performed by: PHYSICIAN ASSISTANT

## 2025-08-15 PROCEDURE — 83735 ASSAY OF MAGNESIUM: CPT | Performed by: PHYSICIAN ASSISTANT

## 2025-08-15 PROCEDURE — 85027 COMPLETE CBC AUTOMATED: CPT | Performed by: PHYSICIAN ASSISTANT

## 2025-08-15 PROCEDURE — 94726 PLETHYSMOGRAPHY LUNG VOLUMES: CPT | Performed by: PHYSICIAN ASSISTANT

## 2025-08-15 PROCEDURE — 83880 ASSAY OF NATRIURETIC PEPTIDE: CPT | Performed by: PHYSICIAN ASSISTANT

## 2025-08-15 PROCEDURE — 82728 ASSAY OF FERRITIN: CPT | Performed by: PHYSICIAN ASSISTANT

## 2025-08-15 RX ORDER — METOPROLOL SUCCINATE 200 MG/1
200 TABLET, EXTENDED RELEASE ORAL DAILY
Qty: 90 TABLET | Refills: 1 | Status: SHIPPED | OUTPATIENT
Start: 2025-08-15

## 2025-08-15 RX ORDER — DIPHENHYDRAMINE HYDROCHLORIDE 50 MG/ML
50 INJECTION, SOLUTION INTRAMUSCULAR; INTRAVENOUS AS NEEDED
OUTPATIENT
Start: 2025-08-18

## 2025-08-15 RX ORDER — SODIUM CHLORIDE 9 MG/ML
20 INJECTION, SOLUTION INTRAVENOUS ONCE
Status: CANCELLED | OUTPATIENT
Start: 2025-08-18

## 2025-08-15 RX ORDER — DAPAGLIFLOZIN 10 MG/1
10 TABLET, FILM COATED ORAL DAILY
Qty: 90 TABLET | Refills: 3 | Status: SHIPPED | OUTPATIENT
Start: 2025-08-15

## 2025-08-15 RX ORDER — FUROSEMIDE 40 MG/1
TABLET ORAL
Qty: 180 TABLET | Refills: 3 | Status: SHIPPED | OUTPATIENT
Start: 2025-08-15

## 2025-08-15 RX ORDER — HYDROCORTISONE SODIUM SUCCINATE 100 MG/2ML
100 INJECTION INTRAMUSCULAR; INTRAVENOUS AS NEEDED
OUTPATIENT
Start: 2025-08-18

## 2025-08-15 RX ORDER — CETIRIZINE HYDROCHLORIDE 10 MG/1
10 TABLET ORAL ONCE
Status: CANCELLED | OUTPATIENT
Start: 2025-08-18

## 2025-08-15 RX ORDER — FAMOTIDINE 20 MG/1
20 TABLET, FILM COATED ORAL ONCE
Status: CANCELLED | OUTPATIENT
Start: 2025-08-18

## 2025-08-15 RX ORDER — PROCHLORPERAZINE MALEATE 10 MG
10 TABLET ORAL ONCE
Status: CANCELLED | OUTPATIENT
Start: 2025-08-18 | End: 2025-08-18

## 2025-08-15 RX ORDER — FUROSEMIDE 40 MG/1
TABLET ORAL
Qty: 180 TABLET | Refills: 0 | Status: SHIPPED | OUTPATIENT
Start: 2025-08-15 | End: 2025-08-15 | Stop reason: SDUPTHER

## 2025-08-15 RX ORDER — FAMOTIDINE 10 MG/ML
20 INJECTION, SOLUTION INTRAVENOUS AS NEEDED
OUTPATIENT
Start: 2025-08-18

## 2025-08-15 RX ORDER — ACETAMINOPHEN 325 MG/1
650 TABLET ORAL ONCE
Status: CANCELLED | OUTPATIENT
Start: 2025-08-18

## 2025-08-18 RX ORDER — FAMOTIDINE 20 MG/1
20 TABLET, FILM COATED ORAL ONCE
OUTPATIENT
Start: 2025-08-18

## 2025-08-18 RX ORDER — CETIRIZINE HYDROCHLORIDE 10 MG/1
10 TABLET ORAL ONCE
OUTPATIENT
Start: 2025-08-18

## 2025-08-18 RX ORDER — PROCHLORPERAZINE MALEATE 10 MG
10 TABLET ORAL ONCE
OUTPATIENT
Start: 2025-08-18 | End: 2025-08-18

## 2025-08-18 RX ORDER — ACETAMINOPHEN 325 MG/1
650 TABLET ORAL ONCE
OUTPATIENT
Start: 2025-08-18

## 2025-08-18 RX ORDER — SODIUM CHLORIDE 9 MG/ML
20 INJECTION, SOLUTION INTRAVENOUS ONCE
OUTPATIENT
Start: 2025-08-18

## 2025-08-20 ENCOUNTER — SPECIALTY PHARMACY (OUTPATIENT)
Dept: PHARMACY | Facility: HOSPITAL | Age: 65
End: 2025-08-20
Payer: COMMERCIAL

## (undated) DEVICE — CONN Y IRR DISP 1P/U

## (undated) DEVICE — POLYP TRAP: Brand: TRAPEASE®

## (undated) DEVICE — HOLDER: Brand: DEROYAL

## (undated) DEVICE — Device

## (undated) DEVICE — ENDOGATOR TUBING FOR ENDOGATOR EGP-100 IRRIGATION PUMP,OLYMPUS OFP PUMP, OLYMPUS AFU-100 PUMP AND ERBE EIP2 PUMP: Brand: ENDOGATOR

## (undated) DEVICE — Device: Brand: ENDOGATOR

## (undated) DEVICE — GOWN,REINF,POLY,ECL,PP SLV,XL: Brand: MEDLINE

## (undated) DEVICE — TUBING, SUCTION, 1/4" X 20', STRAIGHT: Brand: MEDLINE INDUSTRIES, INC.

## (undated) DEVICE — ENDOGATOR AUXILIARY WATER JET CONNECTOR: Brand: ENDOGATOR

## (undated) DEVICE — Device: Brand: DEFENDO AIR/WATER/SUCTION AND BIOPSY VALVE

## (undated) DEVICE — SNAR POLYP CAPTIFLX MICRO OVL 13MM 240CM

## (undated) DEVICE — SINGLE PORT MANIFOLD: Brand: NEPTUNE 2

## (undated) DEVICE — SYR LUERLOK 30CC